# Patient Record
Sex: MALE | Race: WHITE | NOT HISPANIC OR LATINO | URBAN - METROPOLITAN AREA
[De-identification: names, ages, dates, MRNs, and addresses within clinical notes are randomized per-mention and may not be internally consistent; named-entity substitution may affect disease eponyms.]

---

## 2017-05-31 ENCOUNTER — EMERGENCY (EMERGENCY)
Facility: HOSPITAL | Age: 37
LOS: 1 days | Discharge: HOME | End: 2017-05-31
Attending: EMERGENCY MEDICINE | Admitting: EMERGENCY MEDICINE
Payer: MEDICAID

## 2017-05-31 VITALS
RESPIRATION RATE: 18 BRPM | TEMPERATURE: 98 F | SYSTOLIC BLOOD PRESSURE: 156 MMHG | DIASTOLIC BLOOD PRESSURE: 82 MMHG | HEART RATE: 87 BPM | OXYGEN SATURATION: 99 %

## 2017-05-31 PROCEDURE — 99284 EMERGENCY DEPT VISIT MOD MDM: CPT

## 2017-05-31 RX ORDER — KETOROLAC TROMETHAMINE 30 MG/ML
30 SYRINGE (ML) INJECTION ONCE
Qty: 0 | Refills: 0 | Status: DISCONTINUED | OUTPATIENT
Start: 2017-05-31 | End: 2017-05-31

## 2017-05-31 RX ORDER — CYCLOBENZAPRINE HYDROCHLORIDE 10 MG/1
10 TABLET, FILM COATED ORAL ONCE
Qty: 0 | Refills: 0 | Status: COMPLETED | OUTPATIENT
Start: 2017-05-31 | End: 2017-05-31

## 2017-05-31 RX ORDER — CYCLOBENZAPRINE HYDROCHLORIDE 10 MG/1
1 TABLET, FILM COATED ORAL
Qty: 6 | Refills: 0 | OUTPATIENT
Start: 2017-05-31

## 2017-05-31 RX ORDER — KETOROLAC TROMETHAMINE 30 MG/ML
60 SYRINGE (ML) INJECTION ONCE
Qty: 0 | Refills: 0 | Status: DISCONTINUED | OUTPATIENT
Start: 2017-05-31 | End: 2017-05-31

## 2017-05-31 RX ADMIN — Medication 60 MILLIGRAM(S): at 15:03

## 2017-05-31 RX ADMIN — CYCLOBENZAPRINE HYDROCHLORIDE 10 MILLIGRAM(S): 10 TABLET, FILM COATED ORAL at 15:00

## 2017-05-31 NOTE — ED PROVIDER NOTE - NS ED MD SCRIBE ATTENDING SCRIBE SECTIONS
VITAL SIGNS( Pullset)/PHYSICAL EXAM/PAST MEDICAL/SURGICAL/SOCIAL HISTORY/HIV/REVIEW OF SYSTEMS/DISPOSITION/HISTORY OF PRESENT ILLNESS

## 2017-05-31 NOTE — ED PROVIDER NOTE - PLAN OF CARE
Rest, drink plenty of fluids.  Advance activity as tolerated.  Continue all previously prescribed medications as directed.  Follow up with your primary care physician in 48-72 hours- bring copies of your results.  Take Naproxen every 12 hours  as needed. Take medrol dose pack as directed. Take Flexeril three times a day as needed - Do not drink/drive/operate machinery while on this medication, it can make you drowsy.  Return to the Emergency Department for worsening or persistent symptoms OR ANY NEW OR CONCERNING SYMPTOMS.

## 2017-05-31 NOTE — ED PROVIDER NOTE - CARE PLAN
Principal Discharge DX:	Buttock pain  Instructions for follow-up, activity and diet:	Rest, drink plenty of fluids.  Advance activity as tolerated.  Continue all previously prescribed medications as directed.  Follow up with your primary care physician in 48-72 hours- bring copies of your results.  Take Naproxen every 12 hours  as needed. Take medrol dose pack as directed. Take Flexeril three times a day as needed - Do not drink/drive/operate machinery while on this medication, it can make you drowsy.  Return to the Emergency Department for worsening or persistent symptoms OR ANY NEW OR CONCERNING SYMPTOMS.

## 2017-05-31 NOTE — ED PROVIDER NOTE - OBJECTIVE STATEMENT
36y M with no significant PMHx presents to the ED with left buttock pain x 2.5 days. Pt states he was sleeping on a hard surface when he developed pain. Reports throbbing pain radiating down leg. Pt states he had pain at right buttock in the past after trauma but notes this pain is worse. Took Ibuprofen 2 hours ago. Ot states he is taking Methadone. Denies fever, numbness, tingling, weakness, bladder/bowel incontinence, or any other complaints. NKDA.

## 2017-05-31 NOTE — ED PROVIDER NOTE - PROGRESS NOTE DETAILS
JOSE ALBERTO Greco: discussed with eliezer barajasquesting naproxen, flexeril and medrol dose pack sent to [facility]. pt ambulating without difficulty. discussed return precautions. not to drive on flexeril. pt agrees and u understands plan. No signs of withdrawal. I performed the initial face to face bedside interview with this patient regarding history of present illness, review of symptoms and past medical, social and family history.  I completed an independent physical examination.  I was the initial provider who evaluated this patient.  The history, review of symptoms and examination was documented by the scribe in my presence and I attest to the accuracy of the documentation.  I have signed out the follow up of any pending tests (i.e. labs, radiological studies) to the PA.  I have discussed the patient’s plan of care and disposition with the PA.

## 2017-06-09 ENCOUNTER — INPATIENT (INPATIENT)
Facility: HOSPITAL | Age: 37
LOS: 13 days | Discharge: NURSING, REHAB OR LONG-TERM CARE | End: 2017-06-23
Attending: INTERNAL MEDICINE | Admitting: INTERNAL MEDICINE
Payer: MEDICAID

## 2017-06-09 VITALS
SYSTOLIC BLOOD PRESSURE: 129 MMHG | RESPIRATION RATE: 18 BRPM | DIASTOLIC BLOOD PRESSURE: 71 MMHG | HEART RATE: 103 BPM | TEMPERATURE: 98 F

## 2017-06-09 DIAGNOSIS — R74.0 NONSPECIFIC ELEVATION OF LEVELS OF TRANSAMINASE AND LACTIC ACID DEHYDROGENASE [LDH]: ICD-10-CM

## 2017-06-09 DIAGNOSIS — K92.2 GASTROINTESTINAL HEMORRHAGE, UNSPECIFIED: ICD-10-CM

## 2017-06-09 DIAGNOSIS — R22.2 LOCALIZED SWELLING, MASS AND LUMP, TRUNK: ICD-10-CM

## 2017-06-09 DIAGNOSIS — M25.552 PAIN IN LEFT HIP: ICD-10-CM

## 2017-06-09 DIAGNOSIS — K92.1 MELENA: ICD-10-CM

## 2017-06-09 DIAGNOSIS — F11.20 OPIOID DEPENDENCE, UNCOMPLICATED: ICD-10-CM

## 2017-06-09 DIAGNOSIS — Z29.9 ENCOUNTER FOR PROPHYLACTIC MEASURES, UNSPECIFIED: ICD-10-CM

## 2017-06-09 DIAGNOSIS — M54.9 DORSALGIA, UNSPECIFIED: ICD-10-CM

## 2017-06-09 DIAGNOSIS — D50.0 IRON DEFICIENCY ANEMIA SECONDARY TO BLOOD LOSS (CHRONIC): ICD-10-CM

## 2017-06-09 DIAGNOSIS — R65.10 SYSTEMIC INFLAMMATORY RESPONSE SYNDROME (SIRS) OF NON-INFECTIOUS ORIGIN WITHOUT ACUTE ORGAN DYSFUNCTION: ICD-10-CM

## 2017-06-09 LAB
ALBUMIN SERPL ELPH-MCNC: 3.3 G/DL — SIGNIFICANT CHANGE UP (ref 3.3–5)
ALP SERPL-CCNC: 100 U/L — SIGNIFICANT CHANGE UP (ref 40–120)
ALT FLD-CCNC: 116 U/L — HIGH (ref 4–41)
AMPHET UR-MCNC: NEGATIVE — SIGNIFICANT CHANGE UP
ANISOCYTOSIS BLD QL: SLIGHT — SIGNIFICANT CHANGE UP
APAP SERPL-MCNC: < 15 UG/ML — LOW (ref 15–25)
APPEARANCE UR: CLEAR — SIGNIFICANT CHANGE UP
APTT BLD: 33.1 SEC — SIGNIFICANT CHANGE UP (ref 27.5–37.4)
AST SERPL-CCNC: 88 U/L — HIGH (ref 4–40)
BARBITURATES MEASUREMENT: NEGATIVE — SIGNIFICANT CHANGE UP
BARBITURATES UR SCN-MCNC: NEGATIVE — SIGNIFICANT CHANGE UP
BASE EXCESS BLDV CALC-SCNC: 1.8 MMOL/L — SIGNIFICANT CHANGE UP
BASOPHILS # BLD AUTO: 0.05 K/UL — SIGNIFICANT CHANGE UP (ref 0–0.2)
BASOPHILS NFR BLD AUTO: 0.2 % — SIGNIFICANT CHANGE UP (ref 0–2)
BASOPHILS NFR SPEC: 0 % — SIGNIFICANT CHANGE UP (ref 0–2)
BENZODIAZ SERPL-MCNC: NEGATIVE — SIGNIFICANT CHANGE UP
BENZODIAZ UR-MCNC: NEGATIVE — SIGNIFICANT CHANGE UP
BILIRUB SERPL-MCNC: 0.2 MG/DL — SIGNIFICANT CHANGE UP (ref 0.2–1.2)
BILIRUB UR-MCNC: NEGATIVE — SIGNIFICANT CHANGE UP
BLASTS # FLD: 0 % — SIGNIFICANT CHANGE UP (ref 0–0)
BLD GP AB SCN SERPL QL: NEGATIVE — SIGNIFICANT CHANGE UP
BLOOD GAS VENOUS - CREATININE: 0.83 MG/DL — SIGNIFICANT CHANGE UP (ref 0.5–1.3)
BLOOD UR QL VISUAL: NEGATIVE — SIGNIFICANT CHANGE UP
BUN SERPL-MCNC: 19 MG/DL — SIGNIFICANT CHANGE UP (ref 7–23)
CALCIUM SERPL-MCNC: 8.9 MG/DL — SIGNIFICANT CHANGE UP (ref 8.4–10.5)
CANNABINOIDS UR-MCNC: POSITIVE — SIGNIFICANT CHANGE UP
CHLORIDE BLDV-SCNC: 96 MMOL/L — SIGNIFICANT CHANGE UP (ref 96–108)
CHLORIDE SERPL-SCNC: 92 MMOL/L — LOW (ref 98–107)
CK MB BLD-MCNC: 1.33 NG/ML — SIGNIFICANT CHANGE UP (ref 1–6.6)
CK MB BLD-MCNC: SIGNIFICANT CHANGE UP (ref 0–2.5)
CK SERPL-CCNC: 97 U/L — SIGNIFICANT CHANGE UP (ref 30–200)
CO2 SERPL-SCNC: 25 MMOL/L — SIGNIFICANT CHANGE UP (ref 22–31)
COCAINE METAB.OTHER UR-MCNC: NEGATIVE — SIGNIFICANT CHANGE UP
COLOR SPEC: YELLOW — SIGNIFICANT CHANGE UP
CREAT SERPL-MCNC: 0.84 MG/DL — SIGNIFICANT CHANGE UP (ref 0.5–1.3)
CRP SERPL-MCNC: 261.7 MG/L — HIGH (ref 0.3–5)
EOSINOPHIL # BLD AUTO: 0.03 K/UL — SIGNIFICANT CHANGE UP (ref 0–0.5)
EOSINOPHIL NFR BLD AUTO: 0.1 % — SIGNIFICANT CHANGE UP (ref 0–6)
EOSINOPHIL NFR FLD: 0 % — SIGNIFICANT CHANGE UP (ref 0–6)
ERYTHROCYTE [SEDIMENTATION RATE] IN BLOOD: > 140 MM/HR — HIGH (ref 1–15)
ETHANOL BLD-MCNC: < 10 MG/DL — SIGNIFICANT CHANGE UP
FERRITIN SERPL-MCNC: 1201 NG/ML — HIGH (ref 30–400)
GAS PNL BLDV: 129 MMOL/L — LOW (ref 136–146)
GIANT PLATELETS BLD QL SMEAR: PRESENT — SIGNIFICANT CHANGE UP
GLUCOSE BLDV-MCNC: 106 — HIGH (ref 70–99)
GLUCOSE SERPL-MCNC: 104 MG/DL — HIGH (ref 70–99)
GLUCOSE UR-MCNC: NEGATIVE — SIGNIFICANT CHANGE UP
HCO3 BLDV-SCNC: 25 MMOL/L — SIGNIFICANT CHANGE UP (ref 20–27)
HCT VFR BLD CALC: 17.5 % — CRITICAL LOW (ref 39–50)
HCT VFR BLD CALC: 23.2 % — LOW (ref 39–50)
HCT VFR BLDV CALC: 23.9 % — LOW (ref 39–51)
HGB BLD-MCNC: 5.6 G/DL — CRITICAL LOW (ref 13–17)
HGB BLD-MCNC: 7.5 G/DL — LOW (ref 13–17)
HGB BLDV-MCNC: 7.7 G/DL — LOW (ref 13–17)
HYPOCHROMIA BLD QL: SIGNIFICANT CHANGE UP
IMM GRANULOCYTES NFR BLD AUTO: 3.8 % — HIGH (ref 0–1.5)
INR BLD: 1.33 — HIGH (ref 0.88–1.17)
IRON SATN MFR SERPL: 15 UG/DL — LOW (ref 45–165)
IRON SATN MFR SERPL: 232 UG/DL — SIGNIFICANT CHANGE UP (ref 155–535)
KETONES UR-MCNC: NEGATIVE — SIGNIFICANT CHANGE UP
LACTATE BLDV-MCNC: 1.3 MMOL/L — SIGNIFICANT CHANGE UP (ref 0.5–2)
LEUKOCYTE ESTERASE UR-ACNC: NEGATIVE — SIGNIFICANT CHANGE UP
LYMPHOCYTES # BLD AUTO: 2.67 K/UL — SIGNIFICANT CHANGE UP (ref 1–3.3)
LYMPHOCYTES # BLD AUTO: 8.9 % — LOW (ref 13–44)
LYMPHOCYTES NFR SPEC AUTO: 7.9 % — LOW (ref 13–44)
MACROCYTES BLD QL: SLIGHT — SIGNIFICANT CHANGE UP
MCHC RBC-ENTMCNC: 28.9 PG — SIGNIFICANT CHANGE UP (ref 27–34)
MCHC RBC-ENTMCNC: 29.5 PG — SIGNIFICANT CHANGE UP (ref 27–34)
MCHC RBC-ENTMCNC: 32 % — SIGNIFICANT CHANGE UP (ref 32–36)
MCHC RBC-ENTMCNC: 32.3 % — SIGNIFICANT CHANGE UP (ref 32–36)
MCV RBC AUTO: 90.2 FL — SIGNIFICANT CHANGE UP (ref 80–100)
MCV RBC AUTO: 91.3 FL — SIGNIFICANT CHANGE UP (ref 80–100)
METAMYELOCYTES # FLD: 0.9 % — SIGNIFICANT CHANGE UP (ref 0–1)
METHADONE UR-MCNC: POSITIVE — SIGNIFICANT CHANGE UP
MONOCYTES # BLD AUTO: 1.93 K/UL — HIGH (ref 0–0.9)
MONOCYTES NFR BLD AUTO: 6.4 % — SIGNIFICANT CHANGE UP (ref 2–14)
MONOCYTES NFR BLD: 2.6 % — SIGNIFICANT CHANGE UP (ref 2–9)
MYELOCYTES NFR BLD: 1.8 % — HIGH (ref 0–0)
NEUTROPHIL AB SER-ACNC: 83.3 % — HIGH (ref 43–77)
NEUTROPHILS # BLD AUTO: 24.1 K/UL — HIGH (ref 1.8–7.4)
NEUTROPHILS NFR BLD AUTO: 80.6 % — HIGH (ref 43–77)
NEUTS BAND # BLD: 3.5 % — SIGNIFICANT CHANGE UP (ref 0–6)
NITRITE UR-MCNC: NEGATIVE — SIGNIFICANT CHANGE UP
NON-SQ EPI CELLS # UR AUTO: <1 — SIGNIFICANT CHANGE UP
NT-PROBNP SERPL-SCNC: 10.31 PG/ML — SIGNIFICANT CHANGE UP
OB PNL STL: POSITIVE — SIGNIFICANT CHANGE UP
OPIATES UR-MCNC: NEGATIVE — SIGNIFICANT CHANGE UP
OTHER - HEMATOLOGY %: 0 — SIGNIFICANT CHANGE UP
OXYCODONE UR-MCNC: NEGATIVE — SIGNIFICANT CHANGE UP
PCO2 BLDV: 50 MMHG — SIGNIFICANT CHANGE UP (ref 41–51)
PCP UR-MCNC: NEGATIVE — SIGNIFICANT CHANGE UP
PH BLDV: 7.35 PH — SIGNIFICANT CHANGE UP (ref 7.32–7.43)
PH UR: 6 — SIGNIFICANT CHANGE UP (ref 4.6–8)
PLATELET # BLD AUTO: 430 K/UL — HIGH (ref 150–400)
PLATELET # BLD AUTO: 541 K/UL — HIGH (ref 150–400)
PLATELET COUNT - ESTIMATE: SIGNIFICANT CHANGE UP
PMV BLD: 9 FL — SIGNIFICANT CHANGE UP (ref 7–13)
PMV BLD: 9.3 FL — SIGNIFICANT CHANGE UP (ref 7–13)
PO2 BLDV: < 24 MMHG — LOW (ref 35–40)
POTASSIUM BLDV-SCNC: 3.7 MMOL/L — SIGNIFICANT CHANGE UP (ref 3.4–4.5)
POTASSIUM SERPL-MCNC: 4 MMOL/L — SIGNIFICANT CHANGE UP (ref 3.5–5.3)
POTASSIUM SERPL-SCNC: 4 MMOL/L — SIGNIFICANT CHANGE UP (ref 3.5–5.3)
PROMYELOCYTES # FLD: 0 % — SIGNIFICANT CHANGE UP (ref 0–0)
PROT SERPL-MCNC: 7.9 G/DL — SIGNIFICANT CHANGE UP (ref 6–8.3)
PROT UR-MCNC: 30 — SIGNIFICANT CHANGE UP
PROTHROM AB SERPL-ACNC: 15 SEC — HIGH (ref 9.8–13.1)
RBC # BLD: 1.94 M/UL — LOW (ref 4.2–5.8)
RBC # BLD: 2.54 M/UL — LOW (ref 4.2–5.8)
RBC # FLD: 13.4 % — SIGNIFICANT CHANGE UP (ref 10.3–14.5)
RBC # FLD: 13.5 % — SIGNIFICANT CHANGE UP (ref 10.3–14.5)
RBC CASTS # UR COMP ASSIST: SIGNIFICANT CHANGE UP (ref 0–?)
RETICS #: 58.3 10X3/UL — SIGNIFICANT CHANGE UP (ref 17–73)
RETICS/RBC NFR: 3.1 % — HIGH (ref 0.5–2.5)
RH IG SCN BLD-IMP: POSITIVE — SIGNIFICANT CHANGE UP
RH IG SCN BLD-IMP: POSITIVE — SIGNIFICANT CHANGE UP
SALICYLATES SERPL-MCNC: < 5 MG/DL — LOW (ref 15–30)
SAO2 % BLDV: 16.3 % — LOW (ref 60–85)
SODIUM SERPL-SCNC: 133 MMOL/L — LOW (ref 135–145)
SP GR SPEC: > 1.04 — HIGH (ref 1–1.03)
SQUAMOUS # UR AUTO: SIGNIFICANT CHANGE UP
TROPONIN T SERPL-MCNC: < 0.06 NG/ML — SIGNIFICANT CHANGE UP (ref 0–0.06)
UIBC SERPL-MCNC: 217 UG/DL — SIGNIFICANT CHANGE UP (ref 110–370)
UROBILINOGEN FLD QL: NORMAL E.U. — SIGNIFICANT CHANGE UP (ref 0.1–0.2)
VARIANT LYMPHS # BLD: 0 % — SIGNIFICANT CHANGE UP
WBC # BLD: 22.9 K/UL — HIGH (ref 3.8–10.5)
WBC # BLD: 29.93 K/UL — HIGH (ref 3.8–10.5)
WBC # FLD AUTO: 22.9 K/UL — HIGH (ref 3.8–10.5)
WBC # FLD AUTO: 29.93 K/UL — HIGH (ref 3.8–10.5)
WBC UR QL: HIGH (ref 0–?)

## 2017-06-09 PROCEDURE — 71260 CT THORAX DX C+: CPT | Mod: 26

## 2017-06-09 PROCEDURE — 71020: CPT | Mod: 26

## 2017-06-09 RX ORDER — CEFEPIME 1 G/1
INJECTION, POWDER, FOR SOLUTION INTRAMUSCULAR; INTRAVENOUS
Qty: 0 | Refills: 0 | Status: DISCONTINUED | OUTPATIENT
Start: 2017-06-09 | End: 2017-06-10

## 2017-06-09 RX ORDER — TRAMADOL HYDROCHLORIDE 50 MG/1
25 TABLET ORAL ONCE
Qty: 0 | Refills: 0 | Status: DISCONTINUED | OUTPATIENT
Start: 2017-06-09 | End: 2017-06-09

## 2017-06-09 RX ORDER — ACETAMINOPHEN 500 MG
650 TABLET ORAL ONCE
Qty: 0 | Refills: 0 | Status: COMPLETED | OUTPATIENT
Start: 2017-06-09 | End: 2017-06-09

## 2017-06-09 RX ORDER — ACETAMINOPHEN 500 MG
650 TABLET ORAL EVERY 6 HOURS
Qty: 0 | Refills: 0 | Status: DISCONTINUED | OUTPATIENT
Start: 2017-06-09 | End: 2017-06-23

## 2017-06-09 RX ORDER — HYDROMORPHONE HYDROCHLORIDE 2 MG/ML
2 INJECTION INTRAMUSCULAR; INTRAVENOUS; SUBCUTANEOUS ONCE
Qty: 0 | Refills: 0 | Status: DISCONTINUED | OUTPATIENT
Start: 2017-06-09 | End: 2017-06-09

## 2017-06-09 RX ORDER — HYDROMORPHONE HYDROCHLORIDE 2 MG/ML
1 INJECTION INTRAMUSCULAR; INTRAVENOUS; SUBCUTANEOUS EVERY 4 HOURS
Qty: 0 | Refills: 0 | Status: DISCONTINUED | OUTPATIENT
Start: 2017-06-09 | End: 2017-06-10

## 2017-06-09 RX ORDER — PANTOPRAZOLE SODIUM 20 MG/1
8 TABLET, DELAYED RELEASE ORAL
Qty: 80 | Refills: 0 | Status: DISCONTINUED | OUTPATIENT
Start: 2017-06-09 | End: 2017-06-12

## 2017-06-09 RX ORDER — CEFEPIME 1 G/1
2000 INJECTION, POWDER, FOR SOLUTION INTRAMUSCULAR; INTRAVENOUS ONCE
Qty: 0 | Refills: 0 | Status: COMPLETED | OUTPATIENT
Start: 2017-06-09 | End: 2017-06-09

## 2017-06-09 RX ORDER — KETOROLAC TROMETHAMINE 30 MG/ML
15 SYRINGE (ML) INJECTION ONCE
Qty: 0 | Refills: 0 | Status: DISCONTINUED | OUTPATIENT
Start: 2017-06-09 | End: 2017-06-09

## 2017-06-09 RX ORDER — PIPERACILLIN AND TAZOBACTAM 4; .5 G/20ML; G/20ML
3.38 INJECTION, POWDER, LYOPHILIZED, FOR SOLUTION INTRAVENOUS EVERY 8 HOURS
Qty: 0 | Refills: 0 | Status: DISCONTINUED | OUTPATIENT
Start: 2017-06-09 | End: 2017-06-09

## 2017-06-09 RX ORDER — PANTOPRAZOLE SODIUM 20 MG/1
40 TABLET, DELAYED RELEASE ORAL
Qty: 0 | Refills: 0 | Status: DISCONTINUED | OUTPATIENT
Start: 2017-06-09 | End: 2017-06-09

## 2017-06-09 RX ORDER — PIPERACILLIN AND TAZOBACTAM 4; .5 G/20ML; G/20ML
3.38 INJECTION, POWDER, LYOPHILIZED, FOR SOLUTION INTRAVENOUS ONCE
Qty: 0 | Refills: 0 | Status: COMPLETED | OUTPATIENT
Start: 2017-06-09 | End: 2017-06-09

## 2017-06-09 RX ORDER — SODIUM CHLORIDE 9 MG/ML
1000 INJECTION INTRAMUSCULAR; INTRAVENOUS; SUBCUTANEOUS ONCE
Qty: 0 | Refills: 0 | Status: COMPLETED | OUTPATIENT
Start: 2017-06-09 | End: 2017-06-09

## 2017-06-09 RX ORDER — VANCOMYCIN HCL 1 G
1250 VIAL (EA) INTRAVENOUS EVERY 12 HOURS
Qty: 0 | Refills: 0 | Status: DISCONTINUED | OUTPATIENT
Start: 2017-06-10 | End: 2017-06-11

## 2017-06-09 RX ORDER — SODIUM CHLORIDE 9 MG/ML
1000 INJECTION INTRAMUSCULAR; INTRAVENOUS; SUBCUTANEOUS
Qty: 0 | Refills: 0 | Status: DISCONTINUED | OUTPATIENT
Start: 2017-06-09 | End: 2017-06-23

## 2017-06-09 RX ORDER — MIRTAZAPINE 45 MG/1
45 TABLET, ORALLY DISINTEGRATING ORAL ONCE
Qty: 0 | Refills: 0 | Status: COMPLETED | OUTPATIENT
Start: 2017-06-09 | End: 2017-06-10

## 2017-06-09 RX ORDER — HYDROMORPHONE HYDROCHLORIDE 2 MG/ML
1 INJECTION INTRAMUSCULAR; INTRAVENOUS; SUBCUTANEOUS ONCE
Qty: 0 | Refills: 0 | Status: DISCONTINUED | OUTPATIENT
Start: 2017-06-09 | End: 2017-06-09

## 2017-06-09 RX ORDER — CEFEPIME 1 G/1
2000 INJECTION, POWDER, FOR SOLUTION INTRAMUSCULAR; INTRAVENOUS EVERY 8 HOURS
Qty: 0 | Refills: 0 | Status: DISCONTINUED | OUTPATIENT
Start: 2017-06-10 | End: 2017-06-10

## 2017-06-09 RX ORDER — VANCOMYCIN HCL 1 G
1000 VIAL (EA) INTRAVENOUS ONCE
Qty: 0 | Refills: 0 | Status: COMPLETED | OUTPATIENT
Start: 2017-06-09 | End: 2017-06-09

## 2017-06-09 RX ADMIN — SODIUM CHLORIDE 100 MILLILITER(S): 9 INJECTION INTRAMUSCULAR; INTRAVENOUS; SUBCUTANEOUS at 18:50

## 2017-06-09 RX ADMIN — PIPERACILLIN AND TAZOBACTAM 200 GRAM(S): 4; .5 INJECTION, POWDER, LYOPHILIZED, FOR SOLUTION INTRAVENOUS at 17:12

## 2017-06-09 RX ADMIN — HYDROMORPHONE HYDROCHLORIDE 1 MILLIGRAM(S): 2 INJECTION INTRAMUSCULAR; INTRAVENOUS; SUBCUTANEOUS at 14:55

## 2017-06-09 RX ADMIN — Medication 250 MILLIGRAM(S): at 15:06

## 2017-06-09 RX ADMIN — CEFEPIME 100 MILLIGRAM(S): 1 INJECTION, POWDER, FOR SOLUTION INTRAMUSCULAR; INTRAVENOUS at 18:50

## 2017-06-09 RX ADMIN — Medication 650 MILLIGRAM(S): at 15:40

## 2017-06-09 RX ADMIN — Medication 15 MILLIGRAM(S): at 14:55

## 2017-06-09 RX ADMIN — HYDROMORPHONE HYDROCHLORIDE 2 MILLIGRAM(S): 2 INJECTION INTRAMUSCULAR; INTRAVENOUS; SUBCUTANEOUS at 19:40

## 2017-06-09 RX ADMIN — TRAMADOL HYDROCHLORIDE 25 MILLIGRAM(S): 50 TABLET ORAL at 22:51

## 2017-06-09 RX ADMIN — SODIUM CHLORIDE 1000 MILLILITER(S): 9 INJECTION INTRAMUSCULAR; INTRAVENOUS; SUBCUTANEOUS at 12:52

## 2017-06-09 RX ADMIN — HYDROMORPHONE HYDROCHLORIDE 2 MILLIGRAM(S): 2 INJECTION INTRAMUSCULAR; INTRAVENOUS; SUBCUTANEOUS at 20:05

## 2017-06-09 RX ADMIN — PANTOPRAZOLE SODIUM 10 MG/HR: 20 TABLET, DELAYED RELEASE ORAL at 21:00

## 2017-06-09 RX ADMIN — TRAMADOL HYDROCHLORIDE 25 MILLIGRAM(S): 50 TABLET ORAL at 22:13

## 2017-06-09 RX ADMIN — HYDROMORPHONE HYDROCHLORIDE 1 MILLIGRAM(S): 2 INJECTION INTRAMUSCULAR; INTRAVENOUS; SUBCUTANEOUS at 14:40

## 2017-06-09 RX ADMIN — PANTOPRAZOLE SODIUM 40 MILLIGRAM(S): 20 TABLET, DELAYED RELEASE ORAL at 17:13

## 2017-06-09 RX ADMIN — HYDROMORPHONE HYDROCHLORIDE 1 MILLIGRAM(S): 2 INJECTION INTRAMUSCULAR; INTRAVENOUS; SUBCUTANEOUS at 23:52

## 2017-06-09 RX ADMIN — SODIUM CHLORIDE 2000 MILLILITER(S): 9 INJECTION INTRAMUSCULAR; INTRAVENOUS; SUBCUTANEOUS at 17:12

## 2017-06-09 RX ADMIN — HYDROMORPHONE HYDROCHLORIDE 1 MILLIGRAM(S): 2 INJECTION INTRAMUSCULAR; INTRAVENOUS; SUBCUTANEOUS at 23:24

## 2017-06-09 RX ADMIN — Medication 15 MILLIGRAM(S): at 14:40

## 2017-06-09 NOTE — ED ADULT TRIAGE NOTE - CHIEF COMPLAINT QUOTE
Pt seen here may 31--for sciatic pain--which pt states is getting worse. Pt states its in his hip area and when he moves its very painfull- went to pmd and was told he has mass on the outside of his chest that needs to be checked. Pt has raised area on upper lt chest

## 2017-06-09 NOTE — H&P ADULT - NSHPLABSRESULTS_GEN_ALL_CORE
7.5    29.93 )-----------( 541      ( 09 Jun 2017 12:20 )             23.2     06-09    133<L>  |  92<L>  |  19  ----------------------------<  104<H>  4.0   |  25  |  0.84    Ca    8.9      09 Jun 2017 12:20    TPro  7.9  /  Alb  3.3  /  TBili  0.2  /  DBili  x   /  AST  88<H>  /  ALT  116<H>  /  AlkPhos  100  06-09      PT/INR - ( 09 Jun 2017 12:20 )   PT: 15.0 SEC;   INR: 1.33          PTT - ( 09 Jun 2017 12:20 )  PTT:33.1 SEC

## 2017-06-09 NOTE — ED ADULT NURSE NOTE - OBJECTIVE STATEMENT
patient alert ox3 came in c/o low back pain and mass to anterior chest since few days. denies any injury. skin warm and dry, breathing even and unlabored. labs done as ordered. awaiting results and re eval .mass noted to top of the sternum palpable with pain.

## 2017-06-09 NOTE — CONSULT NOTE ADULT - ASSESSMENT
35 yo man with L hip pain, high fever and leukocytosis concerning for infecious process - possibly septic joint vs osteomyelitis - patient also with melena x1 in the setting of high dose NSAID use concerning for PUD bleeding.

## 2017-06-09 NOTE — ED PROVIDER NOTE - OBJECTIVE STATEMENT
36yoM psych hx, taking methadone, diverticulitis in the past, seen 1.5 wks ago for left sided back pain pw with worsening left sided back pain, starting from left buttocks radiating to left groin associated with numbness and tingling in bilateral feet as well as bilateral hands per pt. pain is so bad, pt cannot get out of bed at times and has trouble using the bathroom, described as "would rather on himself than use the bathroom". pt also endorsing subjective fevers, chills and sweats mostly at night for past 5-6 days, worsening yesterday. PT also has bump that has developed on his left anterior chest wall that is painful to touch with no redness. slowly increasing in size per pt. ALSO pt has episode of large, black, thick, stool yesterday x1 after not moving his bowel for approx 1 week. endorsing some fatigue and sob with exertion. prior to back pain, pt was able to walk all day, now he can only walk about  feet before coming out of breath. denies exertional chest pain, abd pain, nausea, vomiting, diarrhea, bowel incontinence. + history of IV drug use (aprox 20 years ago), + family history of colon ca. pt endorses good compliance with his methadone has not missed doses.

## 2017-06-09 NOTE — CONSULT NOTE ADULT - PROBLEM SELECTOR RECOMMENDATION 2
- suspect UGIB likely 2/2 PUD from NSAID use  - serial CBC (Q8 hours) with transfusion to hgb goal > 7  - PPI gtt  - NPO after midnight for possible EGD - suspect UGIB likely 2/2 PUD from NSAID use  - serial CBC (Q8 hours) with transfusion to hgb goal > 7  - PPI gtt  - NPO after midnight for possible EGD  - discussed with GI attending regarding imaging of the abd (CT abd) if obtaining hip imaging to ensure no perforated ulcer - suspect UGIB likely 2/2 PUD from NSAID use  - serial CBC (Q8 hours) with transfusion to hgb goal > 7  - PPI gtt  - NPO after midnight for possible EGD depending on response to transfusions  - discussed with GI attending regarding imaging of the abd (CT abd) if obtaining hip imaging to ensure no perforated ulcer

## 2017-06-09 NOTE — H&P ADULT - PROBLEM SELECTOR PLAN 4
Acute blood loss anemia given Occult blood + and normal Hg in 12/2016  Endorses hx of diverticulitis-No abdominal pain reported  May have a slow GIB vs Occult GI malignancy given fam Hx  Will call GI. Protonix IV 40 bid  Type and scree. Goal Hg>7

## 2017-06-09 NOTE — ED PROVIDER NOTE - ATTENDING CONTRIBUTION TO CARE
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 Pt was seen in the ED and discharged home and now he has fever, chills and worsening pain with a new chest mass. Pt also complains of black tarry stool, feels very tired and weak    pt is alert, unkept ill appearing male, s1s2 normal reg, b/l clear breathe sounds, 3 cm x 2 cm mass in central chest with tenderness to palpation, fluctuance  but no redness, mass is parasternal on left side. Mottling of b/l lower legs noted and diffuse poor skin turgor, full rom at joints, pain rom at left hip but no rash seen.    ddx: infectious or bleeding etiology leading to vaso occlusion, concerns for chest mass

## 2017-06-09 NOTE — H&P ADULT - PROBLEM SELECTOR PLAN 2
TTP in left Posteriro superior illiac spine and gluteal region. Concern for Osteomyelitis  Severely elevated ESR and CRP. Hx of IV drug abuse  Will check MRI pelvis and hip to r/o Osteo  Pain control with Dilaudid

## 2017-06-09 NOTE — H&P ADULT - PROBLEM SELECTOR PLAN 7
SCD for DVT ppx  Protonix for GI ppx   Clear liquid diet for now Soft tissues mass in midline chest not visualized on CT   Will obtain Ultrasound

## 2017-06-09 NOTE — CONSULT NOTE ADULT - SUBJECTIVE AND OBJECTIVE BOX
Chief Complaint:  Patient is a 36y old  Male who presents with a chief complaint of Left sided hip/leg pain with fevers (2017 16:06)      HPI: 37 yo man with depression/anxiety, remote history of prescription drug abuse currently on methadone presents to [facility] for severe left hip pain x1-2 weeks with concurrent fevers. He denies any recent IVDU or IV needle exposures. He denies any open skin wounds. He has been taking 1200 mg ibuprofen twice daily and tylenol for his hip pain. Yesterday the patient had a single melenic BM in the AM that was tarry and thick. No abd pain. No nausea or vomiting. No dyspepsia. No hematochezia or hematemesis.     Allergies:  No Known Allergies      Home Medications:    Hospital Medications:  acetaminophen   Tablet 650milliGRAM(s) Oral every 6 hours PRN  HYDROmorphone  Injectable 1milliGRAM(s) IV Push every 4 hours PRN  cefepime  IVPB  IV Intermittent   sodium chloride 0.9%. 1000milliLiter(s) IV Continuous <Continuous>  pantoprazole Infusion 8mG/Hr IV Continuous <Continuous>      PMHX/PSHX:  Depression  No significant past surgical history      Family history:  Family history of stomach cancer (Mother)      Social History:     ROS:     General:  (+) fevers, chills, night sweats, fatigue   Eyes:  Good vision, no reported pain  ENT:  No sore throat, pain, runny nose  CV:  No chest pain, SOB, palpitations, orthopnea  Resp:  No cough, SOB, wheezing  GI:  See HPI  :  No pain, bleeding, incontinence, nocturia  Muscle:  severe left hip pain and sternal chest wall pain with lump  Neuro:  No weakness, tingling, memory problems  Psych:  No fatigue, insomnia, mood problems  Endocrine:  No cold/heat intolerance  Heme:  No petechiae, ecchymosis, easy bruising  Skin:  No rash, edema      PHYSICAL EXAM:     GENERAL: appears uncomfortable in bed  HEENT: sclera anicteric  CHEST: CTAB  HEART:  RRR, no edema  ABDOMEN:  Soft, non-tender, non-distended, no masses, no hepatosplenomegaly  RECTAL: no stool in rectal vault  EXTREMITIES:  no cyanosis, or edema  SKIN:  No rash  NEURO:  Alert, orientedx3     Vital Signs:  Vital Signs Last 24 Hrs  T(C): 36.7, Max: 39.8 ( @ 15:15)  T(F): 98, Max: 103.6 ( @ 15:15)  HR: 100 (100 - 110)  BP: 144/84 (126/68 - 149/76)  BP(mean): --  RR: 16 (16 - 19)  SpO2: 98% (97% - 100%)  Daily Height in cm: 170.18 (2017 19:48)    Daily Weight in k.2 (2017 19:48)    LABS:                        5.6    22.90 )-----------( 430      ( 2017 18:34 )             17.5         133<L>  |  92<L>  |  19  ----------------------------<  104<H>  4.0   |  25  |  0.84    Ca    8.9      2017 12:20    TPro  7.9  /  Alb  3.3  /  TBili  0.2  /  DBili  x   /  AST  88<H>  /  ALT  116<H>  /  AlkPhos  100      LIVER FUNCTIONS - ( 2017 12:20 )  Alb: 3.3 g/dL / Pro: 7.9 g/dL / ALK PHOS: 100 u/L / ALT: 116 u/L / AST: 88 u/L / GGT: x           PT/INR - ( 2017 12:20 )   PT: 15.0 SEC;   INR: 1.33          PTT - ( 2017 12:20 )  PTT:33.1 SEC  Urinalysis Basic - ( 2017 15:53 )    Color: YELLOW / Appearance: CLEAR / SG: > 1.040 / pH: 6.0  Gluc: NEGATIVE / Ketone: NEGATIVE  / Bili: NEGATIVE / Urobili: NORMAL E.U.   Blood: NEGATIVE / Protein: 30 / Nitrite: NEGATIVE   Leuk Esterase: NEGATIVE / RBC: 2-5 / WBC 5-10   Sq Epi: OCC / Non Sq Epi: x / Bacteria: x          Imaging:

## 2017-06-09 NOTE — H&P ADULT - PROBLEM SELECTOR PLAN 1
WBC>12 HR>90 and Temp>38  TTP in left PSIS and gluteal region. Concern for Osteo   Severely elevated ESR and CRP. Hx of IV drug abuse  Will check MRI pelvis and hip to r/o Osteo  Pain control with Dilaudid  UA shows spec gravity 1.04-will give more IVF  Cultures sent. CXR unremarkable WBC>12 HR>90 and Temp>38  TTP in left PSIS and gluteal region. Concern for Osteo   Severely elevated ESR and CRP. Hx of IV drug abuse  Will check MRI pelvis and hip to r/o Osteo  Pain control with Dilaudid  UA shows spec gravity 1.04-will give more IVF  Cultures sent. CXR unremarkable  Broad spectrum antibiotics for now  Will check urine toxicology.

## 2017-06-09 NOTE — H&P ADULT - NSHPPHYSICALEXAM_GEN_ALL_CORE
PHYSICAL EXAM:  GENERAL: NAD, well-developed  HEAD:  Atraumatic, Normocephalic  EYES: EOMI, PERRLA, conjunctiva and sclera clear  NECK: Supple, No JVD  CHEST/LUNG: Clear to auscultation bilaterally; No wheeze  HEART: Regular rate and rhythm; No murmurs, rubs, or gallops  ABDOMEN: Soft, Nontender, Nondistended; Bowel sounds present  EXTREMITIES:  2+ Peripheral Pulses, No clubbing, cyanosis, or edema  PSYCH: AAOx3  NEUROLOGY: non-focal  SKIN: No rashes or lesions PHYSICAL EXAM:  GENERAL: Moderate distress from left hip pain. Lying on right side   HEAD:  Atraumatic, Normocephalic  EYES: EOMI, PERRLA, conjunctiva and sclera clear. No icterus or pallor  NECK: Supple, No JVD  CHEST/LUNG: Clear to auscultation bilaterally; No wheeze  HEART: Tachycardic. Regular rhythm; Systolic murmur heard best at RUSB. No rubs or gallops  ABDOMEN: Soft, Nontender, Nondistended; Bowel sounds present. No organomegaly   EXTREMITIES:  2+ Peripheral Pulses, No clubbing, cyanosis, or edema. TTP on left posterior illiac spine and left buttocks. No fluctuance noted. No overlying rash. Spine non tender. Full ROM in spine. Limited in left lower extremity due to pain.   PSYCH: AAOx3  NEUROLOGY: non-focal. Reflexes 2+  SKIN: Anterior mid chest mass-TTP-No drainage or overlying point of entry. No fluctuance noted on left buttocks or left PSIS

## 2017-06-09 NOTE — ED PROVIDER NOTE - MEDICAL DECISION MAKING DETAILS
36yoM hx of iv drug use, psych hx, diverticulitis pw chills, sub fevers, chest mass, back pain, black tarry stools. concern for infectious etiology. labs, cxr, ekg, fluids, will reass for further workup.

## 2017-06-09 NOTE — CHART NOTE - NSCHARTNOTEFT_GEN_A_CORE
Night R1 Chart Note:    Called by GI for consideration of ortho consult to r/o septic joint. evaluated pt. He complained of continued L hip pain, not able to walk on it. Tachycardic but currently afebrile (103F in ED). Pt has limited range of motion with flexion/extension of L hip. No swelling or erythema but TTP superior gluteal/inferior L sacrum. Will consult ortho, obtain Xrays L hip and AP pelvis. iSTOP negative, will prescribe Tramadol, as pt states Dilaudid not working (Toradol worked better, he states but cannot prescribe in setting of melena). Night R1 Chart Note:    Called by GI for consideration of ortho consult to r/o septic joint. evaluated pt. He complained of continued L hip pain, not able to walk on it. Tachycardic but currently afebrile (103F in ED). Pt has limited range of motion with flexion/extension of L hip. No swelling or erythema but TTP superior gluteal/inferior L sacrum. Will consult ortho, obtain Xrays L hip and AP pelvis. iSTOP negative, will prescribe Tramadol, as pt states Dilaudid not working (Toradol worked better, he states but cannot prescribe in setting of melena).      Addendum:  Pt has had no bloody BMs since admission. 4U PRBCs ordered earlier in day. Will stop at 2U PRBCs, recheck Hb (along with all AM labs). Also noted by GI to be NPO after MN and start PPI gtt. Night R1 Chart Note:    Called by GI for consideration of ortho consult to r/o septic joint. evaluated pt. He complained of continued L hip pain, not able to walk on it. Tachycardic but currently afebrile (103F in ED). Pt has limited range of motion with flexion/extension of L hip. No swelling or erythema but TTP superior gluteal/inferior L sacrum. Will consult ortho, obtain Xrays L hip and AP pelvis. iSTOP negative, will prescribe Tramadol, as pt states Dilaudid not working (Toradol worked better, he states but cannot prescribe in setting of melena).      Addendum:  Pt has had no bloody BMs since admission. 4U PRBCs ordered earlier in day but will stop at 2U PRBCs, since noted to be transfusing to Hb > 7. Will recheck Hb (along with all AM labs) after 2U. Notified of low grade temps by RN, that started prior to blood transfusions, will continue to monitor but no need to stop transfusions early. Also noted by GI to be NPO after MN and start PPI gtt. Per ortho, unlikely septic joint; will f/u Xrays and also MRI - primary team may consider IR guided tap if signs of large effusion on MRI. Notified

## 2017-06-09 NOTE — H&P ADULT - PROBLEM SELECTOR PLAN 6
Soft tissues mass in midline chest not visualized on CT   Will obtain Ultrasound AST/ALT ~100. Differential includes 2/2 hypovolemia vs CASTREJON vs drug side effect  Will monitor

## 2017-06-09 NOTE — H&P ADULT - HISTORY OF PRESENT ILLNESS
Patient is a 36 years old male with PMH of IV drug abuse (last used 20 years ago) on methadone and depression who presents with acute onset of left sided hip and buttock pain of 7 days in duration. Patient presented to [facility] ED a week ago and was discharged with pain medications. He returned to the hospital today because of persistent and worsening pain. He is also endorsing fever, chills, and night sweats which have been present for the past week as well along with a lackluster appetite. No recent IV drug use. He [place] self [place] but denies any bug bites of rashes. Not sexually active and no recent trauma. He also endorses nausea but denies vomiting.    Patient also stated that he has a large black bowel movement yesterday but never had BRBPR. He does have intermittent constipation and has been told in the past that he has "diverticulitis."     Patient found to be febrile in .6 with . NML BP. Initial labs show WBC ~30, ESR >140, , HG 7.5 (previously normal), transaminitis with elevated INR 1.33. CT chest negative for soft tissue abnormality. Admitted to medicine for further management.

## 2017-06-09 NOTE — H&P ADULT - NSHPREVIEWOFSYSTEMS_GEN_ALL_CORE
Review of Systems:  Review of Systems: GEN: + FEVER, CHILLS, Night Sweats. no weight loss , no swollen lymph nodes    EYES: No blurry vision , no changes in vision  ENT: no sores, no runny nose, no sore throat   PULM: no cough, no shortness of breath   CARD: no palpitations. Tender mass midline chest     GI : no abdominal pain , + nausea, no vomiting  : no burning urination, no change in color of urine, no flank pain, no blood in urine   MSK: TTP left buttocks and hip as per HPI   SKIN: no bruising or bleeding, no sores in mouth  , no yellowing of the skin. No rashes  Neuro: no lightheadedness, no headaches, no weakness, no fainting, no confusion , no seizures

## 2017-06-10 DIAGNOSIS — M00.052 STAPHYLOCOCCAL ARTHRITIS, LEFT HIP: ICD-10-CM

## 2017-06-10 DIAGNOSIS — R78.81 BACTEREMIA: ICD-10-CM

## 2017-06-10 DIAGNOSIS — A49.8 OTHER BACTERIAL INFECTIONS OF UNSPECIFIED SITE: ICD-10-CM

## 2017-06-10 LAB
ALBUMIN SERPL ELPH-MCNC: 2.8 G/DL — LOW (ref 3.3–5)
ALP SERPL-CCNC: 96 U/L — SIGNIFICANT CHANGE UP (ref 40–120)
ALT FLD-CCNC: 87 U/L — HIGH (ref 4–41)
APTT BLD: 34.9 SEC — SIGNIFICANT CHANGE UP (ref 27.5–37.4)
AST SERPL-CCNC: 57 U/L — HIGH (ref 4–40)
BILIRUB SERPL-MCNC: 0.4 MG/DL — SIGNIFICANT CHANGE UP (ref 0.2–1.2)
BUN SERPL-MCNC: 10 MG/DL — SIGNIFICANT CHANGE UP (ref 7–23)
CALCIUM SERPL-MCNC: 8.6 MG/DL — SIGNIFICANT CHANGE UP (ref 8.4–10.5)
CHLORIDE SERPL-SCNC: 91 MMOL/L — LOW (ref 98–107)
CO2 SERPL-SCNC: 23 MMOL/L — SIGNIFICANT CHANGE UP (ref 22–31)
CREAT SERPL-MCNC: 0.58 MG/DL — SIGNIFICANT CHANGE UP (ref 0.5–1.3)
ERYTHROCYTE [SEDIMENTATION RATE] IN BLOOD: 111 MM/HR — HIGH (ref 1–15)
GLUCOSE SERPL-MCNC: 61 MG/DL — LOW (ref 70–99)
HCT VFR BLD CALC: 22 % — LOW (ref 39–50)
HCT VFR BLD CALC: 24 % — LOW (ref 39–50)
HCT VFR BLD CALC: 29.1 % — LOW (ref 39–50)
HGB BLD-MCNC: 7.3 G/DL — LOW (ref 13–17)
HGB BLD-MCNC: 8 G/DL — LOW (ref 13–17)
HGB BLD-MCNC: 9.6 G/DL — LOW (ref 13–17)
HIV1 AG SER QL: SIGNIFICANT CHANGE UP
HIV1+2 AB SPEC QL: SIGNIFICANT CHANGE UP
MAGNESIUM SERPL-MCNC: 2 MG/DL — SIGNIFICANT CHANGE UP (ref 1.6–2.6)
MANUAL SMEAR VERIFICATION: SIGNIFICANT CHANGE UP
MCHC RBC-ENTMCNC: 29.6 PG — SIGNIFICANT CHANGE UP (ref 27–34)
MCHC RBC-ENTMCNC: 29.7 PG — SIGNIFICANT CHANGE UP (ref 27–34)
MCHC RBC-ENTMCNC: 30.1 PG — SIGNIFICANT CHANGE UP (ref 27–34)
MCHC RBC-ENTMCNC: 33 % — SIGNIFICANT CHANGE UP (ref 32–36)
MCHC RBC-ENTMCNC: 33.2 % — SIGNIFICANT CHANGE UP (ref 32–36)
MCHC RBC-ENTMCNC: 33.3 % — SIGNIFICANT CHANGE UP (ref 32–36)
MCV RBC AUTO: 89.1 FL — SIGNIFICANT CHANGE UP (ref 80–100)
MCV RBC AUTO: 90.1 FL — SIGNIFICANT CHANGE UP (ref 80–100)
MCV RBC AUTO: 90.2 FL — SIGNIFICANT CHANGE UP (ref 80–100)
PHOSPHATE SERPL-MCNC: 3.5 MG/DL — SIGNIFICANT CHANGE UP (ref 2.5–4.5)
PLATELET # BLD AUTO: 485 K/UL — HIGH (ref 150–400)
PLATELET # BLD AUTO: 522 K/UL — HIGH (ref 150–400)
PLATELET # BLD AUTO: 522 K/UL — HIGH (ref 150–400)
PMV BLD: 8.6 FL — SIGNIFICANT CHANGE UP (ref 7–13)
PMV BLD: 9 FL — SIGNIFICANT CHANGE UP (ref 7–13)
PMV BLD: 9.3 FL — SIGNIFICANT CHANGE UP (ref 7–13)
POTASSIUM SERPL-MCNC: 3.5 MMOL/L — SIGNIFICANT CHANGE UP (ref 3.5–5.3)
POTASSIUM SERPL-SCNC: 3.5 MMOL/L — SIGNIFICANT CHANGE UP (ref 3.5–5.3)
PROT SERPL-MCNC: 7.2 G/DL — SIGNIFICANT CHANGE UP (ref 6–8.3)
RBC # BLD: 2.47 M/UL — LOW (ref 4.2–5.8)
RBC # BLD: 2.66 M/UL — LOW (ref 4.2–5.8)
RBC # BLD: 3.23 M/UL — LOW (ref 4.2–5.8)
RBC # FLD: 13.7 % — SIGNIFICANT CHANGE UP (ref 10.3–14.5)
RBC # FLD: 13.8 % — SIGNIFICANT CHANGE UP (ref 10.3–14.5)
RBC # FLD: 14 % — SIGNIFICANT CHANGE UP (ref 10.3–14.5)
SODIUM SERPL-SCNC: 133 MMOL/L — LOW (ref 135–145)
SPECIMEN SOURCE: SIGNIFICANT CHANGE UP
SPECIMEN SOURCE: SIGNIFICANT CHANGE UP
WBC # BLD: 23.55 K/UL — HIGH (ref 3.8–10.5)
WBC # BLD: 24.2 K/UL — HIGH (ref 3.8–10.5)
WBC # BLD: 24.24 K/UL — HIGH (ref 3.8–10.5)
WBC # FLD AUTO: 23.55 K/UL — HIGH (ref 3.8–10.5)
WBC # FLD AUTO: 24.2 K/UL — HIGH (ref 3.8–10.5)
WBC # FLD AUTO: 24.24 K/UL — HIGH (ref 3.8–10.5)

## 2017-06-10 PROCEDURE — 99222 1ST HOSP IP/OBS MODERATE 55: CPT | Mod: GC

## 2017-06-10 PROCEDURE — 73502 X-RAY EXAM HIP UNI 2-3 VIEWS: CPT | Mod: 26,LT

## 2017-06-10 PROCEDURE — 99223 1ST HOSP IP/OBS HIGH 75: CPT | Mod: GC

## 2017-06-10 RX ORDER — CEFAZOLIN SODIUM 1 G
VIAL (EA) INJECTION
Qty: 0 | Refills: 0 | Status: DISCONTINUED | OUTPATIENT
Start: 2017-06-10 | End: 2017-06-23

## 2017-06-10 RX ORDER — MIRTAZAPINE 45 MG/1
45 TABLET, ORALLY DISINTEGRATING ORAL AT BEDTIME
Qty: 0 | Refills: 0 | Status: DISCONTINUED | OUTPATIENT
Start: 2017-06-10 | End: 2017-06-23

## 2017-06-10 RX ORDER — CEFAZOLIN SODIUM 1 G
2000 VIAL (EA) INJECTION ONCE
Qty: 0 | Refills: 0 | Status: COMPLETED | OUTPATIENT
Start: 2017-06-10 | End: 2017-06-10

## 2017-06-10 RX ORDER — KETOROLAC TROMETHAMINE 30 MG/ML
15 SYRINGE (ML) INJECTION ONCE
Qty: 0 | Refills: 0 | Status: DISCONTINUED | OUTPATIENT
Start: 2017-06-10 | End: 2017-06-10

## 2017-06-10 RX ORDER — HYDROMORPHONE HYDROCHLORIDE 2 MG/ML
1 INJECTION INTRAMUSCULAR; INTRAVENOUS; SUBCUTANEOUS
Qty: 0 | Refills: 0 | Status: DISCONTINUED | OUTPATIENT
Start: 2017-06-10 | End: 2017-06-11

## 2017-06-10 RX ORDER — TRAMADOL HYDROCHLORIDE 50 MG/1
50 TABLET ORAL ONCE
Qty: 0 | Refills: 0 | Status: DISCONTINUED | OUTPATIENT
Start: 2017-06-10 | End: 2017-06-23

## 2017-06-10 RX ORDER — HYDROMORPHONE HYDROCHLORIDE 2 MG/ML
2 INJECTION INTRAMUSCULAR; INTRAVENOUS; SUBCUTANEOUS EVERY 4 HOURS
Qty: 0 | Refills: 0 | Status: DISCONTINUED | OUTPATIENT
Start: 2017-06-10 | End: 2017-06-12

## 2017-06-10 RX ORDER — QUETIAPINE FUMARATE 200 MG/1
200 TABLET, FILM COATED ORAL AT BEDTIME
Qty: 0 | Refills: 0 | Status: DISCONTINUED | OUTPATIENT
Start: 2017-06-10 | End: 2017-06-23

## 2017-06-10 RX ORDER — PIPERACILLIN AND TAZOBACTAM 4; .5 G/20ML; G/20ML
3.38 INJECTION, POWDER, LYOPHILIZED, FOR SOLUTION INTRAVENOUS EVERY 8 HOURS
Qty: 0 | Refills: 0 | Status: DISCONTINUED | OUTPATIENT
Start: 2017-06-10 | End: 2017-06-10

## 2017-06-10 RX ORDER — CEFAZOLIN SODIUM 1 G
2000 VIAL (EA) INJECTION EVERY 8 HOURS
Qty: 0 | Refills: 0 | Status: DISCONTINUED | OUTPATIENT
Start: 2017-06-10 | End: 2017-06-23

## 2017-06-10 RX ADMIN — Medication 100 MILLIGRAM(S): at 14:24

## 2017-06-10 RX ADMIN — Medication 15 MILLIGRAM(S): at 09:40

## 2017-06-10 RX ADMIN — HYDROMORPHONE HYDROCHLORIDE 1 MILLIGRAM(S): 2 INJECTION INTRAMUSCULAR; INTRAVENOUS; SUBCUTANEOUS at 04:28

## 2017-06-10 RX ADMIN — Medication 650 MILLIGRAM(S): at 02:17

## 2017-06-10 RX ADMIN — MIRTAZAPINE 45 MILLIGRAM(S): 45 TABLET, ORALLY DISINTEGRATING ORAL at 22:59

## 2017-06-10 RX ADMIN — Medication 15 MILLIGRAM(S): at 17:53

## 2017-06-10 RX ADMIN — Medication 650 MILLIGRAM(S): at 22:38

## 2017-06-10 RX ADMIN — HYDROMORPHONE HYDROCHLORIDE 2 MILLIGRAM(S): 2 INJECTION INTRAMUSCULAR; INTRAVENOUS; SUBCUTANEOUS at 22:59

## 2017-06-10 RX ADMIN — HYDROMORPHONE HYDROCHLORIDE 1 MILLIGRAM(S): 2 INJECTION INTRAMUSCULAR; INTRAVENOUS; SUBCUTANEOUS at 04:12

## 2017-06-10 RX ADMIN — QUETIAPINE FUMARATE 200 MILLIGRAM(S): 200 TABLET, FILM COATED ORAL at 22:58

## 2017-06-10 RX ADMIN — Medication 166.67 MILLIGRAM(S): at 05:51

## 2017-06-10 RX ADMIN — Medication 100 MILLIGRAM(S): at 22:38

## 2017-06-10 RX ADMIN — HYDROMORPHONE HYDROCHLORIDE 2 MILLIGRAM(S): 2 INJECTION INTRAMUSCULAR; INTRAVENOUS; SUBCUTANEOUS at 23:13

## 2017-06-10 RX ADMIN — MIRTAZAPINE 45 MILLIGRAM(S): 45 TABLET, ORALLY DISINTEGRATING ORAL at 01:25

## 2017-06-10 RX ADMIN — Medication 15 MILLIGRAM(S): at 17:17

## 2017-06-10 RX ADMIN — Medication 166.67 MILLIGRAM(S): at 17:19

## 2017-06-10 RX ADMIN — PIPERACILLIN AND TAZOBACTAM 25 GRAM(S): 4; .5 INJECTION, POWDER, LYOPHILIZED, FOR SOLUTION INTRAVENOUS at 13:04

## 2017-06-10 RX ADMIN — CEFEPIME 100 MILLIGRAM(S): 1 INJECTION, POWDER, FOR SOLUTION INTRAMUSCULAR; INTRAVENOUS at 05:50

## 2017-06-10 RX ADMIN — Medication 15 MILLIGRAM(S): at 10:21

## 2017-06-10 NOTE — PROGRESS NOTE ADULT - SUBJECTIVE AND OBJECTIVE BOX
Patient is a 36y old  Male who presents with a chief complaint of Left sided hip/leg pain with fevers (09 Jun 2017 16:06)      SUBJECTIVE / OVERNIGHT EVENTS:  Continues to have 10/10 left hip and leg pain. Pain mostly with extension of left leg. unable to lie on left side. No BM overnight-received two units. febrile to 100.5 orally.      MEDICATIONS  (STANDING):  cefepime  IVPB  IV Intermittent   vancomycin  IVPB 1250milliGRAM(s) IV Intermittent every 12 hours  cefepime  IVPB 2000milliGRAM(s) IV Intermittent every 8 hours  sodium chloride 0.9%. 1000milliLiter(s) IV Continuous <Continuous>  pantoprazole Infusion 8mG/Hr IV Continuous <Continuous>    MEDICATIONS  (PRN):  acetaminophen   Tablet 650milliGRAM(s) Oral every 6 hours PRN For Temp greater than 38 C (100.4 F)  HYDROmorphone  Injectable 1milliGRAM(s) IV Push every 4 hours PRN Severe Pain (7 - 10)  traMADol 50milliGRAM(s) Oral once PRN Breakthrough pain no controlled on Dilaudid      Vital Signs Last 24 Hrs  T(C): 38, Max: 39.8 (06-09 @ 15:15)  HR: 97 (97 - 110)  BP: 139/83 (126/68 - 149/76)  RR: 16 (16 - 19)  SpO2: 98% (97% - 100%)  Wt(kg): --  CAPILLARY BLOOD GLUCOSE    I&O's Summary    Physical Exam:  Physical Exam: PHYSICAL EXAM: GENERAL: Moderate distress from left hip pain. Lying on right side  HEAD:  Atraumatic, Normocephalic EYES: EOMI, PERRLA, conjunctiva and sclera clear. No icterus or pallor NECK: Supple, No JVD CHEST/LUNG: Clear to auscultation bilaterally; No wheeze HEART: Tachycardic. Regular rhythm; Systolic murmur heard best at RUSB. No rubs or gallops ABDOMEN: Soft, Nontender, Nondistended; Bowel sounds present. No organomegaly  EXTREMITIES:  2+ Peripheral Pulses, No clubbing, cyanosis, or edema. TTP on left posterior illiac spine and left buttocks. Pain mostly with extension of LLE. No fluctuance noted. No overlying rash. Spine non tender. Full ROM in spine. Limited in left lower extremity due to pain.  PSYCH: AAOx3 NEUROLOGY: non-focal. Reflexes 2+ SKIN: Anterior mid chest mass-TTP-No drainage or overlying point of entry. No fluctuance noted on left buttocks or left PSIS	      LABS:                        5.6    22.90 )-----------( 430      ( 09 Jun 2017 18:34 )             17.5     06-09    133<L>  |  92<L>  |  19  ----------------------------<  104<H>  4.0   |  25  |  0.84    Ca    8.9      09 Jun 2017 12:20    TPro  7.9  /  Alb  3.3  /  TBili  0.2  /  DBili  x   /  AST  88<H>  /  ALT  116<H>  /  AlkPhos  100  06-09    PT/INR - ( 09 Jun 2017 12:20 )   PT: 15.0 SEC;   INR: 1.33          PTT - ( 09 Jun 2017 12:20 )  PTT:33.1 SEC  CARDIAC MARKERS ( 09 Jun 2017 12:20 )  x     / < 0.06 ng/mL / 97 u/L / 1.33 ng/mL / x          Urinalysis Basic - ( 09 Jun 2017 15:53 )    Color: YELLOW / Appearance: CLEAR / SG: > 1.040 / pH: 6.0  Gluc: NEGATIVE / Ketone: NEGATIVE  / Bili: NEGATIVE / Urobili: NORMAL E.U.   Blood: NEGATIVE / Protein: 30 / Nitrite: NEGATIVE   Leuk Esterase: NEGATIVE / RBC: 2-5 / WBC 5-10   Sq Epi: OCC / Non Sq Epi: x / Bacteria: x        RADIOLOGY & ADDITIONAL TESTS:    Imaging Personally Reviewed:    Consultant(s) Notes Reviewed:      Care Discussed with Consultants/Other Providers: Patient is a 36y old  Male who presents with a chief complaint of Left sided hip/leg pain with fevers (09 Jun 2017 16:06)      SUBJECTIVE / OVERNIGHT EVENTS:  Continues to have 10/10 left hip and leg pain. Pain mostly with extension of left leg. unable to lie on left side. No BM overnight-received two units. febrile to 100.5 orally.     MEDICATIONS  (STANDING):  cefepime  IVPB  IV Intermittent   vancomycin  IVPB 1250milliGRAM(s) IV Intermittent every 12 hours  cefepime  IVPB 2000milliGRAM(s) IV Intermittent every 8 hours  sodium chloride 0.9%. 1000milliLiter(s) IV Continuous <Continuous>  pantoprazole Infusion 8mG/Hr IV Continuous <Continuous>    MEDICATIONS  (PRN):  acetaminophen   Tablet 650milliGRAM(s) Oral every 6 hours PRN For Temp greater than 38 C (100.4 F)  HYDROmorphone  Injectable 1milliGRAM(s) IV Push every 4 hours PRN Severe Pain (7 - 10)  traMADol 50milliGRAM(s) Oral once PRN Breakthrough pain no controlled on Dilaudid      Vital Signs Last 24 Hrs  T(C): 38, Max: 39.8 (06-09 @ 15:15)  HR: 97 (97 - 110)  BP: 139/83 (126/68 - 149/76)  RR: 16 (16 - 19)  SpO2: 98% (97% - 100%)  Wt(kg): --  CAPILLARY BLOOD GLUCOSE    I&O's Summary    Physical Exam:  Physical Exam: PHYSICAL EXAM: GENERAL: Moderate distress from left hip pain. Lying on right side  HEAD:  Atraumatic, Normocephalic EYES: EOMI, PERRLA, conjunctiva and sclera clear. No icterus or pallor NECK: Supple, No JVD CHEST/LUNG: Clear to auscultation bilaterally; No wheeze HEART: Tachycardic. Regular rhythm; Systolic murmur heard best at RUSB. No rubs or gallops ABDOMEN: Soft, Nontender, Nondistended; Bowel sounds present. No organomegaly  EXTREMITIES:  2+ Peripheral Pulses, No clubbing, cyanosis, or edema. TTP on left posterior illiac spine and left buttocks. Pain mostly with extension of LLE. No fluctuance noted. No overlying rash. Spine non tender. Full ROM in spine. Limited in left lower extremity due to pain.  PSYCH: AAOx3 NEUROLOGY: non-focal. Reflexes 2+ SKIN: Anterior mid chest mass-TTP-No drainage or overlying point of entry. No fluctuance noted on left buttocks or left PSIS	      LABS:                        5.6    22.90 )-----------( 430      ( 09 Jun 2017 18:34 )             17.5     06-09    133<L>  |  92<L>  |  19  ----------------------------<  104<H>  4.0   |  25  |  0.84    Ca    8.9      09 Jun 2017 12:20    TPro  7.9  /  Alb  3.3  /  TBili  0.2  /  DBili  x   /  AST  88<H>  /  ALT  116<H>  /  AlkPhos  100  06-09    PT/INR - ( 09 Jun 2017 12:20 )   PT: 15.0 SEC;   INR: 1.33          PTT - ( 09 Jun 2017 12:20 )  PTT:33.1 SEC  CARDIAC MARKERS ( 09 Jun 2017 12:20 )  x     / < 0.06 ng/mL / 97 u/L / 1.33 ng/mL / x          Urinalysis Basic - ( 09 Jun 2017 15:53 )    Color: YELLOW / Appearance: CLEAR / SG: > 1.040 / pH: 6.0  Gluc: NEGATIVE / Ketone: NEGATIVE  / Bili: NEGATIVE / Urobili: NORMAL E.U.   Blood: NEGATIVE / Protein: 30 / Nitrite: NEGATIVE   Leuk Esterase: NEGATIVE / RBC: 2-5 / WBC 5-10   Sq Epi: OCC / Non Sq Epi: x / Bacteria: x        RADIOLOGY & ADDITIONAL TESTS:    Imaging Personally Reviewed:    Consultant(s) Notes Reviewed:      Care Discussed with Consultants/Other Providers:

## 2017-06-10 NOTE — PROGRESS NOTE ADULT - SUBJECTIVE AND OBJECTIVE BOX
Patient is a 36y old  Male who presents with a chief complaint of Left sided hip/leg pain with fevers (09 Jun 2017 16:06)  complaining of pain   had fever over night  cbc dropped   s/p 2 prbc  pending cbc today       INTERVAL HPI/OVERNIGHT EVENTS:  T(C): 38, Max: 39.8 (06-09 @ 15:15)  HR: 97 (97 - 110)  BP: 139/83 (126/68 - 149/76)  RR: 16 (16 - 19)  SpO2: 98% (97% - 100%)  Wt(kg): --  I&O's Summary      LABS:                        5.6    22.90 )-----------( 430      ( 09 Jun 2017 18:34 )             17.5     06-09    133<L>  |  92<L>  |  19  ----------------------------<  104<H>  4.0   |  25  |  0.84    Ca    8.9      09 Jun 2017 12:20    TPro  7.9  /  Alb  3.3  /  TBili  0.2  /  DBili  x   /  AST  88<H>  /  ALT  116<H>  /  AlkPhos  100  06-09    PT/INR - ( 09 Jun 2017 12:20 )   PT: 15.0 SEC;   INR: 1.33          PTT - ( 10 Saul 2017 06:00 )  PTT:34.9 SEC  Urinalysis Basic - ( 09 Jun 2017 15:53 )    Color: YELLOW / Appearance: CLEAR / SG: > 1.040 / pH: 6.0  Gluc: NEGATIVE / Ketone: NEGATIVE  / Bili: NEGATIVE / Urobili: NORMAL E.U.   Blood: NEGATIVE / Protein: 30 / Nitrite: NEGATIVE   Leuk Esterase: NEGATIVE / RBC: 2-5 / WBC 5-10   Sq Epi: OCC / Non Sq Epi: x / Bacteria: x      CAPILLARY BLOOD GLUCOSE        Urinalysis Basic - ( 09 Jun 2017 15:53 )    Color: YELLOW / Appearance: CLEAR / SG: > 1.040 / pH: 6.0  Gluc: NEGATIVE / Ketone: NEGATIVE  / Bili: NEGATIVE / Urobili: NORMAL E.U.   Blood: NEGATIVE / Protein: 30 / Nitrite: NEGATIVE   Leuk Esterase: NEGATIVE / RBC: 2-5 / WBC 5-10   Sq Epi: OCC / Non Sq Epi: x / Bacteria: x        MEDICATIONS  (STANDING):  vancomycin  IVPB 1250milliGRAM(s) IV Intermittent every 12 hours  sodium chloride 0.9%. 1000milliLiter(s) IV Continuous <Continuous>  pantoprazole Infusion 8mG/Hr IV Continuous <Continuous>  piperacillin/tazobactam IVPB. 3.375Gram(s) IV Intermittent every 8 hours  ketorolac   Injectable 15milliGRAM(s) IV Push once  QUEtiapine 200milliGRAM(s) Oral at bedtime  mirtazapine 45milliGRAM(s) Oral at bedtime    MEDICATIONS  (PRN):  acetaminophen   Tablet 650milliGRAM(s) Oral every 6 hours PRN For Temp greater than 38 C (100.4 F)  HYDROmorphone  Injectable 1milliGRAM(s) IV Push every 4 hours PRN Severe Pain (7 - 10)  traMADol 50milliGRAM(s) Oral once PRN Breakthrough pain no controlled on Dilaudid      REVIEW OF SYSTEMS:  CONSTITUTIONAL: No fever, weight loss, or fatigue  EYES: No eye pain, visual disturbances, or discharge  ENMT:  No difficulty hearing, tinnitus, vertigo; No sinus or throat pain  NECK: No pain or stiffness  RESPIRATORY: No cough, wheezing, chills or hemoptysis; No shortness of breath  CARDIOVASCULAR: No chest pain, palpitations, dizziness, or leg swelling  GASTROINTESTINAL: No abdominal or epigastric pain. No nausea, vomiting, or hematemesis; No diarrhea or constipation. No melena or hematochezia.  GENITOURINARY: No dysuria, frequency, hematuria, or incontinence  NEUROLOGICAL: No headaches, memory loss, loss of strength, numbness, or tremors  SKIN: No itching, burning, rashes, or lesions   LYMPH NODES: No enlarged glands  ENDOCRINE: No heat or cold intolerance; No hair loss  MUSCULOSKELETAL: No joint pain or swelling; No muscle, back, or extremity pain  PSYCHIATRIC: No depression, anxiety, mood swings, or difficulty sleeping  HEME/LYMPH: No easy bruising, or bleeding gums  ALLERY AND IMMUNOLOGIC: No hives or eczema    RADIOLOGY & ADDITIONAL TESTS:    Imaging Personally Reviewed:  [ ] YES  [ ] NO    Consultant(s) Notes Reviewed:  [ ] YES  [ ] NO    PHYSICAL EXAM:  GENERAL: NAD, well-groomed, well-developed  HEAD:  Atraumatic, Normocephalic  EYES: EOMI, PERRLA, conjunctiva and sclera clear  ENMT: No tonsillar erythema, exudates, or enlargement; Moist mucous membranes, Good dentition, No lesions  NECK: Supple, No JVD, Normal thyroid  NERVOUS SYSTEM:  Alert & Oriented X3, Good concentration; Motor Strength 5/5 B/L upper and lower extremities; DTRs 2+ intact and symmetric  CHEST/LUNG: Clear to percussion bilaterally; No rales, rhonchi, wheezing, or rubs  HEART: Regular rate and rhythm; No murmurs, rubs, or gallops  ABDOMEN: Soft, Nontender, Nondistended; Bowel sounds present  EXTREMITIES:  2+ Peripheral Pulses, No clubbing, cyanosis, or edema  LYMPH: No lymphadenopathy noted  SKIN: No rashes or lesions    Care Discussed with Consultants/Other Providers [ ] YES  [ ] NO

## 2017-06-10 NOTE — PROGRESS NOTE ADULT - PROBLEM SELECTOR PLAN 3
Acute blood loss anemia given Occult blood + and normal Hg in 12/2016  Endorses hx of diverticulitis-No abdominal pain reported  Also has been taking NSAIDs-may be UGIB from PUD  hold nsaids  gi consult  transfuse   check cbc  gi consult  ppi  GI may perform EGD today. Protonix drip. NPO  Type and scree. Goal Hg>7  received 2 units o/n-awaiting repeat cbc  ferritin extremity high on setting of sepsis

## 2017-06-10 NOTE — CONSULT NOTE ADULT - ASSESSMENT
A/P: 36M with L lower back and lateral proximal thigh pain  --minimal pain in L hip joint on exam. given clinical exam, unlikely septic hip joint  -FU MRI. if large effusion and no other obvious cause for L sided pain, would consider IR aspiration of hip for culture  -no urgent ortho intervention at this time  -WBAT

## 2017-06-10 NOTE — CONSULT NOTE ADULT - ATTENDING COMMENTS
I have seen/examined with the resident/fellow.   I agree with the assessment/plan as outlined above. I have seen/examined with the resident/fellow.   I agree with the assessment/plan as outlined above.    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    I have discussed plan of care with consulting team

## 2017-06-10 NOTE — PROGRESS NOTE ADULT - PROBLEM SELECTOR PLAN 1
WBC>12 HR>90 and Temp>38  TTP in left PSIS and gluteal region.  Concern for Osteo   Severely elevated ESR and CRP.   Hx of IV drug abuse  pending mri or xr rays   resident discussing with radiology   this is worrisome for septic joint  continue antibiotics   follow up with id

## 2017-06-10 NOTE — PROGRESS NOTE ADULT - PROBLEM SELECTOR PLAN 4
Acute blood loss anemia given Occult blood + and normal Hg in 12/2016  Endorses hx of diverticulitis-No abdominal pain reported  Been taking NSAIDs  May have a slow UGIB vs Occult GI malignancy given fam Hx  Protonix gtt and NPO for possible scope  Type and scree. Goal Hg>7

## 2017-06-10 NOTE — PROGRESS NOTE ADULT - PROBLEM SELECTOR PLAN 2
TTP in left Posterior superior illiac spine and gluteal region. Concern for Osteomyelitis  Severely elevated ESR and CRP. Hx of IV drug abuse  pending  MRI pelvis and hip to r/o Osteo  Pain control with Dilaudid

## 2017-06-10 NOTE — PROGRESS NOTE ADULT - ASSESSMENT
Patient is a 36 years old male with PMH of IV drug abuse (last used 20 years ago) on methadone and depression who presents with acute onset of left sided hip and buttock pain of 7 days in duration.

## 2017-06-10 NOTE — PROGRESS NOTE ADULT - PROBLEM SELECTOR PLAN 2
TTP in left Posteriro superior illiac spine and gluteal region. Concern for Osteomyelitis  Severely elevated ESR and CRP. Hx of IV drug abuse  Will check MRI pelvis and hip to r/o Osteo  Pain control with Dilaudid  Ortho not convinced this is septic joint TTP in left Posterior superior illiac spine and gluteal region. Concern for Osteomyelitis  Severely elevated ESR and CRP. Hx of IV drug abuse  Will check MRI pelvis and hip to r/o Osteo  Pain control with Dilaudid  Ortho not convinced this is septic joint

## 2017-06-10 NOTE — CONSULT NOTE ADULT - SUBJECTIVE AND OBJECTIVE BOX
36M with history of IV drug abuse with a week and a half of pain in his left side. Pt reports worsening pain in left lower back and buttocks region as well as groin. Is not able to ambulate secondary to pain. was seen in ER once last week and sent out on pain medication, but did not resolve. Had a large black bowel movement two days ago. Endorses fevers.    Vital Signs Last 24 Hrs  T(C): 37.8, Max: 39.8 (06-09 @ 15:15)  T(F): 100, Max: 103.6 (06-09 @ 15:15)  HR: 99 (99 - 110)  BP: 146/72 (126/68 - 149/76)  BP(mean): --  RR: 18 (16 - 19)  SpO2: 99% (97% - 100%)    NAD, AOx3  LLE: holding leg in slight flexion for comfort. pain with full extension. able to straight leg raise. minimal pain with hip flexion to 90 and in internal/external rotation of hip. ttp at left lower back paraspinal area as well as L buttocks. no erythema or fluctuance noted. negative log roll                          5.6    22.90 )-----------( 430      ( 09 Jun 2017 18:34 )             17.5   06-09    133<L>  |  92<L>  |  19  ----------------------------<  104<H>  4.0   |  25  |  0.84    Ca    8.9      09 Jun 2017 12:20    TPro  7.9  /  Alb  3.3  /  TBili  0.2  /  DBili  x   /  AST  88<H>  /  ALT  116<H>  /  AlkPhos  100  06-09    ,     XR pending  MRI pending

## 2017-06-10 NOTE — CONSULT NOTE ADULT - SUBJECTIVE AND OBJECTIVE BOX
36 years old male with PMH of IV drug abuse (last used 20 years ago) on methadone and depression who presents with acute onset of left sided hip and buttock pain of 7 days in duration. Patient presented to [facility] ED a week ago and was discharged with pain medications. He returned to the hospital today because of persistent and worsening pain. He is also endorsing fever, chills, and night sweats which have been present for the past week as well along with a lackluster appetite. No recent IV drug use. [place] but denies any bug bites of rashes. Not sexually active and no recent trauma. He also endorses nausea but denies vomiting.     Patient found to be febrile in .6 with , hemodynamically stable. Initial labs show WBC ~30, ESR >140, , HG 7.5 (previously normal), transaminitis with elevated INR 1.33. CT chest negative for soft tissue abnormality. Admitted to medicine for further management.        PAST MEDICAL & SURGICAL HISTORY:  Diverticulitis  No significant past surgical history      Allergies  No Known Allergies        ANTIMICROBIALS:  cefepime  IVPB    vancomycin  IVPB 1250 every 12 hours s/p one dose   cefepime  IVPB 2000 every 8 hours, s/p one dose   Zosyn- s/p one dose yesterdat       OTHER MEDS:  acetaminophen   Tablet 650milliGRAM(s) Oral every 6 hours PRN  HYDROmorphone  Injectable 1milliGRAM(s) IV Push every 4 hours PRN  sodium chloride 0.9%. 1000milliLiter(s) IV Continuous <Continuous>  pantoprazole Infusion 8mG/Hr IV Continuous <Continuous>  traMADol 50milliGRAM(s) Oral once PRN      SOCIAL HISTORY: [ ] etoh [ ] tobacco [ ] former smoker [ ] IVDU    FAMILY HISTORY:  Family history of stomach cancer (Mother)      REVIEW OF SYSTEMS  [  ] ROS unobtainable because:    [  ] All other systems negative except as noted below:	    Constitutional: [ ] fever [ ]chills [ ] sweats [ ] fatigue [ ] weight loss [ ]  Skin:  [ ] rash [ ] phlebitis	  Eyes: [ ] icterus [ ] inflammation	  ENMT: [ ] discharge [ ] thrush [ ] ulcers [ ] exudates  Respiratory: [ ] dyspnea [ ] hemoptysis [ ] cough [ ] sputum	  Cardiovascular:  [ ] chest pain [ ] palpitations [ ] edema	  Gastrointestinal:  [ ] nausea [ ] vomiting [ ] diarrhea [ ] constipation [ ] pain	  Genitourinary:  [ ] dysuria [ ] frequency [ ] hematuria [ ] discharge [ ] flank pain  Musculoskeletal:  [ ] myalgias [ ] arthralgias [ ] arthritis	  Neurological:  [ ] headache [ ] seizures	  Psychiatric:  [ ] anxiety [ ] depression	  Hematology/Lymphatics:  [ ] lymphadenopathy  Endocrine:  [ ] adrenal [ ] thyroid  Allergic/Immunologic:	 [ ] transplant [ ] seasonal    Vital Signs Last 24 Hrs  T(F): 100.4, Max: 103.6 (06-09 @ 15:15)  Wt(kg): --    Vital Signs Last 24 Hrs  HR: 97 (97 - 110)  BP: 139/83 (126/68 - 149/76)  RR: 16  SpO2: 98% (97% - 100%)  Wt(kg): --    PHYSICAL EXAM:  General: [ ] non-toxic  HEAD/EYES: [ ] PERRL [ ] white sclera [ ] icterus  ENT:  [ ] normal [ ] supple [ ] thrush [ ] pharyngeal exudate  Cardiovascular:   [ ] murmur [ ] normal [ ] PPM/AICD  Respiratory:  [ ] clear to ausculation bilaterally  GI:  [ ] soft, non-tender, normal bowel sounds  :  [ ] santos [ ] no CVA tenderness   Musculoskeletal:  [ ] no synovitis  Neurologic:  [ ] non-focal exam   Skin:  [ ] no rash  Lymph: [ ] no lymphadenopathy  Psychiatric:  [ ] appropriate affect [ ] alert & oriented  Lines:  [ ] no phlebitis [ ] central line                                5.6    22.90 )-----------( 430      ( 09 Jun 2017 18:34 )             17.5       06-09    133<L>  |  92<L>  |  19  ----------------------------<  104<H>  4.0   |  25  |  0.84    Ca    8.9      09 Jun 2017 12:20    TPro  7.9  /  Alb  3.3  /  TBili  0.2  /  DBili  x   /  AST  88<H>  /  ALT  116<H>  /  AlkPhos  100  06-09  , CRP- 250      Urinalysis Basic - Negative       MICROBIOLOGY:  Blood cultures sent from the er, - sent before antibiotics   UCx sent from the er,             RADIOLOGY: xray chest and ct chest normal                        Pending MRI of the l hip joint 36 years old male with PMH of IV drug abuse (last used 20 years ago) on methadone and depression who presents with acute onset of left sided hip and buttock pain of 7 days in duration. Patient presented to [facility] ED a week ago and was discharged with pain medications. He returned to the hospital today because of persistent and worsening pain. He is also endorsing fever, chills, and night sweats which have been present for the past week as well along with a lackluster appetite. No recent IV drug use. He lives [place] self [place] but denies any bug bites of rashes. Not sexually active and no recent trauma. He also endorses nausea but denies vomiting.     Patient found to be febrile in .6 with , hemodynamically stable. Initial labs show WBC ~30, ESR >140, , HG 7.5 (previously normal), transaminitis with elevated INR 1.33. CT chest negative for soft tissue abnormality. Admitted to medicine for further management.        PAST MEDICAL & SURGICAL HISTORY:  Diverticulitis  No significant past surgical history      Allergies  No Known Allergies        ANTIMICROBIALS:  cefepime  IVPB    vancomycin  IVPB 1250 every 12 hours s/p one dose   cefepime  IVPB 2000 every 8 hours, s/p one dose   Zosyn- s/p one dose yesterdat       OTHER MEDS:  acetaminophen   Tablet 650milliGRAM(s) Oral every 6 hours PRN  HYDROmorphone  Injectable 1milliGRAM(s) IV Push every 4 hours PRN  sodium chloride 0.9%. 1000milliLiter(s) IV Continuous <Continuous>  pantoprazole Infusion 8mG/Hr IV Continuous <Continuous>  traMADol 50milliGRAM(s) Oral once PRN      SOCIAL HISTORY: [ x] etoh [ x] tobacco [ ] former smoker [x ] IVDU    FAMILY HISTORY:  Family history of stomach cancer (Mother)      REVIEW OF SYSTEMS  [  ] ROS unobtainable because:    [  x] All other systems negative except as noted below:	    Constitutional: [x ] fever [x ]chills [ ] sweats [ x] fatigue [ ] weight loss [ ]  Skin:  [ ] rash [ ] phlebitis	  Eyes: [ ] icterus [ ] inflammation	  ENMT: [ ] discharge [ ] thrush [ ] ulcers [ ] exudates  Respiratory: [ ] dyspnea [ ] hemoptysis [ ] cough [ ] sputum	  Cardiovascular:  [x ] chest pain [ ] palpitations [ ] edema	  Gastrointestinal:  [ ] nausea [ ] vomiting [ ] diarrhea [ ] constipation [ ] pain	x dark stools   Genitourinary:  [ ] dysuria [ ] frequency [ ] hematuria [ ] discharge [ ] flank pain  Musculoskeletal:  [ ] myalgias [ ] arthralgias [ x] arthritis	x pain in hip joint   Neurological:  [ ] headache [ ] seizures	  Psychiatric:  [ ] anxiety [ ] depression	  Hematology/Lymphatics:  [ ] lymphadenopathy  Endocrine:  [ ] adrenal [ ] thyroid  Allergic/Immunologic:	 [ ] transplant [ ] seasonal    Vital Signs Last 24 Hrs  T(F): 100.4, Max: 103.6 (06-09 @ 15:15)  Wt(kg): --    Vital Signs Last 24 Hrs  HR: 97 (97 - 110)  BP: 139/83 (126/68 - 149/76)  RR: 16  SpO2: 98% (97% - 100%)  Wt(kg): --    PHYSICAL EXAM:  General: [x ] non-toxic  HEAD/EYES: [ x] PERRL [ ] white sclera [ ] icterus  ENT:  [x ] normal [x ] supple [ ] thrush [ ] pharyngeal exudate  Cardiovascular:   [ ] murmur [ ] normal , pain on palpation of the anterior chest wall   Respiratory:  [ x] clear to ausculation bilaterally  GI:  [ x] soft, non-tender, normal bowel sounds  :  [ ] santos [x ] no CVA tenderness   Musculoskeletal:  pain in palpation on left hip, tenderness, inability to bear weight,   Neurologic:  [ x] non-focal exam   Skin:  [x ] no rash  Lymph: [x ] no lymphadenopathy  Psychiatric:  [x ] appropriate affect [ ] alert & oriented  Lines:  [x ] no phlebitis [ ] central line                                5.6    22.90 )-----------( 430      ( 09 Jun 2017 18:34 )             17.5       06-09    133<L>  |  92<L>  |  19  ----------------------------<  104<H>  4.0   |  25  |  0.84    Ca    8.9      09 Jun 2017 12:20    TPro  7.9  /  Alb  3.3  /  TBili  0.2  /  DBili  x   /  AST  88<H>  /  ALT  116<H>  /  AlkPhos  100  06-09  , CRP- 250      Urinalysis Basic - Negative       MICROBIOLOGY:  Blood cultures sent from the er, - sent before antibiotics   UCx sent from the er,             RADIOLOGY: xray chest and ct chest normal                        Pending MRI of the l hip joint 36M hx IVDU but adamently denies current use.   years old male with PMH of IV drug abuse (last used 20 years ago) on methadone and depression who presents with acute onset of left sided hip and buttock pain of 7 days in duration. Patient presented to [facility] ED a week ago and was discharged with pain medications. He returned to the hospital today because of persistent and worsening pain. He is also endorsing fever, chills, and night sweats which have been present for the past week as well along with a lackluster appetite. No recent IV drug use. He lives [place] self [place] but denies any bug bites of rashes. Not sexually active and no recent trauma. He also endorses nausea but denies vomiting.     Patient found to be febrile in .6 with , hemodynamically stable. Initial labs show WBC ~30, ESR >140, , HG 7.5 (previously normal), transaminitis with elevated INR 1.33. CT chest negative for soft tissue abnormality. Admitted to medicine for further management.        PAST MEDICAL & SURGICAL HISTORY:  Diverticulitis  No significant past surgical history      Allergies  No Known Allergies        ANTIMICROBIALS:  cefepime  IVPB    vancomycin  IVPB 1250 every 12 hours s/p one dose   cefepime  IVPB 2000 every 8 hours, s/p one dose   Zosyn- s/p one dose yesterdat       OTHER MEDS:  acetaminophen   Tablet 650milliGRAM(s) Oral every 6 hours PRN  HYDROmorphone  Injectable 1milliGRAM(s) IV Push every 4 hours PRN  sodium chloride 0.9%. 1000milliLiter(s) IV Continuous <Continuous>  pantoprazole Infusion 8mG/Hr IV Continuous <Continuous>  traMADol 50milliGRAM(s) Oral once PRN      SOCIAL HISTORY: [ x] etoh [ x] tobacco [ ] former smoker [x ] IVDU    FAMILY HISTORY:  Family history of stomach cancer (Mother)      REVIEW OF SYSTEMS  [  ] ROS unobtainable because:    [  x] All other systems negative except as noted below:	    Constitutional: [x ] fever [x ]chills [ ] sweats [ x] fatigue [ ] weight loss [ ]  Skin:  [ ] rash [ ] phlebitis	  Eyes: [ ] icterus [ ] inflammation	  ENMT: [ ] discharge [ ] thrush [ ] ulcers [ ] exudates  Respiratory: [ ] dyspnea [ ] hemoptysis [ ] cough [ ] sputum	  Cardiovascular:  [x ] chest pain [ ] palpitations [ ] edema	  Gastrointestinal:  [ ] nausea [ ] vomiting [ ] diarrhea [ ] constipation [ ] pain	x dark stools   Genitourinary:  [ ] dysuria [ ] frequency [ ] hematuria [ ] discharge [ ] flank pain  Musculoskeletal:  [ ] myalgias [ ] arthralgias [ x] arthritis	x pain in hip joint   Neurological:  [ ] headache [ ] seizures	  Psychiatric:  [ ] anxiety [ ] depression	  Hematology/Lymphatics:  [ ] lymphadenopathy  Endocrine:  [ ] adrenal [ ] thyroid  Allergic/Immunologic:	 [ ] transplant [ ] seasonal    Vital Signs Last 24 Hrs  T(F): 100.4, Max: 103.6 (06-09 @ 15:15)  Wt(kg): --    Vital Signs Last 24 Hrs  HR: 97 (97 - 110)  BP: 139/83 (126/68 - 149/76)  RR: 16  SpO2: 98% (97% - 100%)  Wt(kg): --    PHYSICAL EXAM:  General: [x ] non-toxic  HEAD/EYES: [ x] PERRL [ ] white sclera [ ] icterus  ENT:  [x ] normal [x ] supple [ ] thrush [ ] pharyngeal exudate  Cardiovascular:   [ ] murmur [ ] normal , pain on palpation of the anterior chest wall   Respiratory:  [ x] clear to ausculation bilaterally  GI:  [ x] soft, non-tender, normal bowel sounds  :  [ ] santos [x ] no CVA tenderness   Musculoskeletal:  pain in palpation on left hip, tenderness, inability to bear weight,   Neurologic:  [ x] non-focal exam   Skin:  [x ] no rash  Lymph: [x ] no lymphadenopathy  Psychiatric:  [x ] appropriate affect [ ] alert & oriented  Lines:  [x ] no phlebitis [ ] central line                                5.6    22.90 )-----------( 430      ( 09 Jun 2017 18:34 )             17.5       06-09    133<L>  |  92<L>  |  19  ----------------------------<  104<H>  4.0   |  25  |  0.84    Ca    8.9      09 Jun 2017 12:20    TPro  7.9  /  Alb  3.3  /  TBili  0.2  /  DBili  x   /  AST  88<H>  /  ALT  116<H>  /  AlkPhos  100  06-09  , CRP- 250      Urinalysis Basic - Negative       MICROBIOLOGY:  Blood cultures sent from the er, - sent before antibiotics   UCx sent from the er,             RADIOLOGY: xray chest and ct chest normal                        Pending MRI of the l hip joint 36M with hx of IVDU (last used 20 years ago) on methadone and depression who presents with acute onset of left sided hip and buttock pain of 7 days in duration. Patient presented to [facility] ED 5/31/17 with left buttock pain and was discharged with pain medications. He returned to the hospital yesterday because of persistent and worsening pain. He is also endorsing fever, chills, and night sweats which have been present for the past week as well along with a decreased appetite. No recent IV drug use. He lives [place] self [place] but denies any bug bites of rashes. Not sexually active and no recent trauma. He also endorses nausea but denies vomiting.  Also noted left chest "bump" that has been increasing in size and pain over the past 4 days.    IN ER, T103.6 with , hemodynamically stable. Initial labs show WBC ~30, ESR >140, , HG 7.5 (previously normal), transaminitis with elevated INR 1.33. CT chest negative for soft tissue abnormality. Admitted to medicine for further management.      PAST MEDICAL & SURGICAL HISTORY:  Diverticulitis    No Known Allergies    ANTIMICROBIALS:  cefepime  IVPB    vancomycin  IVPB 1250 every 12 hours s/p one dose   cefepime  IVPB 2000 every 8 hours, s/p one dose   Zosyn- s/p one dose yesterdat     OTHER MEDS:    pantoprazole Infusion 8mG/Hr IV Continuous <Continuous>    SOCIAL HISTORY: [ x] etoh [ x] tobacco [ ] former smoker [x ] IVDU in past, no pets, no travel, on disablity    Family history of stomach cancer (Mother)    REVIEW OF SYSTEMS  [  ] ROS unobtainable because:    [  x] All other systems negative except as noted below:	    Constitutional: [x ] fever [x ]chills [ ] sweats [ x] fatigue [ ] weight loss [ ]  Skin:  [ ] rash [ ] phlebitis	  Eyes: [ ] icterus [ ] inflammation	  ENMT: [ ] discharge [ ] thrush [ ] ulcers [ ] exudates  Respiratory: [ ] dyspnea [ ] hemoptysis [ ] cough [ ] sputum	  Cardiovascular:  [x ] chest pain [ ] palpitations [ ] edema	  Gastrointestinal:  [ ] nausea [ ] vomiting [ ] diarrhea [ ] constipation [ ] pain	x dark stools   Genitourinary:  [ ] dysuria [ ] frequency [ ] hematuria [ ] discharge [ ] flank pain  Musculoskeletal:  [ ] myalgias [ ] arthralgias [ x] arthritis	x pain in hip joint   Neurological:  [ ] headache [ ] seizures	  Psychiatric:  [ ] anxiety [ ] depression	  Hematology/Lymphatics:  [ ] lymphadenopathy  Endocrine:  [ ] adrenal [ ] thyroid  Allergic/Immunologic:	 [ ] transplant [ ] seasonal    Vital Signs Last 24 Hrs  T(F): 100.4, Max: 103.6 (06-09 @ 15:15)  HR: 97 (97 - 110)  BP: 139/83 (126/68 - 149/76)  RR: 16  SpO2: 98% (97% - 100%)    PHYSICAL EXAM:  General: [x ] non-toxic  HEAD/EYES: [ x] PERRL [ ] white sclera [ ] icterus  ENT:  [x ] normal [x ] supple [ ] thrush [ ] pharyngeal exudate  Cardiovascular:   [x] murmur [ ] normal , pain on palpation of the anterior chest wall left upper sternal area  Respiratory:  [ x] clear to ausculation bilaterally  GI:  [ x] soft, non-tender, normal bowel sounds  :  [x ] no CVA tenderness   Musculoskeletal:  pain in palpation on left hip, tenderness, inability to bear weight, decreased ROM. keeps hip and knee flexed  Neurologic:  [ x] non-focal exam   Skin:  [x ] no rash  Lymph: [x ] no lymphadenopathy  Psychiatric:  [x ] appropriate affect [ ] alert & oriented  Lines:  [x ] no phlebitis [ ] central line                        5.6    22.90 )-----------( 430      ( 09 Jun 2017 18:34 )             17.5     133<L>  |  92<L>  |  19  ----------------------------<  104<H>  4.0   |  25  |  0.84    Ca    8.9      09 Jun 2017 12:20    TPro  7.9  /  Alb  3.3  /  TBili  0.2  /  DBili  x   /  AST  88<H>  /  ALT  116<H>  /  AlkPhos  100  06-09  , CRP- 250    WBC Count: 24.20 (06-10 @ 10:55)  WBC Count: 24.24 (06-10 @ 04:00)  WBC Count: 22.90 (06-09 @ 18:34)  WBC Count: 29.93 (06-09 @ 12:20)    HIV-1/2 Ab: NON-REACTIVE If patient is suspected to be at risk and/or in the  diagnostic window period, then consider subsequent HIV  retesting with new sample. (06.10.17 @ 06:00)    MICROBIOLOGY:  Gram Stain Blood:   ***** CRITICAL RESULT *****  GPCCL^Gram Pos Cocci In Clusters  AFTER: 23 HOURS INCUBATION  BOTTLE: AEROBIC BOTTLE (06.09.17 @ 13:21)    Gram Stain Blood:   ***** CRITICAL RESULT *****  GPCCL^Gram Pos Cocci In Clusters  AFTER: 20 HOURS INCUBATION  BOTTLE: AEROBIC BOTTLE (06.09.17 @ 13:21)    EXAM:  CT CHEST IC    PROCEDURE DATE:  Jun 9 2017   No evidence of acute pathology in the chest.    Ultrasound may be considered forfurther evaluation of the anterior chest   soft tissues if clinically indicated.      L hip xray pending

## 2017-06-10 NOTE — CONSULT NOTE ADULT - ASSESSMENT
Fever most likely secondary to Hip Pathology   Likely Involvement of the hip joint, could this be septic arthritis  Very liekly given the absence of any other physical finding, and elevated wbc, esr and crp   Would recommend imaging, atleast a xray to begin with, but mri would be the best   will follow up blood cultures,   Cont vancomycin and could switch to cefepime to zosyn   Will d/w attending, will follow up with ortho Fever most likely secondary to Hip Pathology   Likely Involvement of the hip joint, could this be septic arthritis  Very liekly given the absence of any other physical finding, and elevated wbc, esr and crp   Would recommend imaging, atleast a xray to begin with, but mri would be the best   will follow up blood cultures, would get tte to begin with   Cont vancomycin and could switch to cefepime to zosyn   Will d/w attending, will follow up with ortho

## 2017-06-10 NOTE — PROGRESS NOTE ADULT - PROBLEM SELECTOR PLAN 3
Acute blood loss anemia given Occult blood + and normal Hg in 12/2016  Endorses hx of diverticulitis-No abdominal pain reported  Also has been taking NSAIDs-may be UGIB from PUD  May have a slow GIB vs Occult GI malignancy given fam Hx  GI may perform EGD today. Protonix drip. NPO  Type and scree. Goal Hg>7  received 2 units o/n-awaiting repeat cbc  ferritin extremity high on setting of sepsis

## 2017-06-10 NOTE — PROGRESS NOTE ADULT - PROBLEM SELECTOR PLAN 1
WBC>12 HR>90 and Temp>38  TTP in left PSIS and gluteal region. Concern for Osteo   Severely elevated ESR and CRP. Hx of IV drug abuse  Will check MRI pelvis and hip to r/o Osteo  Pain control with Dilaudid  UA shows spec gravity 1.04-will give more IVF  Cultures sent. CXR unremarkable  Broad spectrum antibiotics for now  Will check urine toxicology.  Ortho on board-not convinced this is septic joint

## 2017-06-11 DIAGNOSIS — K68.12 PSOAS MUSCLE ABSCESS: ICD-10-CM

## 2017-06-11 DIAGNOSIS — M46.46 DISCITIS, UNSPECIFIED, LUMBAR REGION: ICD-10-CM

## 2017-06-11 DIAGNOSIS — M86.9 OSTEOMYELITIS, UNSPECIFIED: ICD-10-CM

## 2017-06-11 LAB
BACTERIA UR CULT: SIGNIFICANT CHANGE UP
BASOPHILS # BLD AUTO: 0.03 K/UL — SIGNIFICANT CHANGE UP (ref 0–0.2)
BASOPHILS NFR BLD AUTO: 0.1 % — SIGNIFICANT CHANGE UP (ref 0–2)
BLD GP AB SCN SERPL QL: NEGATIVE — SIGNIFICANT CHANGE UP
BUN SERPL-MCNC: 12 MG/DL — SIGNIFICANT CHANGE UP (ref 7–23)
CALCIUM SERPL-MCNC: 8.3 MG/DL — LOW (ref 8.4–10.5)
CHLORIDE SERPL-SCNC: 91 MMOL/L — LOW (ref 98–107)
CO2 SERPL-SCNC: 25 MMOL/L — SIGNIFICANT CHANGE UP (ref 22–31)
CREAT SERPL-MCNC: 0.62 MG/DL — SIGNIFICANT CHANGE UP (ref 0.5–1.3)
EOSINOPHIL # BLD AUTO: 0.05 K/UL — SIGNIFICANT CHANGE UP (ref 0–0.5)
EOSINOPHIL NFR BLD AUTO: 0.2 % — SIGNIFICANT CHANGE UP (ref 0–6)
GLUCOSE SERPL-MCNC: 104 MG/DL — HIGH (ref 70–99)
HCT VFR BLD CALC: 21.7 % — LOW (ref 39–50)
HCT VFR BLD CALC: 22.9 % — LOW (ref 39–50)
HGB BLD-MCNC: 7.1 G/DL — LOW (ref 13–17)
HGB BLD-MCNC: 7.5 G/DL — LOW (ref 13–17)
IMM GRANULOCYTES NFR BLD AUTO: 1.5 % — SIGNIFICANT CHANGE UP (ref 0–1.5)
LDH SERPL L TO P-CCNC: 388 U/L — HIGH (ref 135–225)
LYMPHOCYTES # BLD AUTO: 2.08 K/UL — SIGNIFICANT CHANGE UP (ref 1–3.3)
LYMPHOCYTES # BLD AUTO: 9.4 % — LOW (ref 13–44)
MAGNESIUM SERPL-MCNC: 2.1 MG/DL — SIGNIFICANT CHANGE UP (ref 1.6–2.6)
MANUAL SMEAR VERIFICATION: SIGNIFICANT CHANGE UP
MCHC RBC-ENTMCNC: 29.8 PG — SIGNIFICANT CHANGE UP (ref 27–34)
MCHC RBC-ENTMCNC: 29.9 PG — SIGNIFICANT CHANGE UP (ref 27–34)
MCHC RBC-ENTMCNC: 32.7 % — SIGNIFICANT CHANGE UP (ref 32–36)
MCHC RBC-ENTMCNC: 32.8 % — SIGNIFICANT CHANGE UP (ref 32–36)
MCV RBC AUTO: 91.2 FL — SIGNIFICANT CHANGE UP (ref 80–100)
MCV RBC AUTO: 91.2 FL — SIGNIFICANT CHANGE UP (ref 80–100)
MONOCYTES # BLD AUTO: 1.23 K/UL — HIGH (ref 0–0.9)
MONOCYTES NFR BLD AUTO: 5.6 % — SIGNIFICANT CHANGE UP (ref 2–14)
NEUTROPHILS # BLD AUTO: 18.4 K/UL — HIGH (ref 1.8–7.4)
NEUTROPHILS NFR BLD AUTO: 83.2 % — HIGH (ref 43–77)
PHOSPHATE SERPL-MCNC: 3.8 MG/DL — SIGNIFICANT CHANGE UP (ref 2.5–4.5)
PLATELET # BLD AUTO: 583 K/UL — HIGH (ref 150–400)
PLATELET # BLD AUTO: 595 K/UL — HIGH (ref 150–400)
PMV BLD: 8.8 FL — SIGNIFICANT CHANGE UP (ref 7–13)
PMV BLD: 8.9 FL — SIGNIFICANT CHANGE UP (ref 7–13)
POTASSIUM SERPL-MCNC: 3.4 MMOL/L — LOW (ref 3.5–5.3)
POTASSIUM SERPL-SCNC: 3.4 MMOL/L — LOW (ref 3.5–5.3)
RBC # BLD: 2.38 M/UL — LOW (ref 4.2–5.8)
RBC # BLD: 2.51 M/UL — LOW (ref 4.2–5.8)
RBC # FLD: 13.7 % — SIGNIFICANT CHANGE UP (ref 10.3–14.5)
RBC # FLD: 13.7 % — SIGNIFICANT CHANGE UP (ref 10.3–14.5)
RH IG SCN BLD-IMP: POSITIVE — SIGNIFICANT CHANGE UP
SODIUM SERPL-SCNC: 132 MMOL/L — LOW (ref 135–145)
SPECIMEN SOURCE: SIGNIFICANT CHANGE UP
VANCOMYCIN TROUGH SERPL-MCNC: 2.5 UG/ML — LOW (ref 10–20)
WBC # BLD: 21.02 K/UL — HIGH (ref 3.8–10.5)
WBC # BLD: 22.12 K/UL — HIGH (ref 3.8–10.5)
WBC # FLD AUTO: 21.02 K/UL — HIGH (ref 3.8–10.5)
WBC # FLD AUTO: 22.12 K/UL — HIGH (ref 3.8–10.5)

## 2017-06-11 PROCEDURE — 72197 MRI PELVIS W/O & W/DYE: CPT | Mod: 26

## 2017-06-11 PROCEDURE — 72149 MRI LUMBAR SPINE W/DYE: CPT | Mod: 26

## 2017-06-11 PROCEDURE — 99232 SBSQ HOSP IP/OBS MODERATE 35: CPT | Mod: GC

## 2017-06-11 PROCEDURE — 73723 MRI JOINT LWR EXTR W/O&W/DYE: CPT | Mod: 26,LT

## 2017-06-11 RX ORDER — VANCOMYCIN HCL 1 G
1500 VIAL (EA) INTRAVENOUS EVERY 12 HOURS
Qty: 0 | Refills: 0 | Status: DISCONTINUED | OUTPATIENT
Start: 2017-06-11 | End: 2017-06-12

## 2017-06-11 RX ORDER — OXYCODONE HYDROCHLORIDE 5 MG/1
5 TABLET ORAL EVERY 6 HOURS
Qty: 0 | Refills: 0 | Status: DISCONTINUED | OUTPATIENT
Start: 2017-06-11 | End: 2017-06-11

## 2017-06-11 RX ORDER — OXYCODONE HYDROCHLORIDE 5 MG/1
5 TABLET ORAL EVERY 4 HOURS
Qty: 0 | Refills: 0 | Status: DISCONTINUED | OUTPATIENT
Start: 2017-06-11 | End: 2017-06-12

## 2017-06-11 RX ORDER — PANTOPRAZOLE SODIUM 20 MG/1
20 TABLET, DELAYED RELEASE ORAL ONCE
Qty: 0 | Refills: 0 | Status: COMPLETED | OUTPATIENT
Start: 2017-06-11 | End: 2017-06-11

## 2017-06-11 RX ORDER — ACETAMINOPHEN 500 MG
1000 TABLET ORAL ONCE
Qty: 0 | Refills: 0 | Status: COMPLETED | OUTPATIENT
Start: 2017-06-11 | End: 2017-06-11

## 2017-06-11 RX ORDER — PANTOPRAZOLE SODIUM 20 MG/1
40 TABLET, DELAYED RELEASE ORAL ONCE
Qty: 0 | Refills: 0 | Status: DISCONTINUED | OUTPATIENT
Start: 2017-06-11 | End: 2017-06-11

## 2017-06-11 RX ADMIN — Medication 100 MILLIGRAM(S): at 16:00

## 2017-06-11 RX ADMIN — Medication 100 MILLIGRAM(S): at 22:13

## 2017-06-11 RX ADMIN — OXYCODONE HYDROCHLORIDE 5 MILLIGRAM(S): 5 TABLET ORAL at 18:10

## 2017-06-11 RX ADMIN — PANTOPRAZOLE SODIUM 20 MILLIGRAM(S): 20 TABLET, DELAYED RELEASE ORAL at 09:07

## 2017-06-11 RX ADMIN — QUETIAPINE FUMARATE 200 MILLIGRAM(S): 200 TABLET, FILM COATED ORAL at 22:13

## 2017-06-11 RX ADMIN — Medication 100 MILLIGRAM(S): at 05:36

## 2017-06-11 RX ADMIN — OXYCODONE HYDROCHLORIDE 5 MILLIGRAM(S): 5 TABLET ORAL at 23:51

## 2017-06-11 RX ADMIN — Medication 1000 MILLIGRAM(S): at 09:37

## 2017-06-11 RX ADMIN — OXYCODONE HYDROCHLORIDE 5 MILLIGRAM(S): 5 TABLET ORAL at 19:10

## 2017-06-11 RX ADMIN — Medication 300 MILLIGRAM(S): at 23:04

## 2017-06-11 RX ADMIN — MIRTAZAPINE 45 MILLIGRAM(S): 45 TABLET, ORALLY DISINTEGRATING ORAL at 22:14

## 2017-06-11 RX ADMIN — OXYCODONE HYDROCHLORIDE 5 MILLIGRAM(S): 5 TABLET ORAL at 13:07

## 2017-06-11 RX ADMIN — Medication 300 MILLIGRAM(S): at 12:52

## 2017-06-11 RX ADMIN — Medication 400 MILLIGRAM(S): at 09:07

## 2017-06-11 RX ADMIN — OXYCODONE HYDROCHLORIDE 5 MILLIGRAM(S): 5 TABLET ORAL at 14:05

## 2017-06-11 RX ADMIN — Medication 650 MILLIGRAM(S): at 18:10

## 2017-06-11 NOTE — PROGRESS NOTE ADULT - PROBLEM SELECTOR PLAN 1
WBC>12 HR>90 and Temp>38  TTP in left PSIS and gluteal region.  Concern for Osteo w/ elevated ESR/ CRP   - ortho/ ID following   - blood culture initial gram + in clusters   Severely elevated ESR and CRP.   Hx of IV drug abuse  pending mri or xr rays   resident discussing with radiology   this is worrisome for septic joint  continue antibiotics   follow up with id

## 2017-06-11 NOTE — PROGRESS NOTE ADULT - PROBLEM SELECTOR PLAN 2
TTP in left Posterior superior illiac spine and gluteal region. Concern for Osteomyelitis  Severely elevated ESR and CRP. Hx of IV drug abuse  pending  MRI pelvis and hip to r/o Osteo  Pain control with Dilaudid TTP in left Posterior superior illiac spine and gluteal region. Concern for Osteomyelitis  Severely elevated ESR and CRP. Hx of IV drug abuse  pending  MRI pelvis and hip to r/o Osteo, Echo for r/o Endocarditis   Pain control with Dilaudid

## 2017-06-11 NOTE — CONSULT NOTE ADULT - SUBJECTIVE AND OBJECTIVE BOX
DAVID MARIN 36y ,Male  HPI:  Patient is a 36 years old male with PMH of IV drug abuse (last used 20 years ago) on methadone and depression who presents with acute onset of left sided hip and buttock pain of 7 days in duration. Patient presented to [facility] ED a week ago and was discharged with pain medications. He returned to the hospital today because of persistent and worsening pain. He is also endorsing fever, chills, and night sweats which have been present for the past week as well along with a lackluster appetite. No recent IV drug use. He lives [place] self [place] a park but denies any bug bites of rashes. Not sexually active and no recent trauma. He also endorses nausea but denies vomiting. Patient also stated that he has a large black bowel movement yesterday but never had BRBPR. He does have intermittent constipation and has been told in the past that he has "diverticulitis." Patient found to be febrile in .6 with . NML BP. Initial labs show WBC ~30, ESR >140, , HG 7.5 (previously normal), transaminitis with elevated INR 1.33. CT chest negative for soft tissue abnormality. Admitted to medicine for further management.MRI of the Lumbar spine today showed findings suspicious for early discitis osteomyelitis at L3-4. Left psoas abscess extends into the pelvis. Inflammatory changes right psoas muscle.  Upon PE patient reports Left buttokcs and hip pain, and pain with AROM, no weakness, no paraesthesias.  Patient on vancomycin and cefazolin for + BCX: MRSA.  Denies bowel or bladder incontinenece.    PAST MEDICAL & SURGICAL HISTORY:  Diverticulitis  No significant past surgical history    Allergies  No Known Allergies    MEDICATIONS  (STANDING):  sodium chloride 0.9%. 1000milliLiter(s) IV Continuous <Continuous>  pantoprazole Infusion 8mG/Hr IV Continuous <Continuous>  QUEtiapine 200milliGRAM(s) Oral at bedtime  mirtazapine 45milliGRAM(s) Oral at bedtime  ceFAZolin   IVPB 2000milliGRAM(s) IV Intermittent every 8 hours  ceFAZolin   IVPB  IV Intermittent   vancomycin  IVPB 1500milliGRAM(s) IV Intermittent every 12 hours    MEDICATIONS  (PRN):  acetaminophen   Tablet 650milliGRAM(s) Oral every 6 hours PRN For Temp greater than 38 C (100.4 F)  traMADol 50milliGRAM(s) Oral once PRN Breakthrough pain no controlled on Dilaudid  HYDROmorphone  Injectable 2milliGRAM(s) IV Push every 4 hours PRN Severe Pain (7 - 10)  oxyCODONE IR 5milliGRAM(s) Oral every 6 hours PRN Moderate Pain (4 - 6)    Vital Signs Last 24 Hrs  T(C): 37.6, Max: 39 (06-10 @ 22:20)  T(F): 0.9, Max: 102.2 (06-10 @ 22:20)  HR: 107 (95 - 107)  BP: 130/67 (122/66 - 146/71)  BP(mean): --  RR: 18 (18 - 18)  SpO2: 99% (97% - 99%)    PHYSICAL EXAM:  AA&0 x3, CN 2-12 grossly intact , speach clear, follows commands  Motor: BUE, RLE 5/5, LLE : proximal 4/5 limited by pain, distal 5/5  Sensory intact to light touch, no clonus      LABS:                        7.1    22.12 )-----------( 595      ( 11 Jun 2017 14:37 )             21.7     06-11    132<L>  |  91<L>  |  12  ----------------------------<  104<H>  3.4<L>   |  25  |  0.62    Ca    8.3<L>      11 Jun 2017 06:30  Phos  3.8     06-11  Mg     2.1     06-11    TPro  7.2  /  Alb  2.8<L>  /  TBili  0.4  /  DBili  x   /  AST  57<H>  /  ALT  87<H>  /  AlkPhos  96  06-10    PTT - ( 10 Saul 2017 06:00 )  PTT:34.9 SEC

## 2017-06-11 NOTE — PROGRESS NOTE ADULT - SUBJECTIVE AND OBJECTIVE BOX
HPI:  Patient is a 36 years old male with PMH of IV drug abuse (last used 20 years ago) on methadone and depression who presents with acute onset of left sided hip and buttock pain of 7 days in duration.           SUBJECTIVE / OVERNIGHT EVENTS:  febrile overnight, complaining of left hip pain worse with movement. Denies CP/SOB    MEDICATIONS  (STANDING):  vancomycin  IVPB 1250milliGRAM(s) IV Intermittent every 12 hours  sodium chloride 0.9%. 1000milliLiter(s) IV Continuous <Continuous>  pantoprazole Infusion 8mG/Hr IV Continuous <Continuous>  QUEtiapine 200milliGRAM(s) Oral at bedtime  mirtazapine 45milliGRAM(s) Oral at bedtime  ceFAZolin   IVPB 2000milliGRAM(s) IV Intermittent every 8 hours  ceFAZolin   IVPB  IV Intermittent     MEDICATIONS  (PRN):  acetaminophen   Tablet 650milliGRAM(s) Oral every 6 hours PRN For Temp greater than 38 C (100.4 F)  traMADol 50milliGRAM(s) Oral once PRN Breakthrough pain no controlled on Dilaudid  HYDROmorphone  Injectable 2milliGRAM(s) IV Push every 4 hours PRN Severe Pain (7 - 10)  HYDROmorphone  Injectable 1milliGRAM(s) IV Push every 2 hours PRN Moderate Pain (4 - 6)      Vital Signs Last 24 Hrs  T(C): 37.6, Max: 39 (06-10 @ 22:20)  HR: 107 (95 - 107)  BP: 122/66 (122/66 - 146/71)  RR: 18 (18 - 18)  SpO2: 99% (97% - 99%)  Wt(kg): --  CAPILLARY BLOOD GLUCOSE    I&O's Summary      PHYSICAL EXAM:  GENERAL: NAD, well-developed  HEAD:  Atraumatic, Normocephalic  EYES: EOMI, PERRLA, conjunctiva and sclera clear  NECK: Supple, No JVD  CHEST/LUNG: Clear to auscultation bilaterally; No wheeze  HEART: tachycardic ; No murmurs, rubs, or gallops  ABDOMEN: Soft, Nontender, Nondistended; Bowel sounds present  EXTREMITIES:  2+ Peripheral Pulses, No clubbing, cyanosis, or edema. left hip pain, decreased rom   PSYCH: AAOx3  NEUROLOGY: non-focal      LABS:  (06-11 @ 06:30)                        7.5  21.02 )-----------( 583                 22.9    Neutrophils = -- (--%)  Lymphocytes = -- (--%)  Eosinophils = -- (--%)  Basophils = -- (--%)  Monocytes = -- (--%)  Bands = --%    WBC Trend: 21.02<--, 23.55<--, 24.20<--  Hb Trend: 7.5<--, 7.3<--, 8.0<--, 9.6<--, 5.6<--  Plt Trend: 583<--, 522<--, 485<--, 522<--, 430<--  06-10    133<L>  |  91<L>  |  10  ----------------------------<  61<L>  3.5   |  23  |  0.58    Ca    8.6      10 Saul 2017 04:00  Phos  3.5     06-10  Mg     2.0     06-10    TPro  7.2  /  Alb  2.8<L>  /  TBili  0.4  /  DBili  x   /  AST  57<H>  /  ALT  87<H>  /  AlkPhos  96  06-10    Creatinine Trend: 0.58<--, 0.84<--  ( 09 Jun 2017 12:20 )   PT: 15.0 SEC;   INR: 1.33 ;       PTT:34.9 SEC  CARDIAC MARKERS ( 09 Jun 2017 12:20 )  Trop < 0.06 ng/mL / CK 97 u/L / CKMB 1.33 ng/mL       Urinalysis Basic - ( 09 Jun 2017 15:53 )    Color: YELLOW / Appearance: CLEAR / SG: > 1.040 / pH: 6.0  Gluc: NEGATIVE / Ketone: NEGATIVE  / Bili: NEGATIVE / Urobili: NORMAL E.U.   Blood: NEGATIVE / Protein: 30 / Nitrite: NEGATIVE   Leuk Esterase: NEGATIVE / RBC: 2-5 / WBC 5-10   Sq Epi: OCC / Non Sq Epi: x / Bacteria: x        Microbiology:  Urine Cx:  Blood Cx: Gram + clusters     RADIOLOGY & ADDITIONAL TESTS:  X- Ray:Jun 9 2017         INTERPRETATION:  CLINICAL INFORMATION: Fever and night sweats with chills   for one week. Firm tender mass chest on anterior chest left of the   sternum. No overlying skin changes.    PROCEDURE:   CT of the Chest was performed with intravenous contrast.   Intravenous contrast: 90 ml Omnipaque 350. 10 ml discarded.  Oral contrast: positive contrast was administered.  Sagittal and coronal reformats were performed.    COMPARISON: Chestx-ray 6/9/2017. Chest x-ray 9/4/2003.    FINDINGS:    LUNGS AND LARGE AIRWAYS: Patent central airways. No pulmonary nodules.  PLEURA: No pleural effusion.  VESSELS: Within normal limits.  HEART: Heart size is normal. No pericardial effusion.  MEDIASTINUM AND JACQUES: No lymphadenopathy.  CHEST WALL AND LOWER NECK: Within normal limits.  UPPER ABDOMEN: Small hiatus hernia.  BONES: Within normal limits.     IMPRESSION:    No evidence of acute pathology in the chest.    Ultrasound may be considered forfurther evaluation of the anterior chest   soft tissues if clinically indicated.  CT:  Ultrasound:  [ ] imaging personally reviewed and interpreted by me    Consultant(s) Notes Reviewed:  ID, Ortho     Care Discussed with Consultants/Other Providers:

## 2017-06-11 NOTE — PROGRESS NOTE ADULT - SUBJECTIVE AND OBJECTIVE BOX
Follow Up:      Inverval History:    ROS:    Unobtainable becasue:  All other systems negative    fever [ ]     chills [ ]      headache  [ ]       chest pain [ ]           SOB  [ ]          abd pain  [ ]            nausea [ ]            vomiting [ ]          diarrhea [ ]  dysurea [ ]         rash  [ ]         joint swelling [ ]           edema  [ ]    Allergies  No Known Allergies        ANTIMICROBIALS:  ceFAZolin   IVPB 2000 every 8 hours  ceFAZolin   IVPB    vancomycin  IVPB 1500 every 12 hours      OTHER MEDS:  acetaminophen   Tablet 650milliGRAM(s) Oral every 6 hours PRN  sodium chloride 0.9%. 1000milliLiter(s) IV Continuous <Continuous>  pantoprazole Infusion 8mG/Hr IV Continuous <Continuous>  traMADol 50milliGRAM(s) Oral once PRN  QUEtiapine 200milliGRAM(s) Oral at bedtime  mirtazapine 45milliGRAM(s) Oral at bedtime  HYDROmorphone  Injectable 2milliGRAM(s) IV Push every 4 hours PRN  oxyCODONE IR 5milliGRAM(s) Oral every 6 hours PRN      Vital Signs Last 24 Hrs  T(C): 37.6, Max: 39 (06-10 @ 22:20)  T(F): 0.9, Max: 102.2 (06-10 @ 22:20)  HR: 107 (95 - 107)  BP: 130/67 (122/66 - 146/71)  BP(mean): --  RR: 18 (18 - 18)  SpO2: 99% (97% - 99%)    Physical Exam:  General:    NAD,  non toxic, A&O x 3  HEENT:    no oropharyngeal lesions,   no LAD,   neck supple  Cardiovascular:     regular S1, S2,  no murmur  Respiratory:    clear b/l,    no wheezing  abd:     soft,   BS +,   no tenderness,    no organomegaly  :   no CVAT,  no suprapubic tenderness,   no  santos  Musculoskletal:    no joint swelling,   no edema  Skin:    no rash  Neurologic:     no focal deficit                          7.5    21.02 )-----------( 583      ( 11 Jun 2017 06:30 )             22.9       06-11    132<L>  |  91<L>  |  12  ----------------------------<  104<H>  3.4<L>   |  25  |  0.62    Ca    8.3<L>      11 Jun 2017 06:30  Phos  3.8     06-11  Mg     2.1     06-11    TPro  7.2  /  Alb  2.8<L>  /  TBili  0.4  /  DBili  x   /  AST  57<H>  /  ALT  87<H>  /  AlkPhos  96  06-10      Urinalysis Basic - ( 09 Jun 2017 15:53 )    Color: YELLOW / Appearance: CLEAR / SG: > 1.040 / pH: 6.0  Gluc: NEGATIVE / Ketone: NEGATIVE  / Bili: NEGATIVE / Urobili: NORMAL E.U.   Blood: NEGATIVE / Protein: 30 / Nitrite: NEGATIVE   Leuk Esterase: NEGATIVE / RBC: 2-5 / WBC 5-10   Sq Epi: OCC / Non Sq Epi: x / Bacteria: x        MICROBIOLOGY:  Vancomycin Level, Trough: 2.5 ug/mL (06-11)    Culture - Blood (06.09.17 @ 13:21)    Culture - Blood:   STAU^Staphylococcus aureus- MRSA                         RADIOLOGY: Follow Up:      Inverval History:    ROS:    Unobtainable becasue:  All other systems negative    fever [ ]     chills [ ]      headache  [ ]       chest pain [ ]           SOB  [ ]          abd pain  [ ]            nausea [ ]            vomiting [ ]          diarrhea [ ]  dysurea [ ]         rash  [ ]         joint swelling [ ]           edema  [ ]    Allergies  No Known Allergies        ANTIMICROBIALS:  ceFAZolin   IVPB 2000 every 8 hours  ceFAZolin   IVPB    vancomycin  IVPB 1500 every 12 hours      OTHER MEDS:  acetaminophen   Tablet 650milliGRAM(s) Oral every 6 hours PRN  sodium chloride 0.9%. 1000milliLiter(s) IV Continuous <Continuous>  pantoprazole Infusion 8mG/Hr IV Continuous <Continuous>  traMADol 50milliGRAM(s) Oral once PRN  QUEtiapine 200milliGRAM(s) Oral at bedtime  mirtazapine 45milliGRAM(s) Oral at bedtime  HYDROmorphone  Injectable 2milliGRAM(s) IV Push every 4 hours PRN  oxyCODONE IR 5milliGRAM(s) Oral every 6 hours PRN      Vital Signs Last 24 Hrs  T(C): 37.6, Max: 39 (06-10 @ 22:20)  T(F): 0.9, Max: 102.2 (06-10 @ 22:20)  HR: 107 (95 - 107)  BP: 130/67 (122/66 - 146/71)  BP(mean): --  RR: 18 (18 - 18)  SpO2: 99% (97% - 99%)    Physical Exam:  General:    NAD,  non toxic, A&O x 3  HEENT:    no oropharyngeal lesions,   no LAD,   neck supple  Cardiovascular:     regular S1, S2,  no murmur  Respiratory:    clear b/l,    no wheezing  abd:     soft,   BS +,   no tenderness,    no organomegaly  :   no CVAT,  no suprapubic tenderness,   no  santos  Musculoskletal:    pt has severe tenderness of the left hip joint   Skin:    no rash  Neurologic:     no focal deficit                          7.5    21.02 )-----------( 583    WBC Count: 21.02 (06-11 @ 06:30)  WBC Count: 23.55 (06-10 @ 23:20)  WBC Count: 24.20 (06-10 @ 10:55)  WBC Count: 24.24 (06-10 @ 04:00)  WBC Count: 22.90 (06-09 @ 18:34)  WBC Count: 29.93 (06-09 @ 12:20)               22.9       06-11    132<L>  |  91<L>  |  12  ----------------------------<  104<H>  3.4<L>   |  25  |  0.62    Ca    8.3<L>      11 Jun 2017 06:30  Phos  3.8     06-11  Mg     2.1     06-11    TPro  7.2  /  Alb  2.8<L>  /  TBili  0.4  /  DBili  x   /  AST  57<H>  /  ALT  87<H>  /  AlkPhos  96  06-10      Urinalysis Basic - ( 09 Jun 2017 15:53 )    Color: YELLOW / Appearance: CLEAR / SG: > 1.040 / pH: 6.0  Gluc: NEGATIVE / Ketone: NEGATIVE  / Bili: NEGATIVE / Urobili: NORMAL E.U.   Blood: NEGATIVE / Protein: 30 / Nitrite: NEGATIVE   Leuk Esterase: NEGATIVE / RBC: 2-5 / WBC 5-10   Sq Epi: OCC / Non Sq Epi: x / Bacteria: x        MICROBIOLOGY:  Vancomycin Level, Trough: 2.5 ug/mL (06-11)    Culture - Blood (06.09.17 @ 13:21)   STAU^Staphylococcus aureus- MRSA    To send repeat blood cultures           RADIOLOGY:  EXAM:  MRI PELVIS W & W O CONTRAST    EXAM:  MRI HIP W-W O C LT      EXAM:  MRI LUMBAR SPINE W C      There is mild edema involving the superior endplate of L4 at the L3-4   level. After contrast administration there is slight enhancement. This is   suspicious for early discitis/osteomyelitis.    The left psoas muscle is enlarged and enhances heterogeneously consistent   with a psoas abscess. This extends downward into the pelvis to involve   the left iliacus muscle. This is better evaluated on the MRI of the   pelvis. The right psoas muscle is also slightly enlarged and enhances   slightly heterogeneously suggesting mild inflammatory change without   drainable collection. No abnormal enhancement within the spinal canal is   identified.    The conus medullaris normally terminates at the L1 level.EXAM:  MRI LUMBAR SPINE W C      There is mild edema involving the superior endplate of L4 at the L3-4   level. After contrast administration there is slight enhancement. This is   suspicious for early discitis/osteomyelitis.    The left psoas muscle is enlarged and enhances heterogeneously consistent   with a psoas abscess. This extends downward into the pelvis to involve   the left iliacus muscle. This is better evaluated on the MRI of the   pelvis. The right psoas muscle is also slightly enlarged and enhances   slightly heterogeneously suggesting mild inflammatory change without   drainable collection. No abnormal enhancement within the spinal canal is   identified.    The conus medullaris normally terminates at the L1 level.  IMPRESSION:   1.  Findings suspicious for infectious left iliopsoas bursitis tracking   into the pelvis. Rim-enhancing fluid collection tracking along the left   iliopsoas tendon. Fully extent of fluid collection is partially included   on this study.  2.  Small rim-enhancing fluid collections within the left piriformis and   left gluteus gabriel musculature suspicious for abscess.  3.  Myositis of the cellulitis, gluteus gabriel and left piriformis of   infectious or inflammatory etiology.  4.  No osteomyelitis.  6.  Diffuse marrow signal loss on T1-weighted imaging within the lumbar   spine and pelvis may reflect chronic anemia or other infiltrative process. Follow Up:      Inverval History: the pt has been stable overnight, though he still has fever and he is complaining of pain. The pt mentions some pain control due to medications. No nausea, vomitting.   No acute events overnight.     ROS:    Unobtainable becasue:  All other systems negative    fever [x ]     chills [x ]      headache  [ ]       chest pain [ ]           SOB  [ ]          abd pain  [ ]            nausea [ ]            vomiting [ ]          diarrhea [ ]  dysurea [ ]         rash  [ ]         joint swelling [ ]           edema  [ ] xpain in the hip     Allergies  No Known Allergies        ANTIMICROBIALS:  ceFAZolin   IVPB 2000 every 8 hours  ceFAZolin   IVPB    vancomycin  IVPB 1500 every 12 hours      OTHER MEDS:  acetaminophen   Tablet 650milliGRAM(s) Oral every 6 hours PRN  sodium chloride 0.9%. 1000milliLiter(s) IV Continuous <Continuous>  pantoprazole Infusion 8mG/Hr IV Continuous <Continuous>  traMADol 50milliGRAM(s) Oral once PRN  QUEtiapine 200milliGRAM(s) Oral at bedtime  mirtazapine 45milliGRAM(s) Oral at bedtime  HYDROmorphone  Injectable 2milliGRAM(s) IV Push every 4 hours PRN  oxyCODONE IR 5milliGRAM(s) Oral every 6 hours PRN      Vital Signs Last 24 Hrs  T(C): 37.6, Max: 39 (06-10 @ 22:20)  T(F): 0.9, Max: 102.2 (06-10 @ 22:20)  HR: 107 (95 - 107)  BP: 130/67 (122/66 - 146/71)  BP(mean): --  RR: 18 (18 - 18)  SpO2: 99% (97% - 99%)    Physical Exam:  General:    NAD,  non toxic, A&O x 3  HEENT:    no oropharyngeal lesions,   no LAD,   neck supple  Cardiovascular:     regular S1, S2,  no murmur  Respiratory:    clear b/l,    no wheezing  abd:     soft,   BS +,   no tenderness,    no organomegaly  :   no CVAT,  no suprapubic tenderness,   no  santos  Musculoskletal:    pt has severe tenderness of the left hip joint   Skin:    no rash  Neurologic:     no focal deficit                          7.5    21.02 )-----------( 583    WBC Count: 21.02 (06-11 @ 06:30)  WBC Count: 23.55 (06-10 @ 23:20)  WBC Count: 24.20 (06-10 @ 10:55)  WBC Count: 24.24 (06-10 @ 04:00)  WBC Count: 22.90 (06-09 @ 18:34)  WBC Count: 29.93 (06-09 @ 12:20)               22.9       06-11    132<L>  |  91<L>  |  12  ----------------------------<  104<H>  3.4<L>   |  25  |  0.62    Ca    8.3<L>      11 Jun 2017 06:30  Phos  3.8     06-11  Mg     2.1     06-11    TPro  7.2  /  Alb  2.8<L>  /  TBili  0.4  /  DBili  x   /  AST  57<H>  /  ALT  87<H>  /  AlkPhos  96  06-10      Urinalysis Basic - ( 09 Jun 2017 15:53 )    Color: YELLOW / Appearance: CLEAR / SG: > 1.040 / pH: 6.0  Gluc: NEGATIVE / Ketone: NEGATIVE  / Bili: NEGATIVE / Urobili: NORMAL E.U.   Blood: NEGATIVE / Protein: 30 / Nitrite: NEGATIVE   Leuk Esterase: NEGATIVE / RBC: 2-5 / WBC 5-10   Sq Epi: OCC / Non Sq Epi: x / Bacteria: x        MICROBIOLOGY:  Vancomycin Level, Trough: 2.5 ug/mL (06-11)    Culture - Blood (06.09.17 @ 13:21)   STAU^Staphylococcus aureus- MRSA    To send repeat blood cultures           RADIOLOGY:  EXAM:  MRI PELVIS W & W O CONTRAST    EXAM:  MRI HIP W-W O C LT      EXAM:  MRI LUMBAR SPINE W C      There is mild edema involving the superior endplate of L4 at the L3-4   level. After contrast administration there is slight enhancement. This is   suspicious for early discitis/osteomyelitis.    The left psoas muscle is enlarged and enhances heterogeneously consistent   with a psoas abscess. This extends downward into the pelvis to involve   the left iliacus muscle. This is better evaluated on the MRI of the   pelvis. The right psoas muscle is also slightly enlarged and enhances   slightly heterogeneously suggesting mild inflammatory change without   drainable collection. No abnormal enhancement within the spinal canal is   identified.    The conus medullaris normally terminates at the L1 level.EXAM:  MRI LUMBAR SPINE W C      There is mild edema involving the superior endplate of L4 at the L3-4   level. After contrast administration there is slight enhancement. This is   suspicious for early discitis/osteomyelitis.    The left psoas muscle is enlarged and enhances heterogeneously consistent   with a psoas abscess. This extends downward into the pelvis to involve   the left iliacus muscle. This is better evaluated on the MRI of the   pelvis. The right psoas muscle is also slightly enlarged and enhances   slightly heterogeneously suggesting mild inflammatory change without   drainable collection. No abnormal enhancement within the spinal canal is   identified.    The conus medullaris normally terminates at the L1 level.  IMPRESSION:   1.  Findings suspicious for infectious left iliopsoas bursitis tracking   into the pelvis. Rim-enhancing fluid collection tracking along the left   iliopsoas tendon. Fully extent of fluid collection is partially included   on this study.  2.  Small rim-enhancing fluid collections within the left piriformis and   left gluteus gabriel musculature suspicious for abscess.  3.  Myositis of the cellulitis, gluteus gabriel and left piriformis of   infectious or inflammatory etiology.  4.  No osteomyelitis.  6.  Diffuse marrow signal loss on T1-weighted imaging within the lumbar   spine and pelvis may reflect chronic anemia or other infiltrative process. Follow Up:      Inverval History: the pt has been stable overnight, though he still has fever and he is complaining of pain. The pt mentions some pain control due to medications. No nausea, vomitting.   No acute events overnight.     ROS:    Unobtainable becasue:  All other systems negative: x     fever [x ]     chills [x ]      headache  [ ]       chest pain [ ]           SOB  [ ]          abd pain  [ ]            nausea [ ]            vomiting [ ]          diarrhea [ ]  dysurea [ ]         rash  [ ]         joint swelling [ ]           edema  [ ] xpain in the hip     Allergies  No Known Allergies        ANTIMICROBIALS:  ceFAZolin   IVPB 2000 every 8 hours  ceFAZolin   IVPB    vancomycin  IVPB 1500 every 12 hours      OTHER MEDS:  acetaminophen   Tablet 650milliGRAM(s) Oral every 6 hours PRN  sodium chloride 0.9%. 1000milliLiter(s) IV Continuous <Continuous>  pantoprazole Infusion 8mG/Hr IV Continuous <Continuous>  traMADol 50milliGRAM(s) Oral once PRN  QUEtiapine 200milliGRAM(s) Oral at bedtime  mirtazapine 45milliGRAM(s) Oral at bedtime  HYDROmorphone  Injectable 2milliGRAM(s) IV Push every 4 hours PRN  oxyCODONE IR 5milliGRAM(s) Oral every 6 hours PRN      Vital Signs Last 24 Hrs  T(C): 37.6, Max: 39 (06-10 @ 22:20)  T(F): 0.9, Max: 102.2 (06-10 @ 22:20)  HR: 107 (95 - 107)  BP: 130/67 (122/66 - 146/71)  BP(mean): --  RR: 18 (18 - 18)  SpO2: 99% (97% - 99%)    Physical Exam:  General:    NAD,  non toxic, A&O x 3  HEENT:    no oropharyngeal lesions,   no LAD,   neck supple  Cardiovascular:     regular S1, S2,  no murmur  Respiratory:    clear b/l,    no wheezing  abd:     soft,   BS +,   no tenderness,    no organomegaly  :   no CVAT,  no suprapubic tenderness,   no  santos  Musculoskletal:    pt has severe tenderness of the left hip joint   Skin:    no rash  Neurologic:     no focal deficit                          7.5    21.02 )-----------( 583    WBC Count: 21.02 (06-11 @ 06:30)  WBC Count: 23.55 (06-10 @ 23:20)  WBC Count: 24.20 (06-10 @ 10:55)  WBC Count: 24.24 (06-10 @ 04:00)  WBC Count: 22.90 (06-09 @ 18:34)  WBC Count: 29.93 (06-09 @ 12:20)               22.9       06-11    132<L>  |  91<L>  |  12  ----------------------------<  104<H>  3.4<L>   |  25  |  0.62    Ca    8.3<L>      11 Jun 2017 06:30  Phos  3.8     06-11  Mg     2.1     06-11    TPro  7.2  /  Alb  2.8<L>  /  TBili  0.4  /  DBili  x   /  AST  57<H>  /  ALT  87<H>  /  AlkPhos  96  06-10      Urinalysis Basic - ( 09 Jun 2017 15:53 )    Color: YELLOW / Appearance: CLEAR / SG: > 1.040 / pH: 6.0  Gluc: NEGATIVE / Ketone: NEGATIVE  / Bili: NEGATIVE / Urobili: NORMAL E.U.   Blood: NEGATIVE / Protein: 30 / Nitrite: NEGATIVE   Leuk Esterase: NEGATIVE / RBC: 2-5 / WBC 5-10   Sq Epi: OCC / Non Sq Epi: x / Bacteria: x        MICROBIOLOGY:  Vancomycin Level, Trough: 2.5 ug/mL (06-11)    Culture - Blood (06.09.17 @ 13:21)   STAU^Staphylococcus aureus- MRSA    To send repeat blood cultures           RADIOLOGY:  EXAM:  MRI PELVIS W & W O CONTRAST    EXAM:  MRI HIP W-W O C LT      EXAM:  MRI LUMBAR SPINE W C      There is mild edema involving the superior endplate of L4 at the L3-4   level. After contrast administration there is slight enhancement. This is   suspicious for early discitis/osteomyelitis.    The left psoas muscle is enlarged and enhances heterogeneously consistent   with a psoas abscess. This extends downward into the pelvis to involve   the left iliacus muscle. This is better evaluated on the MRI of the   pelvis. The right psoas muscle is also slightly enlarged and enhances   slightly heterogeneously suggesting mild inflammatory change without   drainable collection. No abnormal enhancement within the spinal canal is   identified.    The conus medullaris normally terminates at the L1 level.EXAM:  MRI LUMBAR SPINE W C      There is mild edema involving the superior endplate of L4 at the L3-4   level. After contrast administration there is slight enhancement. This is   suspicious for early discitis/osteomyelitis.    The left psoas muscle is enlarged and enhances heterogeneously consistent   with a psoas abscess. This extends downward into the pelvis to involve   the left iliacus muscle. This is better evaluated on the MRI of the   pelvis. The right psoas muscle is also slightly enlarged and enhances   slightly heterogeneously suggesting mild inflammatory change without   drainable collection. No abnormal enhancement within the spinal canal is   identified.    The conus medullaris normally terminates at the L1 level.  IMPRESSION:   1.  Findings suspicious for infectious left iliopsoas bursitis tracking   into the pelvis. Rim-enhancing fluid collection tracking along the left   iliopsoas tendon. Fully extent of fluid collection is partially included   on this study.  2.  Small rim-enhancing fluid collections within the left piriformis and   left gluteus gabriel musculature suspicious for abscess.  3.  Myositis of the cellulitis, gluteus gabriel and left piriformis of   infectious or inflammatory etiology.  4.  No osteomyelitis.  6.  Diffuse marrow signal loss on T1-weighted imaging within the lumbar   spine and pelvis may reflect chronic anemia or other infiltrative process. Follow Up:  bacteremia, r/o septic hip    Interval History: c/o continued left hip pain, remains febrile.  Rest of ROS otherwise negative.    Allergies  No Known Allergies    ANTIMICROBIALS:  ceFAZolin   IVPB 2000 every 8 hours  ceFAZolin   IVPB    vancomycin  IVPB 1500 every 12 hours    OTHER MEDS:  acetaminophen   Tablet 650milliGRAM(s) Oral every 6 hours PRN  sodium chloride 0.9%. 1000milliLiter(s) IV Continuous <Continuous>  pantoprazole Infusion 8mG/Hr IV Continuous <Continuous>  traMADol 50milliGRAM(s) Oral once PRN  QUEtiapine 200milliGRAM(s) Oral at bedtime  mirtazapine 45milliGRAM(s) Oral at bedtime  HYDROmorphone  Injectable 2milliGRAM(s) IV Push every 4 hours PRN  oxyCODONE IR 5milliGRAM(s) Oral every 6 hours PRN      Vital Signs Last 24 Hrs  T(C): 37.6, Max: 39 (06-10 @ 22:20)  T(F): 0.9, Max: 102.2 (06-10 @ 22:20)  HR: 107 (95 - 107)  BP: 130/67 (122/66 - 146/71)  BP(mean): --  RR: 18 (18 - 18)  SpO2: 99% (97% - 99%)    Physical Exam:  General:    NAD,  non toxic, A&O x 3  HEENT:    no oropharyngeal lesions,   no LAD,   neck supple  Cardiovascular:     regular S1, S2,  no murmur  Respiratory:    clear b/l,    no wheezing  abd:     soft,   BS +,   no tenderness,    no organomegaly  :   no CVAT,  no suprapubic tenderness,   no  santos  Musculoskletal:    pt has severe tenderness of the left hip joint   Skin:    no rash  Neurologic:     no focal deficit                        7.5    21.02 )-----------( 583      WBC Count: 21.02 (06-11 @ 06:30)  WBC Count: 23.55 (06-10 @ 23:20)  WBC Count: 24.20 (06-10 @ 10:55)  WBC Count: 24.24 (06-10 @ 04:00)  WBC Count: 22.90 (06-09 @ 18:34)  WBC Count: 29.93 (06-09 @ 12:20)    132<L>  |  91<L>  |  12  ----------------------------<  104<H>  3.4<L>   |  25  |  0.62    Ca    8.3<L>      11 Jun 2017 06:30  Phos  3.8     06-11  Mg     2.1     06-11    TPro  7.2  /  Alb  2.8<L>  /  TBili  0.4  /  DBili  x   /  AST  57<H>  /  ALT  87<H>  /  AlkPhos  96  06-10    Urinalysis Basic - ( 09 Jun 2017 15:53 )    Color: YELLOW / Appearance: CLEAR / SG: > 1.040 / pH: 6.0  Gluc: NEGATIVE / Ketone: NEGATIVE  / Bili: NEGATIVE / Urobili: NORMAL E.U.   Blood: NEGATIVE / Protein: 30 / Nitrite: NEGATIVE   Leuk Esterase: NEGATIVE / RBC: 2-5 / WBC 5-10   Sq Epi: OCC / Non Sq Epi: x / Bacteria: x    MICROBIOLOGY:  Vancomycin Level, Trough: 2.5 ug/mL (06-11)    Culture - Blood (06.09.17 @ 13:21)   STAU^Staphylococcus aureus- MRSA    To send repeat blood cultures        RADIOLOGY:  MRI PELVIS W & W O CONTRAST    MRI HIP W-W O C LT    MRI LUMBAR SPINE W C      1.  Findings suspicious for infectious left iliopsoas bursitis tracking into the pelvis. Rim-enhancing fluid collection tracking along the left   iliopsoas tendon. Fully extent of fluid collection is partially included on this study.  2.  Small rim-enhancing fluid collections within the left piriformis and left gluteus gabriel musculature suspicious for abscess.  3.  Myositis of the cellulitis, gluteus gabriel and left piriformis of infectious or inflammatory etiology.  4.  No osteomyelitis.  5.  Moderate distention of the urinary bladder. Correlate clinically.  6.  Diffuse marrow signal loss on T1-weighted imaging within the lumbar spine and pelvis may reflect chronic anemia or other infiltrative process.    Findings suspicious for early discitis osteomyelitis at L3-4. Left psoas abscess extends into the pelvis. Inflammatory changes right psoas muscle.

## 2017-06-11 NOTE — PROGRESS NOTE ADULT - ASSESSMENT
Iliopsoas abscess/ Lumbar discitis/ Staph Aureus Bacteremia   Pt has pathologies as described in the imaging that includes Iliopsoas absscess, and other smaller absecces, and Staph Aureus bacteremia.   Still febrile and has a whie count   Would recommend Cont vancomycin and cefazolin, would need tte  repeat cultures  Will need drainage of the abscess and send for culture   Will also need analysis of the effusion, small, around the left hip joint to assess the involvement  Will d/w attending,

## 2017-06-11 NOTE — PROGRESS NOTE ADULT - PROBLEM SELECTOR PLAN 3
Acute blood loss anemia given Occult blood + and normal Hg in 12/2016  Endorses hx of diverticulitis-No abdominal pain reported  Also has been taking NSAIDs-may be UGIB from PUD  hold nsaids  gi consult  transfuse   check cbc q 24   gi consult pending   ppi  GI may perform EGD today. Protonix drip. NPO  Type and scree. Goal Hg>7  received 2 units o/n-awaiting repeat cbc  ferritin extremity high on setting of sepsis likely Acute blood loss anemia given Occult blood + and normal Hg in 12/2016 , less likely 2/2 hemolysis will check hapto/ LDH as possibility of endocarditis   Endorses hx of diverticulitis-No abdominal pain reported  Also has been taking NSAIDs-may be UGIB from PUD  hold nsaids  gi consult  transfuse if h/h < 7.0  check cbc q 24 , will check LDH/ Hapto . ECHO pending   gi consulted  ppi  GI may perform EGD today. Protonix drip. NPO  Type and scree. Goal Hg>7  received 2 units o/n-awaiting repeat cbc  ferritin extremity high on setting of sepsis

## 2017-06-11 NOTE — CONSULT NOTE ADULT - PROBLEM SELECTOR RECOMMENDATION 9
- agree with IV abx  - would consult Orthopedics to ask about possible hip joint aspiration to assess for septic joint  - any further imaging recommendations per Ortho re: hip imaging
1. Continue IV ABx  2. PT eval  3. ORtho f/u for left hip pain  4. Case to be d/w attending
continue vancomycin  change zosyn to ancef  repeat blood culture  echo

## 2017-06-12 LAB
-  CEFAZOLIN: SIGNIFICANT CHANGE UP
-  CIPROFLOXACIN: SIGNIFICANT CHANGE UP
-  CLINDAMYCIN: SIGNIFICANT CHANGE UP
-  ERYTHROMYCIN: SIGNIFICANT CHANGE UP
-  GENTAMICIN: SIGNIFICANT CHANGE UP
-  MOXIFLOXACIN(AEROBIC): SIGNIFICANT CHANGE UP
-  OXACILLIN: SIGNIFICANT CHANGE UP
-  PENICILLIN: SIGNIFICANT CHANGE UP
-  RIFAMPIN.: SIGNIFICANT CHANGE UP
-  TETRACYCLINE: SIGNIFICANT CHANGE UP
-  TRIMETHOPRIM/SULFAMETHOXAZOLE: SIGNIFICANT CHANGE UP
-  VANCOMYCIN: SIGNIFICANT CHANGE UP
BACTERIA BLD CULT: SIGNIFICANT CHANGE UP
BUN SERPL-MCNC: 10 MG/DL — SIGNIFICANT CHANGE UP (ref 7–23)
CALCIUM SERPL-MCNC: 8.4 MG/DL — SIGNIFICANT CHANGE UP (ref 8.4–10.5)
CHLORIDE SERPL-SCNC: 91 MMOL/L — LOW (ref 98–107)
CO2 SERPL-SCNC: 25 MMOL/L — SIGNIFICANT CHANGE UP (ref 22–31)
CREAT SERPL-MCNC: 0.63 MG/DL — SIGNIFICANT CHANGE UP (ref 0.5–1.3)
GLUCOSE SERPL-MCNC: 104 MG/DL — HIGH (ref 70–99)
HCT VFR BLD CALC: 25 % — LOW (ref 39–50)
HGB BLD-MCNC: 8.1 G/DL — LOW (ref 13–17)
INR BLD: 1.52 — HIGH (ref 0.88–1.17)
MAGNESIUM SERPL-MCNC: 2 MG/DL — SIGNIFICANT CHANGE UP (ref 1.6–2.6)
MCHC RBC-ENTMCNC: 29.2 PG — SIGNIFICANT CHANGE UP (ref 27–34)
MCHC RBC-ENTMCNC: 32.4 % — SIGNIFICANT CHANGE UP (ref 32–36)
MCV RBC AUTO: 90.3 FL — SIGNIFICANT CHANGE UP (ref 80–100)
METHOD TYPE: SIGNIFICANT CHANGE UP
ORGANISM # SPEC MICROSCOPIC CNT: SIGNIFICANT CHANGE UP
ORGANISM # SPEC MICROSCOPIC CNT: SIGNIFICANT CHANGE UP
PHOSPHATE SERPL-MCNC: 3.5 MG/DL — SIGNIFICANT CHANGE UP (ref 2.5–4.5)
PLATELET # BLD AUTO: 689 K/UL — HIGH (ref 150–400)
PMV BLD: 8.9 FL — SIGNIFICANT CHANGE UP (ref 7–13)
POTASSIUM SERPL-MCNC: 3.9 MMOL/L — SIGNIFICANT CHANGE UP (ref 3.5–5.3)
POTASSIUM SERPL-SCNC: 3.9 MMOL/L — SIGNIFICANT CHANGE UP (ref 3.5–5.3)
PROTHROM AB SERPL-ACNC: 17.2 SEC — HIGH (ref 9.8–13.1)
RBC # BLD: 2.77 M/UL — LOW (ref 4.2–5.8)
RBC # FLD: 14.1 % — SIGNIFICANT CHANGE UP (ref 10.3–14.5)
SODIUM SERPL-SCNC: 133 MMOL/L — LOW (ref 135–145)
SPECIMEN SOURCE: SIGNIFICANT CHANGE UP
SPECIMEN SOURCE: SIGNIFICANT CHANGE UP
WBC # BLD: 16.39 K/UL — HIGH (ref 3.8–10.5)
WBC # FLD AUTO: 16.39 K/UL — HIGH (ref 3.8–10.5)

## 2017-06-12 PROCEDURE — 99232 SBSQ HOSP IP/OBS MODERATE 35: CPT

## 2017-06-12 PROCEDURE — 43235 EGD DIAGNOSTIC BRUSH WASH: CPT | Mod: GC

## 2017-06-12 PROCEDURE — 76604 US EXAM CHEST: CPT | Mod: 26

## 2017-06-12 RX ORDER — METHADONE HYDROCHLORIDE 40 MG/1
105 TABLET ORAL DAILY
Qty: 0 | Refills: 0 | Status: DISCONTINUED | OUTPATIENT
Start: 2017-06-12 | End: 2017-06-12

## 2017-06-12 RX ORDER — METHADONE HYDROCHLORIDE 40 MG/1
105 TABLET ORAL EVERY 24 HOURS
Qty: 0 | Refills: 0 | Status: DISCONTINUED | OUTPATIENT
Start: 2017-06-12 | End: 2017-06-19

## 2017-06-12 RX ORDER — METHADONE HYDROCHLORIDE 40 MG/1
105 TABLET ORAL EVERY 24 HOURS
Qty: 0 | Refills: 0 | Status: DISCONTINUED | OUTPATIENT
Start: 2017-06-12 | End: 2017-06-12

## 2017-06-12 RX ORDER — PANTOPRAZOLE SODIUM 20 MG/1
40 TABLET, DELAYED RELEASE ORAL
Qty: 0 | Refills: 0 | Status: DISCONTINUED | OUTPATIENT
Start: 2017-06-12 | End: 2017-06-23

## 2017-06-12 RX ORDER — VANCOMYCIN HCL 1 G
1000 VIAL (EA) INTRAVENOUS ONCE
Qty: 0 | Refills: 0 | Status: COMPLETED | OUTPATIENT
Start: 2017-06-12 | End: 2017-06-12

## 2017-06-12 RX ORDER — OXYCODONE HYDROCHLORIDE 5 MG/1
5 TABLET ORAL EVERY 4 HOURS
Qty: 0 | Refills: 0 | Status: DISCONTINUED | OUTPATIENT
Start: 2017-06-12 | End: 2017-06-19

## 2017-06-12 RX ADMIN — Medication 100 MILLIGRAM(S): at 15:12

## 2017-06-12 RX ADMIN — Medication 250 MILLIGRAM(S): at 19:33

## 2017-06-12 RX ADMIN — METHADONE HYDROCHLORIDE 105 MILLIGRAM(S): 40 TABLET ORAL at 14:41

## 2017-06-12 RX ADMIN — Medication 300 MILLIGRAM(S): at 15:33

## 2017-06-12 RX ADMIN — Medication 100 MILLIGRAM(S): at 05:15

## 2017-06-12 RX ADMIN — OXYCODONE HYDROCHLORIDE 5 MILLIGRAM(S): 5 TABLET ORAL at 08:04

## 2017-06-12 RX ADMIN — MIRTAZAPINE 45 MILLIGRAM(S): 45 TABLET, ORALLY DISINTEGRATING ORAL at 22:06

## 2017-06-12 RX ADMIN — QUETIAPINE FUMARATE 200 MILLIGRAM(S): 200 TABLET, FILM COATED ORAL at 22:06

## 2017-06-12 RX ADMIN — OXYCODONE HYDROCHLORIDE 5 MILLIGRAM(S): 5 TABLET ORAL at 19:33

## 2017-06-12 RX ADMIN — Medication 650 MILLIGRAM(S): at 16:04

## 2017-06-12 RX ADMIN — OXYCODONE HYDROCHLORIDE 5 MILLIGRAM(S): 5 TABLET ORAL at 00:30

## 2017-06-12 RX ADMIN — OXYCODONE HYDROCHLORIDE 5 MILLIGRAM(S): 5 TABLET ORAL at 08:55

## 2017-06-12 RX ADMIN — OXYCODONE HYDROCHLORIDE 5 MILLIGRAM(S): 5 TABLET ORAL at 20:03

## 2017-06-12 RX ADMIN — Medication 100 MILLIGRAM(S): at 22:06

## 2017-06-12 NOTE — PHYSICAL THERAPY INITIAL EVALUATION ADULT - MANUAL MUSCLE TESTING RESULTS, REHAB EVAL
Except B Shoulder Flexion and L Hip Flexion less than or equal to 3/5/no strength deficits were identified

## 2017-06-12 NOTE — PROGRESS NOTE ADULT - SUBJECTIVE AND OBJECTIVE BOX
Follow Up:  bacteremia, discitis L3/4, left iliopsoas abscess    Interval History: c/o continued left hip/buttock pain but better.  s/p EGD with gastric non-bleeding erosions.  chest sono unremarkable.  Rest of ROS otherwise negative.    Allergies  No Known Allergies    ANTIMICROBIALS:  ceFAZolin   IVPB 2000 every 8 hours  ceFAZolin   IVPB    vancomycin  IVPB 1500 every 12 hours    OTHER MEDS:  acetaminophen   Tablet 650milliGRAM(s) Oral every 6 hours PRN  sodium chloride 0.9%. 1000milliLiter(s) IV Continuous <Continuous>  pantoprazole Infusion 8mG/Hr IV Continuous <Continuous>  traMADol 50milliGRAM(s) Oral once PRN  QUEtiapine 200milliGRAM(s) Oral at bedtime  mirtazapine 45milliGRAM(s) Oral at bedtime  HYDROmorphone  Injectable 2milliGRAM(s) IV Push every 4 hours PRN  oxyCODONE IR 5milliGRAM(s) Oral every 6 hours PRN    Vital Signs Last 24 Hrs  T(F): 99.2, Max: 102.7 (06-11 @ 17:51)  HR: 93  BP: 140/69  RR: 18  SpO2: 98% (97% - 98%)  Wt(kg): --    Physical Exam:  General:    NAD,  non toxic, sitting up in bed  HEENT:   no icterus, neck supple  Cardiovascular:  S1, S2  Respiratory: rhonchi b/l  abd: soft, no tenderness  : no santos  Musculoskeletal:  no findings left buttock (ie cellulitis, fluctuance), can flex left hip  Skin:    no rash  Neurologic:  no focal deficit                        8.1    16.39 )-----------( 689      ( 12 Jun 2017 06:00 )             25.0 06-12    133  |  91  |  10  ----------------------------<  104  3.9   |  25  |  0.63  Ca    8.4      12 Jun 2017 06:00Phos  3.5     06-12Mg     2.0     06-12    Culture - Blood:   NO ORGANISMS ISOLATED  NO ORGANISMS ISOLATED AT 24 HOURS (06.11.17 @ 08:44)    Culture - Blood:   NO ORGANISMS ISOLATED  NO ORGANISMS ISOLATED AT 24 HOURS (06.11.17 @ 08:44)    Culture - Blood:   STAU^Staphylococcus aureus (06.09.17 @ 13:21)  -  Oxacillin: S <=0.25 HILLARY (06.09.17 @ 13:21)    RADIOLOGY:  MRI PELVIS W & W O CONTRAST    MRI HIP W-W O C LT    MRI LUMBAR SPINE W C      1.  Findings suspicious for infectious left iliopsoas bursitis tracking into the pelvis. Rim-enhancing fluid collection tracking along the left   iliopsoas tendon. Fully extent of fluid collection is partially included on this study.  2.  Small rim-enhancing fluid collections within the left piriformis and left gluteus gabriel musculature suspicious for abscess.  3.  Myositis of the cellulitis, gluteus gabriel and left piriformis of infectious or inflammatory etiology.  4.  No osteomyelitis.  5.  Moderate distention of the urinary bladder. Correlate clinically.  6.  Diffuse marrow signal loss on T1-weighted imaging within the lumbar spine and pelvis may reflect chronic anemia or other infiltrative process.    Findings suspicious for early discitis osteomyelitis at L3-4. Left psoas abscess extends into the pelvis. Inflammatory changes right psoas muscle.

## 2017-06-12 NOTE — PROGRESS NOTE ADULT - SUBJECTIVE AND OBJECTIVE BOX
patient was evaluated for rule out septic hip. pain in lower back and left leg unchanged. patient said he is going to get an abscess drained by IR today.    Vitals: ICU Vital Signs Last 24 Hrs  T(C): 37.2, Max: 39.3 (06-11 @ 17:51)  T(F): 99, Max: 102.7 (06-11 @ 17:51)  HR: 98 (89 - 98)  BP: 146/80 (104/70 - 146/80)  BP(mean): --  ABP: --  ABP(mean): --  RR: 16 (16 - 18)  SpO2: 98% (97% - 99%)      Exam:  Gen: NAD  LLE  Motor: 5/5 EHL/FHL/TA/Gastrocnemius  Sensory: SILT DP/SP/S/S/T nerve distributions  Vascular: foot warm well perfused  ROM: pain with range of motion, AROM 0-70 PROM 0-90 ER/IR: 15/20    MRI: L iliopsoas bursitis/abscess, L3/4 discitis fluid collection piriformis/glut max    A/P: 36 year old M s/p iliopsoas abscess  - rec IR aspiration of abscess  - abx per primary team  - no ortho intervention at this time, please call back for questions

## 2017-06-12 NOTE — PROGRESS NOTE ADULT - PROBLEM SELECTOR PLAN 2
Abscess/bursitis seen on MRI which will need IR guided drainage  Contacted IR today. Will f/u  c/w IV abx for now  Pain control with oxycodone and Dilaudid

## 2017-06-12 NOTE — PROGRESS NOTE ADULT - PROBLEM SELECTOR PLAN 1
Discitis/osteo of L3-L4 noted on MRI  c/w IV Vancomycin and cefazolin for now  ID and neuro sx following. Will f/u additional reccs   Appreciate input

## 2017-06-12 NOTE — PROGRESS NOTE ADULT - PROBLEM SELECTOR PLAN 3
Acute blood loss anemia given Occult blood + and normal Hg in 12/2016  Endorses hx of diverticulitis-No abdominal pain reported  Also has been taking NSAIDs-may be UGIB from PUD  hold nsaids  gi consult  transfuse   check cbc  gi consult  ppi  GI may perform EGD today. Protonix drip. NPO  Type and scree. Goal Hg>7  received 2 units o/n-awaiting repeat cbc  ferritin extremity high on setting of sepsis Acute blood loss anemia given Occult blood + and normal Hg in 12/2016  Endorses hx of diverticulitis-No abdominal pain reported  Also has been taking NSAIDs-may be UGIB from PUD  hold nsaids  gi consult  transfuse   check cbc  gi consult  ppi  GI may perform EGD today. Protonix drip. NPO  Type and scree. Goal Hg>7  received 2 units o/n-awaiting repeat cbc  ferritin extremity high on setting of sepsis  Transfused one unit pRBC last night

## 2017-06-12 NOTE — PROGRESS NOTE ADULT - SUBJECTIVE AND OBJECTIVE BOX
Patient is a 36y old  Male with hx of IV drug abuse found to have L3-4 discitis/osteo and left iliopsoas bursitis/myositis with left gluteus abscess       INTERVAL HPI/OVERNIGHT EVENTS:  Still has left sided hip pain. Febrile yesterday. MRI showed osteo/abscess/bursitis       T(C): 38, Max: 39.8 (06-09 @ 15:15)  HR: 97 (97 - 110)  BP: 139/83 (126/68 - 149/76)  RR: 16 (16 - 19)  SpO2: 98% (97% - 100%)  Wt(kg): --  I&O's Summary    MEDICATIONS  (STANDING):  cefepime  IVPB  IV Intermittent   vancomycin  IVPB 1250milliGRAM(s) IV Intermittent every 12 hours  cefepime  IVPB 2000milliGRAM(s) IV Intermittent every 8 hours  sodium chloride 0.9%. 1000milliLiter(s) IV Continuous <Continuous>  pantoprazole Infusion 8mG/Hr IV Continuous <Continuous>    MEDICATIONS  (PRN):  acetaminophen   Tablet 650milliGRAM(s) Oral every 6 hours PRN For Temp greater than 38 C (100.4 F)  HYDROmorphone  Injectable 1milliGRAM(s) IV Push every 4 hours PRN Severe Pain (7 - 10)  traMADol 50milliGRAM(s) Oral once PRN Breakthrough pain no controlled on Dilaudid      Physical Exam: PHYSICAL EXAM: GENERAL: Moderate distress from left hip pain. Lying on right side  HEAD:  Atraumatic, Normocephalic EYES: EOMI, PERRLA, conjunctiva and sclera clear. No icterus or pallor NECK: Supple, No JVD CHEST/LUNG: Clear to auscultation bilaterally; No wheeze HEART: Tachycardic. Regular rhythm; Systolic murmur heard best at RUSB. No rubs or gallops ABDOMEN: Soft, Nontender, Nondistended; Bowel sounds present. No organomegaly  EXTREMITIES:  2+ Peripheral Pulses, No clubbing, cyanosis, or edema. TTP on left posterior illiac spine and left buttocks. Pain mostly with extension of LLE. No fluctuance noted. No overlying rash. Spine non tender. Full ROM in spine. Limited in left lower extremity due to pain.  PSYCH: AAOx3 NEUROLOGY: non-focal. Reflexes 2+ SKIN: Anterior mid chest mass-TTP-No drainage or overlying point of entry. No fluctuance noted on left buttocks or left PSIS	      LABS:                        5.6    22.90 )-----------( 430      ( 09 Jun 2017 18:34 )             17.5     06-09    133<L>  |  92<L>  |  19  ----------------------------<  104<H>  4.0   |  25  |  0.84    Ca    8.9      09 Jun 2017 12:20    TPro  7.9  /  Alb  3.3  /  TBili  0.2  /  DBili  x   /  AST  88<H>  /  ALT  116<H>  /  AlkPhos  100  06-09    PT/INR - ( 09 Jun 2017 12:20 )   PT: 15.0 SEC;   INR: 1.33          PTT - ( 10 Saul 2017 06:00 )  PTT:34.9 SEC  Urinalysis Basic - ( 09 Jun 2017 15:53 )    Color: YELLOW / Appearance: CLEAR / SG: > 1.040 / pH: 6.0  Gluc: NEGATIVE / Ketone: NEGATIVE  / Bili: NEGATIVE / Urobili: NORMAL E.U.   Blood: NEGATIVE / Protein: 30 / Nitrite: NEGATIVE   Leuk Esterase: NEGATIVE / RBC: 2-5 / WBC 5-10   Sq Epi: OCC / Non Sq Epi: x / Bacteria: x      CAPILLARY BLOOD GLUCOSE        Urinalysis Basic - ( 09 Jun 2017 15:53 )    Color: YELLOW / Appearance: CLEAR / SG: > 1.040 / pH: 6.0  Gluc: NEGATIVE / Ketone: NEGATIVE  / Bili: NEGATIVE / Urobili: NORMAL E.U.   Blood: NEGATIVE / Protein: 30 / Nitrite: NEGATIVE   Leuk Esterase: NEGATIVE / RBC: 2-5 / WBC 5-10   Sq Epi: OCC / Non Sq Epi: x / Bacteria: x    RADIOLOGY & ADDITIONAL TESTS:    Imaging Personally Reviewed:  [ ] YES  [ ] NO    Consultant(s) Notes Reviewed:  [ ] YES  [ ] NO    PHYSICAL EXAM:  GENERAL: NAD, well-groomed, well-developed  HEAD:  Atraumatic, Normocephalic  EYES: EOMI, PERRLA, conjunctiva and sclera clear  ENMT: No tonsillar erythema, exudates, or enlargement; Moist mucous membranes, Good dentition, No lesions  NECK: Supple, No JVD, Normal thyroid  NERVOUS SYSTEM:  Alert & Oriented X3, Good concentration; Motor Strength 5/5 B/L upper and lower extremities; DTRs 2+ intact and symmetric  CHEST/LUNG: Clear to percussion bilaterally; No rales, rhonchi, wheezing, or rubs  HEART: Regular rate and rhythm; No murmurs, rubs, or gallops  ABDOMEN: Soft, Nontender, Nondistended; Bowel sounds present  EXTREMITIES:  2+ Peripheral Pulses, No clubbing, cyanosis, or edema  LYMPH: No lymphadenopathy noted  SKIN: No rashes or lesions    Care Discussed with Consultants/Other Providers [ ] YES  [ ] NO Patient is a 36y old  Male with hx of IV drug abuse found to have L3-4 discitis/osteo and left iliopsoas bursitis/myositis with left gluteus abscess       INTERVAL HPI/OVERNIGHT EVENTS:  Still has left sided hip pain. Febrile yesterday. MRI showed osteo/abscess/bursitis. Received One unit pRBC o/n.       T(C): 38, Max: 39.8 (06-09 @ 15:15)  HR: 97 (97 - 110)  BP: 139/83 (126/68 - 149/76)  RR: 16 (16 - 19)  SpO2: 98% (97% - 100%)  Wt(kg): --  I&O's Summary    MEDICATIONS  (STANDING):  cefepime  IVPB  IV Intermittent   vancomycin  IVPB 1250milliGRAM(s) IV Intermittent every 12 hours  cefepime  IVPB 2000milliGRAM(s) IV Intermittent every 8 hours  sodium chloride 0.9%. 1000milliLiter(s) IV Continuous <Continuous>  pantoprazole Infusion 8mG/Hr IV Continuous <Continuous>    MEDICATIONS  (PRN):  acetaminophen   Tablet 650milliGRAM(s) Oral every 6 hours PRN For Temp greater than 38 C (100.4 F)  HYDROmorphone  Injectable 1milliGRAM(s) IV Push every 4 hours PRN Severe Pain (7 - 10)  traMADol 50milliGRAM(s) Oral once PRN Breakthrough pain no controlled on Dilaudid      Physical Exam: PHYSICAL EXAM: GENERAL: Moderate distress from left hip pain. Lying on right side  HEAD:  Atraumatic, Normocephalic EYES: EOMI, PERRLA, conjunctiva and sclera clear. No icterus or pallor NECK: Supple, No JVD CHEST/LUNG: Clear to auscultation bilaterally; No wheeze HEART: Tachycardic. Regular rhythm; Systolic murmur heard best at RUSB. No rubs or gallops ABDOMEN: Soft, Nontender, Nondistended; Bowel sounds present. No organomegaly  EXTREMITIES:  2+ Peripheral Pulses, No clubbing, cyanosis, or edema. TTP on left posterior illiac spine and left buttocks. Pain mostly with extension of LLE. No fluctuance noted. No overlying rash. Spine non tender. Full ROM in spine. Limited in left lower extremity due to pain.  PSYCH: AAOx3 NEUROLOGY: non-focal. Reflexes 2+ SKIN: Anterior mid chest mass-TTP-No drainage or overlying point of entry. No fluctuance noted on left buttocks or left PSIS	      LABS:                        5.6    22.90 )-----------( 430      ( 09 Jun 2017 18:34 )             17.5     06-09    133<L>  |  92<L>  |  19  ----------------------------<  104<H>  4.0   |  25  |  0.84    Ca    8.9      09 Jun 2017 12:20    TPro  7.9  /  Alb  3.3  /  TBili  0.2  /  DBili  x   /  AST  88<H>  /  ALT  116<H>  /  AlkPhos  100  06-09    PT/INR - ( 09 Jun 2017 12:20 )   PT: 15.0 SEC;   INR: 1.33          PTT - ( 10 Saul 2017 06:00 )  PTT:34.9 SEC  Urinalysis Basic - ( 09 Jun 2017 15:53 )    Color: YELLOW / Appearance: CLEAR / SG: > 1.040 / pH: 6.0  Gluc: NEGATIVE / Ketone: NEGATIVE  / Bili: NEGATIVE / Urobili: NORMAL E.U.   Blood: NEGATIVE / Protein: 30 / Nitrite: NEGATIVE   Leuk Esterase: NEGATIVE / RBC: 2-5 / WBC 5-10   Sq Epi: OCC / Non Sq Epi: x / Bacteria: x      CAPILLARY BLOOD GLUCOSE        Urinalysis Basic - ( 09 Jun 2017 15:53 )    Color: YELLOW / Appearance: CLEAR / SG: > 1.040 / pH: 6.0  Gluc: NEGATIVE / Ketone: NEGATIVE  / Bili: NEGATIVE / Urobili: NORMAL E.U.   Blood: NEGATIVE / Protein: 30 / Nitrite: NEGATIVE   Leuk Esterase: NEGATIVE / RBC: 2-5 / WBC 5-10   Sq Epi: OCC / Non Sq Epi: x / Bacteria: x    RADIOLOGY & ADDITIONAL TESTS:    Imaging Personally Reviewed:  [ ] YES  [ ] NO    Consultant(s) Notes Reviewed:  [ ] YES  [ ] NO    PHYSICAL EXAM:  GENERAL: NAD, well-groomed, well-developed  HEAD:  Atraumatic, Normocephalic  EYES: EOMI, PERRLA, conjunctiva and sclera clear  ENMT: No tonsillar erythema, exudates, or enlargement; Moist mucous membranes, Good dentition, No lesions  NECK: Supple, No JVD, Normal thyroid  NERVOUS SYSTEM:  Alert & Oriented X3, Good concentration; Motor Strength 5/5 B/L upper and lower extremities; DTRs 2+ intact and symmetric  CHEST/LUNG: Clear to percussion bilaterally; No rales, rhonchi, wheezing, or rubs  HEART: Regular rate and rhythm; No murmurs, rubs, or gallops  ABDOMEN: Soft, Nontender, Nondistended; Bowel sounds present  EXTREMITIES:  2+ Peripheral Pulses, No clubbing, cyanosis, or edema  LYMPH: No lymphadenopathy noted  SKIN: No rashes or lesions    Care Discussed with Consultants/Other Providers [ ] YES  [ ] NO

## 2017-06-12 NOTE — PROGRESS NOTE ADULT - ASSESSMENT
MSSA bacteremia in former IVDU with involvement of Left iliopsoas, gluteal muscles and discitis/OM L3/4.  Not clear if joint space is also involved. Repeat blood cultures negative to date.  IR unable to drain psoas.  Ortho feels there is no need for orthopedic surgical intervention.  [place]. PICC once blood cultures stay negative.

## 2017-06-13 LAB
BUN SERPL-MCNC: 10 MG/DL — SIGNIFICANT CHANGE UP (ref 7–23)
CALCIUM SERPL-MCNC: 8.7 MG/DL — SIGNIFICANT CHANGE UP (ref 8.4–10.5)
CHLORIDE SERPL-SCNC: 94 MMOL/L — LOW (ref 98–107)
CO2 SERPL-SCNC: 28 MMOL/L — SIGNIFICANT CHANGE UP (ref 22–31)
CREAT SERPL-MCNC: 0.62 MG/DL — SIGNIFICANT CHANGE UP (ref 0.5–1.3)
GLUCOSE SERPL-MCNC: 97 MG/DL — SIGNIFICANT CHANGE UP (ref 70–99)
HCT VFR BLD CALC: 25.6 % — LOW (ref 39–50)
HGB BLD-MCNC: 8.2 G/DL — LOW (ref 13–17)
MAGNESIUM SERPL-MCNC: 2.1 MG/DL — SIGNIFICANT CHANGE UP (ref 1.6–2.6)
MCHC RBC-ENTMCNC: 29.1 PG — SIGNIFICANT CHANGE UP (ref 27–34)
MCHC RBC-ENTMCNC: 32 % — SIGNIFICANT CHANGE UP (ref 32–36)
MCV RBC AUTO: 90.8 FL — SIGNIFICANT CHANGE UP (ref 80–100)
PHOSPHATE SERPL-MCNC: 3.8 MG/DL — SIGNIFICANT CHANGE UP (ref 2.5–4.5)
PLATELET # BLD AUTO: 779 K/UL — HIGH (ref 150–400)
PMV BLD: 8.6 FL — SIGNIFICANT CHANGE UP (ref 7–13)
POTASSIUM SERPL-MCNC: 4 MMOL/L — SIGNIFICANT CHANGE UP (ref 3.5–5.3)
POTASSIUM SERPL-SCNC: 4 MMOL/L — SIGNIFICANT CHANGE UP (ref 3.5–5.3)
RBC # BLD: 2.82 M/UL — LOW (ref 4.2–5.8)
RBC # FLD: 14.1 % — SIGNIFICANT CHANGE UP (ref 10.3–14.5)
SODIUM SERPL-SCNC: 137 MMOL/L — SIGNIFICANT CHANGE UP (ref 135–145)
WBC # BLD: 12.54 K/UL — HIGH (ref 3.8–10.5)
WBC # FLD AUTO: 12.54 K/UL — HIGH (ref 3.8–10.5)

## 2017-06-13 PROCEDURE — 99232 SBSQ HOSP IP/OBS MODERATE 35: CPT

## 2017-06-13 RX ORDER — POLYETHYLENE GLYCOL 3350 17 G/17G
17 POWDER, FOR SOLUTION ORAL DAILY
Qty: 0 | Refills: 0 | Status: DISCONTINUED | OUTPATIENT
Start: 2017-06-13 | End: 2017-06-17

## 2017-06-13 RX ORDER — DOCUSATE SODIUM 100 MG
100 CAPSULE ORAL THREE TIMES A DAY
Qty: 0 | Refills: 0 | Status: DISCONTINUED | OUTPATIENT
Start: 2017-06-13 | End: 2017-06-17

## 2017-06-13 RX ORDER — SENNA PLUS 8.6 MG/1
2 TABLET ORAL AT BEDTIME
Qty: 0 | Refills: 0 | Status: DISCONTINUED | OUTPATIENT
Start: 2017-06-13 | End: 2017-06-17

## 2017-06-13 RX ADMIN — POLYETHYLENE GLYCOL 3350 17 GRAM(S): 17 POWDER, FOR SOLUTION ORAL at 18:04

## 2017-06-13 RX ADMIN — METHADONE HYDROCHLORIDE 105 MILLIGRAM(S): 40 TABLET ORAL at 14:03

## 2017-06-13 RX ADMIN — MIRTAZAPINE 45 MILLIGRAM(S): 45 TABLET, ORALLY DISINTEGRATING ORAL at 23:48

## 2017-06-13 RX ADMIN — OXYCODONE HYDROCHLORIDE 5 MILLIGRAM(S): 5 TABLET ORAL at 18:45

## 2017-06-13 RX ADMIN — Medication 100 MILLIGRAM(S): at 06:22

## 2017-06-13 RX ADMIN — Medication 100 MILLIGRAM(S): at 23:48

## 2017-06-13 RX ADMIN — SENNA PLUS 2 TABLET(S): 8.6 TABLET ORAL at 23:48

## 2017-06-13 RX ADMIN — OXYCODONE HYDROCHLORIDE 5 MILLIGRAM(S): 5 TABLET ORAL at 07:35

## 2017-06-13 RX ADMIN — OXYCODONE HYDROCHLORIDE 5 MILLIGRAM(S): 5 TABLET ORAL at 18:02

## 2017-06-13 RX ADMIN — PANTOPRAZOLE SODIUM 40 MILLIGRAM(S): 20 TABLET, DELAYED RELEASE ORAL at 06:21

## 2017-06-13 RX ADMIN — Medication 100 MILLIGRAM(S): at 14:07

## 2017-06-13 RX ADMIN — QUETIAPINE FUMARATE 200 MILLIGRAM(S): 200 TABLET, FILM COATED ORAL at 23:48

## 2017-06-13 RX ADMIN — OXYCODONE HYDROCHLORIDE 5 MILLIGRAM(S): 5 TABLET ORAL at 08:10

## 2017-06-13 NOTE — PROGRESS NOTE ADULT - SUBJECTIVE AND OBJECTIVE BOX
Follow Up:  bacteremia, discitis L3/4, left iliopsoas abscess    Interval History: left hip/buttock pain but better.  can weight bear a little.  left SC joint area better.  Rest of ROS otherwise negative.    Allergies  No Known Allergies    ANTIMICROBIALS:    ceFAZolin   IVPB 2000 every 8 hours    OTHER MEDS:  acetaminophen   Tablet 650milliGRAM(s) Oral every 6 hours PRN  sodium chloride 0.9%. 1000milliLiter(s) IV Continuous <Continuous>  pantoprazole Infusion 8mG/Hr IV Continuous <Continuous>  traMADol 50milliGRAM(s) Oral once PRN  QUEtiapine 200milliGRAM(s) Oral at bedtime  mirtazapine 45milliGRAM(s) Oral at bedtime  HYDROmorphone  Injectable 2milliGRAM(s) IV Push every 4 hours PRN  oxyCODONE IR 5milliGRAM(s) Oral every 6 hours PRN    Vital Signs Last 24 Hrs  T(F): 98.3, Max: 101.7 (06-12 @ 15:46)  HR: 86  BP: 115/67  RR: 20  SpO2: 98% (94% - 100%)  Wt(kg): --    Physical Exam:  General:    NAD,  non toxic, sitting up in bed  HEENT:   no icterus, neck supple  Cardiovascular:  S1, S2  Respiratory: rhonchi b/l  abd: soft, no tenderness  : no santos  Musculoskeletal:  no findings left buttock (ie cellulitis, fluctuance), can flex and extend  left hip  Skin:    no rash  Neurologic:  no focal deficit                                   8.2    12.54 )-----------( 779      ( 13 Jun 2017 05:35 )             25.6 06-13    137  |  94  |  10  ----------------------------<  97  4.0   |  28  |  0.62  Ca    8.7      13 Jun 2017 05:35Phos  3.8     06-13Mg     2.1     06-13    MICROBIOLOGY:  Culture - Blood:   Culture - Blood:   STAU^Staphylococcus aureus (06.11.17 @ 08:44)    Culture - Blood:   STAU^Staphylococcus aureus (06.09.17 @ 13:21)  -  Oxacillin: S <=0.25 HILLARY (06.09.17 @ 13:21)    RADIOLOGY:  MRI PELVIS W & W O CONTRAST    MRI HIP W-W O C LT    MRI LUMBAR SPINE W C      1.  Findings suspicious for infectious left iliopsoas bursitis tracking into the pelvis. Rim-enhancing fluid collection tracking along the left   iliopsoas tendon. Fully extent of fluid collection is partially included on this study.  2.  Small rim-enhancing fluid collections within the left piriformis and left gluteus gabriel musculature suspicious for abscess.  3.  Myositis of the cellulitis, gluteus gabriel and left piriformis of infectious or inflammatory etiology.  4.  No osteomyelitis.  5.  Moderate distention of the urinary bladder. Correlate clinically.  6.  Diffuse marrow signal loss on T1-weighted imaging within the lumbar spine and pelvis may reflect chronic anemia or other infiltrative process.    Findings suspicious for early discitis osteomyelitis at L3-4. Left psoas abscess extends into the pelvis. Inflammatory changes right psoas muscle.

## 2017-06-13 NOTE — PROGRESS NOTE ADULT - PROBLEM SELECTOR PLAN 4
Acute blood loss anemia given Occult blood + and normal Hg in 12/2016  Endorses hx of diverticulitis-No abdominal pain reported  Been taking NSAIDs though EGD normal   Type and scree. Goal Hg>7  Will f/u with GI regarding need for colonoscopy/capsule study

## 2017-06-13 NOTE — PROVIDER CONTACT NOTE (CRITICAL VALUE NOTIFICATION) - TEST AND RESULT REPORTED:
H/H 5.6/17.5
gram + cocci in clusters from 06/11 1 out of 2
2nd set blood cultures- aerobically growing gram pos cocci in clusters
Blood cultures are growing gram positive cocci in clusters from an aerobic bottle after 20hrs of incubation from 1 out of 2 bottles.
BCx from 6/11 2nd set anaerobic bottle - gram positive cocci in clusters

## 2017-06-13 NOTE — PROVIDER CONTACT NOTE (CRITICAL VALUE NOTIFICATION) - ASSESSMENT
Pt is asymptomatic
Pt. febrile at times
asymptomatic
BCx from 6/11 2nd set anaerobic bottle - gram positive cocci in clusters
Pt resting in bed

## 2017-06-13 NOTE — PROVIDER CONTACT NOTE (CRITICAL VALUE NOTIFICATION) - SITUATION
2nd set venous blood cultures- 2/2 bottles aerobically growing gram pos cocci in clusters after 23 hours of incubation
Blood cultures from 6/9 are growing gram positive cocci in clusters from an aerobic bottle after 20hrs of incubation from 1 out of 2 bottles.
BCx from 6/11 2nd set anaerobic bottle - gram positive cocci in clusters
+ blood cx.
low H/H

## 2017-06-13 NOTE — PROVIDER CONTACT NOTE (CRITICAL VALUE NOTIFICATION) - RECOMMENDATIONS
administer antibiotics as ordered
transfuse as ordered
Assess pt at bedside
MD notified for further interventions.

## 2017-06-13 NOTE — PROVIDER CONTACT NOTE (CRITICAL VALUE NOTIFICATION) - BACKGROUND
Pt is dx with Dorsalgia
Pt. admitted with dorsalgia
Pt. admitted with pain
Patient admitted for dorsalgia, r/o osteomyelitis.
Pt admitted for rule out osteo

## 2017-06-13 NOTE — PROGRESS NOTE ADULT - SUBJECTIVE AND OBJECTIVE BOX
Patient is a 36y old  Male with hx of IV drug abuse found to have L3-4 discitis/osteo and left iliopsoas bursitis/myositis with left gluteus abscess     CC: left hip pain, fevers    INTERVAL HPI/OVERNIGHT EVENTS:  Left sided hip/leg pain much improved. Febrile yesterday afternoon.       T(C): 38, Max: 39.8 (06-09 @ 15:15)  HR: 97 (97 - 110)  BP: 139/83 (126/68 - 149/76)  RR: 16 (16 - 19)  SpO2: 98% (97% - 100%)  Wt(kg): --  I&O's Summary    MEDICATIONS  (STANDING):  cefepime  IVPB  IV Intermittent   vancomycin  IVPB 1250milliGRAM(s) IV Intermittent every 12 hours  cefepime  IVPB 2000milliGRAM(s) IV Intermittent every 8 hours  sodium chloride 0.9%. 1000milliLiter(s) IV Continuous <Continuous>  pantoprazole Infusion 8mG/Hr IV Continuous <Continuous>    MEDICATIONS  (PRN):  acetaminophen   Tablet 650milliGRAM(s) Oral every 6 hours PRN For Temp greater than 38 C (100.4 F)  HYDROmorphone  Injectable 1milliGRAM(s) IV Push every 4 hours PRN Severe Pain (7 - 10)  traMADol 50milliGRAM(s) Oral once PRN Breakthrough pain no controlled on Dilaudid      Physical Exam: PHYSICAL EXAM: GENERAL: Moderate distress from left hip pain. Lying on right side  HEAD:  Atraumatic, Normocephalic EYES: EOMI, PERRLA, conjunctiva and sclera clear. No icterus or pallor NECK: Supple, No JVD CHEST/LUNG: Clear to auscultation bilaterally; No wheeze HEART: Tachycardic. Regular rhythm; Systolic murmur heard best at RUSB. No rubs or gallops ABDOMEN: Soft, Nontender, Nondistended; Bowel sounds present. No organomegaly  EXTREMITIES:  2+ Peripheral Pulses, No clubbing, cyanosis, or edema. TTP on left posterior illiac spine and left buttocks. Pain mostly with extension of LLE. No fluctuance noted. No overlying rash. Spine non tender. Full ROM in spine. Limited in left lower extremity due to pain.  PSYCH: AAOx3 NEUROLOGY: non-focal. Reflexes 2+ SKIN: Anterior mid chest mass-TTP-No drainage or overlying point of entry. No fluctuance noted on left buttocks or left PSIS	      LABS:                        5.6    22.90 )-----------( 430      ( 09 Jun 2017 18:34 )             17.5     06-09    133<L>  |  92<L>  |  19  ----------------------------<  104<H>  4.0   |  25  |  0.84    Ca    8.9      09 Jun 2017 12:20    TPro  7.9  /  Alb  3.3  /  TBili  0.2  /  DBili  x   /  AST  88<H>  /  ALT  116<H>  /  AlkPhos  100  06-09    PT/INR - ( 09 Jun 2017 12:20 )   PT: 15.0 SEC;   INR: 1.33          PTT - ( 10 Saul 2017 06:00 )  PTT:34.9 SEC  Urinalysis Basic - ( 09 Jun 2017 15:53 )    Color: YELLOW / Appearance: CLEAR / SG: > 1.040 / pH: 6.0  Gluc: NEGATIVE / Ketone: NEGATIVE  / Bili: NEGATIVE / Urobili: NORMAL E.U.   Blood: NEGATIVE / Protein: 30 / Nitrite: NEGATIVE   Leuk Esterase: NEGATIVE / RBC: 2-5 / WBC 5-10   Sq Epi: OCC / Non Sq Epi: x / Bacteria: x      CAPILLARY BLOOD GLUCOSE        Urinalysis Basic - ( 09 Jun 2017 15:53 )    Color: YELLOW / Appearance: CLEAR / SG: > 1.040 / pH: 6.0  Gluc: NEGATIVE / Ketone: NEGATIVE  / Bili: NEGATIVE / Urobili: NORMAL E.U.   Blood: NEGATIVE / Protein: 30 / Nitrite: NEGATIVE   Leuk Esterase: NEGATIVE / RBC: 2-5 / WBC 5-10   Sq Epi: OCC / Non Sq Epi: x / Bacteria: x    RADIOLOGY & ADDITIONAL TESTS:    Imaging Personally Reviewed:  [ ] YES  [ ] NO    Consultant(s) Notes Reviewed:  [ ] YES  [ ] NO    PHYSICAL EXAM:  GENERAL: NAD, well-groomed, well-developed  HEAD:  Atraumatic, Normocephalic  EYES: EOMI, PERRLA, conjunctiva and sclera clear  ENMT: No tonsillar erythema, exudates, or enlargement; Moist mucous membranes, Good dentition, No lesions  NECK: Supple, No JVD, Normal thyroid  NERVOUS SYSTEM:  Alert & Oriented X3, Good concentration; Motor Strength 5/5 B/L upper and lower extremities; DTRs 2+ intact and symmetric  CHEST/LUNG: Clear to percussion bilaterally; No rales, rhonchi, wheezing, or rubs  HEART: Regular rate and rhythm; No murmurs, rubs, or gallops  ABDOMEN: Soft, Nontender, Nondistended; Bowel sounds present  EXTREMITIES:  2+ Peripheral Pulses, No clubbing, cyanosis, or edema  LYMPH: No lymphadenopathy noted  SKIN: No rashes or lesions    Care Discussed with Consultants/Other Providers [ ] YES  [ ] NO

## 2017-06-13 NOTE — PROGRESS NOTE ADULT - PROBLEM SELECTOR PLAN 2
Abscess/bursitis seen on MRI-collection is about a cm and is usually overly enhanced in MRI as per IR-will not be accessible for drainage  c/w IV abx for now  Pain control with oxycodone

## 2017-06-13 NOTE — PROGRESS NOTE ADULT - PROBLEM SELECTOR PLAN 3
Acute blood loss anemia given Occult blood + and normal Hg in 12/2016  Endorses hx of diverticulitis-No abdominal pain reported  UGIB ruled out with normal EGD  hold nsaids  c/w protonix po  Gi following  Type and screen. Goal Hg>7

## 2017-06-14 DIAGNOSIS — R78.81 BACTEREMIA: ICD-10-CM

## 2017-06-14 LAB
BACTERIA BLD CULT: SIGNIFICANT CHANGE UP
BUN SERPL-MCNC: 9 MG/DL — SIGNIFICANT CHANGE UP (ref 7–23)
CALCIUM SERPL-MCNC: 9.2 MG/DL — SIGNIFICANT CHANGE UP (ref 8.4–10.5)
CHLORIDE SERPL-SCNC: 93 MMOL/L — LOW (ref 98–107)
CO2 SERPL-SCNC: 28 MMOL/L — SIGNIFICANT CHANGE UP (ref 22–31)
CREAT SERPL-MCNC: 0.76 MG/DL — SIGNIFICANT CHANGE UP (ref 0.5–1.3)
GLUCOSE SERPL-MCNC: 97 MG/DL — SIGNIFICANT CHANGE UP (ref 70–99)
HCT VFR BLD CALC: 26.9 % — LOW (ref 39–50)
HGB BLD-MCNC: 8.5 G/DL — LOW (ref 13–17)
MCHC RBC-ENTMCNC: 29 PG — SIGNIFICANT CHANGE UP (ref 27–34)
MCHC RBC-ENTMCNC: 31.6 % — LOW (ref 32–36)
MCV RBC AUTO: 91.8 FL — SIGNIFICANT CHANGE UP (ref 80–100)
PLATELET # BLD AUTO: 841 K/UL — HIGH (ref 150–400)
PMV BLD: 8.6 FL — SIGNIFICANT CHANGE UP (ref 7–13)
POTASSIUM SERPL-MCNC: 4.3 MMOL/L — SIGNIFICANT CHANGE UP (ref 3.5–5.3)
POTASSIUM SERPL-SCNC: 4.3 MMOL/L — SIGNIFICANT CHANGE UP (ref 3.5–5.3)
RBC # BLD: 2.93 M/UL — LOW (ref 4.2–5.8)
RBC # FLD: 13.8 % — SIGNIFICANT CHANGE UP (ref 10.3–14.5)
SODIUM SERPL-SCNC: 135 MMOL/L — SIGNIFICANT CHANGE UP (ref 135–145)
WBC # BLD: 11.08 K/UL — HIGH (ref 3.8–10.5)
WBC # FLD AUTO: 11.08 K/UL — HIGH (ref 3.8–10.5)

## 2017-06-14 PROCEDURE — 93306 TTE W/DOPPLER COMPLETE: CPT | Mod: 26

## 2017-06-14 RX ADMIN — PANTOPRAZOLE SODIUM 40 MILLIGRAM(S): 20 TABLET, DELAYED RELEASE ORAL at 06:02

## 2017-06-14 RX ADMIN — OXYCODONE HYDROCHLORIDE 5 MILLIGRAM(S): 5 TABLET ORAL at 03:44

## 2017-06-14 RX ADMIN — POLYETHYLENE GLYCOL 3350 17 GRAM(S): 17 POWDER, FOR SOLUTION ORAL at 13:55

## 2017-06-14 RX ADMIN — OXYCODONE HYDROCHLORIDE 5 MILLIGRAM(S): 5 TABLET ORAL at 09:30

## 2017-06-14 RX ADMIN — OXYCODONE HYDROCHLORIDE 5 MILLIGRAM(S): 5 TABLET ORAL at 10:30

## 2017-06-14 RX ADMIN — Medication 100 MILLIGRAM(S): at 21:31

## 2017-06-14 RX ADMIN — QUETIAPINE FUMARATE 200 MILLIGRAM(S): 200 TABLET, FILM COATED ORAL at 21:31

## 2017-06-14 RX ADMIN — SENNA PLUS 2 TABLET(S): 8.6 TABLET ORAL at 21:31

## 2017-06-14 RX ADMIN — Medication 100 MILLIGRAM(S): at 13:55

## 2017-06-14 RX ADMIN — OXYCODONE HYDROCHLORIDE 5 MILLIGRAM(S): 5 TABLET ORAL at 21:16

## 2017-06-14 RX ADMIN — Medication 100 MILLIGRAM(S): at 05:53

## 2017-06-14 RX ADMIN — METHADONE HYDROCHLORIDE 105 MILLIGRAM(S): 40 TABLET ORAL at 13:56

## 2017-06-14 RX ADMIN — MIRTAZAPINE 45 MILLIGRAM(S): 45 TABLET, ORALLY DISINTEGRATING ORAL at 21:31

## 2017-06-14 RX ADMIN — OXYCODONE HYDROCHLORIDE 5 MILLIGRAM(S): 5 TABLET ORAL at 20:16

## 2017-06-14 RX ADMIN — Medication 100 MILLIGRAM(S): at 05:54

## 2017-06-14 RX ADMIN — Medication 100 MILLIGRAM(S): at 13:59

## 2017-06-14 RX ADMIN — OXYCODONE HYDROCHLORIDE 5 MILLIGRAM(S): 5 TABLET ORAL at 04:44

## 2017-06-14 NOTE — PROGRESS NOTE ADULT - SUBJECTIVE AND OBJECTIVE BOX
Patient is a 36y old  Male with hx of IV drug abuse found to have L3-4 discitis/osteo and left iliopsoas bursitis/myositis with left gluteus abscess     CC: left hip pain, fevers    INTERVAL HPI/OVERNIGHT EVENTS:  Left sided hip/leg pain much improved. low grade fever yesterday       T(C): 38, Max: 39.8 (06-09 @ 15:15)  HR: 97 (97 - 110)  BP: 139/83 (126/68 - 149/76)  RR: 16 (16 - 19)  SpO2: 98% (97% - 100%)  Wt(kg): --  I&O's Summary    MEDICATIONS  (STANDING):  cefepime  IVPB  IV Intermittent   vancomycin  IVPB 1250milliGRAM(s) IV Intermittent every 12 hours  cefepime  IVPB 2000milliGRAM(s) IV Intermittent every 8 hours  sodium chloride 0.9%. 1000milliLiter(s) IV Continuous <Continuous>  pantoprazole Infusion 8mG/Hr IV Continuous <Continuous>    MEDICATIONS  (PRN):  acetaminophen   Tablet 650milliGRAM(s) Oral every 6 hours PRN For Temp greater than 38 C (100.4 F)  HYDROmorphone  Injectable 1milliGRAM(s) IV Push every 4 hours PRN Severe Pain (7 - 10)  traMADol 50milliGRAM(s) Oral once PRN Breakthrough pain no controlled on Dilaudid      Physical Exam: PHYSICAL EXAM: GENERAL: Moderate distress from left hip pain. Lying on right side  HEAD:  Atraumatic, Normocephalic EYES: EOMI, PERRLA, conjunctiva and sclera clear. No icterus or pallor NECK: Supple, No JVD CHEST/LUNG: Clear to auscultation bilaterally; No wheeze HEART: Tachycardic. Regular rhythm; Systolic murmur heard best at RUSB. No rubs or gallops ABDOMEN: Soft, Nontender, Nondistended; Bowel sounds present. No organomegaly  EXTREMITIES:  2+ Peripheral Pulses, No clubbing, cyanosis, or edema. TTP on left posterior illiac spine and left buttocks. Pain mostly with extension of LLE. No fluctuance noted. No overlying rash. Spine non tender. Full ROM in spine. Limited in left lower extremity due to pain.  PSYCH: AAOx3 NEUROLOGY: non-focal. Reflexes 2+ SKIN: Anterior mid chest mass-TTP-No drainage or overlying point of entry. No fluctuance noted on left buttocks or left PSIS	      LABS:                        5.6    22.90 )-----------( 430      ( 09 Jun 2017 18:34 )             17.5     06-09    133<L>  |  92<L>  |  19  ----------------------------<  104<H>  4.0   |  25  |  0.84    Ca    8.9      09 Jun 2017 12:20    TPro  7.9  /  Alb  3.3  /  TBili  0.2  /  DBili  x   /  AST  88<H>  /  ALT  116<H>  /  AlkPhos  100  06-09    PT/INR - ( 09 Jun 2017 12:20 )   PT: 15.0 SEC;   INR: 1.33          PTT - ( 10 Saul 2017 06:00 )  PTT:34.9 SEC  Urinalysis Basic - ( 09 Jun 2017 15:53 )    Color: YELLOW / Appearance: CLEAR / SG: > 1.040 / pH: 6.0  Gluc: NEGATIVE / Ketone: NEGATIVE  / Bili: NEGATIVE / Urobili: NORMAL E.U.   Blood: NEGATIVE / Protein: 30 / Nitrite: NEGATIVE   Leuk Esterase: NEGATIVE / RBC: 2-5 / WBC 5-10   Sq Epi: OCC / Non Sq Epi: x / Bacteria: x      CAPILLARY BLOOD GLUCOSE        Urinalysis Basic - ( 09 Jun 2017 15:53 )    Color: YELLOW / Appearance: CLEAR / SG: > 1.040 / pH: 6.0  Gluc: NEGATIVE / Ketone: NEGATIVE  / Bili: NEGATIVE / Urobili: NORMAL E.U.   Blood: NEGATIVE / Protein: 30 / Nitrite: NEGATIVE   Leuk Esterase: NEGATIVE / RBC: 2-5 / WBC 5-10   Sq Epi: OCC / Non Sq Epi: x / Bacteria: x    RADIOLOGY & ADDITIONAL TESTS:    Imaging Personally Reviewed:  [ ] YES  [ ] NO    Consultant(s) Notes Reviewed:  [ ] YES  [ ] NO    PHYSICAL EXAM:  GENERAL: NAD, well-groomed, well-developed  HEAD:  Atraumatic, Normocephalic  EYES: EOMI, PERRLA, conjunctiva and sclera clear  ENMT: No tonsillar erythema, exudates, or enlargement; Moist mucous membranes, Good dentition, No lesions  NECK: Supple, No JVD, Normal thyroid  NERVOUS SYSTEM:  Alert & Oriented X3, Good concentration; Motor Strength 5/5 B/L upper and lower extremities; DTRs 2+ intact and symmetric  CHEST/LUNG: Clear to percussion bilaterally; No rales, rhonchi, wheezing, or rubs  HEART: Regular rate and rhythm; No murmurs, rubs, or gallops  ABDOMEN: Soft, Nontender, Nondistended; Bowel sounds present  EXTREMITIES:  2+ Peripheral Pulses, No clubbing, cyanosis, or edema  LYMPH: No lymphadenopathy noted  SKIN: No rashes or lesions    Care Discussed with Consultants/Other Providers [ ] YES  [ ] NO Patient is a 36y old  Male with hx of IV drug abuse found to have L3-4 discitis/osteo and left iliopsoas bursitis/myositis with left gluteus abscess     CC: left hip pain, fevers    INTERVAL HPI/OVERNIGHT EVENTS:  Left sided hip/leg pain worse today-7/10. Lying on right side. low grade fever yesterday       T(C): 38, Max: 39.8 (06-09 @ 15:15)  HR: 97 (97 - 110)  BP: 139/83 (126/68 - 149/76)  RR: 16 (16 - 19)  SpO2: 98% (97% - 100%)  Wt(kg): --  I&O's Summary    MEDICATIONS  (STANDING):  cefepime  IVPB  IV Intermittent   vancomycin  IVPB 1250milliGRAM(s) IV Intermittent every 12 hours  cefepime  IVPB 2000milliGRAM(s) IV Intermittent every 8 hours  sodium chloride 0.9%. 1000milliLiter(s) IV Continuous <Continuous>  pantoprazole Infusion 8mG/Hr IV Continuous <Continuous>    MEDICATIONS  (PRN):  acetaminophen   Tablet 650milliGRAM(s) Oral every 6 hours PRN For Temp greater than 38 C (100.4 F)  HYDROmorphone  Injectable 1milliGRAM(s) IV Push every 4 hours PRN Severe Pain (7 - 10)  traMADol 50milliGRAM(s) Oral once PRN Breakthrough pain no controlled on Dilaudid      Physical Exam: PHYSICAL EXAM: GENERAL: Moderate distress from left hip pain. Lying on right side  HEAD:  Atraumatic, Normocephalic EYES: EOMI, PERRLA, conjunctiva and sclera clear. No icterus or pallor NECK: Supple, No JVD CHEST/LUNG: Clear to auscultation bilaterally; No wheeze HEART: Tachycardic. Regular rhythm; Systolic murmur heard best at RUSB. No rubs or gallops ABDOMEN: Soft, Nontender, Nondistended; Bowel sounds present. No organomegaly  EXTREMITIES:  2+ Peripheral Pulses, No clubbing, cyanosis, or edema. TTP on left posterior illiac spine and left buttocks. Pain mostly with extension of LLE. No fluctuance noted. No overlying rash. Spine non tender. Full ROM in spine. Limited in left lower extremity due to pain.  PSYCH: AAOx3 NEUROLOGY: non-focal. Reflexes 2+ SKIN: Anterior mid chest mass-TTP-No drainage or overlying point of entry. No fluctuance noted on left buttocks or left PSIS	      LABS:                        5.6    22.90 )-----------( 430      ( 09 Jun 2017 18:34 )             17.5     06-09    133<L>  |  92<L>  |  19  ----------------------------<  104<H>  4.0   |  25  |  0.84    Ca    8.9      09 Jun 2017 12:20    TPro  7.9  /  Alb  3.3  /  TBili  0.2  /  DBili  x   /  AST  88<H>  /  ALT  116<H>  /  AlkPhos  100  06-09    PT/INR - ( 09 Jun 2017 12:20 )   PT: 15.0 SEC;   INR: 1.33          PTT - ( 10 Saul 2017 06:00 )  PTT:34.9 SEC  Urinalysis Basic - ( 09 Jun 2017 15:53 )    Color: YELLOW / Appearance: CLEAR / SG: > 1.040 / pH: 6.0  Gluc: NEGATIVE / Ketone: NEGATIVE  / Bili: NEGATIVE / Urobili: NORMAL E.U.   Blood: NEGATIVE / Protein: 30 / Nitrite: NEGATIVE   Leuk Esterase: NEGATIVE / RBC: 2-5 / WBC 5-10   Sq Epi: OCC / Non Sq Epi: x / Bacteria: x      CAPILLARY BLOOD GLUCOSE        Urinalysis Basic - ( 09 Jun 2017 15:53 )    Color: YELLOW / Appearance: CLEAR / SG: > 1.040 / pH: 6.0  Gluc: NEGATIVE / Ketone: NEGATIVE  / Bili: NEGATIVE / Urobili: NORMAL E.U.   Blood: NEGATIVE / Protein: 30 / Nitrite: NEGATIVE   Leuk Esterase: NEGATIVE / RBC: 2-5 / WBC 5-10   Sq Epi: OCC / Non Sq Epi: x / Bacteria: x    RADIOLOGY & ADDITIONAL TESTS:    Imaging Personally Reviewed:  [ ] YES  [ ] NO    Consultant(s) Notes Reviewed:  [ ] YES  [ ] NO    PHYSICAL EXAM:  GENERAL: NAD, well-groomed, well-developed  HEAD:  Atraumatic, Normocephalic  EYES: EOMI, PERRLA, conjunctiva and sclera clear  ENMT: No tonsillar erythema, exudates, or enlargement; Moist mucous membranes, Good dentition, No lesions  NECK: Supple, No JVD, Normal thyroid  NERVOUS SYSTEM:  Alert & Oriented X3, Good concentration; Motor Strength 5/5 B/L upper and lower extremities; DTRs 2+ intact and symmetric  CHEST/LUNG: Clear to percussion bilaterally; No rales, rhonchi, wheezing, or rubs  HEART: Regular rate and rhythm; No murmurs, rubs, or gallops  ABDOMEN: Soft, Nontender, Nondistended; Bowel sounds present  EXTREMITIES:  2+ Peripheral Pulses, No clubbing, cyanosis, or edema  LYMPH: No lymphadenopathy noted  SKIN: No rashes or lesions    Care Discussed with Consultants/Other Providers [ ] YES  [ ] NO

## 2017-06-14 NOTE — PROGRESS NOTE ADULT - PROBLEM SELECTOR PLAN 4
Acute blood loss anemia given Occult blood + and normal Hg in 12/2016  Endorses hx of diverticulitis-No abdominal pain reported  Been taking NSAIDs though EGD normal   Type and scree. Goal Hg>7  Will f/u with GI regarding need for colonoscopy/capsule study Acute blood loss anemia given Occult blood + and normal Hg in 12/2016  Endorses hx of diverticulitis-No abdominal pain reported  UGIB ruled out with normal EGD  hold nsaids  c/w protonix po  Gi following  Type and screen. Goal Hg>7

## 2017-06-14 NOTE — PROGRESS NOTE ADULT - PROBLEM SELECTOR PLAN 3
Acute blood loss anemia given Occult blood + and normal Hg in 12/2016  Endorses hx of diverticulitis-No abdominal pain reported  UGIB ruled out with normal EGD  hold nsaids  c/w protonix po  Gi following  Type and screen. Goal Hg>7 GPC bacteremia-grew twice in 2 sets of cultures two days apart despite being on Cefazolin-MSSA-will repeat blood cx today

## 2017-06-14 NOTE — PROGRESS NOTE ADULT - PROBLEM SELECTOR PLAN 5
confirmed with NP at [facility]  continue inpatient Acute blood loss anemia given Occult blood + and normal Hg in 12/2016  Endorses hx of diverticulitis-No abdominal pain reported  Been taking NSAIDs though EGD normal   Type and scree. Goal Hg>7  Will f/u with GI regarding need for colonoscopy/capsule study

## 2017-06-14 NOTE — PROGRESS NOTE ADULT - PROBLEM SELECTOR PLAN 7
SCD for DVT ppx  Protonix GI ppx AST/ALT ~100. Differential includes 2/2 hypovolemia vs CASTREJON vs drug side effect  Will monitor

## 2017-06-14 NOTE — PROGRESS NOTE ADULT - PROBLEM SELECTOR PLAN 6
AST/ALT ~100. Differential includes 2/2 hypovolemia vs CASTREJON vs drug side effect  Will monitor confirmed with NP at [facility]  continue inpatient

## 2017-06-15 LAB
BACTERIA BLD CULT: SIGNIFICANT CHANGE UP
SPECIMEN SOURCE: SIGNIFICANT CHANGE UP
SPECIMEN SOURCE: SIGNIFICANT CHANGE UP

## 2017-06-15 PROCEDURE — 71010: CPT | Mod: 26

## 2017-06-15 PROCEDURE — 99232 SBSQ HOSP IP/OBS MODERATE 35: CPT | Mod: GC

## 2017-06-15 RX ADMIN — Medication 100 MILLIGRAM(S): at 21:59

## 2017-06-15 RX ADMIN — QUETIAPINE FUMARATE 200 MILLIGRAM(S): 200 TABLET, FILM COATED ORAL at 22:01

## 2017-06-15 RX ADMIN — OXYCODONE HYDROCHLORIDE 5 MILLIGRAM(S): 5 TABLET ORAL at 10:29

## 2017-06-15 RX ADMIN — OXYCODONE HYDROCHLORIDE 5 MILLIGRAM(S): 5 TABLET ORAL at 02:56

## 2017-06-15 RX ADMIN — OXYCODONE HYDROCHLORIDE 5 MILLIGRAM(S): 5 TABLET ORAL at 18:30

## 2017-06-15 RX ADMIN — OXYCODONE HYDROCHLORIDE 5 MILLIGRAM(S): 5 TABLET ORAL at 19:00

## 2017-06-15 RX ADMIN — Medication 100 MILLIGRAM(S): at 14:53

## 2017-06-15 RX ADMIN — OXYCODONE HYDROCHLORIDE 5 MILLIGRAM(S): 5 TABLET ORAL at 09:59

## 2017-06-15 RX ADMIN — Medication 100 MILLIGRAM(S): at 05:20

## 2017-06-15 RX ADMIN — MIRTAZAPINE 45 MILLIGRAM(S): 45 TABLET, ORALLY DISINTEGRATING ORAL at 22:01

## 2017-06-15 RX ADMIN — METHADONE HYDROCHLORIDE 105 MILLIGRAM(S): 40 TABLET ORAL at 10:03

## 2017-06-15 RX ADMIN — POLYETHYLENE GLYCOL 3350 17 GRAM(S): 17 POWDER, FOR SOLUTION ORAL at 14:53

## 2017-06-15 RX ADMIN — SENNA PLUS 2 TABLET(S): 8.6 TABLET ORAL at 22:01

## 2017-06-15 RX ADMIN — PANTOPRAZOLE SODIUM 40 MILLIGRAM(S): 20 TABLET, DELAYED RELEASE ORAL at 06:02

## 2017-06-15 RX ADMIN — Medication 100 MILLIGRAM(S): at 15:00

## 2017-06-15 NOTE — PROGRESS NOTE ADULT - SUBJECTIVE AND OBJECTIVE BOX
Follow Up:      Interval History/ROS:    Allergies  No Known Allergies        ANTIMICROBIALS:  ceFAZolin   IVPB 2000 every 8 hours  ceFAZolin   IVPB        OTHER MEDS:  MEDICATIONS  (STANDING):  acetaminophen   Tablet 650 every 6 hours PRN  traMADol 50 once PRN  QUEtiapine 200 at bedtime  mirtazapine 45 at bedtime  oxyCODONE IR 5 every 4 hours PRN  pantoprazole    Tablet 40 before breakfast  methadone 105 every 24 hours  senna 2 at bedtime  polyethylene glycol 3350 17 daily  docusate sodium 100 three times a day      Vital Signs Last 24 Hrs  T(C): 37.2, Max: 37.8 (06-14 @ 21:13)  T(F): 98.9, Max: 100.1 (06-14 @ 21:13)  HR: 76 (76 - 79)  BP: 105/70 (98/53 - 117/71)  BP(mean): --  RR: 18 (18 - 18)  SpO2: 97% (95% - 100%)    PHYSICAL EXAM:  General: non-toxic  HEAD/EYES: anicteric, PERRL  ENT:  supple  Cardiovascular:   S1, S2  Respiratory:  clear bilaterally  GI:  soft, non-tender, normal bowel sounds  :  no CVA tenderness   Musculoskeletal:  no synovitis  Neurologic:  grossly non-focal  Skin:  no rash  Lymph: no lymphadenopathy  Psychiatric:  appropriate affect  Vascular:  no phlebitis                                8.5    11.08 )-----------( 841      ( 14 Jun 2017 07:30 )             26.9       06-14    135  |  93<L>  |  9   ----------------------------<  97  4.3   |  28  |  0.76    Ca    9.2      14 Jun 2017 07:30            MICROBIOLOGY:  v  BLOOD PERIPHERAL  06-14-17 --  --  --      BLOOD VENOUS  06-11-17 --  --  --      URINE MIDSTREAM  06-09-17 --  --  --      BLOOD PERIPHERAL  06-09-17 --  --  Staphylococcus aureus                RADIOLOGY: Follow Up:  Staphylococcus aureus bacteremia with Left Iliopsoas abscess with Discitis of lumbar region and Staphylococcal arthritis of left hip.     Interval History/ROS:  Patient has pain in his left hip and lower back. He also has chest pain that is tender on palpation. Lower back is tender as well. He feels that he has to cough up sputum but it is too painful to cough. He has fevers and chills. he is constipated as well.     Allergies  No Known Allergies      ANTIMICROBIALS:    ceFAZolin   IVPB 2000 every 8 hours- started 6/10/17        OTHER MEDS:  MEDICATIONS  (STANDING):  acetaminophen   Tablet 650 every 6 hours PRN  traMADol 50 once PRN  QUEtiapine 200 at bedtime  mirtazapine 45 at bedtime  oxyCODONE IR 5 every 4 hours PRN  pantoprazole    Tablet 40 before breakfast  methadone 105 every 24 hours  senna 2 at bedtime  polyethylene glycol 3350 17 daily  docusate sodium 100 three times a day      Vital Signs Last 24 Hrs  T(C): 37.2, Max: 37.8 (06-14 @ 21:13)  T(F): 98.9, Max: 100.1 (06-14 @ 21:13)  HR: 76 (76 - 79)  BP: 105/70 (98/53 - 117/71)  BP(mean): --  RR: 18 (18 - 18)  SpO2: 97% (95% - 100%)    PHYSICAL EXAM:  General: non-toxic  HEAD/EYES: anicteric, PERRL  ENT:  supple  Cardiovascular:   S1, S2  Respiratory:  rhonchi bilateral lower lobes plus cough that is productive of white sputum  GI:  soft, non-tender, normal bowel sounds  :  no CVA tenderness   Musculoskeletal:  lower back on left side is tender  has left hip pain and limited range of motion  Neurologic:  grossly non-focal  Skin:  no rash  Lymph: no lymphadenopathy  Psychiatric:  appropriate affect  Vascular:  no phlebitis                            8.5    11.08 )-----------( 841      ( 14 Jun 2017 07:30 )             26.9       06-14    135  |  93<L>  |  9   ----------------------------<  97  4.3   |  28  |  0.76    Ca    9.2      14 Jun 2017 07:30          Culture - Blood (06.11.17 @ 08:44)    Culture - Blood:   NO ORGANISMS ISOLATED  NO ORGANISMS ISOLATED AT 48 HRS.    Culture - Blood:   STAU^Staphylococcus aureus    Specimen Source: BLOOD VENOUS    Gram Stain Blood:   ***** CRITICAL RESULT *****  PERSON CALLED / READ-BACK: RATNA BRADLEY/RN/Y  DATE / TIME CALLED: 06/13/17 0847  CALLED BY: SUE RICHARDS  GPCCL^Gram Pos Cocci In Clusters  AFTER: 43 HOURS INCUBATION  BOTTLE: ANAEROBIC BOTTLE      MICROBIOLOGY:    Culture - Blood in AM (06.14.17 @ 07:54)    Culture - Blood:   NO ORGANISMS ISOLATED  NO ORGANISMS ISOLATED AT 24 HOURS    Specimen Source: BLOOD VENOUS    Culture - Blood in AM (06.14.17 @ 07:54)    Culture - Blood:   NO ORGANISMS ISOLATED  NO ORGANISMS ISOLATED AT 24 HOURS    Specimen Source: BLOOD PERIPHERAL    Culture - Blood (06.11.17 @ 08:44)    Culture - Blood:   REFER TO [id number] FOR [facility] COLLECTED ON 6/9/17 AT 1235  STAU^Staphylococcus aureus    Culture - Blood:   STAU^Staphylococcus aureus    Specimen Source: BLOOD PERIPHERAL    Gram Stain Blood:   ***** CRITICAL RESULT *****  PERSON CALLED / READ-BACK: ANA GUTIÉRREZ RN / Y  DATE / TIME CALLED: 06/12/17 1841  CALLED BY: LEIGHA TURNER  GPCCL^Gram Pos Cocci In Clusters  AFTER: 33 HOURS INCUBATION  BOTTLE: AEROBIC BOTTLE    HIV-1/2 Ag/Ab Combo (06.10.17 @ 06:00)    HIV-1 Ag: NON-REACTIVE If patient is suspected to be at risk and/or in the  diagnostic window period, then consider subsequent HIV  retesting with new sample.    HIV-1/2 Ab: NON-REACTIVE If patient is suspected to be at risk and/or in the  diagnostic window period, then consider subsequent HIV  retesting with new sample.            Echocardiogram:    Patient name: DAVID MARIN  YOB: 1980   Age: 36 (M)   MR#: [id number]  Study Date: 6/14/2017  Ejection Fraction (Teicholtz): 67 %  CONCLUSIONS:  1. Normal mitral valve. Minimal mitral regurgitation.  2. Normal left ventricular internal dimensions and wall  thicknesses.  3. Normal left ventricular systolic function. No segmental  wallmotion abnormalities.  4. The right ventricle is not well visualized; grossly  normal right ventricular systolic function. Follow Up:  Staphylococcus aureus bacteremia with Left Iliopsoas abscess with Discitis of lumbar region and Staphylococcal arthritis of left hip.     Interval History/ROS:  Patient has pain in his left hip and lower back. he can barely walk with a walker. He also has chest pain that is tender on palpation. Lower back is tender as well. He feels that he has to cough up sputum but it is too painful to cough. He has fevers and chills. he is constipated as well.     Allergies  No Known Allergies      ANTIMICROBIALS:    ceFAZolin   IVPB 2000 every 8 hours- started 6/10/17        OTHER MEDS:  MEDICATIONS  (STANDING):  acetaminophen   Tablet 650 every 6 hours PRN  traMADol 50 once PRN  QUEtiapine 200 at bedtime  mirtazapine 45 at bedtime  oxyCODONE IR 5 every 4 hours PRN  pantoprazole    Tablet 40 before breakfast  methadone 105 every 24 hours  senna 2 at bedtime  polyethylene glycol 3350 17 daily  docusate sodium 100 three times a day      Vital Signs Last 24 Hrs  T(C): 37.2, Max: 37.8 (06-14 @ 21:13)  T(F): 98.9, Max: 100.1 (06-14 @ 21:13)  HR: 76 (76 - 79)  BP: 105/70 (98/53 - 117/71)  BP(mean): --  RR: 18 (18 - 18)  SpO2: 97% (95% - 100%)    PHYSICAL EXAM:  General: non-toxic  HEAD/EYES: anicteric, PERRL  ENT:  supple  Cardiovascular:   S1, S2  Respiratory:  rhonchi bilateral lower lobes plus cough that is productive of white sputum  GI:  soft, non-tender, normal bowel sounds  :  no CVA tenderness   Musculoskeletal:  lower back on left side is tender  has left hip pain and limited range of motion  Neurologic:  grossly non-focal  Skin:  no rash  Lymph: no lymphadenopathy  Psychiatric:  appropriate affect  Vascular:  no phlebitis                            8.5    11.08 )-----------( 841      ( 14 Jun 2017 07:30 )             26.9       06-14    135  |  93<L>  |  9   ----------------------------<  97  4.3   |  28  |  0.76    Ca    9.2      14 Jun 2017 07:30          Culture - Blood (06.11.17 @ 08:44)    Culture - Blood:   NO ORGANISMS ISOLATED  NO ORGANISMS ISOLATED AT 48 HRS.    Culture - Blood:   STAU^Staphylococcus aureus    Specimen Source: BLOOD VENOUS    Gram Stain Blood:   ***** CRITICAL RESULT *****  PERSON CALLED / READ-BACK: RATNA BRADLEY/RN/Y  DATE / TIME CALLED: 06/13/17 0847  CALLED BY: SUE RICHARDS  GPCCL^Gram Pos Cocci In Clusters  AFTER: 43 HOURS INCUBATION  BOTTLE: ANAEROBIC BOTTLE      MICROBIOLOGY:    Culture - Blood in AM (06.14.17 @ 07:54)    Culture - Blood:   NO ORGANISMS ISOLATED  NO ORGANISMS ISOLATED AT 24 HOURS    Specimen Source: BLOOD VENOUS    Culture - Blood in AM (06.14.17 @ 07:54)    Culture - Blood:   NO ORGANISMS ISOLATED  NO ORGANISMS ISOLATED AT 24 HOURS    Specimen Source: BLOOD PERIPHERAL    Culture - Blood (06.11.17 @ 08:44)    Culture - Blood:   REFER TO [id number] FOR [facility] COLLECTED ON 6/9/17 AT 1235  STAU^Staphylococcus aureus    Culture - Blood:   STAU^Staphylococcus aureus    Specimen Source: BLOOD PERIPHERAL    Gram Stain Blood:   ***** CRITICAL RESULT *****  PERSON CALLED / READ-BACK: ANA GUTIÉRREZ RN / Y  DATE / TIME CALLED: 06/12/17 1841  CALLED BY: LEIGHA TURNER  GPCCL^Gram Pos Cocci In Clusters  AFTER: 33 HOURS INCUBATION  BOTTLE: AEROBIC BOTTLE    HIV-1/2 Ag/Ab Combo (06.10.17 @ 06:00)    HIV-1 Ag: NON-REACTIVE If patient is suspected to be at risk and/or in the  diagnostic window period, then consider subsequent HIV  retesting with new sample.    HIV-1/2 Ab: NON-REACTIVE If patient is suspected to be at risk and/or in the  diagnostic window period, then consider subsequent HIV  retesting with new sample.            Echocardiogram:    Patient name: DAVID MARIN  YOB: 1980   Age: 36 (M)   MR#: [id number]  Study Date: 6/14/2017  Ejection Fraction (Teicholtz): 67 %  CONCLUSIONS:  1. Normal mitral valve. Minimal mitral regurgitation.  2. Normal left ventricular internal dimensions and wall  thicknesses.  3. Normal left ventricular systolic function. No segmental  wallmotion abnormalities.  4. The right ventricle is not well visualized; grossly  normal right ventricular systolic function. Follow Up:  Staphylococcus aureus bacteremia with Left Iliopsoas abscess with Discitis of lumbar region and probable septic bursitis left hip.     Interval History/ROS:  Patient has pain in his left hip and lower back. he can barely walk with a walker cut can extend hip which he was not able to do earlier. He also has chest pain that is tender on palpation. Lower back is tender as well. He feels that he has to cough up sputum but it is too painful to cough. He has fevers and chills. he is constipated as well.     Allergies  No Known Allergies    ANTIMICROBIALS:    ceFAZolin   IVPB 2000 every 8 hours- started 6/10/17    OTHER MEDS:  MEDICATIONS  (STANDING):  acetaminophen   Tablet 650 every 6 hours PRN  traMADol 50 once PRN  QUEtiapine 200 at bedtime  mirtazapine 45 at bedtime  oxyCODONE IR 5 every 4 hours PRN  pantoprazole    Tablet 40 before breakfast  methadone 105 every 24 hours  senna 2 at bedtime  polyethylene glycol 3350 17 daily  docusate sodium 100 three times a day    Vital Signs Last 24 Hrs  T(C): 37.2, Max: 37.8 (06-14 @ 21:13)  T(F): 98.9, Max: 100.1 (06-14 @ 21:13)  HR: 76 (76 - 79)  BP: 105/70 (98/53 - 117/71)  BP(mean): --  RR: 18 (18 - 18)  SpO2: 97% (95% - 100%)    PHYSICAL EXAM:  General: non-toxic  HEAD/EYES: anicteric, PERRL  ENT:  supple  Cardiovascular:   S1, S2  Respiratory:  rhonchi bilateral lower lobes plus cough that is productive of white sputum  GI:  soft, non-tender, normal bowel sounds  :  no CVA tenderness   Musculoskeletal:  lower back on left side is tender  has left hip pain and limited range of motion  Neurologic:  grossly non-focal  Skin:  no rash  Lymph: no lymphadenopathy  Psychiatric:  appropriate affect  Vascular:  no phlebitis                        8.5    11.08 )-----------( 841      ( 14 Jun 2017 07:30 )             26.9     135  |  93<L>  |  9   ----------------------------<  97  4.3   |  28  |  0.76    Ca    9.2      14 Jun 2017 07:30    Culture - Blood:   NO ORGANISMS ISOLATED  NO ORGANISMS ISOLATED AT 24 HOURS (06.14.17 @ 07:54)    Culture - Blood:   STAU^Staphylococcus aureus (06.11.17 @ 08:44)    Culture - Blood:   STAU^Staphylococcus aureus (06.09.17 @ 13:21)    HIV-1/2 Ag/Ab Combo (06.10.17 @ 06:00)    HIV-1 Ag: NON-REACTIVE      TTE:  Study Date: 6/14/2017  Ejection Fraction (Teicholtz): 67 %  CONCLUSIONS:  1. Normal mitral valve. Minimal mitral regurgitation.  2. Normal left ventricular internal dimensions and wall  thicknesses.  3. Normal left ventricular systolic function. No segmental  wall motion abnormalities.  4. The right ventricle is not well visualized; grossly  normal right ventricular systolic function.    EXAM:  MRI PELVIS W & W O CONTRASTPROCEDURE DATE:  Jun 11 2017 IMPRESSION:   1.  Findings suspicious for infectious left iliopsoas bursitis tracking into the pelvis. Rim-enhancing fluid collection tracking along the left iliopsoas tendon. Fully extent of fluid collection is partially included on this study.  2.  Small rim-enhancing fluid collections within the left piriformis and left gluteus gabriel musculature suspicious for abscess.  3.  Myositis of the cellulitis, gluteus gabriel and left piriformis of infectious or inflammatory etiology.  4.  No osteomyelitis.  5.  Moderate distention of the urinary bladder. Correlate clinically.  6.  Diffuse marrow signal loss on T1-weighted imaging within the lumbar spine and pelvis may reflect chronic anemia or other infiltrative process.    EXAM:  MRI LUMBAR SPINE W C  PROCEDURE DATE:  Jun 11 2017   Impression: Findings suspicious for early discitis osteomyelitis at L3-4.   Left psoas abscess extends into the pelvis. Inflammatory changes right psoas muscle.

## 2017-06-15 NOTE — PROGRESS NOTE ADULT - SUBJECTIVE AND OBJECTIVE BOX
Patient is a 36y old  Male who presents with a chief complaint of Left sided hip/leg pain with fevers (09 Jun 2017 16:06)    CC: Back pain, cant ambulate    SUBJECTIVE / OVERNIGHT EVENTS: No acute events overnight. Would like his methadone in the AM like he gets at home.  Pain in his left leg is still very bad when he walks. No CP, SOB. he does complain of a productive cough. and pain when he coughs     MEDICATIONS  (STANDING):  sodium chloride 0.9%. 1000milliLiter(s) IV Continuous <Continuous>  QUEtiapine 200milliGRAM(s) Oral at bedtime  mirtazapine 45milliGRAM(s) Oral at bedtime  ceFAZolin   IVPB 2000milliGRAM(s) IV Intermittent every 8 hours  ceFAZolin   IVPB  IV Intermittent   pantoprazole    Tablet 40milliGRAM(s) Oral before breakfast  methadone 105milliGRAM(s) Oral every 24 hours  senna 2Tablet(s) Oral at bedtime  polyethylene glycol 3350 17Gram(s) Oral daily  docusate sodium 100milliGRAM(s) Oral three times a day    MEDICATIONS  (PRN):  acetaminophen   Tablet 650milliGRAM(s) Oral every 6 hours PRN For Temp greater than 38 C (100.4 F)  traMADol 50milliGRAM(s) Oral once PRN Breakthrough pain no controlled on Dilaudid  oxyCODONE IR 5milliGRAM(s) Oral every 4 hours PRN Severe Pain (7 - 10)      Vital Signs Last 24 Hrs  T(C): 37.2, Max: 37.8 (06-14 @ 21:13)  HR: 76 (76 - 79)  BP: 105/70 (98/53 - 117/71)  RR: 18 (18 - 18)  SpO2: 97% (95% - 100%)  Wt(kg): --  CAPILLARY BLOOD GLUCOSE    I&O's Summary      PHYSICAL EXAM:  GENERAL: NAD, thin male  HEAD:  Atraumatic, Normocephalic  EYES: EOMI, PERRLA, conjunctiva and sclera clear  NECK: Supple, No JVD  CHEST/LUNG: Rhonchi bilaterally   HEART: Regular rate and rhythm; No murmurs, rubs, or gallops  ABDOMEN: Soft, Nontender, Nondistended; Bowel sounds present  EXTREMITIES:  2+ Peripheral Pulses, No clubbing, cyanosis, or edema  PSYCH: AAOx3  NEUROLOGY: pain when he moves left leg,.   SKIN: No rashes or lesions    LABS:                        8.5    11.08 )-----------( 841      ( 14 Jun 2017 07:30 )             26.9     06-14    135  |  93<L>  |  9   ----------------------------<  97  4.3   |  28  |  0.76    Ca    9.2      14 Jun 2017 07:30                RADIOLOGY & ADDITIONAL TESTS:    Imaging Personally Reviewed:    Consultant(s) Notes Reviewed:      Care Discussed with Consultants/Other Providers:

## 2017-06-15 NOTE — PROGRESS NOTE ADULT - PROBLEM SELECTOR PLAN 1
Ancef  please send more blood cultures  TTE ok, check LONDON  PICC if repeat blood cultures again negative

## 2017-06-15 NOTE — PROGRESS NOTE ADULT - PROBLEM SELECTOR PLAN 6
confirmed with NP at [facility]  continue inpatient  - Will give methadone in AM

## 2017-06-15 NOTE — PROGRESS NOTE ADULT - ASSESSMENT
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  Not clear if joint space is also involved. Repeat blood cultures negative to date.  IR unable to drain psoas.  Ortho feels there is no need for orthopedic surgical intervention. PICC once blood cultures stay negative    1- Staphylococcus aureus bacteremia with Left Iliopsoas abscess with Discitis of lumbar region and Staphylococcal arthritis of left hip.   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  Not clear if joint space is also involved. Repeat blood cultures negative to date.  IR unable to drain psoas.  Ortho feels there is no need for orthopedic surgical intervention. PICC once blood cultures stay negative    1- Staphylococcus aureus bacteremia with Left Iliopsoas abscess with Discitis of lumbar region and Staphylococcal arthritis of left hip.   - repeat blood cultures are so far negative from 6/14/17  - echo shows no vegetations  - on cefazolin day 6  - consider CXR for cough with sputum and rhonchi  - as per notes: Abscess/bursitis seen on MRI-collection is about a cm and is usually overly enhanced in MRI as per IR-will not be accessible for drainage 36M ex-IVDU with MSSA bacteremia with involvement of left iliopsoas, gluteal muscles and discitis/OM L3/4.  Not clear if joint space is also involved. Repeat blood cultures negative to date.  IR unable to drain psoas.  Ortho feels there is no need for orthopedic surgical intervention. PICC once blood cultures stay negative    1- Staphylococcus aureus bacteremia with Left Iliopsoas abscess with Discitis of lumbar region and Staphylococcal arthritis of left hip.   - repeat blood cultures are so far negative from 6/14/17  - echo shows no vegetations  - on cefazolin day 6  - consider CXR for cough with sputum and rhonchi  - as per notes: Abscess/bursitis seen on MRI-collection is about a cm and is usually overly enhanced in MRI as per IR-will not be accessible for drainage

## 2017-06-15 NOTE — PROGRESS NOTE ADULT - PROBLEM SELECTOR PLAN 3
GPC bacteremia-grew twice in 2 sets of cultures two days apart despite being on Cefazolin-MSSA-will repeat blood cx today

## 2017-06-16 LAB
BUN SERPL-MCNC: 8 MG/DL — SIGNIFICANT CHANGE UP (ref 7–23)
CALCIUM SERPL-MCNC: 9.5 MG/DL — SIGNIFICANT CHANGE UP (ref 8.4–10.5)
CHLORIDE SERPL-SCNC: 95 MMOL/L — LOW (ref 98–107)
CO2 SERPL-SCNC: 27 MMOL/L — SIGNIFICANT CHANGE UP (ref 22–31)
CREAT SERPL-MCNC: 0.72 MG/DL — SIGNIFICANT CHANGE UP (ref 0.5–1.3)
GLUCOSE SERPL-MCNC: 105 MG/DL — HIGH (ref 70–99)
HCT VFR BLD CALC: 26.2 % — LOW (ref 39–50)
HGB BLD-MCNC: 8 G/DL — LOW (ref 13–17)
MAGNESIUM SERPL-MCNC: 2 MG/DL — SIGNIFICANT CHANGE UP (ref 1.6–2.6)
MCHC RBC-ENTMCNC: 28.3 PG — SIGNIFICANT CHANGE UP (ref 27–34)
MCHC RBC-ENTMCNC: 30.5 % — LOW (ref 32–36)
MCV RBC AUTO: 92.6 FL — SIGNIFICANT CHANGE UP (ref 80–100)
PHOSPHATE SERPL-MCNC: 5.3 MG/DL — HIGH (ref 2.5–4.5)
PLATELET # BLD AUTO: 931 K/UL — HIGH (ref 150–400)
PMV BLD: 8.4 FL — SIGNIFICANT CHANGE UP (ref 7–13)
POTASSIUM SERPL-MCNC: 4.4 MMOL/L — SIGNIFICANT CHANGE UP (ref 3.5–5.3)
POTASSIUM SERPL-SCNC: 4.4 MMOL/L — SIGNIFICANT CHANGE UP (ref 3.5–5.3)
RBC # BLD: 2.83 M/UL — LOW (ref 4.2–5.8)
RBC # FLD: 13.7 % — SIGNIFICANT CHANGE UP (ref 10.3–14.5)
SODIUM SERPL-SCNC: 137 MMOL/L — SIGNIFICANT CHANGE UP (ref 135–145)
WBC # BLD: 11.12 K/UL — HIGH (ref 3.8–10.5)
WBC # FLD AUTO: 11.12 K/UL — HIGH (ref 3.8–10.5)

## 2017-06-16 PROCEDURE — 99232 SBSQ HOSP IP/OBS MODERATE 35: CPT

## 2017-06-16 RX ADMIN — MIRTAZAPINE 45 MILLIGRAM(S): 45 TABLET, ORALLY DISINTEGRATING ORAL at 21:38

## 2017-06-16 RX ADMIN — Medication 100 MILLIGRAM(S): at 21:38

## 2017-06-16 RX ADMIN — OXYCODONE HYDROCHLORIDE 5 MILLIGRAM(S): 5 TABLET ORAL at 20:40

## 2017-06-16 RX ADMIN — OXYCODONE HYDROCHLORIDE 5 MILLIGRAM(S): 5 TABLET ORAL at 01:01

## 2017-06-16 RX ADMIN — METHADONE HYDROCHLORIDE 105 MILLIGRAM(S): 40 TABLET ORAL at 09:02

## 2017-06-16 RX ADMIN — PANTOPRAZOLE SODIUM 40 MILLIGRAM(S): 20 TABLET, DELAYED RELEASE ORAL at 06:11

## 2017-06-16 RX ADMIN — OXYCODONE HYDROCHLORIDE 5 MILLIGRAM(S): 5 TABLET ORAL at 19:43

## 2017-06-16 RX ADMIN — OXYCODONE HYDROCHLORIDE 5 MILLIGRAM(S): 5 TABLET ORAL at 12:29

## 2017-06-16 RX ADMIN — OXYCODONE HYDROCHLORIDE 5 MILLIGRAM(S): 5 TABLET ORAL at 01:58

## 2017-06-16 RX ADMIN — OXYCODONE HYDROCHLORIDE 5 MILLIGRAM(S): 5 TABLET ORAL at 06:25

## 2017-06-16 RX ADMIN — Medication 100 MILLIGRAM(S): at 05:26

## 2017-06-16 RX ADMIN — OXYCODONE HYDROCHLORIDE 5 MILLIGRAM(S): 5 TABLET ORAL at 05:26

## 2017-06-16 RX ADMIN — OXYCODONE HYDROCHLORIDE 5 MILLIGRAM(S): 5 TABLET ORAL at 13:04

## 2017-06-16 RX ADMIN — QUETIAPINE FUMARATE 200 MILLIGRAM(S): 200 TABLET, FILM COATED ORAL at 21:38

## 2017-06-16 RX ADMIN — Medication 100 MILLIGRAM(S): at 14:51

## 2017-06-16 RX ADMIN — SENNA PLUS 2 TABLET(S): 8.6 TABLET ORAL at 21:38

## 2017-06-16 NOTE — PROGRESS NOTE ADULT - PROBLEM SELECTOR PLAN 1
Ancef  f/u 6/14 and 6/16 blood cultures  LONDON please  PICC monday if above blood cultures negative

## 2017-06-16 NOTE — PROGRESS NOTE ADULT - PROBLEM SELECTOR PLAN 4
Acute blood loss anemia given Occult blood + and normal Hg in 12/2016  Endorses hx of diverticulitis-No abdominal pain reported  UGIB ruled out with normal EGD  hold nsaids  c/w protonix po  Gi following  Type and screen. Goal Hg>7  Stable

## 2017-06-16 NOTE — PROGRESS NOTE ADULT - SUBJECTIVE AND OBJECTIVE BOX
Follow Up:  bacteremia, discitis L3/4, left iliopsoas abscess    Interval History: left hip/buttock pain but better.  can weight bear a little.  left SC joint area better.  Rest of ROS otherwise negative.    Allergies  No Known Allergies    ANTIMICROBIALS:    ceFAZolin   IVPB 2000 every 8 hours    MEDICATIONS  (STANDING):  acetaminophen   Tablet 650 every 6 hours PRN  traMADol 50 once PRN  QUEtiapine 200 at bedtime  mirtazapine 45 at bedtime  oxyCODONE IR 5 every 4 hours PRN  pantoprazole    Tablet 40 before breakfast  methadone 105 every 24 hours  senna 2 at bedtime  polyethylene glycol 3350 17 daily  docusate sodium 100 three times a day    Vital Signs Last 24 Hrs  T(F): 98.5, Max: 98.8 (06-15 @ 22:07)  HR: 88  BP: 109/63  RR: 18  SpO2: 100% (96% - 100%)  Physical Exam:  General:    NAD,  non toxic, sitting up in bed  HEENT:   no icterus, neck supple  Cardiovascular:  S1, S2  Respiratory: rhonchi b/l  abd: soft, no tenderness  : no santos  Musculoskeletal:  markedly improved ROM  Skin:    no rash  Neurologic:  no focal deficit                                8.0    11.12 )-----------( 931      ( 16 Jun 2017 07:00 )             26.2 06-16    137  |  95  |  8   ----------------------------<  105  4.4   |  27  |  0.72  Ca    9.5      16 Jun 2017 07:00Phos  5.3     06-16Mg     2.0     06-16      MICROBIOLOGY:  Culture - Blood:   NO ORGANISMS ISOLATED  NO ORGANISMS ISOLATED AT 48 HRS. (06.14.17 @ 07:54)    Culture - Blood:   STAU^Staphylococcus aureus (06.11.17 @ 08:44)    Culture - Blood:   STAU^Staphylococcus aureus (06.09.17 @ 13:21)  -  Oxacillin: S <=0.25 HILLARY (06.09.17 @ 13:21)    RADIOLOEXAM:  RAD CHEST PORTABLE URGENT    PROCEDURE DATE:  Saul 15 2017   IMPRESSION:  Clear lungs.GY:      MRI PELVIS W & W O CONTRAST    MRI HIP W-W O C LT    MRI LUMBAR SPINE W C      1.  Findings suspicious for infectious left iliopsoas bursitis tracking into the pelvis. Rim-enhancing fluid collection tracking along the left   iliopsoas tendon. Fully extent of fluid collection is partially included on this study.  2.  Small rim-enhancing fluid collections within the left piriformis and left gluteus gabriel musculature suspicious for abscess.  3.  Myositis of the cellulitis, gluteus gabriel and left piriformis of infectious or inflammatory etiology.  4.  No osteomyelitis.  5.  Moderate distention of the urinary bladder. Correlate clinically.  6.  Diffuse marrow signal loss on T1-weighted imaging within the lumbar spine and pelvis may reflect chronic anemia or other infiltrative process.    Findings suspicious for early discitis osteomyelitis at L3-4. Left psoas abscess extends into the pelvis. Inflammatory changes right psoas muscle.

## 2017-06-16 NOTE — PROGRESS NOTE ADULT - PROBLEM SELECTOR PLAN 3
GPC bacteremia-grew twice in 2 sets of cultures two days apart despite being on Cefazolin-MSSA-repeat blood cx from 6/14 NGTD   Plan for PICC line eventually for 6-8 weeks of abx from the day of negative cultures

## 2017-06-16 NOTE — PROGRESS NOTE ADULT - SUBJECTIVE AND OBJECTIVE BOX
Patient is a 36y old  Male who presents with a chief complaint of Left sided hip/leg pain with fevers (09 Jun 2017 16:06)    CC: Back pain, cant ambulate    SUBJECTIVE / OVERNIGHT EVENTS: No acute events overnight. Afebrile last 24 hours.     MEDICATIONS  (STANDING):  sodium chloride 0.9%. 1000milliLiter(s) IV Continuous <Continuous>  QUEtiapine 200milliGRAM(s) Oral at bedtime  mirtazapine 45milliGRAM(s) Oral at bedtime  ceFAZolin   IVPB 2000milliGRAM(s) IV Intermittent every 8 hours  ceFAZolin   IVPB  IV Intermittent   pantoprazole    Tablet 40milliGRAM(s) Oral before breakfast  methadone 105milliGRAM(s) Oral every 24 hours  senna 2Tablet(s) Oral at bedtime  polyethylene glycol 3350 17Gram(s) Oral daily  docusate sodium 100milliGRAM(s) Oral three times a day    MEDICATIONS  (PRN):  acetaminophen   Tablet 650milliGRAM(s) Oral every 6 hours PRN For Temp greater than 38 C (100.4 F)  traMADol 50milliGRAM(s) Oral once PRN Breakthrough pain no controlled on Dilaudid  oxyCODONE IR 5milliGRAM(s) Oral every 4 hours PRN Severe Pain (7 - 10)      Vital Signs Last 24 Hrs  T(C): 37.2, Max: 37.8 (06-14 @ 21:13)  HR: 76 (76 - 79)  BP: 105/70 (98/53 - 117/71)  RR: 18 (18 - 18)  SpO2: 97% (95% - 100%)  Wt(kg): --  CAPILLARY BLOOD GLUCOSE    I&O's Summary      PHYSICAL EXAM:  GENERAL: NAD, thin male  HEAD:  Atraumatic, Normocephalic  EYES: EOMI, PERRLA, conjunctiva and sclera clear  NECK: Supple, No JVD  CHEST/LUNG: Rhonchi bilaterally   HEART: Regular rate and rhythm; No murmurs, rubs, or gallops  ABDOMEN: Soft, Nontender, Nondistended; Bowel sounds present  EXTREMITIES:  2+ Peripheral Pulses, No clubbing, cyanosis, or edema  PSYCH: AAOx3  NEUROLOGY: pain when he moves left leg,.   SKIN: No rashes or lesions    LABS:                        8.5    11.08 )-----------( 841      ( 14 Jun 2017 07:30 )             26.9     06-14    135  |  93<L>  |  9   ----------------------------<  97  4.3   |  28  |  0.76    Ca    9.2      14 Jun 2017 07:30                RADIOLOGY & ADDITIONAL TESTS:    Imaging Personally Reviewed:    Consultant(s) Notes Reviewed:      Care Discussed with Consultants/Other Providers: Patient is a 36y old  Male who presents with a chief complaint of Left sided hip/leg pain with fevers (09 Jun 2017 16:06)    CC: Back pain, cant ambulate    SUBJECTIVE / OVERNIGHT EVENTS: No acute events overnight. Afebrile last 24 hours. Still has left groin pain. Coughing intermittently.       MEDICATIONS  (STANDING):  sodium chloride 0.9%. 1000milliLiter(s) IV Continuous <Continuous>  QUEtiapine 200milliGRAM(s) Oral at bedtime  mirtazapine 45milliGRAM(s) Oral at bedtime  ceFAZolin   IVPB 2000milliGRAM(s) IV Intermittent every 8 hours  ceFAZolin   IVPB  IV Intermittent   pantoprazole    Tablet 40milliGRAM(s) Oral before breakfast  methadone 105milliGRAM(s) Oral every 24 hours  senna 2Tablet(s) Oral at bedtime  polyethylene glycol 3350 17Gram(s) Oral daily  docusate sodium 100milliGRAM(s) Oral three times a day    MEDICATIONS  (PRN):  acetaminophen   Tablet 650milliGRAM(s) Oral every 6 hours PRN For Temp greater than 38 C (100.4 F)  traMADol 50milliGRAM(s) Oral once PRN Breakthrough pain no controlled on Dilaudid  oxyCODONE IR 5milliGRAM(s) Oral every 4 hours PRN Severe Pain (7 - 10)      Vital Signs Last 24 Hrs  T(C): 37.2, Max: 37.8 (06-14 @ 21:13)  HR: 76 (76 - 79)  BP: 105/70 (98/53 - 117/71)  RR: 18 (18 - 18)  SpO2: 97% (95% - 100%)  Wt(kg): --  CAPILLARY BLOOD GLUCOSE    I&O's Summary      PHYSICAL EXAM:  GENERAL: NAD, thin male  HEAD:  Atraumatic, Normocephalic  EYES: EOMI, PERRLA, conjunctiva and sclera clear  NECK: Supple, No JVD  CHEST/LUNG: CTA in all fields     HEART: Regular rate and rhythm; No murmurs, rubs, or gallops  ABDOMEN: Soft, Nontender, Nondistended; Bowel sounds present  EXTREMITIES:  2+ Peripheral Pulses, No clubbing, cyanosis, or edema. Pain with extension, internal rotation and adduction of left hip   PSYCH: AAOx3  NEUROLOGY: Non focal.   SKIN: No rashes or lesions    LABS:                        8.5    11.08 )-----------( 841      ( 14 Jun 2017 07:30 )             26.9     06-14    135  |  93<L>  |  9   ----------------------------<  97  4.3   |  28  |  0.76    Ca    9.2      14 Jun 2017 07:30                RADIOLOGY & ADDITIONAL TESTS:    Imaging Personally Reviewed:    Consultant(s) Notes Reviewed:      Care Discussed with Consultants/Other Providers:

## 2017-06-16 NOTE — PROGRESS NOTE ADULT - PROBLEM SELECTOR PLAN 1
Discitis/osteo of L3-L4 noted on MRI  c/w IV cefazolin  ID following-will likely need 6-8 weeks of antibiotics total Discitis/osteo of L3-L4 noted on MRI  c/w IV cefazolin  ID following-will likely need 6-8 weeks of antibiotics total  Will like to get PICC placed today

## 2017-06-16 NOTE — PROGRESS NOTE ADULT - PROBLEM SELECTOR PLAN 6
confirmed with NP at [facility]  continue inpatient  - Will give methadone in AM

## 2017-06-17 LAB
HCT VFR BLD CALC: 22.5 % — LOW (ref 39–50)
HGB BLD-MCNC: 7 G/DL — CRITICAL LOW (ref 13–17)
MCHC RBC-ENTMCNC: 28.8 PG — SIGNIFICANT CHANGE UP (ref 27–34)
MCHC RBC-ENTMCNC: 31.1 % — LOW (ref 32–36)
MCV RBC AUTO: 92.6 FL — SIGNIFICANT CHANGE UP (ref 80–100)
PLATELET # BLD AUTO: 944 K/UL — HIGH (ref 150–400)
PMV BLD: 8.1 FL — SIGNIFICANT CHANGE UP (ref 7–13)
RBC # BLD: 2.43 M/UL — LOW (ref 4.2–5.8)
RBC # FLD: 13.5 % — SIGNIFICANT CHANGE UP (ref 10.3–14.5)
SPECIMEN SOURCE: SIGNIFICANT CHANGE UP
WBC # BLD: 10.97 K/UL — HIGH (ref 3.8–10.5)
WBC # FLD AUTO: 10.97 K/UL — HIGH (ref 3.8–10.5)

## 2017-06-17 RX ADMIN — METHADONE HYDROCHLORIDE 105 MILLIGRAM(S): 40 TABLET ORAL at 08:57

## 2017-06-17 RX ADMIN — MIRTAZAPINE 45 MILLIGRAM(S): 45 TABLET, ORALLY DISINTEGRATING ORAL at 21:22

## 2017-06-17 RX ADMIN — QUETIAPINE FUMARATE 200 MILLIGRAM(S): 200 TABLET, FILM COATED ORAL at 21:22

## 2017-06-17 RX ADMIN — Medication 100 MILLIGRAM(S): at 06:03

## 2017-06-17 RX ADMIN — PANTOPRAZOLE SODIUM 40 MILLIGRAM(S): 20 TABLET, DELAYED RELEASE ORAL at 06:03

## 2017-06-17 RX ADMIN — Medication 100 MILLIGRAM(S): at 13:13

## 2017-06-17 RX ADMIN — POLYETHYLENE GLYCOL 3350 17 GRAM(S): 17 POWDER, FOR SOLUTION ORAL at 11:37

## 2017-06-17 RX ADMIN — OXYCODONE HYDROCHLORIDE 5 MILLIGRAM(S): 5 TABLET ORAL at 14:57

## 2017-06-17 RX ADMIN — Medication 100 MILLIGRAM(S): at 21:23

## 2017-06-17 RX ADMIN — OXYCODONE HYDROCHLORIDE 5 MILLIGRAM(S): 5 TABLET ORAL at 22:19

## 2017-06-17 RX ADMIN — OXYCODONE HYDROCHLORIDE 5 MILLIGRAM(S): 5 TABLET ORAL at 04:00

## 2017-06-17 RX ADMIN — OXYCODONE HYDROCHLORIDE 5 MILLIGRAM(S): 5 TABLET ORAL at 21:22

## 2017-06-17 RX ADMIN — OXYCODONE HYDROCHLORIDE 5 MILLIGRAM(S): 5 TABLET ORAL at 03:02

## 2017-06-17 RX ADMIN — OXYCODONE HYDROCHLORIDE 5 MILLIGRAM(S): 5 TABLET ORAL at 15:30

## 2017-06-17 NOTE — PROGRESS NOTE ADULT - PROBLEM SELECTOR PLAN 5
Acute blood loss anemia given Occult blood + and normal Hg in 12/2016  Endorses hx of diverticulitis-No abdominal pain reported  Been taking NSAIDs though EGD normal   Type and scree. Goal Hg>7  Will f/u with GI regarding need for colonoscopy/capsule study Acute blood loss anemia given Occult blood + and normal Hg in 12/2016  Endorses hx of diverticulitis-No abdominal pain reported  Been taking NSAIDs though EGD normal   Type and scree. Goal Hg>7  No intervention from GI as inpatient given stable HG

## 2017-06-17 NOTE — PROGRESS NOTE ADULT - PROBLEM SELECTOR PLAN 6
confirmed with NP at [facility]  continue inpatient  - Will give methadone in AM

## 2017-06-17 NOTE — PROGRESS NOTE ADULT - PROBLEM SELECTOR PLAN 1
Discitis/osteo of L3-L4 noted on MRI  c/w IV cefazolin  ID following-will likely need 6-8 weeks of antibiotics total  PICC after blood cx from 6/14 and 6/16 stay negative Discitis/osteo of L3-L4 noted on MRI  c/w IV cefazolin-DAY 9  ID following-will likely need 6-8 weeks of antibiotics total  PICC after blood cx from 6/14 and 6/16 stay negative  L[place]ON Monday

## 2017-06-17 NOTE — PROGRESS NOTE ADULT - SUBJECTIVE AND OBJECTIVE BOX
Patient is a 36y old  Male who presents with a chief complaint of Left sided hip/leg pain with fevers (09 Jun 2017 16:06)    CC: Back pain, cant ambulate    SUBJECTIVE / OVERNIGHT EVENTS: No acute events overnight. Afebrile last 24 hours. Still has left groin pain. Coughing intermittently.       MEDICATIONS  (STANDING):  sodium chloride 0.9%. 1000milliLiter(s) IV Continuous <Continuous>  QUEtiapine 200milliGRAM(s) Oral at bedtime  mirtazapine 45milliGRAM(s) Oral at bedtime  ceFAZolin   IVPB 2000milliGRAM(s) IV Intermittent every 8 hours  ceFAZolin   IVPB  IV Intermittent   pantoprazole    Tablet 40milliGRAM(s) Oral before breakfast  methadone 105milliGRAM(s) Oral every 24 hours  senna 2Tablet(s) Oral at bedtime  polyethylene glycol 3350 17Gram(s) Oral daily  docusate sodium 100milliGRAM(s) Oral three times a day    MEDICATIONS  (PRN):  acetaminophen   Tablet 650milliGRAM(s) Oral every 6 hours PRN For Temp greater than 38 C (100.4 F)  traMADol 50milliGRAM(s) Oral once PRN Breakthrough pain no controlled on Dilaudid  oxyCODONE IR 5milliGRAM(s) Oral every 4 hours PRN Severe Pain (7 - 10)      Vital Signs Last 24 Hrs  T(C): 37.2, Max: 37.8 (06-14 @ 21:13)  HR: 76 (76 - 79)  BP: 105/70 (98/53 - 117/71)  RR: 18 (18 - 18)  SpO2: 97% (95% - 100%)  Wt(kg): --  CAPILLARY BLOOD GLUCOSE    I&O's Summary      PHYSICAL EXAM:  GENERAL: NAD, thin male  HEAD:  Atraumatic, Normocephalic  EYES: EOMI, PERRLA, conjunctiva and sclera clear  NECK: Supple, No JVD  CHEST/LUNG: CTA in all fields     HEART: Regular rate and rhythm; No murmurs, rubs, or gallops  ABDOMEN: Soft, Nontender, Nondistended; Bowel sounds present  EXTREMITIES:  2+ Peripheral Pulses, No clubbing, cyanosis, or edema. Pain with extension, internal rotation and adduction of left hip   PSYCH: AAOx3  NEUROLOGY: Non focal.   SKIN: No rashes or lesions    LABS:                        8.5    11.08 )-----------( 841      ( 14 Jun 2017 07:30 )             26.9     06-14    135  |  93<L>  |  9   ----------------------------<  97  4.3   |  28  |  0.76    Ca    9.2      14 Jun 2017 07:30                RADIOLOGY & ADDITIONAL TESTS:    Imaging Personally Reviewed:    Consultant(s) Notes Reviewed:      Care Discussed with Consultants/Other Providers: Patient is a 36y old  Male who presents with a chief complaint of Left sided hip/leg pain with fevers (09 Jun 2017 16:06)    CC: Back pain, cant ambulate    SUBJECTIVE / OVERNIGHT EVENTS: No acute events overnight. Afebrile last 24 hours. Ambulating with crutches. Coughing intermittently but improved.       MEDICATIONS  (STANDING):  sodium chloride 0.9%. 1000milliLiter(s) IV Continuous <Continuous>  QUEtiapine 200milliGRAM(s) Oral at bedtime  mirtazapine 45milliGRAM(s) Oral at bedtime  ceFAZolin   IVPB 2000milliGRAM(s) IV Intermittent every 8 hours  ceFAZolin   IVPB  IV Intermittent   pantoprazole    Tablet 40milliGRAM(s) Oral before breakfast  methadone 105milliGRAM(s) Oral every 24 hours  senna 2Tablet(s) Oral at bedtime  polyethylene glycol 3350 17Gram(s) Oral daily  docusate sodium 100milliGRAM(s) Oral three times a day    MEDICATIONS  (PRN):  acetaminophen   Tablet 650milliGRAM(s) Oral every 6 hours PRN For Temp greater than 38 C (100.4 F)  traMADol 50milliGRAM(s) Oral once PRN Breakthrough pain no controlled on Dilaudid  oxyCODONE IR 5milliGRAM(s) Oral every 4 hours PRN Severe Pain (7 - 10)      Vital Signs Last 24 Hrs  T(C): 37.2, Max: 37.8 (06-14 @ 21:13)  HR: 76 (76 - 79)  BP: 105/70 (98/53 - 117/71)  RR: 18 (18 - 18)  SpO2: 97% (95% - 100%)  Wt(kg): --  CAPILLARY BLOOD GLUCOSE    I&O's Summary      PHYSICAL EXAM:  GENERAL: NAD, thin male  HEAD:  Atraumatic, Normocephalic  EYES: EOMI, PERRLA, conjunctiva and sclera clear  NECK: Supple, No JVD  CHEST/LUNG: CTA in all fields     HEART: Regular rate and rhythm; No murmurs, rubs, or gallops  ABDOMEN: Soft, Nontender, Nondistended; Bowel sounds present  EXTREMITIES:  2+ Peripheral Pulses, No clubbing, cyanosis, or edema. Pain with extension, internal rotation and adduction of left hip   PSYCH: AAOx3  NEUROLOGY: Non focal.   SKIN: No rashes or lesions    LABS:                        8.5    11.08 )-----------( 841      ( 14 Jun 2017 07:30 )             26.9     06-14    135  |  93<L>  |  9   ----------------------------<  97  4.3   |  28  |  0.76    Ca    9.2      14 Jun 2017 07:30                RADIOLOGY & ADDITIONAL TESTS:    Imaging Personally Reviewed:    Consultant(s) Notes Reviewed:      Care Discussed with Consultants/Other Providers: Patient is a 36y old  Male who presents with a chief complaint of Left sided hip/leg pain with fevers (09 Jun 2017 16:06)    CC: Back pain, cant ambulate    SUBJECTIVE / OVERNIGHT EVENTS: No acute events overnight. Afebrile last 24 hours. Ambulating with crutches. Coughing intermittently but improved.       MEDICATIONS  (STANDING):  sodium chloride 0.9%. 1000milliLiter(s) IV Continuous <Continuous>  QUEtiapine 200milliGRAM(s) Oral at bedtime  mirtazapine 45milliGRAM(s) Oral at bedtime  ceFAZolin   IVPB 2000milliGRAM(s) IV Intermittent every 8 hours  ceFAZolin   IVPB  IV Intermittent   pantoprazole    Tablet 40milliGRAM(s) Oral before breakfast  methadone 105milliGRAM(s) Oral every 24 hours  senna 2Tablet(s) Oral at bedtime  polyethylene glycol 3350 17Gram(s) Oral daily  docusate sodium 100milliGRAM(s) Oral three times a day    MEDICATIONS  (PRN):  acetaminophen   Tablet 650milliGRAM(s) Oral every 6 hours PRN For Temp greater than 38 C (100.4 F)  traMADol 50milliGRAM(s) Oral once PRN Breakthrough pain no controlled on Dilaudid  oxyCODONE IR 5milliGRAM(s) Oral every 4 hours PRN Severe Pain (7 - 10)    Vital Signs Last 24 Hrs  T(C): 36.6, Max: 37.3 (06-16 @ 20:35)  T(F): 97.9, Max: 99.1 (06-16 @ 20:35)  HR: 74 (71 - 80)  BP: 111/69 (108/62 - 113/61)  BP(mean): --  RR: 18 (16 - 18)  SpO2: 100% (98% - 100%)      PHYSICAL EXAM:  GENERAL: NAD, thin male  HEAD:  Atraumatic, Normocephalic  EYES: EOMI, PERRLA, conjunctiva and sclera clear  NECK: Supple, No JVD  CHEST/LUNG: CTA in all fields     HEART: Regular rate and rhythm; No murmurs, rubs, or gallops  ABDOMEN: Soft, Nontender, Nondistended; Bowel sounds present  EXTREMITIES:  2+ Peripheral Pulses, No clubbing, cyanosis, or edema. Pain with extension, internal rotation and adduction of left hip   PSYCH: AAOx3  NEUROLOGY: Non focal.   SKIN: No rashes or lesions    LABS:                        8.5    11.08 )-----------( 841      ( 14 Jun 2017 07:30 )             26.9     06-14    135  |  93<L>  |  9   ----------------------------<  97  4.3   |  28  |  0.76    Ca    9.2      14 Jun 2017 07:30                RADIOLOGY & ADDITIONAL TESTS:    Imaging Personally Reviewed:    Consultant(s) Notes Reviewed:      Care Discussed with Consultants/Other Providers:

## 2017-06-17 NOTE — PROGRESS NOTE ADULT - PROBLEM SELECTOR PLAN 4
Acute blood loss anemia given Occult blood + and normal Hg in 12/2016  Endorses hx of diverticulitis-No abdominal pain reported  UGIB ruled out with normal EGD  hold nsaids  c/w protonix po  Gi following  Type and screen. Goal Hg>7  Stable Acute blood loss anemia given Occult blood + and normal Hg in 12/2016  Endorses hx of diverticulitis-No abdominal pain reported  UGIB ruled out with normal EGD  hold nsaids  c/w protonix po  Gi following-HG stable   Type and screen. Goal Hg>7  Stable Acute blood loss anemia given Occult blood + and normal Hg in 12/2016  Endorses hx of diverticulitis-No abdominal pain reported  UGIB ruled out with normal EGD  hold nsaids  c/w protonix po  HG drop this am-will contact GI regarding rescoping   Type and screen. Goal Hg>7  Stable

## 2017-06-17 NOTE — PROGRESS NOTE ADULT - PROBLEM SELECTOR PLAN 3
GPC bacteremia-grew twice in 2 sets of cultures two days apart despite being on Cefazolin-MSSA-repeat blood cx from 6/14 NGTD   Plan for PICC line eventually for 6-8 weeks of abx from the day of negative cultures  L[place]ON Monday

## 2017-06-18 LAB
BASOPHILS # BLD AUTO: 0.02 K/UL — SIGNIFICANT CHANGE UP (ref 0–0.2)
BASOPHILS NFR BLD AUTO: 0.3 % — SIGNIFICANT CHANGE UP (ref 0–2)
BUN SERPL-MCNC: 12 MG/DL — SIGNIFICANT CHANGE UP (ref 7–23)
CALCIUM SERPL-MCNC: 9.2 MG/DL — SIGNIFICANT CHANGE UP (ref 8.4–10.5)
CHLORIDE SERPL-SCNC: 95 MMOL/L — LOW (ref 98–107)
CO2 SERPL-SCNC: 30 MMOL/L — SIGNIFICANT CHANGE UP (ref 22–31)
CREAT SERPL-MCNC: 0.75 MG/DL — SIGNIFICANT CHANGE UP (ref 0.5–1.3)
EOSINOPHIL # BLD AUTO: 0.29 K/UL — SIGNIFICANT CHANGE UP (ref 0–0.5)
EOSINOPHIL NFR BLD AUTO: 3.7 % — SIGNIFICANT CHANGE UP (ref 0–6)
GLUCOSE SERPL-MCNC: 99 MG/DL — SIGNIFICANT CHANGE UP (ref 70–99)
HCT VFR BLD CALC: 27.4 % — LOW (ref 39–50)
HGB BLD-MCNC: 8.4 G/DL — LOW (ref 13–17)
IMM GRANULOCYTES NFR BLD AUTO: 0.8 % — SIGNIFICANT CHANGE UP (ref 0–1.5)
LYMPHOCYTES # BLD AUTO: 1.94 K/UL — SIGNIFICANT CHANGE UP (ref 1–3.3)
LYMPHOCYTES # BLD AUTO: 24.7 % — SIGNIFICANT CHANGE UP (ref 13–44)
MAGNESIUM SERPL-MCNC: 2 MG/DL — SIGNIFICANT CHANGE UP (ref 1.6–2.6)
MCHC RBC-ENTMCNC: 28.3 PG — SIGNIFICANT CHANGE UP (ref 27–34)
MCHC RBC-ENTMCNC: 30.7 % — LOW (ref 32–36)
MCV RBC AUTO: 92.3 FL — SIGNIFICANT CHANGE UP (ref 80–100)
MONOCYTES # BLD AUTO: 0.73 K/UL — SIGNIFICANT CHANGE UP (ref 0–0.9)
MONOCYTES NFR BLD AUTO: 9.3 % — SIGNIFICANT CHANGE UP (ref 2–14)
NEUTROPHILS # BLD AUTO: 4.82 K/UL — SIGNIFICANT CHANGE UP (ref 1.8–7.4)
NEUTROPHILS NFR BLD AUTO: 61.2 % — SIGNIFICANT CHANGE UP (ref 43–77)
PHOSPHATE SERPL-MCNC: 4.4 MG/DL — SIGNIFICANT CHANGE UP (ref 2.5–4.5)
PLATELET # BLD AUTO: 883 K/UL — HIGH (ref 150–400)
PMV BLD: 7.9 FL — SIGNIFICANT CHANGE UP (ref 7–13)
POTASSIUM SERPL-MCNC: 4.6 MMOL/L — SIGNIFICANT CHANGE UP (ref 3.5–5.3)
POTASSIUM SERPL-SCNC: 4.6 MMOL/L — SIGNIFICANT CHANGE UP (ref 3.5–5.3)
RBC # BLD: 2.97 M/UL — LOW (ref 4.2–5.8)
RBC # FLD: 13.4 % — SIGNIFICANT CHANGE UP (ref 10.3–14.5)
SODIUM SERPL-SCNC: 138 MMOL/L — SIGNIFICANT CHANGE UP (ref 135–145)
WBC # BLD: 7.86 K/UL — SIGNIFICANT CHANGE UP (ref 3.8–10.5)
WBC # FLD AUTO: 7.86 K/UL — SIGNIFICANT CHANGE UP (ref 3.8–10.5)

## 2017-06-18 PROCEDURE — 99232 SBSQ HOSP IP/OBS MODERATE 35: CPT | Mod: GC

## 2017-06-18 RX ADMIN — MIRTAZAPINE 45 MILLIGRAM(S): 45 TABLET, ORALLY DISINTEGRATING ORAL at 21:33

## 2017-06-18 RX ADMIN — OXYCODONE HYDROCHLORIDE 5 MILLIGRAM(S): 5 TABLET ORAL at 11:23

## 2017-06-18 RX ADMIN — Medication 100 MILLIGRAM(S): at 21:33

## 2017-06-18 RX ADMIN — OXYCODONE HYDROCHLORIDE 5 MILLIGRAM(S): 5 TABLET ORAL at 07:00

## 2017-06-18 RX ADMIN — Medication 100 MILLIGRAM(S): at 06:00

## 2017-06-18 RX ADMIN — QUETIAPINE FUMARATE 200 MILLIGRAM(S): 200 TABLET, FILM COATED ORAL at 21:33

## 2017-06-18 RX ADMIN — PANTOPRAZOLE SODIUM 40 MILLIGRAM(S): 20 TABLET, DELAYED RELEASE ORAL at 06:00

## 2017-06-18 RX ADMIN — OXYCODONE HYDROCHLORIDE 5 MILLIGRAM(S): 5 TABLET ORAL at 06:00

## 2017-06-18 RX ADMIN — OXYCODONE HYDROCHLORIDE 5 MILLIGRAM(S): 5 TABLET ORAL at 12:00

## 2017-06-18 RX ADMIN — OXYCODONE HYDROCHLORIDE 5 MILLIGRAM(S): 5 TABLET ORAL at 17:21

## 2017-06-18 RX ADMIN — METHADONE HYDROCHLORIDE 105 MILLIGRAM(S): 40 TABLET ORAL at 09:03

## 2017-06-18 RX ADMIN — OXYCODONE HYDROCHLORIDE 5 MILLIGRAM(S): 5 TABLET ORAL at 18:00

## 2017-06-18 RX ADMIN — Medication 100 MILLIGRAM(S): at 13:46

## 2017-06-18 RX ADMIN — OXYCODONE HYDROCHLORIDE 5 MILLIGRAM(S): 5 TABLET ORAL at 23:25

## 2017-06-18 NOTE — PROGRESS NOTE ADULT - SUBJECTIVE AND OBJECTIVE BOX
Patient is a 36y old  Male who presents with a chief complaint of Left sided hip/leg pain with fevers (09 Jun 2017 16:06)    CC: Back pain, cant ambulate    SUBJECTIVE / OVERNIGHT EVENTS: No acute events overnight. Afebrile. Ambulating with crutches. Coughing intermittently but improved.       MEDICATIONS  (STANDING):  sodium chloride 0.9%. 1000milliLiter(s) IV Continuous <Continuous>  QUEtiapine 200milliGRAM(s) Oral at bedtime  mirtazapine 45milliGRAM(s) Oral at bedtime  ceFAZolin   IVPB 2000milliGRAM(s) IV Intermittent every 8 hours  ceFAZolin   IVPB  IV Intermittent   pantoprazole    Tablet 40milliGRAM(s) Oral before breakfast  methadone 105milliGRAM(s) Oral every 24 hours    MEDICATIONS  (PRN):  acetaminophen   Tablet 650milliGRAM(s) Oral every 6 hours PRN For Temp greater than 38 C (100.4 F)  traMADol 50milliGRAM(s) Oral once PRN Breakthrough pain no controlled on Dilaudid  oxyCODONE IR 5milliGRAM(s) Oral every 4 hours PRN Severe Pain (7 - 10)    Vital Signs Last 24 Hrs  T(C): 36.7, Max: 37.5 (06-17 @ 21:00)  T(F): 98.1, Max: 99.5 (06-17 @ 21:00)  HR: 72 (72 - 74)  BP: 113/73 (102/57 - 113/73)  BP(mean): --  RR: 18 (18 - 18)  SpO2: 98% (98% - 100%)    PHYSICAL EXAM:  GENERAL: NAD, thin male  HEAD:  Atraumatic, Normocephalic  EYES: EOMI, PERRLA, conjunctiva and sclera clear  NECK: Supple, No JVD  CHEST/LUNG: CTA in all fields     HEART: Regular rate and rhythm; No murmurs, rubs, or gallops  ABDOMEN: Soft, Nontender, Nondistended; Bowel sounds present  EXTREMITIES:  2+ Peripheral Pulses, No clubbing, cyanosis, or edema. Pain with extension, internal rotation and adduction of left hip   PSYCH: AAOx3  NEUROLOGY: Non focal.   SKIN: No rashes or lesions    LABS:                          8.4    7.86  )-----------( 883      ( 18 Jun 2017 06:05 )             27.4     06-18    138  |  95<L>  |  12  ----------------------------<  99  4.6   |  30  |  0.75    Ca    9.2      18 Jun 2017 06:05  Phos  4.4     06-18  Mg     2.0     06-18                  RADIOLOGY & ADDITIONAL TESTS:    Imaging Personally Reviewed:    Consultant(s) Notes Reviewed:      Care Discussed with Consultants/Other Providers:

## 2017-06-18 NOTE — PROGRESS NOTE ADULT - SUBJECTIVE AND OBJECTIVE BOX
Chief Complaint:  Patient is a 36y old  Male who presents with a chief complaint of Left sided hip/leg pain with fevers (09 Jun 2017 16:06)      Interval Events: NO acute overnight events. Called to re-evaluate patient for GIB after hgb drop from 8 to 7 yesterday. No overt GI bleeding. Stools are dark brown. No CP or SOB. Currently on antibiotics for Psoas abscess/MSSA bacteremia.     Allergies:  No Known Allergies        Hospital Medications:  acetaminophen   Tablet 650milliGRAM(s) Oral every 6 hours PRN  sodium chloride 0.9%. 1000milliLiter(s) IV Continuous <Continuous>  traMADol 50milliGRAM(s) Oral once PRN  QUEtiapine 200milliGRAM(s) Oral at bedtime  mirtazapine 45milliGRAM(s) Oral at bedtime  ceFAZolin   IVPB 2000milliGRAM(s) IV Intermittent every 8 hours  ceFAZolin   IVPB  IV Intermittent   oxyCODONE IR 5milliGRAM(s) Oral every 4 hours PRN  pantoprazole    Tablet 40milliGRAM(s) Oral before breakfast  methadone 105milliGRAM(s) Oral every 24 hours      PMHX/PSHX:  Diverticulitis  No significant past surgical history      Family history:  Family history of stomach cancer (Mother)      ROS:     General:  No wt loss, fevers, chills, night sweats, fatigue,   Eyes:  Good vision, no reported pain  ENT:  No sore throat, pain, runny nose, dysphagia  CV:  No pain, palpitations, hypo/hypertension  Resp:  No dyspnea, cough, tachypnea, wheezing  GI:  No pain, No nausea, No vomiting, No diarrhea, No constipation, No weight loss, No fever, No pruritis, No rectal bleeding, No tarry stools, No dysphagia,  :  No pain, bleeding, incontinence, nocturia  Muscle:  No pain, weakness  Neuro:  No weakness, tingling, memory problems  Psych:  No fatigue, insomnia, mood problems, depression  Endocrine:  No polyuria, polydipsia, cold/heat intolerance  Heme:  No petechiae, ecchymosis, easy bruisability  Skin:  No rash, tattoos, scars, edema      PHYSICAL EXAM:   Vital Signs:  Vital Signs Last 24 Hrs  T(C): 36.7, Max: 37.5 (06-17 @ 21:00)  T(F): 98.1, Max: 99.5 (06-17 @ 21:00)  HR: 72 (72 - 74)  BP: 113/73 (102/57 - 113/73)  BP(mean): --  RR: 18 (18 - 18)  SpO2: 98% (98% - 100%)  Daily     Daily     GENERAL:  Appears stated age, well-groomed, well-nourished, no distress  HEENT:  NC/AT,  conjunctivae clear and pink, sclera -anicteric  CHEST:  Full & symmetric excursion, no increased effort, breath sounds clear  HEART:  Regular rhythm, S1, S2, no murmur/rub/S3/S4, no abdominal bruit, no edema  ABDOMEN:  Soft, non-tender, non-distended, normoactive bowel sounds,  no masses ,no hepato-splenomegaly, no signs of chronic liver disease  EXTEREMITIES:  no cyanosis,clubbing or edema  SKIN:  No rash/erythema/ecchymoses/petechiae/wounds/abscess/warm/dry  NEURO:  Alert, oriented, no asterixis, no tremor, no encephalopathy    LABS:                        7.0    10.97 )-----------( 944      ( 17 Jun 2017 06:33 )             22.5   Hemoglobin: 7.0 g/dL (06-17 @ 06:33)  Hemoglobin: 8.0 g/dL (06-16 @ 07:00)  Hemoglobin: 8.5 g/dL (06-14 @ 07:30)  Baseline Hgb 12    Mean Cell Volume: 92.6 fL (06-17 @ 06:33)    06-16    137  |  95<L>  |  8   ----------------------------<  105<H>  4.4   |  27  |  0.72    Ca    9.5      16 Jun 2017 07:00  Phos  5.3     06-16  Mg     2.0     06-16          Imaging:  [facility]  _______________________________________________________________________________  Patient Name: Reynaldo Gandara               Procedure Date: 6/12/2017 11:04 AM  MRN: [id number]                     Account Number: [id number]  YOB: 1980             Admit Type: Inpatient  Room: [place] [address]                         Gender: Male  Attending MD: TIEN JOHNSTON MD   _______________________________________________________________________________     Procedure:           Upper GI endoscopy  Indications:         Melena  Providers:           TIEN JOHNSTON MD, KEY KELSEY MD (Fellow)  Medicines:           Monitored Anesthesia Care  Complications:       No immediate complications.  Procedure:    Pre-Anesthesia Assessment:                       - Prior to the procedure, a History and Physical was                        performed, and patient medications and allergies were                        reviewed. The risks and benefits of the procedure and                        the sedation options and risks were discussed with the                        patient. All questions were answered and informed                        consent was obtained. Patient identification and       proposed procedure were verified by the physician, the                        nurse and the anesthesiologist in the pre-procedure area                        in the procedure room. ASA Grade Assessment: III - A                        patient withsevere systemic disease. After reviewing                        the risks and benefits, the patient was deemed in                        satisfactory condition to undergo the procedure. The                        anesthesia plan was to use monitored anesthesia care                        (MAC). Immediately prior to administration of                        medications, the patient was re-assessed for adequacy to                        receive sedatives. The heart rate, respiratory rate,               oxygen saturations, blood pressure, adequacy of                        pulmonary ventilation, and response to care were                        monitored throughout the procedure. The physical status                        of the patient was re-assessed after the procedure.                       After obtaining informed consent, the endoscope was                        passed under direct vision. Throughout the procedure,                        the patient's blood pressure, pulse, and oxygen                        saturations were monitored continuously.The upper GI                        endoscopy was accomplished without difficulty. The                        patient tolerated the procedure well. The was introduced           through the mouth, and advanced to the second part of                        duodenum.                                                                                   Findings:       The examined esophagus was normal except for a 3cm hiatushernia.       The Z-line was regular and was found 32 cm from the incisors. The GEJ, Z        line, and top of the gastric folds were noted at 32cm. The diaphragmatic        hiatus was noted at 35cm.       A 3 cm hiatus hernia was present.       Multiple dispersed, non-bleeding erosions were found in the prepyloric        region of the stomach. There were no stigmata of recent bleeding.       The exam of the stomach was otherwise normal.       The duodenal bulb and 2nd part of the duodenum were normal.                                                                                   Impression:          - 3 cm hiatus hernia.                       - Non-bleeding erosive gastropathy.                       - Normal duodenal bulb and 2nd part of the duodenum.                       - No clear source of melena found.  Recommendation:      - Return patient to hospital [facility] for ongoing care.                       - Advance diet as tolerated.                       - Trend CBC. Transfuse as needed.                       - PPI daily.                       - Monitor for further episodes of GI bleeding.                                                                                   Attending Participation:       I was present and participated during the entire procedure, including        non-key portions.                                                                                     ______________________  TIEN JOHNSTON MD  6/12/2017 11:50:35 AM  This report has been signed electronically.  Number of Addenda: 0    Note Initiated On: 6/12/2017 11:04 AM

## 2017-06-18 NOTE — PROGRESS NOTE ADULT - PROBLEM SELECTOR PLAN 5
Acute blood loss anemia given Occult blood + and normal Hg in 12/2016  Endorses hx of diverticulitis-No abdominal pain reported  Been taking NSAIDs though EGD normal   Type and scree. Goal Hg>7  No intervention from GI as inpatient given stable HG

## 2017-06-18 NOTE — PROGRESS NOTE ADULT - PROBLEM SELECTOR PLAN 1
Discitis/osteo of L3-L4 noted on MRI  c/w IV cefazolin-DAY 10  ID following-will likely need 6-8 weeks of antibiotics total  PICC after blood cx from 6/14 and 6/16 stay negative  LONDON Monday

## 2017-06-18 NOTE — PROGRESS NOTE ADULT - PROBLEM SELECTOR PLAN 4
Acute blood loss anemia given Occult blood + and normal Hg in 12/2016  Endorses hx of diverticulitis-No abdominal pain reported  UGIB ruled out with normal EGD  hold nsaids  c/w protonix po  HG drop this am-will contact GI regarding rescoping   Type and screen. Goal Hg>7  Stable

## 2017-06-18 NOTE — PROGRESS NOTE ADULT - PROBLEM SELECTOR PLAN 8
SCD for DVT ppx  Protonix gtt GI ppx   NPO
SCD for DVT ppx  Protonix GI ppx
SCD for DVT ppx  Protonix gtt GI ppx   NPO
SCD for DVT ppx  Protonix GI ppx

## 2017-06-18 NOTE — PROGRESS NOTE ADULT - ASSESSMENT
Impression:  1)Acute blood loss anemia- drop in Hgb 6/17 w/o any signs of overt GIB. Suspect drop in Hgb yesterday may have been due to lab error, no etiology for drop in Hgb from baselined 12/2016 yet found. Negative EGD 6/12/17. Deferring endoscopic evaluation for now given bacteremia.   2)Psoas Abscess, MSSA Bacteremia- on IV antibiotics. LONDON pending    Plan:  -f/u AM Hgb, would monitor Hgb daily and monitor bowel movements  -c/w PPI PO daily  -c/w IV antibiotics as per ID, f/u LONDON  -If Hgb continues to drop may consider repeat EGD.   Will follow [id number]

## 2017-06-18 NOTE — PROGRESS NOTE ADULT - PROBLEM SELECTOR PLAN 6
confirmed with NP at [facility]  continue inpatient  - Will give methadone in AM

## 2017-06-19 LAB
BACTERIA BLD CULT: SIGNIFICANT CHANGE UP
BACTERIA BLD CULT: SIGNIFICANT CHANGE UP
BASOPHILS # BLD AUTO: 0.03 K/UL — SIGNIFICANT CHANGE UP (ref 0–0.2)
BASOPHILS NFR BLD AUTO: 0.4 % — SIGNIFICANT CHANGE UP (ref 0–2)
EOSINOPHIL # BLD AUTO: 0.28 K/UL — SIGNIFICANT CHANGE UP (ref 0–0.5)
EOSINOPHIL NFR BLD AUTO: 3.3 % — SIGNIFICANT CHANGE UP (ref 0–6)
HCT VFR BLD CALC: 26.9 % — LOW (ref 39–50)
HGB BLD-MCNC: 8.1 G/DL — LOW (ref 13–17)
IMM GRANULOCYTES NFR BLD AUTO: 0.6 % — SIGNIFICANT CHANGE UP (ref 0–1.5)
LYMPHOCYTES # BLD AUTO: 2.01 K/UL — SIGNIFICANT CHANGE UP (ref 1–3.3)
LYMPHOCYTES # BLD AUTO: 23.5 % — SIGNIFICANT CHANGE UP (ref 13–44)
MCHC RBC-ENTMCNC: 27.6 PG — SIGNIFICANT CHANGE UP (ref 27–34)
MCHC RBC-ENTMCNC: 30.1 % — LOW (ref 32–36)
MCV RBC AUTO: 91.8 FL — SIGNIFICANT CHANGE UP (ref 80–100)
MONOCYTES # BLD AUTO: 0.76 K/UL — SIGNIFICANT CHANGE UP (ref 0–0.9)
MONOCYTES NFR BLD AUTO: 8.9 % — SIGNIFICANT CHANGE UP (ref 2–14)
NEUTROPHILS # BLD AUTO: 5.41 K/UL — SIGNIFICANT CHANGE UP (ref 1.8–7.4)
NEUTROPHILS NFR BLD AUTO: 63.3 % — SIGNIFICANT CHANGE UP (ref 43–77)
PLATELET # BLD AUTO: 787 K/UL — HIGH (ref 150–400)
PMV BLD: 7.8 FL — SIGNIFICANT CHANGE UP (ref 7–13)
RBC # BLD: 2.93 M/UL — LOW (ref 4.2–5.8)
RBC # FLD: 13.5 % — SIGNIFICANT CHANGE UP (ref 10.3–14.5)
WBC # BLD: 8.54 K/UL — SIGNIFICANT CHANGE UP (ref 3.8–10.5)
WBC # FLD AUTO: 8.54 K/UL — SIGNIFICANT CHANGE UP (ref 3.8–10.5)

## 2017-06-19 PROCEDURE — 93312 ECHO TRANSESOPHAGEAL: CPT | Mod: 26

## 2017-06-19 PROCEDURE — 99232 SBSQ HOSP IP/OBS MODERATE 35: CPT

## 2017-06-19 PROCEDURE — 93320 DOPPLER ECHO COMPLETE: CPT | Mod: 26,GC

## 2017-06-19 PROCEDURE — 76376 3D RENDER W/INTRP POSTPROCES: CPT | Mod: 26

## 2017-06-19 PROCEDURE — 93325 DOPPLER ECHO COLOR FLOW MAPG: CPT | Mod: 26,GC

## 2017-06-19 RX ADMIN — METHADONE HYDROCHLORIDE 105 MILLIGRAM(S): 40 TABLET ORAL at 11:23

## 2017-06-19 RX ADMIN — QUETIAPINE FUMARATE 200 MILLIGRAM(S): 200 TABLET, FILM COATED ORAL at 21:48

## 2017-06-19 RX ADMIN — OXYCODONE HYDROCHLORIDE 5 MILLIGRAM(S): 5 TABLET ORAL at 22:45

## 2017-06-19 RX ADMIN — OXYCODONE HYDROCHLORIDE 5 MILLIGRAM(S): 5 TABLET ORAL at 15:57

## 2017-06-19 RX ADMIN — OXYCODONE HYDROCHLORIDE 5 MILLIGRAM(S): 5 TABLET ORAL at 15:27

## 2017-06-19 RX ADMIN — PANTOPRAZOLE SODIUM 40 MILLIGRAM(S): 20 TABLET, DELAYED RELEASE ORAL at 06:19

## 2017-06-19 RX ADMIN — OXYCODONE HYDROCHLORIDE 5 MILLIGRAM(S): 5 TABLET ORAL at 08:29

## 2017-06-19 RX ADMIN — MIRTAZAPINE 45 MILLIGRAM(S): 45 TABLET, ORALLY DISINTEGRATING ORAL at 21:48

## 2017-06-19 RX ADMIN — OXYCODONE HYDROCHLORIDE 5 MILLIGRAM(S): 5 TABLET ORAL at 21:48

## 2017-06-19 RX ADMIN — Medication 100 MILLIGRAM(S): at 05:19

## 2017-06-19 RX ADMIN — Medication 100 MILLIGRAM(S): at 13:19

## 2017-06-19 RX ADMIN — Medication 100 MILLIGRAM(S): at 21:49

## 2017-06-19 RX ADMIN — OXYCODONE HYDROCHLORIDE 5 MILLIGRAM(S): 5 TABLET ORAL at 00:25

## 2017-06-19 NOTE — PROGRESS NOTE ADULT - SUBJECTIVE AND OBJECTIVE BOX
Patient is a 36y old  Male who presents with a chief complaint of Left sided hip/leg pain with fevers (09 Jun 2017 16:06)    CC: Back pain, cant ambulate    SUBJECTIVE / OVERNIGHT EVENTS: No acute events overnight. Afebrile. Ambulating with crutches. Coughing improved.       MEDICATIONS  (STANDING):  sodium chloride 0.9%. 1000milliLiter(s) IV Continuous <Continuous>  QUEtiapine 200milliGRAM(s) Oral at bedtime  mirtazapine 45milliGRAM(s) Oral at bedtime  ceFAZolin   IVPB 2000milliGRAM(s) IV Intermittent every 8 hours  ceFAZolin   IVPB  IV Intermittent   pantoprazole    Tablet 40milliGRAM(s) Oral before breakfast  methadone 105milliGRAM(s) Oral every 24 hours    MEDICATIONS  (PRN):  acetaminophen   Tablet 650milliGRAM(s) Oral every 6 hours PRN For Temp greater than 38 C (100.4 F)  traMADol 50milliGRAM(s) Oral once PRN Breakthrough pain no controlled on Dilaudid  oxyCODONE IR 5milliGRAM(s) Oral every 4 hours PRN Severe Pain (7 - 10)    Vital Signs Last 24 Hrs  T(C): 37.1, Max: 37.7 (06-18 @ 21:04)  T(F): 98.7, Max: 99.8 (06-18 @ 21:04)  HR: 66 (66 - 89)  BP: 109/61 (103/56 - 124/71)  BP(mean): --  RR: 18 (18 - 18)  SpO2: 97% (97% - 100%)    PHYSICAL EXAM:  GENERAL: NAD, thin male  HEAD:  Atraumatic, Normocephalic  EYES: EOMI, PERRLA, conjunctiva and sclera clear  NECK: Supple, No JVD  CHEST/LUNG: CTA in all fields     HEART: Regular rate and rhythm; No murmurs, rubs, or gallops  ABDOMEN: Soft, Nontender, Nondistended; Bowel sounds present  EXTREMITIES:  2+ Peripheral Pulses, No clubbing, cyanosis, or edema. Pain with extension, internal rotation and adduction of left hip   PSYCH: AAOx3  NEUROLOGY: Non focal.   SKIN: No rashes or lesions    LABS:                          8.1    8.54  )-----------( 787      ( 19 Jun 2017 06:00 )             26.9     06-18    138  |  95<L>  |  12  ----------------------------<  99  4.6   |  30  |  0.75    Ca    9.2      18 Jun 2017 06:05  Phos  4.4     06-18  Mg     2.0     06-18                      RADIOLOGY & ADDITIONAL TESTS:    Imaging Personally Reviewed:    Consultant(s) Notes Reviewed:      Care Discussed with Consultants/Other Providers:

## 2017-06-19 NOTE — PROGRESS NOTE ADULT - PROBLEM SELECTOR PLAN 3
GPC bacteremia-grew twice in 2 sets of cultures two days apart despite being on Cefazolin-MSSA-repeat blood cx from 6/14 NGTD   Plan for PICC line eventually for 6-8 weeks of abx from the day of negative cultures  LONDON today

## 2017-06-19 NOTE — PROGRESS NOTE ADULT - PROBLEM SELECTOR PLAN 1
Discitis/osteo of L3-L4 noted on MRI  c/w IV cefazolin-DAY 10  ID following-will likely need 6-8 weeks of antibiotics total  PICC after blood cx from 6/14 and 6/16 stay negative  LONDON today

## 2017-06-19 NOTE — PROGRESS NOTE ADULT - SUBJECTIVE AND OBJECTIVE BOX
Follow Up:  bacteremia, discitis L3/4, left iliopsoas abscess    Interval History: left hip/buttock pain but better.  can weight bear a little.  left SC joint area better.  Rest of ROS otherwise negative.    Allergies  No Known Allergies    ANTIMICROBIALS:    ceFAZolin   IVPB 2000 every 8 hours    MEDICATIONS  (STANDING):  acetaminophen   Tablet 650 every 6 hours PRN  traMADol 50 once PRN  QUEtiapine 200 at bedtime  mirtazapine 45 at bedtime  oxyCODONE IR 5 every 4 hours PRN  pantoprazole    Tablet 40 before breakfast  methadone 105 every 24 hours  senna 2 at bedtime  polyethylene glycol 3350 17 daily  docusate sodium 100 three times a day    Vital Signs Last 24 Hrs  T(F): 98.7, Max: 99.8 (06-18 @ 21:04)  HR: 66  BP: 109/61  RR: 18  SpO2: 97% (97% - 100%)    Physical Exam:  General:    NAD,  non toxic, sitting up in bed  HEENT:   no icterus, neck supple  Cardiovascular:  S1, S2  Respiratory: rhonchi b/l  abd: soft, no tenderness  : no santos  Musculoskeletal:  markedly improved ROM  Skin:    no rash  Neurologic:  no focal deficit                                       8.1    8.54  )-----------( 787      ( 19 Jun 2017 06:00 )             26.9 06-18    138  |  95  |  12  ----------------------------<  99  4.6   |  30  |  0.75  Ca    9.2      18 Jun 2017 06:05Phos  4.4     06-18Mg     2.0     06-18    MICROBIOLOGY:  Culture - Blood:   NO ORGANISMS ISOLATED  NO ORGANISMS ISOLATED AT 48 HRS. (06.14.17 @ 07:54)    Culture - Blood:   STAU^Staphylococcus aureus (06.11.17 @ 08:44)    Culture - Blood:   STAU^Staphylococcus aureus (06.09.17 @ 13:21)  -  Oxacillin: S <=0.25 HILLARY (06.09.17 @ 13:21)    EXAM:  RAD CHEST PORTABLE URGENT    PROCEDURE DATE:  Saul 15 2017   IMPRESSION:  Clear lungs.GY:    LONDON negative    MRI PELVIS W & W O CONTRAST    MRI HIP W-W O C LT    MRI LUMBAR SPINE W C      1.  Findings suspicious for infectious left iliopsoas bursitis tracking into the pelvis. Rim-enhancing fluid collection tracking along the left   iliopsoas tendon. Fully extent of fluid collection is partially included on this study.  2.  Small rim-enhancing fluid collections within the left piriformis and left gluteus gabriel musculature suspicious for abscess.  3.  Myositis of the cellulitis, gluteus gabriel and left piriformis of infectious or inflammatory etiology.  4.  No osteomyelitis.  5.  Moderate distention of the urinary bladder. Correlate clinically.  6.  Diffuse marrow signal loss on T1-weighted imaging within the lumbar spine and pelvis may reflect chronic anemia or other infiltrative process.    Findings suspicious for early discitis osteomyelitis at L3-4. Left psoas abscess extends into the pelvis. Inflammatory changes right psoas muscle.

## 2017-06-20 LAB
BUN SERPL-MCNC: 10 MG/DL — SIGNIFICANT CHANGE UP (ref 7–23)
CALCIUM SERPL-MCNC: 9.6 MG/DL — SIGNIFICANT CHANGE UP (ref 8.4–10.5)
CHLORIDE SERPL-SCNC: 94 MMOL/L — LOW (ref 98–107)
CO2 SERPL-SCNC: 26 MMOL/L — SIGNIFICANT CHANGE UP (ref 22–31)
CREAT SERPL-MCNC: 0.87 MG/DL — SIGNIFICANT CHANGE UP (ref 0.5–1.3)
GLUCOSE SERPL-MCNC: 104 MG/DL — HIGH (ref 70–99)
HCT VFR BLD CALC: 28.5 % — LOW (ref 39–50)
HGB BLD-MCNC: 8.8 G/DL — LOW (ref 13–17)
INR BLD: 1.19 — HIGH (ref 0.88–1.17)
MCHC RBC-ENTMCNC: 28.4 PG — SIGNIFICANT CHANGE UP (ref 27–34)
MCHC RBC-ENTMCNC: 30.9 % — LOW (ref 32–36)
MCV RBC AUTO: 91.9 FL — SIGNIFICANT CHANGE UP (ref 80–100)
PLATELET # BLD AUTO: 848 K/UL — HIGH (ref 150–400)
PMV BLD: 8 FL — SIGNIFICANT CHANGE UP (ref 7–13)
POTASSIUM SERPL-MCNC: 4.3 MMOL/L — SIGNIFICANT CHANGE UP (ref 3.5–5.3)
POTASSIUM SERPL-SCNC: 4.3 MMOL/L — SIGNIFICANT CHANGE UP (ref 3.5–5.3)
PROTHROM AB SERPL-ACNC: 13.4 SEC — HIGH (ref 9.8–13.1)
RBC # BLD: 3.1 M/UL — LOW (ref 4.2–5.8)
RBC # FLD: 13.6 % — SIGNIFICANT CHANGE UP (ref 10.3–14.5)
SODIUM SERPL-SCNC: 137 MMOL/L — SIGNIFICANT CHANGE UP (ref 135–145)
WBC # BLD: 7.19 K/UL — SIGNIFICANT CHANGE UP (ref 3.8–10.5)
WBC # FLD AUTO: 7.19 K/UL — SIGNIFICANT CHANGE UP (ref 3.8–10.5)

## 2017-06-20 PROCEDURE — 36569 INSJ PICC 5 YR+ W/O IMAGING: CPT

## 2017-06-20 PROCEDURE — 99232 SBSQ HOSP IP/OBS MODERATE 35: CPT

## 2017-06-20 PROCEDURE — 76937 US GUIDE VASCULAR ACCESS: CPT | Mod: 26

## 2017-06-20 PROCEDURE — 77001 FLUOROGUIDE FOR VEIN DEVICE: CPT | Mod: 26,GC

## 2017-06-20 RX ORDER — OXYCODONE HYDROCHLORIDE 5 MG/1
5 TABLET ORAL EVERY 6 HOURS
Qty: 0 | Refills: 0 | Status: DISCONTINUED | OUTPATIENT
Start: 2017-06-20 | End: 2017-06-23

## 2017-06-20 RX ORDER — METHADONE HYDROCHLORIDE 40 MG/1
105 TABLET ORAL EVERY 24 HOURS
Qty: 0 | Refills: 0 | Status: DISCONTINUED | OUTPATIENT
Start: 2017-06-20 | End: 2017-06-21

## 2017-06-20 RX ADMIN — METHADONE HYDROCHLORIDE 105 MILLIGRAM(S): 40 TABLET ORAL at 09:18

## 2017-06-20 RX ADMIN — Medication 100 MILLIGRAM(S): at 15:03

## 2017-06-20 RX ADMIN — OXYCODONE HYDROCHLORIDE 5 MILLIGRAM(S): 5 TABLET ORAL at 23:33

## 2017-06-20 RX ADMIN — PANTOPRAZOLE SODIUM 40 MILLIGRAM(S): 20 TABLET, DELAYED RELEASE ORAL at 06:13

## 2017-06-20 RX ADMIN — Medication 100 MILLIGRAM(S): at 21:22

## 2017-06-20 RX ADMIN — OXYCODONE HYDROCHLORIDE 5 MILLIGRAM(S): 5 TABLET ORAL at 12:50

## 2017-06-20 RX ADMIN — OXYCODONE HYDROCHLORIDE 5 MILLIGRAM(S): 5 TABLET ORAL at 12:20

## 2017-06-20 RX ADMIN — QUETIAPINE FUMARATE 200 MILLIGRAM(S): 200 TABLET, FILM COATED ORAL at 21:22

## 2017-06-20 RX ADMIN — MIRTAZAPINE 45 MILLIGRAM(S): 45 TABLET, ORALLY DISINTEGRATING ORAL at 21:22

## 2017-06-20 RX ADMIN — Medication 100 MILLIGRAM(S): at 06:13

## 2017-06-20 NOTE — DISCHARGE NOTE ADULT - PLAN OF CARE
To treat with IV antibiotics Please take your medications as prescribed and report any changes in your symptoms to your PCP. Treat with IV antibiotics Determine etiology Continue Methadone Your Methadone was confirmed by the NP at [facility]. Please take your medications as prescribed and report any changes in your symptoms to your PCP. You have MSSA baceteremia and are on IV antibiotics. You need to continue taking the antibiotics until 07/25/2017. Please take your medications as prescribed and report any changes in your symptoms to your PCP. You were treated with IV antibiotics for Osteomyelitis. Please take your medications as prescribed and report any changes in your symptoms to your PCP. You were treated with IV antibiotics and the abscess was deemed too small for IR guided drainage. Please take your medications as prescribed and report any changes in your symptoms to your PCP. You had an EGD which was negative for acute bleeding. You received transfusions and yoru Hemoglobin was stable. Please take your medications as prescribed and report any changes in your symptoms to your PCP.

## 2017-06-20 NOTE — PROGRESS NOTE ADULT - SUBJECTIVE AND OBJECTIVE BOX
Patient is a 36y old  Male who presents with a chief complaint of Left sided hip/leg pain with fevers (09 Jun 2017 16:06)    CC: Back pain, cant ambulate    SUBJECTIVE / OVERNIGHT EVENTS: No acute events overnight. Afebrile. Ambulation improving       MEDICATIONS  (STANDING):  sodium chloride 0.9%. 1000milliLiter(s) IV Continuous <Continuous>  QUEtiapine 200milliGRAM(s) Oral at bedtime  mirtazapine 45milliGRAM(s) Oral at bedtime  ceFAZolin   IVPB 2000milliGRAM(s) IV Intermittent every 8 hours  ceFAZolin   IVPB  IV Intermittent   pantoprazole    Tablet 40milliGRAM(s) Oral before breakfast  methadone    Solution 105milliGRAM(s) Oral every 24 hours    MEDICATIONS  (PRN):  acetaminophen   Tablet 650milliGRAM(s) Oral every 6 hours PRN For Temp greater than 38 C (100.4 F)  traMADol 50milliGRAM(s) Oral once PRN Breakthrough pain no controlled on Dilaudid  oxyCODONE IR 5milliGRAM(s) Oral every 6 hours PRN Severe Pain (7 - 10)      Vital Signs Last 24 Hrs  T(C): 37.2, Max: 37.3 (06-19 @ 21:22)  T(F): 98.9, Max: 99.2 (06-19 @ 21:22)  HR: 67 (67 - 88)  BP: 117/65 (101/54 - 126/77)  BP(mean): --  RR: 18 (18 - 18)  SpO2: 97% (97% - 97%)    PHYSICAL EXAM:  GENERAL: NAD, thin male  HEAD:  Atraumatic, Normocephalic  EYES: EOMI, PERRLA, conjunctiva and sclera clear  NECK: Supple, No JVD  CHEST/LUNG: CTA in all fields     HEART: Regular rate and rhythm; No murmurs, rubs, or gallops  ABDOMEN: Soft, Nontender, Nondistended; Bowel sounds present  EXTREMITIES:  2+ Peripheral Pulses, No clubbing, cyanosis, or edema. Pain with extension, internal rotation and adduction of left hip   PSYCH: AAOx3  NEUROLOGY: Non focal.   SKIN: No rashes or lesions                          8.1    8.54  )-----------( 787      ( 19 Jun 2017 06:00 )             26.9                           RADIOLOGY & ADDITIONAL TESTS:    Imaging Personally Reviewed:    Consultant(s) Notes Reviewed:      Care Discussed with Consultants/Other Providers:

## 2017-06-20 NOTE — DISCHARGE NOTE ADULT - CARE PROVIDERS DIRECT ADDRESSES
[email or web address]

## 2017-06-20 NOTE — PROGRESS NOTE ADULT - ASSESSMENT
MSSA bacteremia in former IVDU with involvement of Left iliopsoas, gluteal muscles and discitis/OM L3/4.  Repeat blood cultures negative to date.  IR unable to drain psoas.  Ortho feels there is no need for orthopedic surgical intervention.  LONDON negative. Stable for discharge to rehab

## 2017-06-20 NOTE — DISCHARGE NOTE ADULT - HOSPITAL COURSE
Patient is a 36 years old male with PMH of IV drug abuse (last used 20 years ago) on methadone and depression who presents with acute onset of left sided hip and buttock pain of 7 days in duration. Patient presented to [facility] ED a week ago and was discharged with pain medications. He returned to the hospital today because of persistent and worsening pain. He is also endorsing fever, chills, and night sweats which have been present for the past week as well along with a lackluster appetite. No recent IV drug use. He [place] self [place] but denies any bug bites of rashes. Not sexually active and no recent trauma. He also endorses nausea but denies vomiting.    Patient also stated that he has a large black bowel movement yesterday but never had BRBPR. He does have intermittent constipation and has been told in the past that he has "diverticulitis."     Patient found to be febrile in .6 with . NML BP. Initial labs show WBC ~30, ESR >140, , HG 7.5 (previously normal), transaminitis with elevated INR 1.33. CT chest negative for soft tissue abnormality. Admitted to medicine for further management. Patient is a 36 years old male with PMH of IV drug abuse (last used 20 years ago) on methadone and depression who presents with acute onset of left sided hip and buttock pain of 7 days in duration. Patient presented to [facility] ED a week ago and was discharged with pain medications. He returned to the hospital today because of persistent and worsening pain. He is also endorsing fever, chills, and night sweats which have been present for the past week as well along with a lackluster appetite. No recent IV drug use. He lives [place] self [place] a park but denies any bug bites of rashes. Not sexually active and no recent trauma. He also endorses nausea but denies vomiting.    Patient also stated that he has a large black bowel movement yesterday but never had BRBPR. He does have intermittent constipation and has been told in the past that he has "diverticulitis."     Patient found to be febrile in .6 with . NML BP. Initial labs show WBC ~30, ESR >140, , HG 7.5 (previously normal), transaminitis with elevated INR 1.33. CT chest negative for soft tissue abnormality. Admitted to medicine for further management.      6/9; MRI spine/pelvis ordered to rule out Osteo. Vanco and zosyn. CXR neg. Gave 2 liters fluids. Stat cbc sent-if Hg<7, transfuse. ID and GI called   6/10; No NSAIDs as per GI. Will not scope if no acute bleeding or hemodynamic compromise. Pain still excessive-wants toradol-GI recommending against it. called to have MRI protocoled    Sat afternoon coverage: G+ cocci in clusters. Still on Vanc. ID saw and wanted Zosyn changed to Cefazolin. ID thinks septic joint. Called Ortho, they will follow-up with MRI and if effusion will tap.  6/11: MRI today will f/u read w/ ortho, added hemolysis labs though GI bleed more likely . vnaco increased to 1500mg BID,   MRI showing osteo l3-l4, neuro suregy consulted, may need IR drain for psos on monday. Ortho aware of MRI awaiting final MRI hip read. NPO for Gi scope tomorrow   6/12: MRI shows 1 cm abscess left illopsoas, bursitis/seen with left gluteus inv-IR says 1 cm collection is not amenable to drainage. ID made aware. Methadone confirmed. Nothing to drain as per psych Blc cx from 6/11 still positive for GPC in clusters 1/2.  Patient afternoon dose was cancelled before bl cx came back. ordered 1g Vanc.  6/13: Wll speak with ID regarding vanc given staph aureus bacteremia-febrile yesterday evening-pain much improved-PT recc rehab. TTE to be done today   6/15: Changed methadone to AM. KEMAL ordered per ID. having cough, getting CXR. Will need PICC if cutrues clear. order another set tmr AM  6/17; Blood cx neg fron 6.14 and 16-plan for KEMAL monday and PICC afterwards, Pain much improved. Afebrile   6/18: afebrile. pain under control. Plan for kemal tomorrow   6/19; went for KEMAL today   6/20; KEMAL neg. awaiting placement   6/23: pending placement Patient is a 36 years old male with PMH of IV drug abuse (last used 20 years ago) on methadone and depression who presents with acute onset of left sided hip and buttock pain of 7 days in duration. Patient presented to [facility] ED a week ago and was discharged with pain medications. He returned to the hospital today because of persistent and worsening pain. He is also endorsing fever, chills, and night sweats which have been present for the past week as well along with a lackluster appetite. No recent IV drug use. He lives in self made [place]ent [place] a park but denies any bug bites of rashes. Not sexually active and no recent trauma. He also endorses nausea but denies vomiting.    Patient also stated that he has a large black bowel movement yesterday but never had BRBPR. He does have intermittent constipation and has been told in the past that he has "diverticulitis."     Patient found to be febrile in .6 with . NML BP. Initial labs show WBC ~30, ESR >140, , HG 7.5 (previously normal), transaminitis with elevated INR 1.33. CT chest negative for soft tissue abnormality. Admitted to medicine for further management.      Patient had MSSA bacteremia and received a PICC line for IV antibiotics. MRI showed osteo L3-L4, neurosuregy amd IR were called who offered no intervention. Patient was started on IV antibiotics for bacteremia and abscess as per ID. His MRI also showed left illopsoas abscess, bursitis/seen with left gluteus involvement-IR stated that 1 cm collection was not amenable to drainage. His Methadone was confirmed and resumed inpatient. LONDON was done which was negative for endocarditis. Over the course of his hospitalization the pain was under better control and he was able to ambulate with assistance. Patient also received multiple transfusions for Hg<7 upon presentation and had an EGD which was negative for acute sign of bleeding. Patient's H/H remained stable throughout the last week of hospital stay. Discharged to rehab in stable condition with PICC line and plan for antibiotics till 07/25/2017.

## 2017-06-20 NOTE — DISCHARGE NOTE ADULT - MEDICATION SUMMARY - MEDICATIONS TO STOP TAKING
I will STOP taking the medications listed below when I get home from the hospital:    Paxil 40 mg oral tablet  -- 1 tab(s) by mouth once a day

## 2017-06-20 NOTE — DISCHARGE NOTE ADULT - PATIENT PORTAL LINK FT
“You can access the [facility] Patient Portal, offered by [facility], by registering with the following website: [email or web address]”

## 2017-06-20 NOTE — PROGRESS NOTE ADULT - PROBLEM SELECTOR PLAN 5
Acute blood loss anemia given Occult blood + and normal Hg in 12/2016  Endorses hx of diverticulitis-No abdominal pain reported  Been taking NSAIDs though EGD normal   Type and screen. Goal Hg>7  No intervention from GI as inpatient given stable HG

## 2017-06-20 NOTE — PROGRESS NOTE ADULT - PROBLEM SELECTOR PLAN 1
Discitis/osteo of L3-L4 noted on MRI  c/w IV cefazolin for MSSA bacteremia   ID following-will likely need IV antibiotics till 07/25/2017  PICC line today  LONDON negative for endocarditis

## 2017-06-20 NOTE — PROGRESS NOTE ADULT - SUBJECTIVE AND OBJECTIVE BOX
Follow Up:  bacteremia, discitis L3/4, left iliopsoas abscess    Interval History: left hip/buttock pain but better.  can weight bear a little. ambulating with crutches.  Rest of ROS otherwise negative.    Allergies  No Known Allergies    ANTIMICROBIALS:    ceFAZolin   IVPB 2000 every 8 hours    MEDICATIONS  (STANDING):  QUEtiapine 200 at bedtime  mirtazapine 45 at bedtime  pantoprazole    Tablet 40 before breakfast  methadone 105 every 24 hours  senna 2 at bedtime  polyethylene glycol 3350 17 daily  docusate sodium 100 three times a day    Vital Signs Last 24 Hrs  T(F): 98.9, Max: 99.2 (06-19 @ 21:22)  HR: 67  BP: 117/65  RR: 18  SpO2: 97% (97% - 97%)    Physical Exam:  General:    NAD,  non toxic, sitting up in bed  HEENT:   no icterus, neck supple  Cardiovascular:  S1, S2  Respiratory: rhonchi b/l  abd: soft, no tenderness  : no santos  Musculoskeletal:  markedly improved ROM  Skin:    no rash  Neurologic:  no focal deficit  Vascular:  Left PICC inserted c/d/i                                              8.8    7.19  )-----------( 848      ( 20 Jun 2017 06:40 )             28.5 06-20    137  |  94  |  10  ----------------------------<  104  4.3   |  26  |  0.87  Ca    9.6      20 Jun 2017 06:40    MICROBIOLOGY:  Culture - Blood:   NO ORGANISMS ISOLATED  NO ORGANISMS ISOLATED AT 48 HRS. (06.14.17 @ 07:54)    Culture - Blood:   STAU^Staphylococcus aureus (06.11.17 @ 08:44)    Culture - Blood:   STAU^Staphylococcus aureus (06.09.17 @ 13:21)  -  Oxacillin: S <=0.25 HILLARY (06.09.17 @ 13:21)    EXAM:  RAD CHEST PORTABLE URGENT    PROCEDURE DATE:  Saul 15 2017   IMPRESSION:  Clear lungs.GY:    LONDON negative    MRI PELVIS W & W O CONTRAST    MRI HIP W-W O C LT    MRI LUMBAR SPINE W C      1.  Findings suspicious for infectious left iliopsoas bursitis tracking into the pelvis. Rim-enhancing fluid collection tracking along the left   iliopsoas tendon. Fully extent of fluid collection is partially included on this study.  2.  Small rim-enhancing fluid collections within the left piriformis and left gluteus gabriel musculature suspicious for abscess.  3.  Myositis of the cellulitis, gluteus gabriel and left piriformis of infectious or inflammatory etiology.  4.  No osteomyelitis.  5.  Moderate distention of the urinary bladder. Correlate clinically.  6.  Diffuse marrow signal loss on T1-weighted imaging within the lumbar spine and pelvis may reflect chronic anemia or other infiltrative process.    Findings suspicious for early discitis osteomyelitis at L3-4. Left psoas abscess extends into the pelvis. Inflammatory changes right psoas muscle.

## 2017-06-20 NOTE — DISCHARGE NOTE ADULT - MEDICATION SUMMARY - MEDICATIONS TO TAKE
I will START or STAY ON the medications listed below when I get home from the hospital:    methadone 5 mg oral tablet  -- 21 tab(s) by mouth every 24 hours  -- Indication: For Opiate dependence    oxyCODONE 5 mg oral tablet  -- 1 tab(s) by mouth every 6 hours, As needed, Severe Pain (7 - 10)  -- Indication: For Pain    traMADol 50 mg oral tablet  -- 1 tab(s) by mouth once, As needed, Breakthrough pain no controlled on Dilaudid  -- Indication: For Pain    mirtazapine 45 mg oral tablet  -- 1 tab(s) by mouth once a day (at bedtime)  -- Indication: For Depression    QUEtiapine 200 mg oral tablet  -- 1 tab(s) by mouth once a day (at bedtime)  -- Indication: For Depression    ceFAZolin  --     -- Indication: For Bacteremia    ceFAZolin 2 g/20 mL injectable solution  -- 2 milliliter(s) injectable every 8 hours. Last day is 07/25/2017  -- Indication: For Bacteremia    pantoprazole 40 mg oral delayed release tablet  -- 1 tab(s) by mouth once a day (before a meal)  -- Indication: For GERD

## 2017-06-20 NOTE — PROGRESS NOTE ADULT - PROBLEM SELECTOR PLAN 3
MSSA bacteremia-repeat blood cx from 6/14 NGTD   Plan for PICC line today for IV abx till 07/25/2017  LONDON negative for endocarditis

## 2017-06-20 NOTE — DISCHARGE NOTE ADULT - COMMUNITY RESOURCES
[facility] [address] [place] [address] [phone]

## 2017-06-20 NOTE — PROGRESS NOTE ADULT - PROBLEM SELECTOR PLAN 2
Abscess/bursitis seen on MRI-collection is about a cm and is usually overly enhanced in MRI as per IR-will not be accessible for drainage  c/w IV abx   Pain control with oxycodone

## 2017-06-20 NOTE — DISCHARGE NOTE ADULT - CARE PLAN
Principal Discharge DX:	Staphylococcus aureus bacteremia  Goal:	To treat with IV antibiotics  Instructions for follow-up, activity and diet:	Please take your medications as prescribed and report any changes in your symptoms to your PCP.  Secondary Diagnosis:	Osteomyelitis  Instructions for follow-up, activity and diet:	Please take your medications as prescribed and report any changes in your symptoms to your PCP.  Secondary Diagnosis:	Iliopsoas abscess on left  Instructions for follow-up, activity and diet:	Please take your medications as prescribed and report any changes in your symptoms to your PCP.  Secondary Diagnosis:	GIB (gastrointestinal bleeding)  Instructions for follow-up, activity and diet:	Please take your medications as prescribed and report any changes in your symptoms to your PCP.  Secondary Diagnosis:	Methadone use Principal Discharge DX:	Staphylococcus aureus bacteremia  Goal:	To treat with IV antibiotics  Instructions for follow-up, activity and diet:	You have MSSA baceteremia and are on IV antibiotics. You need to continue taking the antibiotics until 07/25/2017. Please take your medications as prescribed and report any changes in your symptoms to your PCP.  Secondary Diagnosis:	Osteomyelitis  Goal:	Treat with IV antibiotics  Instructions for follow-up, activity and diet:	You were treated with IV antibiotics for Osteomyelitis. Please take your medications as prescribed and report any changes in your symptoms to your PCP.  Secondary Diagnosis:	Iliopsoas abscess on left  Goal:	Treat with IV antibiotics  Instructions for follow-up, activity and diet:	You were treated with IV antibiotics and the abscess was deemed too small for IR guided drainage. Please take your medications as prescribed and report any changes in your symptoms to your PCP.  Secondary Diagnosis:	GIB (gastrointestinal bleeding)  Goal:	Determine etiology  Instructions for follow-up, activity and diet:	You had an EGD which was negative for acute bleeding. You received transfusions and yoru Hemoglobin was stable. Please take your medications as prescribed and report any changes in your symptoms to your PCP.  Secondary Diagnosis:	Methadone use  Goal:	Continue Methadone  Instructions for follow-up, activity and diet:	Your Methadone was confirmed by the NP at [facility]. Please take your medications as prescribed and report any changes in your symptoms to your PCP. Principal Discharge DX:	Staphylococcus aureus bacteremia  Goal:	To treat with IV antibiotics  Instructions for follow-up, activity and diet:	You have MSSA baceteremia and are on IV antibiotics. You need to continue taking the antibiotics until 07/25/2017. Please take your medications as prescribed and report any changes in your symptoms to your PCP.  Secondary Diagnosis:	Osteomyelitis  Goal:	Treat with IV antibiotics  Instructions for follow-up, activity and diet:	You were treated with IV antibiotics for Osteomyelitis. Please take your medications as prescribed and report any changes in your symptoms to your PCP.  Secondary Diagnosis:	Iliopsoas abscess on left  Goal:	Treat with IV antibiotics  Instructions for follow-up, activity and diet:	You were treated with IV antibiotics and the abscess was deemed too small for IR guided drainage. Please take your medications as prescribed and report any changes in your symptoms to your PCP.  Secondary Diagnosis:	GIB (gastrointestinal bleeding)  Goal:	Determine etiology  Instructions for follow-up, activity and diet:	You had an EGD which was negative for acute bleeding. You received transfusions and yoru Hemoglobin was stable. Please take your medications as prescribed and report any changes in your symptoms to your PCP.  Secondary Diagnosis:	Methadone use  Goal:	Continue Methadone  Instructions for follow-up, activity and diet:	Your Methadone was confirmed by the NP at [facility]. Please take your medications as prescribed and report any changes in your symptoms to your PCP. Principal Discharge DX:	Staphylococcus aureus bacteremia  Goal:	To treat with IV antibiotics  Instructions for follow-up, activity and diet:	You have MSSA baceteremia and are on IV antibiotics. You need to continue taking the antibiotics until 07/25/2017. Please take your medications as prescribed and report any changes in your symptoms to your PCP.  Secondary Diagnosis:	Osteomyelitis  Goal:	Treat with IV antibiotics  Instructions for follow-up, activity and diet:	You were treated with IV antibiotics for Osteomyelitis. Please take your medications as prescribed and report any changes in your symptoms to your PCP.  Secondary Diagnosis:	Iliopsoas abscess on left  Goal:	Treat with IV antibiotics  Instructions for follow-up, activity and diet:	You were treated with IV antibiotics and the abscess was deemed too small for IR guided drainage. Please take your medications as prescribed and report any changes in your symptoms to your PCP.  Secondary Diagnosis:	GIB (gastrointestinal bleeding)  Goal:	Determine etiology  Instructions for follow-up, activity and diet:	You had an EGD which was negative for acute bleeding. You received transfusions and yoru Hemoglobin was stable. Please take your medications as prescribed and report any changes in your symptoms to your PCP.  Secondary Diagnosis:	Methadone use  Goal:	Continue Methadone  Instructions for follow-up, activity and diet:	Your Methadone was confirmed by the NP at [facility]. Please take your medications as prescribed and report any changes in your symptoms to your PCP. Principal Discharge DX:	Staphylococcus aureus bacteremia  Goal:	To treat with IV antibiotics  Instructions for follow-up, activity and diet:	You have MSSA baceteremia and are on IV antibiotics. You need to continue taking the antibiotics until 07/25/2017. Please take your medications as prescribed and report any changes in your symptoms to your PCP.  Secondary Diagnosis:	Osteomyelitis  Goal:	Treat with IV antibiotics  Instructions for follow-up, activity and diet:	You were treated with IV antibiotics for Osteomyelitis. Please take your medications as prescribed and report any changes in your symptoms to your PCP.  Secondary Diagnosis:	Iliopsoas abscess on left  Goal:	Treat with IV antibiotics  Instructions for follow-up, activity and diet:	You were treated with IV antibiotics and the abscess was deemed too small for IR guided drainage. Please take your medications as prescribed and report any changes in your symptoms to your PCP.  Secondary Diagnosis:	GIB (gastrointestinal bleeding)  Goal:	Determine etiology  Instructions for follow-up, activity and diet:	You had an EGD which was negative for acute bleeding. You received transfusions and yoru Hemoglobin was stable. Please take your medications as prescribed and report any changes in your symptoms to your PCP.  Secondary Diagnosis:	Methadone use  Goal:	Continue Methadone  Instructions for follow-up, activity and diet:	Your Methadone was confirmed by the NP at [facility]. Please take your medications as prescribed and report any changes in your symptoms to your PCP. Principal Discharge DX:	Staphylococcus aureus bacteremia  Goal:	To treat with IV antibiotics  Instructions for follow-up, activity and diet:	You have MSSA baceteremia and are on IV antibiotics. You need to continue taking the antibiotics until 07/25/2017. Please take your medications as prescribed and report any changes in your symptoms to your PCP.  Secondary Diagnosis:	Osteomyelitis  Goal:	Treat with IV antibiotics  Instructions for follow-up, activity and diet:	You were treated with IV antibiotics for Osteomyelitis. Please take your medications as prescribed and report any changes in your symptoms to your PCP.  Secondary Diagnosis:	Iliopsoas abscess on left  Goal:	Treat with IV antibiotics  Instructions for follow-up, activity and diet:	You were treated with IV antibiotics and the abscess was deemed too small for IR guided drainage. Please take your medications as prescribed and report any changes in your symptoms to your PCP.  Secondary Diagnosis:	GIB (gastrointestinal bleeding)  Goal:	Determine etiology  Instructions for follow-up, activity and diet:	You had an EGD which was negative for acute bleeding. You received transfusions and yoru Hemoglobin was stable. Please take your medications as prescribed and report any changes in your symptoms to your PCP.  Secondary Diagnosis:	Methadone use  Goal:	Continue Methadone  Instructions for follow-up, activity and diet:	Your Methadone was confirmed by the NP at [facility]. Please take your medications as prescribed and report any changes in your symptoms to your PCP. Principal Discharge DX:	Staphylococcus aureus bacteremia  Goal:	To treat with IV antibiotics  Instructions for follow-up, activity and diet:	You have MSSA baceteremia and are on IV antibiotics. You need to continue taking the antibiotics until 07/25/2017. Please take your medications as prescribed and report any changes in your symptoms to your PCP.  Secondary Diagnosis:	Osteomyelitis  Goal:	Treat with IV antibiotics  Instructions for follow-up, activity and diet:	You were treated with IV antibiotics for Osteomyelitis. Please take your medications as prescribed and report any changes in your symptoms to your PCP.  Secondary Diagnosis:	Iliopsoas abscess on left  Goal:	Treat with IV antibiotics  Instructions for follow-up, activity and diet:	You were treated with IV antibiotics and the abscess was deemed too small for IR guided drainage. Please take your medications as prescribed and report any changes in your symptoms to your PCP.  Secondary Diagnosis:	GIB (gastrointestinal bleeding)  Goal:	Determine etiology  Instructions for follow-up, activity and diet:	You had an EGD which was negative for acute bleeding. You received transfusions and yoru Hemoglobin was stable. Please take your medications as prescribed and report any changes in your symptoms to your PCP.  Secondary Diagnosis:	Methadone use  Goal:	Continue Methadone  Instructions for follow-up, activity and diet:	Your Methadone was confirmed by the NP at [facility]. Please take your medications as prescribed and report any changes in your symptoms to your PCP.

## 2017-06-21 LAB
BACTERIA BLD CULT: SIGNIFICANT CHANGE UP
BASOPHILS # BLD AUTO: 0.04 K/UL — SIGNIFICANT CHANGE UP (ref 0–0.2)
BASOPHILS NFR BLD AUTO: 0.5 % — SIGNIFICANT CHANGE UP (ref 0–2)
EOSINOPHIL # BLD AUTO: 0.21 K/UL — SIGNIFICANT CHANGE UP (ref 0–0.5)
EOSINOPHIL NFR BLD AUTO: 2.7 % — SIGNIFICANT CHANGE UP (ref 0–6)
HCT VFR BLD CALC: 27 % — LOW (ref 39–50)
HGB BLD-MCNC: 8.2 G/DL — LOW (ref 13–17)
IMM GRANULOCYTES NFR BLD AUTO: 0.5 % — SIGNIFICANT CHANGE UP (ref 0–1.5)
LYMPHOCYTES # BLD AUTO: 1.87 K/UL — SIGNIFICANT CHANGE UP (ref 1–3.3)
LYMPHOCYTES # BLD AUTO: 24.3 % — SIGNIFICANT CHANGE UP (ref 13–44)
MCHC RBC-ENTMCNC: 27.9 PG — SIGNIFICANT CHANGE UP (ref 27–34)
MCHC RBC-ENTMCNC: 30.4 % — LOW (ref 32–36)
MCV RBC AUTO: 91.8 FL — SIGNIFICANT CHANGE UP (ref 80–100)
MONOCYTES # BLD AUTO: 0.85 K/UL — SIGNIFICANT CHANGE UP (ref 0–0.9)
MONOCYTES NFR BLD AUTO: 11 % — SIGNIFICANT CHANGE UP (ref 2–14)
NEUTROPHILS # BLD AUTO: 4.7 K/UL — SIGNIFICANT CHANGE UP (ref 1.8–7.4)
NEUTROPHILS NFR BLD AUTO: 61 % — SIGNIFICANT CHANGE UP (ref 43–77)
PLATELET # BLD AUTO: 608 K/UL — HIGH (ref 150–400)
PMV BLD: 8 FL — SIGNIFICANT CHANGE UP (ref 7–13)
RBC # BLD: 2.94 M/UL — LOW (ref 4.2–5.8)
RBC # FLD: 13.6 % — SIGNIFICANT CHANGE UP (ref 10.3–14.5)
WBC # BLD: 7.71 K/UL — SIGNIFICANT CHANGE UP (ref 3.8–10.5)
WBC # FLD AUTO: 7.71 K/UL — SIGNIFICANT CHANGE UP (ref 3.8–10.5)

## 2017-06-21 RX ORDER — METHADONE HYDROCHLORIDE 40 MG/1
105 TABLET ORAL EVERY 24 HOURS
Qty: 0 | Refills: 0 | Status: DISCONTINUED | OUTPATIENT
Start: 2017-06-21 | End: 2017-06-23

## 2017-06-21 RX ADMIN — PANTOPRAZOLE SODIUM 40 MILLIGRAM(S): 20 TABLET, DELAYED RELEASE ORAL at 06:00

## 2017-06-21 RX ADMIN — OXYCODONE HYDROCHLORIDE 5 MILLIGRAM(S): 5 TABLET ORAL at 00:33

## 2017-06-21 RX ADMIN — Medication 100 MILLIGRAM(S): at 13:53

## 2017-06-21 RX ADMIN — OXYCODONE HYDROCHLORIDE 5 MILLIGRAM(S): 5 TABLET ORAL at 08:56

## 2017-06-21 RX ADMIN — OXYCODONE HYDROCHLORIDE 5 MILLIGRAM(S): 5 TABLET ORAL at 08:26

## 2017-06-21 RX ADMIN — QUETIAPINE FUMARATE 200 MILLIGRAM(S): 200 TABLET, FILM COATED ORAL at 23:14

## 2017-06-21 RX ADMIN — Medication 100 MILLIGRAM(S): at 23:13

## 2017-06-21 RX ADMIN — METHADONE HYDROCHLORIDE 105 MILLIGRAM(S): 40 TABLET ORAL at 09:42

## 2017-06-21 RX ADMIN — MIRTAZAPINE 45 MILLIGRAM(S): 45 TABLET, ORALLY DISINTEGRATING ORAL at 23:14

## 2017-06-21 RX ADMIN — Medication 100 MILLIGRAM(S): at 06:00

## 2017-06-21 RX ADMIN — OXYCODONE HYDROCHLORIDE 5 MILLIGRAM(S): 5 TABLET ORAL at 19:30

## 2017-06-21 RX ADMIN — OXYCODONE HYDROCHLORIDE 5 MILLIGRAM(S): 5 TABLET ORAL at 18:58

## 2017-06-21 NOTE — PROGRESS NOTE ADULT - PROBLEM SELECTOR PLAN 1
Discitis/osteo of L3-L4 noted on MRI  c/w IV cefazolin for MSSA bacteremia   ID following-will likely need IV antibiotics till 07/25/2017  PICC line placed  LONDON negative for endocarditis

## 2017-06-21 NOTE — PROGRESS NOTE ADULT - SUBJECTIVE AND OBJECTIVE BOX
Patient is a 36y old  Male who presents with a chief complaint of ;3, L4 Osteo, left iliopsoas abscess and MSSA bacteremia  (09 Jun 2017 16:06)    CC: Back pain, cant ambulate    SUBJECTIVE / OVERNIGHT EVENTS: No acute events overnight. Afebrile. Ambulation improving       MEDICATIONS  (STANDING):  sodium chloride 0.9%. 1000milliLiter(s) IV Continuous <Continuous>  QUEtiapine 200milliGRAM(s) Oral at bedtime  mirtazapine 45milliGRAM(s) Oral at bedtime  ceFAZolin   IVPB 2000milliGRAM(s) IV Intermittent every 8 hours  ceFAZolin   IVPB  IV Intermittent   pantoprazole    Tablet 40milliGRAM(s) Oral before breakfast  methadone    Solution 105milliGRAM(s) Oral every 24 hours    MEDICATIONS  (PRN):  acetaminophen   Tablet 650milliGRAM(s) Oral every 6 hours PRN For Temp greater than 38 C (100.4 F)  traMADol 50milliGRAM(s) Oral once PRN Breakthrough pain no controlled on Dilaudid  oxyCODONE IR 5milliGRAM(s) Oral every 6 hours PRN Severe Pain (7 - 10)      Vital Signs Last 24 Hrs  T(C): 37, Max: 37.2 (06-20 @ 21:13)  T(F): 98.6, Max: 99 (06-20 @ 21:13)  HR: 73 (73 - 90)  BP: 122/61 (103/56 - 122/61)  BP(mean): --  RR: 18 (18 - 18)  SpO2: 98% (95% - 99%)    PHYSICAL EXAM:  GENERAL: NAD, thin male  HEAD:  Atraumatic, Normocephalic  EYES: EOMI, PERRLA, conjunctiva and sclera clear  NECK: Supple, No JVD  CHEST/LUNG: CTA in all fields     HEART: Regular rate and rhythm; No murmurs, rubs, or gallops  ABDOMEN: Soft, Nontender, Nondistended; Bowel sounds present  EXTREMITIES:  2+ Peripheral Pulses, No clubbing, cyanosis, or edema. Pain with extension, internal rotation and adduction of left hip   PSYCH: AAOx3  NEUROLOGY: Non focal.   SKIN: No rashes or lesions                            8.8    7.19  )-----------( [id number]      ( 20 Jun 2017 06:40 )             28.5     06-20    137  |  94<L>  |  10  ----------------------------<  104<H>  4.3   |  26  |  0.87    Ca    9.6      20 Jun 2017 06:40                          RADIOLOGY & ADDITIONAL TESTS:    Imaging Personally Reviewed:    Consultant(s) Notes Reviewed:      Care Discussed with Consultants/Other Providers:

## 2017-06-21 NOTE — PROGRESS NOTE ADULT - PROBLEM SELECTOR PLAN 3
MSSA bacteremia-repeat blood cx from 6/14 NGTD   Plan for PICC line placed for IV abx till 07/25/2017  LONDON negative for endocarditis

## 2017-06-22 RX ADMIN — METHADONE HYDROCHLORIDE 105 MILLIGRAM(S): 40 TABLET ORAL at 10:08

## 2017-06-22 RX ADMIN — PANTOPRAZOLE SODIUM 40 MILLIGRAM(S): 20 TABLET, DELAYED RELEASE ORAL at 07:02

## 2017-06-22 RX ADMIN — OXYCODONE HYDROCHLORIDE 5 MILLIGRAM(S): 5 TABLET ORAL at 03:30

## 2017-06-22 RX ADMIN — OXYCODONE HYDROCHLORIDE 5 MILLIGRAM(S): 5 TABLET ORAL at 22:53

## 2017-06-22 RX ADMIN — MIRTAZAPINE 45 MILLIGRAM(S): 45 TABLET, ORALLY DISINTEGRATING ORAL at 21:06

## 2017-06-22 RX ADMIN — Medication 100 MILLIGRAM(S): at 15:25

## 2017-06-22 RX ADMIN — OXYCODONE HYDROCHLORIDE 5 MILLIGRAM(S): 5 TABLET ORAL at 14:28

## 2017-06-22 RX ADMIN — OXYCODONE HYDROCHLORIDE 5 MILLIGRAM(S): 5 TABLET ORAL at 23:53

## 2017-06-22 RX ADMIN — QUETIAPINE FUMARATE 200 MILLIGRAM(S): 200 TABLET, FILM COATED ORAL at 21:06

## 2017-06-22 RX ADMIN — Medication 100 MILLIGRAM(S): at 21:07

## 2017-06-22 RX ADMIN — OXYCODONE HYDROCHLORIDE 5 MILLIGRAM(S): 5 TABLET ORAL at 02:42

## 2017-06-22 RX ADMIN — Medication 100 MILLIGRAM(S): at 07:02

## 2017-06-22 RX ADMIN — OXYCODONE HYDROCHLORIDE 5 MILLIGRAM(S): 5 TABLET ORAL at 15:26

## 2017-06-22 NOTE — PROGRESS NOTE ADULT - SUBJECTIVE AND OBJECTIVE BOX
Patient is a 36y old  Male who presents with a chief complaint of ;3, L4 Osteo, left iliopsoas abscess and MSSA bacteremia  (09 Jun 2017 16:06)    CC: Back pain, cant ambulate    SUBJECTIVE / OVERNIGHT EVENTS: No acute events overnight. Afebrile. Ambulation improving       MEDICATIONS  (STANDING):  sodium chloride 0.9%. 1000milliLiter(s) IV Continuous <Continuous>  QUEtiapine 200milliGRAM(s) Oral at bedtime  mirtazapine 45milliGRAM(s) Oral at bedtime  ceFAZolin   IVPB 2000milliGRAM(s) IV Intermittent every 8 hours  ceFAZolin   IVPB  IV Intermittent   pantoprazole    Tablet 40milliGRAM(s) Oral before breakfast  methadone    Solution 105milliGRAM(s) Oral every 24 hours    MEDICATIONS  (PRN):  acetaminophen   Tablet 650milliGRAM(s) Oral every 6 hours PRN For Temp greater than 38 C (100.4 F)  traMADol 50milliGRAM(s) Oral once PRN Breakthrough pain no controlled on Dilaudid  oxyCODONE IR 5milliGRAM(s) Oral every 6 hours PRN Severe Pain (7 - 10)      Vital Signs Last 24 Hrs  T(C): 36.9, Max: 37.2 (06-21 @ 13:01)  T(F): 98.5, Max: 99 (06-21 @ 13:01)  HR: 74 (74 - 86)  BP: 109/56 (107/64 - 119/62)  BP(mean): --  RR: 18 (17 - 18)  SpO2: 100% (98% - 100%)    PHYSICAL EXAM:  GENERAL: NAD, thin male  HEAD:  Atraumatic, Normocephalic  EYES: EOMI, PERRLA, conjunctiva and sclera clear  NECK: Supple, No JVD  CHEST/LUNG: CTA in all fields     HEART: Regular rate and rhythm; No murmurs, rubs, or gallops  ABDOMEN: Soft, Nontender, Nondistended; Bowel sounds present  EXTREMITIES:  2+ Peripheral Pulses, No clubbing, cyanosis, or edema. Pain with extension, internal rotation and adduction of left hip   PSYCH: AAOx3  NEUROLOGY: Non focal.   SKIN: No rashes or lesions                            8.2    7.71  )-----------( 608      ( 21 Jun 2017 06:30 )             27.0                           RADIOLOGY & ADDITIONAL TESTS:    Imaging Personally Reviewed:    Consultant(s) Notes Reviewed:      Care Discussed with Consultants/Other Providers:

## 2017-06-22 NOTE — PROGRESS NOTE ADULT - ATTENDING COMMENTS
I have seen/examined with the resident/fellow.   I agree with the assessment/plan as outlined above.    Staph aureus bacteremia in former IVDU with involvement of Left iliopsoas, gluteal muscles and discitis/OM L4/5.  Not clear if joint space is also involved.  Plan as outlined above.
I have discussed plan of care with consulting team
Please call Infectious Diseases if there is a change in status.  Thank you.  [phone].
I have discussed plan of care with consulting team
I have discussed plan of care with consulting team
Please call the ID service [phone] with questions or concernd over the ID service to see if called over the weekend
I have discussed plan of care with consulting team  I have seen/examined with the resident/fellow.   I agree with the assessment/plan as outlined above.
agree with House staff A and P
agree with above A and P
agree with housestaff A and P
Patient is a 36y old  Male with hx of IV drug abuse found to have L3-4 discitis/osteo and left iliopsoas bursitis/myositis with left gluteus abscess  anemia   will continue iv cefazolin   need tte to rule out vegetations  will need long term iv antibiotics  picc line once blood cultures clear   he is homeless and ivdu will likely need snf
pt seen and examine bedside. Agree with houstaff A and P
Patient is a 36y old  Male with hx of IV drug abuse found to have L3-4 discitis/osteo and left iliopsoas bursitis/myositis with left gluteus abscess and anemia   s/p EGD to today no active bleed found   monitor cbc transfuse as needed  staph bacteremia with l3-4 discitis and left iliopsoas  not amendable to IR drainage, no ortho intervention   will likely need long term antibiotics
ivdu discitis psoas abscess   need iv antibiotics  tte   follow up with id

## 2017-06-23 VITALS
RESPIRATION RATE: 19 BRPM | HEART RATE: 82 BPM | OXYGEN SATURATION: 97 % | TEMPERATURE: 99 F | DIASTOLIC BLOOD PRESSURE: 56 MMHG | SYSTOLIC BLOOD PRESSURE: 110 MMHG

## 2017-06-23 PROBLEM — Z00.00 ENCOUNTER FOR PREVENTIVE HEALTH EXAMINATION: Status: ACTIVE | Noted: 2017-06-23

## 2017-06-23 LAB
HCT VFR BLD CALC: 28.3 % — LOW (ref 39–50)
HGB BLD-MCNC: 8.6 G/DL — LOW (ref 13–17)
MCHC RBC-ENTMCNC: 27.7 PG — SIGNIFICANT CHANGE UP (ref 27–34)
MCHC RBC-ENTMCNC: 30.4 % — LOW (ref 32–36)
MCV RBC AUTO: 91 FL — SIGNIFICANT CHANGE UP (ref 80–100)
PLATELET # BLD AUTO: 543 K/UL — HIGH (ref 150–400)
PMV BLD: 8 FL — SIGNIFICANT CHANGE UP (ref 7–13)
RBC # BLD: 3.11 M/UL — LOW (ref 4.2–5.8)
RBC # FLD: 13.6 % — SIGNIFICANT CHANGE UP (ref 10.3–14.5)
WBC # BLD: 6.34 K/UL — SIGNIFICANT CHANGE UP (ref 3.8–10.5)
WBC # FLD AUTO: 6.34 K/UL — SIGNIFICANT CHANGE UP (ref 3.8–10.5)

## 2017-06-23 RX ORDER — TRAMADOL HYDROCHLORIDE 50 MG/1
1 TABLET ORAL
Qty: 0 | Refills: 0 | DISCHARGE
Start: 2017-06-23

## 2017-06-23 RX ORDER — PANTOPRAZOLE SODIUM 20 MG/1
1 TABLET, DELAYED RELEASE ORAL
Qty: 0 | Refills: 0 | DISCHARGE
Start: 2017-06-23

## 2017-06-23 RX ORDER — QUETIAPINE FUMARATE 200 MG/1
1 TABLET, FILM COATED ORAL
Qty: 0 | Refills: 0 | COMMUNITY

## 2017-06-23 RX ORDER — OXYCODONE HYDROCHLORIDE 5 MG/1
1 TABLET ORAL
Qty: 0 | Refills: 0 | DISCHARGE
Start: 2017-06-23

## 2017-06-23 RX ORDER — METHADONE HYDROCHLORIDE 40 MG/1
105 TABLET ORAL
Qty: 0 | Refills: 0 | COMMUNITY

## 2017-06-23 RX ORDER — QUETIAPINE FUMARATE 200 MG/1
1 TABLET, FILM COATED ORAL
Qty: 0 | Refills: 0 | DISCHARGE
Start: 2017-06-23

## 2017-06-23 RX ORDER — CEFAZOLIN SODIUM 1 G
2 VIAL (EA) INJECTION
Qty: 192 | Refills: 0
Start: 2017-06-23 | End: 2017-07-25

## 2017-06-23 RX ORDER — MIRTAZAPINE 45 MG/1
1 TABLET, ORALLY DISINTEGRATING ORAL
Qty: 0 | Refills: 0 | DISCHARGE
Start: 2017-06-23

## 2017-06-23 RX ORDER — CEFAZOLIN SODIUM 1 G
0 VIAL (EA) INJECTION
Qty: 0 | Refills: 0 | COMMUNITY
Start: 2017-06-23

## 2017-06-23 RX ORDER — MIRTAZAPINE 45 MG/1
1 TABLET, ORALLY DISINTEGRATING ORAL
Qty: 0 | Refills: 0 | COMMUNITY

## 2017-06-23 RX ORDER — METHADONE HYDROCHLORIDE 40 MG/1
21 TABLET ORAL
Qty: 0 | Refills: 0 | DISCHARGE
Start: 2017-06-23

## 2017-06-23 RX ADMIN — OXYCODONE HYDROCHLORIDE 5 MILLIGRAM(S): 5 TABLET ORAL at 13:14

## 2017-06-23 RX ADMIN — PANTOPRAZOLE SODIUM 40 MILLIGRAM(S): 20 TABLET, DELAYED RELEASE ORAL at 06:33

## 2017-06-23 RX ADMIN — Medication 100 MILLIGRAM(S): at 13:14

## 2017-06-23 RX ADMIN — OXYCODONE HYDROCHLORIDE 5 MILLIGRAM(S): 5 TABLET ORAL at 14:00

## 2017-06-23 RX ADMIN — METHADONE HYDROCHLORIDE 105 MILLIGRAM(S): 40 TABLET ORAL at 08:43

## 2017-06-23 RX ADMIN — Medication 100 MILLIGRAM(S): at 06:33

## 2017-06-23 NOTE — PROGRESS NOTE ADULT - PROBLEM SELECTOR PROBLEM 1
SIRS (systemic inflammatory response syndrome)
Discitis of lumbar region
SIRS (systemic inflammatory response syndrome)
SIRS (systemic inflammatory response syndrome)
Staphylococcus aureus bacteremia

## 2017-06-23 NOTE — PROGRESS NOTE ADULT - PROBLEM SELECTOR PROBLEM 2
Hip pain, acute, left
Iliopsoas abscess on left

## 2017-06-23 NOTE — PROVIDER CONTACT NOTE (OTHER) - DATE AND TIME:
18-Jun-2017 22:00
23-Jun-2017 05:42
10-Saul-2017 02:20
10-Saul-2017 22:30
11-Jun-2017 05:30
11-Jun-2017 17:55
12-Jun-2017 16:00
14-Jun-2017 21:22
17-Jun-2017 09:24
18-Jun-2017 01:08
19-Jun-2017 21:46
20-Jun-2017 06:15
20-Jun-2017 21:21

## 2017-06-23 NOTE — PROVIDER CONTACT NOTE (OTHER) - ACTION/TREATMENT ORDERED:
administered Tylenol as ordered
administered Tylenol as ordered
Administer Tylenol and continue to monitor.
Continue to monitor and administer Tylenol.
Continue to monitor.
Continue to monitor. No further interventions at this time.
Dr Andrew aware, no interventions ordered at this time. Will continue to monitor patient
Dr Murillo aware, ok to give due oxycodone. Will continue to monitor patient
Dr Murillo aware, pending on primary team to decide what medicine to order. Will continue to monitor patient
Dr Murillo notified. No interventions ordered . Will continue to monitor patient  Will continue to monitor patient
Team to review. Nursing will continue to monitor
Will retake blood pressure manually. Dr. Andrew notified and aware. Will continue to monitor patient.
continue to monitor

## 2017-06-23 NOTE — PROGRESS NOTE ADULT - PROBLEM SELECTOR PLAN 3
MSSA bacteremia-repeat blood cx from 6/14 NGTD   PICC line placed for IV abx till 07/25/2017  LONDON negative for endocarditis

## 2017-06-23 NOTE — PROGRESS NOTE ADULT - SUBJECTIVE AND OBJECTIVE BOX
Patient is a 36y old  Male who presents with a chief complaint of ;3, L4 Osteo, left iliopsoas abscess and MSSA bacteremia  (09 Jun 2017 16:06)    CC: Back pain, cant ambulate    SUBJECTIVE / OVERNIGHT EVENTS: No acute events overnight. Afebrile. Ambulating with assistance. Back pain under control with percocet.       MEDICATIONS  (STANDING):  sodium chloride 0.9%. 1000milliLiter(s) IV Continuous <Continuous>  QUEtiapine 200milliGRAM(s) Oral at bedtime  mirtazapine 45milliGRAM(s) Oral at bedtime  ceFAZolin   IVPB 2000milliGRAM(s) IV Intermittent every 8 hours  ceFAZolin   IVPB  IV Intermittent   pantoprazole    Tablet 40milliGRAM(s) Oral before breakfast  methadone    Solution 105milliGRAM(s) Oral every 24 hours    MEDICATIONS  (PRN):  acetaminophen   Tablet 650milliGRAM(s) Oral every 6 hours PRN For Temp greater than 38 C (100.4 F)  traMADol 50milliGRAM(s) Oral once PRN Breakthrough pain no controlled on Dilaudid  oxyCODONE IR 5milliGRAM(s) Oral every 6 hours PRN Severe Pain (7 - 10)      Vital Signs Last 24 Hrs  T(C): 36.9, Max: 37.1 (06-22 @ 13:04)  T(F): 98.4, Max: 98.8 (06-22 @ 13:04)  HR: 73 (73 - 83)  BP: 102/59 (102/59 - 114/73)  BP(mean): --  RR: 18 (18 - 18)  SpO2: 100% (98% - 100%)    PHYSICAL EXAM:  GENERAL: NAD, thin male  HEAD:  Atraumatic, Normocephalic  EYES: EOMI, PERRLA, conjunctiva and sclera clear  NECK: Supple, No JVD  CHEST/LUNG: CTA in all fields     HEART: Regular rate and rhythm; No murmurs, rubs, or gallops  ABDOMEN: Soft, Nontender, Nondistended; Bowel sounds present  EXTREMITIES:  2+ Peripheral Pulses, No clubbing, cyanosis, or edema. Pain with extension, internal rotation and adduction of left hip   PSYCH: AAOx3  NEUROLOGY: Non focal.   SKIN: No rashes or lesions                 pending labs                              RADIOLOGY & ADDITIONAL TESTS:    Imaging Personally Reviewed:    Consultant(s) Notes Reviewed:      Care Discussed with Consultants/Other Providers:

## 2017-06-23 NOTE — PROVIDER CONTACT NOTE (OTHER) - NAME OF MD/NP/PA/DO NOTIFIED:
Dr. Murillo
Dr Murillo
Dr. Adams
Dr. Andrew
Dr. Chowdhury
Dr. Flores
Dr. Marcos
MD Andrew
MD Mckeon
MD Mckeon
Dr Andrew

## 2017-06-23 NOTE — PROGRESS NOTE ADULT - PROBLEM SELECTOR PLAN 1
Discitis/osteo of L3-L4 noted on MRI  c/w IV cefazolin for MSSA bacteremia   ID recopmmended IV antibiotics till 07/25/2017  PICC line placed  LONDON negative for endocarditis

## 2017-06-23 NOTE — PROVIDER CONTACT NOTE (OTHER) - BACKGROUND
Pt. admitted with dorsalgia
Pt. admitted with sepsis
Dorsalgia, Staphylococcus aureus bacteremia
Paient dx with dorsalgia has left gluteus gabriel myositis hx of diverticulitis
Patient admitted for dorsalgia
Patient admitted with dorsalgia.
Patient dx with dorsalgia has left gluteus gabriel myositis hx of diverticulitis
Pt admitted with dorsalgia.
Pt admitted with dorsalgia.
patient admitted for dorsalgia
patient came in with dorsalgia

## 2017-06-23 NOTE — PROVIDER CONTACT NOTE (OTHER) - ASSESSMENT
temp 101.7
temp 102.7
/59, HR 73. No acute distress noted.
Blood pressure: 90/53. No acute distress noted.
Patient asymptomatic
Patient calm, no signs of acute distress.
Patient comfortable, calm. Patient feels very warm to touch.
Patient complaining of pain due for oxycodone
Patient complaining of pain no pain medicine ordered. patient aware. ok with waiting for primary team
Patient has had a low grade temperature throughout the shift.
patient blood pressure 102/57, patient alert no dizziness
patient resting in bed, no distress noted
see flow sheet

## 2017-06-23 NOTE — PROGRESS NOTE ADULT - PROBLEM SELECTOR PLAN 2
Abscess/bursitis seen on MRI-collection is about a cm and is usually overly enhanced in MRI as per IR-will not be accessible for drainage  c/w IV abx   Pain under adequate control with oxycodone

## 2017-06-23 NOTE — PROGRESS NOTE ADULT - PROVIDER SPECIALTY LIST ADULT
Gastroenterology
Infectious Disease
Internal Medicine
Orthopedics
Internal Medicine

## 2017-06-23 NOTE — PROVIDER CONTACT NOTE (OTHER) - REASON
Blood pressure: 90/53
HR -- 107
Low H/H
Temp - 100.5
diastolic <60
patient blood pressure 102/57
patient complaining 8/10 pain
temp - 102.2
temp 101.7
temp. 102.7
DBP low

## 2017-06-23 NOTE — PROVIDER CONTACT NOTE (OTHER) - RECOMMENDATIONS
administer Tylenol as ordered
administer Tylenol as ordered
Administer Tylenol and continue to monitor.
Assess patient, prepare to transfuse and call team.
Continue to monitor and administer Tylenol.
Continue to monitor.
Continue to monitor. MD notified and aware.
Md to be notified
Retake blood pressure manually. No acute distress noted.
contact provider

## 2017-06-23 NOTE — PROVIDER CONTACT NOTE (OTHER) - SITUATION
/54 hr 88
/56 hr 76
/56 hr 82
DBP low
Lab called to report patient H/H is 7.0/22.5
Patient blood pressure:90/53
Patient temperature - 102.2
Pt HR - 107
Pt temperature - 100.5
patient blood pressure 102/57
patient complaining of severe pain
increased temp
increased temp.

## 2017-07-14 PROBLEM — K57.92 DIVERTICULITIS OF INTESTINE, PART UNSPECIFIED, WITHOUT PERFORATION OR ABSCESS WITHOUT BLEEDING: Chronic | Status: ACTIVE | Noted: 2017-06-09

## 2017-07-24 ENCOUNTER — APPOINTMENT (OUTPATIENT)
Dept: INTERNAL MEDICINE | Facility: CLINIC | Age: 37
End: 2017-07-24

## 2017-08-17 PROBLEM — R78.81 BACTEREMIA DUE TO STAPHYLOCOCCUS AUREUS: Status: ACTIVE | Noted: 2017-08-17

## 2017-08-17 PROBLEM — M46.20 VERTEBRAL OSTEOMYELITIS, ACUTE: Status: ACTIVE | Noted: 2017-08-17

## 2017-08-21 ENCOUNTER — LABORATORY RESULT (OUTPATIENT)
Age: 37
End: 2017-08-21

## 2017-08-21 ENCOUNTER — APPOINTMENT (OUTPATIENT)
Dept: INFECTIOUS DISEASE | Facility: CLINIC | Age: 37
End: 2017-08-21
Payer: MEDICAID

## 2017-08-21 VITALS
TEMPERATURE: 98.3 F | BODY MASS INDEX: 23.49 KG/M2 | HEIGHT: 68 IN | WEIGHT: 155 LBS | OXYGEN SATURATION: 95 % | SYSTOLIC BLOOD PRESSURE: 127 MMHG | HEART RATE: 88 BPM | DIASTOLIC BLOOD PRESSURE: 75 MMHG

## 2017-08-21 DIAGNOSIS — R78.81 BACTEREMIA: ICD-10-CM

## 2017-08-21 DIAGNOSIS — Z87.891 PERSONAL HISTORY OF NICOTINE DEPENDENCE: ICD-10-CM

## 2017-08-21 DIAGNOSIS — Z86.59 PERSONAL HISTORY OF OTHER MENTAL AND BEHAVIORAL DISORDERS: ICD-10-CM

## 2017-08-21 DIAGNOSIS — M46.20 OSTEOMYELITIS OF VERTEBRA, SITE UNSPECIFIED: ICD-10-CM

## 2017-08-21 DIAGNOSIS — Z80.0 FAMILY HISTORY OF MALIGNANT NEOPLASM OF DIGESTIVE ORGANS: ICD-10-CM

## 2017-08-21 PROCEDURE — 99214 OFFICE O/P EST MOD 30 MIN: CPT

## 2017-08-21 RX ORDER — IRON/IRON ASP GLY/FA/MV-MIN 38 125-25-1MG
TABLET ORAL
Refills: 0 | Status: ACTIVE | COMMUNITY

## 2017-08-21 RX ORDER — METHADONE HCL 10 MG
TABLET ORAL
Refills: 0 | Status: ACTIVE | COMMUNITY

## 2017-08-21 RX ORDER — MIRTAZAPINE 30 MG/1
TABLET, FILM COATED ORAL
Refills: 0 | Status: ACTIVE | COMMUNITY

## 2017-08-21 RX ORDER — PAROXETINE HYDROCHLORIDE 40 MG/1
TABLET, FILM COATED ORAL
Refills: 0 | Status: ACTIVE | COMMUNITY

## 2017-08-23 LAB
CRP SERPL-MCNC: 5 MG/DL
ERYTHROCYTE [SEDIMENTATION RATE] IN BLOOD BY WESTERGREN METHOD: 55 MM/HR

## 2017-08-28 LAB
BACTERIA BLD CULT: NORMAL
BACTERIA BLD CULT: NORMAL

## 2017-09-05 ENCOUNTER — OUTPATIENT (OUTPATIENT)
Dept: OUTPATIENT SERVICES | Facility: HOSPITAL | Age: 37
LOS: 1 days | End: 2017-09-05
Payer: COMMERCIAL

## 2017-09-05 ENCOUNTER — APPOINTMENT (OUTPATIENT)
Dept: MRI IMAGING | Facility: CLINIC | Age: 37
End: 2017-09-05

## 2017-09-07 ENCOUNTER — OUTPATIENT (OUTPATIENT)
Dept: OUTPATIENT SERVICES | Facility: HOSPITAL | Age: 37
LOS: 1 days | End: 2017-09-07

## 2017-09-07 ENCOUNTER — APPOINTMENT (OUTPATIENT)
Dept: MRI IMAGING | Facility: CLINIC | Age: 37
End: 2017-09-07

## 2017-09-07 PROCEDURE — 72148 MRI LUMBAR SPINE W/O DYE: CPT

## 2017-09-07 PROCEDURE — 72197 MRI PELVIS W/O & W/DYE: CPT

## 2017-09-07 PROCEDURE — A9585: CPT

## 2017-09-13 ENCOUNTER — APPOINTMENT (OUTPATIENT)
Dept: INTERNAL MEDICINE | Facility: CLINIC | Age: 37
End: 2017-09-13

## 2017-10-30 ENCOUNTER — APPOINTMENT (OUTPATIENT)
Dept: ORTHOPEDIC SURGERY | Facility: CLINIC | Age: 37
End: 2017-10-30

## 2017-12-19 ENCOUNTER — APPOINTMENT (OUTPATIENT)
Dept: SPINE | Facility: CLINIC | Age: 37
End: 2017-12-19

## 2017-12-20 ENCOUNTER — APPOINTMENT (OUTPATIENT)
Dept: SPINE | Facility: CLINIC | Age: 37
End: 2017-12-20

## 2017-12-26 ENCOUNTER — APPOINTMENT (OUTPATIENT)
Dept: SPINE | Facility: CLINIC | Age: 37
End: 2017-12-26

## 2018-04-16 ENCOUNTER — APPOINTMENT (OUTPATIENT)
Dept: INFECTIOUS DISEASE | Facility: CLINIC | Age: 38
End: 2018-04-16

## 2018-05-01 ENCOUNTER — OUTPATIENT (OUTPATIENT)
Dept: OUTPATIENT SERVICES | Facility: HOSPITAL | Age: 38
LOS: 1 days | End: 2018-05-01
Payer: MEDICAID

## 2018-05-14 DIAGNOSIS — R69 ILLNESS, UNSPECIFIED: ICD-10-CM

## 2018-05-29 ENCOUNTER — OUTPATIENT (OUTPATIENT)
Dept: OUTPATIENT SERVICES | Facility: HOSPITAL | Age: 38
LOS: 1 days | Discharge: HOME | End: 2018-05-29

## 2018-06-01 PROCEDURE — G9005: CPT

## 2018-06-04 NOTE — CONSULT NOTE ADULT - PROBLEM SELECTOR RECOMMENDATION 3
"I called and spoke with her.  I reassured her.  She says she \"doesn't want anything else done to me\"  "
- serial CBC, transfuse to hgb goal > 7  - add iron studies with ferritin to pre-transfusion labs  - add reticulocyte count to pre-transfusion CBC
left sternoclavicular joint?  would image this as well to evaluate if seeded

## 2018-07-01 ENCOUNTER — OUTPATIENT (OUTPATIENT)
Dept: OUTPATIENT SERVICES | Facility: HOSPITAL | Age: 38
LOS: 1 days | End: 2018-07-01
Payer: MEDICAID

## 2018-07-01 DIAGNOSIS — Z76.89 PERSONS ENCOUNTERING HEALTH SERVICES IN OTHER SPECIFIED CIRCUMSTANCES: ICD-10-CM

## 2018-07-19 DIAGNOSIS — Z71.89 OTHER SPECIFIED COUNSELING: ICD-10-CM

## 2019-03-01 PROCEDURE — G9005: CPT

## 2019-04-01 ENCOUNTER — OUTPATIENT (OUTPATIENT)
Dept: OUTPATIENT SERVICES | Facility: HOSPITAL | Age: 39
LOS: 1 days | End: 2019-04-01
Payer: MEDICAID

## 2019-05-10 DIAGNOSIS — Z71.89 OTHER SPECIFIED COUNSELING: ICD-10-CM

## 2019-06-19 ENCOUNTER — EMERGENCY (EMERGENCY)
Facility: HOSPITAL | Age: 39
LOS: 1 days | Discharge: HOME | End: 2019-06-19
Admitting: EMERGENCY MEDICINE
Payer: MEDICAID

## 2019-06-19 ENCOUNTER — INPATIENT (INPATIENT)
Facility: HOSPITAL | Age: 39
LOS: 18 days | Discharge: HOME | End: 2019-07-08
Attending: PSYCHIATRY & NEUROLOGY | Admitting: PSYCHIATRY & NEUROLOGY
Payer: MEDICAID

## 2019-06-19 VITALS
TEMPERATURE: 98 F | OXYGEN SATURATION: 99 % | RESPIRATION RATE: 18 BRPM | SYSTOLIC BLOOD PRESSURE: 122 MMHG | DIASTOLIC BLOOD PRESSURE: 78 MMHG | HEART RATE: 76 BPM

## 2019-06-19 VITALS
SYSTOLIC BLOOD PRESSURE: 119 MMHG | DIASTOLIC BLOOD PRESSURE: 83 MMHG | OXYGEN SATURATION: 100 % | TEMPERATURE: 98 F | RESPIRATION RATE: 16 BRPM | HEART RATE: 57 BPM

## 2019-06-19 DIAGNOSIS — F11.20 OPIOID DEPENDENCE, UNCOMPLICATED: ICD-10-CM

## 2019-06-19 DIAGNOSIS — F32.9 MAJOR DEPRESSIVE DISORDER, SINGLE EPISODE, UNSPECIFIED: ICD-10-CM

## 2019-06-19 DIAGNOSIS — F33.2 MAJOR DEPRESSIVE DISORDER, RECURRENT SEVERE WITHOUT PSYCHOTIC FEATURES: ICD-10-CM

## 2019-06-19 LAB
ALBUMIN SERPL ELPH-MCNC: 4.4 G/DL — SIGNIFICANT CHANGE UP (ref 3.3–5)
ALP SERPL-CCNC: 76 U/L — SIGNIFICANT CHANGE UP (ref 40–120)
ALT FLD-CCNC: 15 U/L — SIGNIFICANT CHANGE UP (ref 4–41)
AMPHET UR-MCNC: NEGATIVE — SIGNIFICANT CHANGE UP
ANION GAP SERPL CALC-SCNC: 14 MMO/L — SIGNIFICANT CHANGE UP (ref 7–14)
APAP SERPL-MCNC: < 15 UG/ML — LOW (ref 15–25)
APPEARANCE UR: CLEAR — SIGNIFICANT CHANGE UP
AST SERPL-CCNC: 30 U/L — SIGNIFICANT CHANGE UP (ref 4–40)
BARBITURATES UR SCN-MCNC: NEGATIVE — SIGNIFICANT CHANGE UP
BASOPHILS # BLD AUTO: 0.03 K/UL — SIGNIFICANT CHANGE UP (ref 0–0.2)
BASOPHILS NFR BLD AUTO: 0.5 % — SIGNIFICANT CHANGE UP (ref 0–2)
BENZODIAZ UR-MCNC: POSITIVE — SIGNIFICANT CHANGE UP
BILIRUB SERPL-MCNC: < 0.2 MG/DL — LOW (ref 0.2–1.2)
BILIRUB UR-MCNC: NEGATIVE — SIGNIFICANT CHANGE UP
BLOOD UR QL VISUAL: NEGATIVE — SIGNIFICANT CHANGE UP
BUN SERPL-MCNC: 9 MG/DL — SIGNIFICANT CHANGE UP (ref 7–23)
CALCIUM SERPL-MCNC: 9.6 MG/DL — SIGNIFICANT CHANGE UP (ref 8.4–10.5)
CANNABINOIDS UR-MCNC: POSITIVE — SIGNIFICANT CHANGE UP
CHLORIDE SERPL-SCNC: 102 MMOL/L — SIGNIFICANT CHANGE UP (ref 98–107)
CO2 SERPL-SCNC: 24 MMOL/L — SIGNIFICANT CHANGE UP (ref 22–31)
COCAINE METAB.OTHER UR-MCNC: NEGATIVE — SIGNIFICANT CHANGE UP
COLOR SPEC: COLORLESS — SIGNIFICANT CHANGE UP
CREAT SERPL-MCNC: 0.63 MG/DL — SIGNIFICANT CHANGE UP (ref 0.5–1.3)
EOSINOPHIL # BLD AUTO: 0.18 K/UL — SIGNIFICANT CHANGE UP (ref 0–0.5)
EOSINOPHIL NFR BLD AUTO: 3 % — SIGNIFICANT CHANGE UP (ref 0–6)
ETHANOL BLD-MCNC: 119 MG/DL — HIGH
GLUCOSE SERPL-MCNC: 85 MG/DL — SIGNIFICANT CHANGE UP (ref 70–99)
GLUCOSE UR-MCNC: NEGATIVE — SIGNIFICANT CHANGE UP
HCT VFR BLD CALC: 40.8 % — SIGNIFICANT CHANGE UP (ref 39–50)
HGB BLD-MCNC: 13.5 G/DL — SIGNIFICANT CHANGE UP (ref 13–17)
IMM GRANULOCYTES NFR BLD AUTO: 0.3 % — SIGNIFICANT CHANGE UP (ref 0–1.5)
KETONES UR-MCNC: NEGATIVE — SIGNIFICANT CHANGE UP
LEUKOCYTE ESTERASE UR-ACNC: NEGATIVE — SIGNIFICANT CHANGE UP
LYMPHOCYTES # BLD AUTO: 2.64 K/UL — SIGNIFICANT CHANGE UP (ref 1–3.3)
LYMPHOCYTES # BLD AUTO: 43.6 % — SIGNIFICANT CHANGE UP (ref 13–44)
MCHC RBC-ENTMCNC: 29 PG — SIGNIFICANT CHANGE UP (ref 27–34)
MCHC RBC-ENTMCNC: 33.1 % — SIGNIFICANT CHANGE UP (ref 32–36)
MCV RBC AUTO: 87.7 FL — SIGNIFICANT CHANGE UP (ref 80–100)
METHADONE UR-MCNC: POSITIVE — SIGNIFICANT CHANGE UP
MONOCYTES # BLD AUTO: 0.35 K/UL — SIGNIFICANT CHANGE UP (ref 0–0.9)
MONOCYTES NFR BLD AUTO: 5.8 % — SIGNIFICANT CHANGE UP (ref 2–14)
NEUTROPHILS # BLD AUTO: 2.84 K/UL — SIGNIFICANT CHANGE UP (ref 1.8–7.4)
NEUTROPHILS NFR BLD AUTO: 46.8 % — SIGNIFICANT CHANGE UP (ref 43–77)
NITRITE UR-MCNC: NEGATIVE — SIGNIFICANT CHANGE UP
NRBC # FLD: 0 K/UL — SIGNIFICANT CHANGE UP (ref 0–0)
OPIATES UR-MCNC: NEGATIVE — SIGNIFICANT CHANGE UP
OXYCODONE UR-MCNC: NEGATIVE — SIGNIFICANT CHANGE UP
PCP UR-MCNC: NEGATIVE — SIGNIFICANT CHANGE UP
PH UR: 6 — SIGNIFICANT CHANGE UP (ref 5–8)
PLATELET # BLD AUTO: 341 K/UL — SIGNIFICANT CHANGE UP (ref 150–400)
PMV BLD: 8.7 FL — SIGNIFICANT CHANGE UP (ref 7–13)
POTASSIUM SERPL-MCNC: 4.2 MMOL/L — SIGNIFICANT CHANGE UP (ref 3.5–5.3)
POTASSIUM SERPL-SCNC: 4.2 MMOL/L — SIGNIFICANT CHANGE UP (ref 3.5–5.3)
PROT SERPL-MCNC: 8 G/DL — SIGNIFICANT CHANGE UP (ref 6–8.3)
PROT UR-MCNC: NEGATIVE — SIGNIFICANT CHANGE UP
RBC # BLD: 4.65 M/UL — SIGNIFICANT CHANGE UP (ref 4.2–5.8)
RBC # FLD: 12.5 % — SIGNIFICANT CHANGE UP (ref 10.3–14.5)
SALICYLATES SERPL-MCNC: < 5 MG/DL — LOW (ref 15–30)
SODIUM SERPL-SCNC: 140 MMOL/L — SIGNIFICANT CHANGE UP (ref 135–145)
SP GR SPEC: 1.01 — SIGNIFICANT CHANGE UP (ref 1–1.04)
TSH SERPL-MCNC: 1.72 UIU/ML — SIGNIFICANT CHANGE UP (ref 0.27–4.2)
UROBILINOGEN FLD QL: NORMAL — SIGNIFICANT CHANGE UP
WBC # BLD: 6.06 K/UL — SIGNIFICANT CHANGE UP (ref 3.8–10.5)
WBC # FLD AUTO: 6.06 K/UL — SIGNIFICANT CHANGE UP (ref 3.8–10.5)

## 2019-06-19 PROCEDURE — 99285 EMERGENCY DEPT VISIT HI MDM: CPT

## 2019-06-19 PROCEDURE — 99283 EMERGENCY DEPT VISIT LOW MDM: CPT | Mod: 25

## 2019-06-19 RX ORDER — TOPIRAMATE 25 MG
50 TABLET ORAL AT BEDTIME
Refills: 0 | Status: DISCONTINUED | OUTPATIENT
Start: 2019-06-20 | End: 2019-06-21

## 2019-06-19 RX ORDER — CLONAZEPAM 1 MG
1 TABLET ORAL THREE TIMES A DAY
Refills: 0 | Status: DISCONTINUED | OUTPATIENT
Start: 2019-06-19 | End: 2019-06-20

## 2019-06-19 RX ORDER — METHADONE HYDROCHLORIDE 40 MG/1
100 TABLET ORAL DAILY
Refills: 0 | Status: DISCONTINUED | OUTPATIENT
Start: 2019-06-19 | End: 2019-06-20

## 2019-06-19 RX ORDER — MIRTAZAPINE 45 MG/1
45 TABLET, ORALLY DISINTEGRATING ORAL AT BEDTIME
Refills: 0 | Status: DISCONTINUED | OUTPATIENT
Start: 2019-06-19 | End: 2019-07-08

## 2019-06-19 RX ORDER — METHADONE HYDROCHLORIDE 40 MG/1
5 TABLET ORAL DAILY
Refills: 0 | Status: DISCONTINUED | OUTPATIENT
Start: 2019-06-19 | End: 2019-06-20

## 2019-06-19 RX ORDER — METHADONE HYDROCHLORIDE 40 MG/1
100 TABLET ORAL DAILY
Refills: 0 | Status: DISCONTINUED | OUTPATIENT
Start: 2019-06-19 | End: 2019-06-19
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
## 2019-06-19 NOTE — ED BEHAVIORAL HEALTH ASSESSMENT NOTE - CASE SUMMARY
Patient is a 38 year old single, white male, undomiciled but reports living in a structure on the [address];  Disabled, on SSI;  non-caregiver;  PPH of major depressive disorder, post-traumatic stress disorder, Opioid use d/o; Patient with "dozens" of prior hospitalizations, last 14 years ago with multiple prior suicide attempts of o/d and injecting antifreeze; history of arrests in his teenage years; denies  active substance abuse but u/tox positive for THC and ETOH;  on methadone treatment; reports history of withdrawal seizures; PMH of oseteomylitis from dental carries; He was brought in by EMS, [facility] by EMS for depression. Pt is also on the meth program and feels overwhelmed. He wants help for the depression;  Pt is suicidal , depressed, an acute danger to self, admit

## 2019-06-19 NOTE — ED BEHAVIORAL HEALTH ASSESSMENT NOTE - RISK ASSESSMENT
High risk:  passive suicidal ideation with worsening depression, unable to safety plan if discharged;  No family/social supports;  history of suicide attempts;   Protective: connected to treatment;  medication compliant;

## 2019-06-19 NOTE — ED BEHAVIORAL HEALTH ASSESSMENT NOTE - DESCRIPTION
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 but reports living in a man made structure on the [address] rocking in chair prior to assessment; calm and cooperative during assessment;  Vital Signs Last 24 Hrs  T(C): 36.7 (19 Jun 2019 16:23), Max: 36.7 (19 Jun 2019 16:23)  T(F): 98 (19 Jun 2019 16:23), Max: 98 (19 Jun 2019 16:23)  HR: 76 (19 Jun 2019 16:23) (76 - 76)  BP: 122/78 (19 Jun 2019 16:23) (122/78 - 122/78)  BP(mean): --  RR: 18 (19 Jun 2019 16:23) (18 - 18)  SpO2: 99% (19 Jun 2019 16:23) (99% - 99%) rocking in chair prior to assessment; calm and cooperative during assessment; psyckes checked; Istop checked, verified klonopin 1 mg tid, Dr. Henry;  Vital Signs Last 24 Hrs  T(C): 36.7 (19 Jun 2019 16:23), Max: 36.7 (19 Jun 2019 16:23)  T(F): 98 (19 Jun 2019 16:23), Max: 98 (19 Jun 2019 16:23)  HR: 76 (19 Jun 2019 16:23) (76 - 76)  BP: 122/78 (19 Jun 2019 16:23) (122/78 - 122/78)  BP(mean): --  RR: 18 (19 Jun 2019 16:23) (18 - 18)  SpO2: 99% (19 Jun 2019 16:23) (99% - 99%) rocking in chair prior to assessment; calm and cooperative during assessment; psyckes checked; Istop checked, verified klonopin 1 mg tid, Dr. Henry;  u/tox positive for benzo's (prescribed), methadone (prescribed), Cannabis and ETOH (119 on admission);    Vital Signs Last 24 Hrs  T(C): 36.7 (19 Jun 2019 16:23), Max: 36.7 (19 Jun 2019 16:23)  T(F): 98 (19 Jun 2019 16:23), Max: 98 (19 Jun 2019 16:23)  HR: 76 (19 Jun 2019 16:23) (76 - 76)  BP: 122/78 (19 Jun 2019 16:23) (122/78 - 122/78)  BP(mean): --  RR: 18 (19 Jun 2019 16:23) (18 - 18)  SpO2: 99% (19 Jun 2019 16:23) (99% - 99%)
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
## 2019-06-19 NOTE — ED BEHAVIORAL HEALTH ASSESSMENT NOTE - HPI (INCLUDE ILLNESS QUALITY, SEVERITY, DURATION, TIMING, CONTEXT, MODIFYING FACTORS, ASSOCIATED SIGNS AND SYMPTOMS)
Patient is a 38 year old single, white male, undomiciled but reports living in a structure on the corner of [facility] and [facility];  Disabled, on SSI;  non-caregiver;  PPH of major depressive disorder, post-traumatic stress disorder, Opioid use d/o; Patient with "dozens" of prior hospitalizations, last 14 years ago with multiple prior suicide attempts of o/d and injecting antifreeze; history of arrests in his teenage years; denies  active substance abuse but u/tox positive for THC;  on methadone treatment; denies history of complicated withdrawal; PMH of oseteomylitis from dental carries; He was brought in by EMS, [facility] by EMS for depression. Pt is also on the meth program and feels overwhelmed. He wants help for the depression;    On current evaluation, patient reports feeling depressed and hopeless with passive suicidal ideation, stated ""I know it's going to happen, from my own hand";  He endorsed poor sleep with nightmares of prior trauma;  Poor appetite with unknown weight loss;  anhedonia, anergia; poor concentration;  He states "I have this ball in my head, it's always there.  I can't get rid of it".   He stated "I've given up hope;  I need help".  He denies a stimulus for the depression;  He denies active substance use, u/tox positive for THC;  admits to a few beers earlier today;  The patient denies manic symptoms, past and present.  The patient denies auditory or visual hallucinations, and no delusions could be elicited on direct questioning.  The patient denies active  suicidal ideation, homicidal ideation, intent, or plan.    Collateral:  brother, Reynaldo;  Refer to SW note for full collateral but in brief:  Brother reports living in [place] and having minimal contact with his brother, 1x month;  Reports brother is feeling more hopeless and passively suicidal;  Denies other, acute issues; Patient is a 38 year old single, white male, undomiciled but reports living in a structure on the corner of [facility] and [facility];  Disabled, on SSI;  non-caregiver;  PPH of major depressive disorder, post-traumatic stress disorder, Opioid use d/o; Patient with "dozens" of prior hospitalizations, last 14 years ago with multiple prior suicide attempts of o/d and injecting antifreeze; history of arrests in his teenage years; denies  active substance abuse but u/tox positive for THC;  on methadone treatment; denies history of complicated withdrawal; PMH of oseteomylitis from dental carries; He was brought in by EMS, [facility] by EMS for depression. Pt is also on the meth program and feels overwhelmed. He wants help for the depression;    On current evaluation, patient reports feeling depressed and hopeless with passive suicidal ideation, stated ""I know it's going to happen, from my own hand";  He endorsed poor sleep with nightmares of prior trauma;  Poor appetite with unknown weight loss;  anhedonia, anergia; poor concentration;  He states "I have this ball in my head, it's always there.  I can't get rid of it".   He stated "I've given up hope;  I need help".  He denies a stimulus for the depression;  He denies active substance use, u/tox positive for THC;  admits to a few beers earlier today;  The patient denies manic symptoms, past and present.  The patient denies auditory or visual hallucinations, and no delusions could be elicited on direct questioning.  The patient denies active  suicidal ideation, homicidal ideation, intent, or plan.    Collateral:  brother, Reynaldo;  Refer to SW note for full collateral but in brief:  Brother reports living in [place] and having minimal contact with his brother, 1x month;  Reports brother is feeling more hopeless and passively suicidal;  Reports he may be abusing benzo's (street xanax), patient denies; Patient is a 38 year old single, white male, undomiciled but reports living in a structure on the corner of [facility] and [facility];  Disabled, on SSI;  non-caregiver;  PPH of major depressive disorder, post-traumatic stress disorder, Opioid use d/o; Patient with "dozens" of prior hospitalizations, last 14 years ago with multiple prior suicide attempts of o/d and injecting antifreeze; history of arrests in his teenage years; denies  active substance abuse but u/tox positive for THC and ETOH;  on methadone treatment; reports history of withdrawal seizures; PMH of oseteomylitis from dental carries; He was brought in by EMS, [facility] by EMS for depression. Pt is also on the meth program and feels overwhelmed. He wants help for the depression;    On current evaluation, patient reports feeling depressed and hopeless with passive suicidal ideation, stated ""I know it's going to happen, from my own hand";  He endorsed poor sleep with nightmares of prior trauma;  Poor appetite with unknown weight loss;  anhedonia, anergia; poor concentration;  He states "I have this ball in my head, it's always there.  I can't get rid of it".   He stated "I've given up hope;  I need help".  He denies a stimulus for the depression;  He denies active substance use, u/tox positive for THC;  admits to a few beers earlier today;  The patient denies manic symptoms, past and present.  The patient denies auditory or visual hallucinations, and no delusions could be elicited on direct questioning.  The patient denies active  suicidal ideation, homicidal ideation, intent, or plan.    Collateral:  brotherReynaldo;  Refer to SW note for full collateral but in brief:  Brother reports living in [place] and having minimal contact with his brother, 1x month;  Reports brother is feeling more hopeless and passively suicidal;  Reports he may be abusing benzo's (street xanax), patient denies, states "i'm not using anything other than my prescribed klonopin".

## 2019-06-19 NOTE — ED PROVIDER NOTE - OBJECTIVE STATEMENT
39 yo Homeless M with a Hx of substance abuse (attends the methadone program at [facility]) and depression with prior psychiatric admissions here for worsening depression due to his benzo addiction. Pt is tearful on presentation to the ED, c/o insomnia and inability to cope. The pt states that suicidality is not a matter of if 'but a matter of when.' Denies HI, AH, VH. No medical complaints.

## 2019-06-19 NOTE — ED PROVIDER NOTE - CLINICAL SUMMARY MEDICAL DECISION MAKING FREE TEXT BOX
Depression 2/2 substance abuse with suicidality: labs for medical clearance and psych consult for admission to [facility].
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
## 2019-06-19 NOTE — ED BEHAVIORAL HEALTH ASSESSMENT NOTE - OTHER PAST PSYCHIATRIC HISTORY (INCLUDE DETAILS REGARDING ONSET, COURSE OF ILLNESS, INPATIENT/OUTPATIENT TREATMENT)
Reports history of greater than "a few dozen" psychiatric hospitalizations, last 14 years ago;  Current outpatient treatment [facility]  reports multiple prior suicide attempts;

## 2019-06-19 NOTE — ED BEHAVIORAL HEALTH ASSESSMENT NOTE - SUMMARY
Patient is a 38 year old single, white male, undomiciled but reports living in a structure on the corner of [facility] and [facility];  Disabled, on SSI;  non-caregiver;  PPH of major depressive disorder, post-traumatic stress disorder, Opioid use d/o; Patient with "dozens" of prior hospitalizations, last 14 years ago with multiple prior suicide attempts of o/d and injecting antifreeze; history of arrests in his teenage years; denies  active substance abuse but u/tox positive for THC;  on methadone treatment; denies history of complicated withdrawal; PMH of oseteomylitis from dental carries; He was brought in by EMS, [facility] by EMS for depression. Pt is also on the meth program and feels overwhelmed. He wants help for the depression;  Endorsed symptoms of worsening depression with passive suicidal ideation;  Has history of multiple sx attempts and px hospitalizations, last 14 years ago;  Patient in agreement for voluntary admission;  Patient requires acute, in-patient hospitalization for safety and stabilization of symptoms;

## 2019-06-19 NOTE — ED ADULT NURSE REASSESSMENT NOTE - NS ED NURSE REASSESS COMMENT FT1
Patient presents calm and cooperative with flat affect, NAD, blood work done, needs met, pt currently in BH room 2 awaiting further MD evaluation, will continue to monitor pt.
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
## 2019-06-19 NOTE — ED ADULT TRIAGE NOTE - CHIEF COMPLAINT QUOTE
Patient brought to ER from [facility] by EMS for depression. Pt is also on the meth program and feels overwhelmed. He wants help for the depression and admits to drinking a couple of beers earlier.   .

## 2019-06-19 NOTE — ED ADULT NURSE NOTE - CHIEF COMPLAINT QUOTE
Patient brought to ER from [facility] by EMS for depression. Pt is also on the meth program and feels overwhelmed. He wants help for the depression and admits to drinking a couple of beers earlier.   .

## 2019-06-20 VITALS
TEMPERATURE: 98 F | OXYGEN SATURATION: 100 % | SYSTOLIC BLOOD PRESSURE: 124 MMHG | HEART RATE: 84 BPM | RESPIRATION RATE: 18 BRPM | DIASTOLIC BLOOD PRESSURE: 83 MMHG

## 2019-06-20 PROCEDURE — 73610 X-RAY EXAM OF ANKLE: CPT | Mod: 26,LT

## 2019-06-20 PROCEDURE — 73630 X-RAY EXAM OF FOOT: CPT | Mod: 26,LT

## 2019-06-20 PROCEDURE — 99222 1ST HOSP IP/OBS MODERATE 55: CPT | Mod: GC

## 2019-06-20 RX ORDER — CLONAZEPAM 1 MG
1 TABLET ORAL THREE TIMES A DAY
Refills: 0 | Status: DISCONTINUED | OUTPATIENT
Start: 2019-06-20 | End: 2019-06-27

## 2019-06-20 RX ORDER — METHADONE HYDROCHLORIDE 40 MG/1
100 TABLET ORAL DAILY
Refills: 0 | Status: DISCONTINUED | OUTPATIENT
Start: 2019-06-20 | End: 2019-06-27

## 2019-06-20 RX ORDER — METHADONE HYDROCHLORIDE 40 MG/1
105 TABLET ORAL DAILY
Refills: 0 | Status: DISCONTINUED | OUTPATIENT
Start: 2019-06-20 | End: 2019-06-20

## 2019-06-20 RX ORDER — METHADONE HYDROCHLORIDE 40 MG/1
100 TABLET ORAL DAILY
Refills: 0 | Status: DISCONTINUED | OUTPATIENT
Start: 2019-06-20 | End: 2019-06-20

## 2019-06-20 RX ORDER — METHADONE HYDROCHLORIDE 40 MG/1
5 TABLET ORAL DAILY
Refills: 0 | Status: DISCONTINUED | OUTPATIENT
Start: 2019-06-20 | End: 2019-06-27

## 2019-06-20 RX ADMIN — MIRTAZAPINE 45 MILLIGRAM(S): 45 TABLET, ORALLY DISINTEGRATING ORAL at 05:40

## 2019-06-20 RX ADMIN — Medication 1 MILLIGRAM(S): at 12:09

## 2019-06-20 RX ADMIN — Medication 50 MILLIGRAM(S): at 21:19

## 2019-06-20 RX ADMIN — METHADONE HYDROCHLORIDE 5 MILLIGRAM(S): 40 TABLET ORAL at 09:29

## 2019-06-20 RX ADMIN — Medication 1 MILLIGRAM(S): at 20:54

## 2019-06-20 RX ADMIN — METHADONE HYDROCHLORIDE 100 MILLIGRAM(S): 40 TABLET ORAL at 09:29

## 2019-06-20 RX ADMIN — Medication 1 MILLIGRAM(S): at 09:29

## 2019-06-20 RX ADMIN — MIRTAZAPINE 45 MILLIGRAM(S): 45 TABLET, ORALLY DISINTEGRATING ORAL at 21:19

## 2019-06-20 RX ADMIN — Medication 50 MILLIGRAM(S): at 09:29

## 2019-06-20 NOTE — ED ADULT NURSE NOTE - NSIMPLEMENTINTERV_GEN_ALL_ED
Implemented All Universal Safety Interventions:  [place] to call system. Call bell, personal items and telephone within reach. Instruct patient to call for assistance. Room bathroom lighting operational. Non-slip footwear when patient is off stretcher. Physically safe environment: no spills, clutter or unnecessary equipment. Stretcher in lowest position, wheels locked, appropriate side rails in place.
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
## 2019-06-20 NOTE — ED PROVIDER NOTE - PHYSICAL EXAMINATION
MSK: L foot without erythema or ecchymosis, OTONIEL. Cap refill <2 seconds, intact sensation to light touch, no joint effusion. Palpable dorsal pulse.

## 2019-06-20 NOTE — ED PROVIDER NOTE - CLINICAL SUMMARY MEDICAL DECISION MAKING FREE TEXT BOX
Pt presents with L foot pain X 2 weeks. No traumatic findings on exam, pt neurovascularly intact and well appearing. X rays are negative for FX. Pt declines motrin/tylenol. Pt D/C'd back to [facility], security accompanied patient for transfer.
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
## 2019-06-20 NOTE — ED PROVIDER NOTE - NSFOLLOWUPINSTRUCTIONS_ED_ALL_ED_FT
Take Motrin and or tylenol as needed for pain. Follow up with a podiatrist, you can call  to schedule an appointment.  Advance activity as tolerated.  Continue all previously prescribed medications as directed.  Follow up with your primary care physician in 48-72 hours- bring copies of your results.  Return to the ER for worsening or persistent symptoms, and/or ANY NEW OR CONCERNING SYMPTOMS. If you have issues obtaining follow up, please call: [phone] to obtain a doctor or specialist who takes your insurance in your area.  You may call [phone] to make an appointment with the internal medicine clinic.
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
## 2019-06-21 PROCEDURE — 99232 SBSQ HOSP IP/OBS MODERATE 35: CPT

## 2019-06-21 RX ORDER — TOPIRAMATE 25 MG
25 TABLET ORAL AT BEDTIME
Refills: 0 | Status: DISCONTINUED | OUTPATIENT
Start: 2019-06-21 | End: 2019-06-21

## 2019-06-21 RX ORDER — TOPIRAMATE 25 MG
25 TABLET ORAL AT BEDTIME
Refills: 0 | Status: COMPLETED | OUTPATIENT
Start: 2019-06-21 | End: 2019-06-22

## 2019-06-21 RX ADMIN — Medication 25 MILLIGRAM(S): at 21:14

## 2019-06-21 RX ADMIN — Medication 1 MILLIGRAM(S): at 13:03

## 2019-06-21 RX ADMIN — MIRTAZAPINE 45 MILLIGRAM(S): 45 TABLET, ORALLY DISINTEGRATING ORAL at 21:13

## 2019-06-21 RX ADMIN — METHADONE HYDROCHLORIDE 5 MILLIGRAM(S): 40 TABLET ORAL at 09:08

## 2019-06-21 RX ADMIN — Medication 1 MILLIGRAM(S): at 09:08

## 2019-06-21 RX ADMIN — Medication 50 MILLIGRAM(S): at 09:29

## 2019-06-21 RX ADMIN — Medication 1 MILLIGRAM(S): at 20:07

## 2019-06-21 RX ADMIN — METHADONE HYDROCHLORIDE 100 MILLIGRAM(S): 40 TABLET ORAL at 09:29

## 2019-06-22 PROCEDURE — 99232 SBSQ HOSP IP/OBS MODERATE 35: CPT

## 2019-06-22 RX ADMIN — Medication 1 MILLIGRAM(S): at 12:51

## 2019-06-22 RX ADMIN — Medication 50 MILLIGRAM(S): at 09:10

## 2019-06-22 RX ADMIN — MIRTAZAPINE 45 MILLIGRAM(S): 45 TABLET, ORALLY DISINTEGRATING ORAL at 20:17

## 2019-06-22 RX ADMIN — Medication 1 MILLIGRAM(S): at 20:17

## 2019-06-22 RX ADMIN — METHADONE HYDROCHLORIDE 100 MILLIGRAM(S): 40 TABLET ORAL at 09:10

## 2019-06-22 RX ADMIN — Medication 1 MILLIGRAM(S): at 09:10

## 2019-06-22 RX ADMIN — METHADONE HYDROCHLORIDE 5 MILLIGRAM(S): 40 TABLET ORAL at 09:10

## 2019-06-22 RX ADMIN — Medication 25 MILLIGRAM(S): at 20:18

## 2019-06-23 PROCEDURE — 99232 SBSQ HOSP IP/OBS MODERATE 35: CPT

## 2019-06-23 RX ADMIN — METHADONE HYDROCHLORIDE 5 MILLIGRAM(S): 40 TABLET ORAL at 09:13

## 2019-06-23 RX ADMIN — Medication 1 MILLIGRAM(S): at 09:13

## 2019-06-23 RX ADMIN — Medication 1 MILLIGRAM(S): at 20:21

## 2019-06-23 RX ADMIN — MIRTAZAPINE 45 MILLIGRAM(S): 45 TABLET, ORALLY DISINTEGRATING ORAL at 20:21

## 2019-06-23 RX ADMIN — Medication 1 MILLIGRAM(S): at 12:50

## 2019-06-23 RX ADMIN — Medication 50 MILLIGRAM(S): at 09:13

## 2019-06-23 RX ADMIN — METHADONE HYDROCHLORIDE 100 MILLIGRAM(S): 40 TABLET ORAL at 09:13

## 2019-06-24 PROCEDURE — 99232 SBSQ HOSP IP/OBS MODERATE 35: CPT | Mod: GC

## 2019-06-24 RX ORDER — OLANZAPINE 15 MG/1
5 TABLET, FILM COATED ORAL AT BEDTIME
Refills: 0 | Status: DISCONTINUED | OUTPATIENT
Start: 2019-06-24 | End: 2019-06-26

## 2019-06-24 RX ADMIN — Medication 1 MILLIGRAM(S): at 13:16

## 2019-06-24 RX ADMIN — METHADONE HYDROCHLORIDE 5 MILLIGRAM(S): 40 TABLET ORAL at 08:37

## 2019-06-24 RX ADMIN — MIRTAZAPINE 45 MILLIGRAM(S): 45 TABLET, ORALLY DISINTEGRATING ORAL at 20:00

## 2019-06-24 RX ADMIN — OLANZAPINE 5 MILLIGRAM(S): 15 TABLET, FILM COATED ORAL at 20:00

## 2019-06-24 RX ADMIN — Medication 1 MILLIGRAM(S): at 08:37

## 2019-06-24 RX ADMIN — Medication 1 MILLIGRAM(S): at 20:00

## 2019-06-24 RX ADMIN — Medication 50 MILLIGRAM(S): at 08:37

## 2019-06-24 RX ADMIN — METHADONE HYDROCHLORIDE 100 MILLIGRAM(S): 40 TABLET ORAL at 08:37

## 2019-06-25 PROCEDURE — 99232 SBSQ HOSP IP/OBS MODERATE 35: CPT | Mod: GC

## 2019-06-25 RX ORDER — VENLAFAXINE HCL 75 MG
37.5 CAPSULE, EXT RELEASE 24 HR ORAL DAILY
Refills: 0 | Status: DISCONTINUED | OUTPATIENT
Start: 2019-06-25 | End: 2019-06-27

## 2019-06-25 RX ADMIN — MIRTAZAPINE 45 MILLIGRAM(S): 45 TABLET, ORALLY DISINTEGRATING ORAL at 20:16

## 2019-06-25 RX ADMIN — OLANZAPINE 5 MILLIGRAM(S): 15 TABLET, FILM COATED ORAL at 20:16

## 2019-06-25 RX ADMIN — Medication 1 MILLIGRAM(S): at 12:17

## 2019-06-25 RX ADMIN — METHADONE HYDROCHLORIDE 100 MILLIGRAM(S): 40 TABLET ORAL at 08:49

## 2019-06-25 RX ADMIN — Medication 37.5 MILLIGRAM(S): at 12:22

## 2019-06-25 RX ADMIN — Medication 1 MILLIGRAM(S): at 08:49

## 2019-06-25 RX ADMIN — Medication 1 MILLIGRAM(S): at 20:16

## 2019-06-25 RX ADMIN — Medication 40 MILLIGRAM(S): at 08:50

## 2019-06-25 RX ADMIN — METHADONE HYDROCHLORIDE 5 MILLIGRAM(S): 40 TABLET ORAL at 08:49

## 2019-06-26 LAB
CHOLEST SERPL-MCNC: 171 MG/DL — SIGNIFICANT CHANGE UP (ref 120–199)
HDLC SERPL-MCNC: 58 MG/DL — HIGH (ref 35–55)
LIPID PNL WITH DIRECT LDL SERPL: 109 MG/DL — SIGNIFICANT CHANGE UP
TRIGL SERPL-MCNC: 116 MG/DL — SIGNIFICANT CHANGE UP (ref 10–149)

## 2019-06-26 PROCEDURE — 99232 SBSQ HOSP IP/OBS MODERATE 35: CPT | Mod: GC

## 2019-06-26 RX ORDER — OLANZAPINE 15 MG/1
10 TABLET, FILM COATED ORAL AT BEDTIME
Refills: 0 | Status: DISCONTINUED | OUTPATIENT
Start: 2019-06-26 | End: 2019-07-08

## 2019-06-26 RX ADMIN — Medication 1 MILLIGRAM(S): at 20:01

## 2019-06-26 RX ADMIN — OLANZAPINE 10 MILLIGRAM(S): 15 TABLET, FILM COATED ORAL at 20:01

## 2019-06-26 RX ADMIN — Medication 37.5 MILLIGRAM(S): at 09:05

## 2019-06-26 RX ADMIN — MIRTAZAPINE 45 MILLIGRAM(S): 45 TABLET, ORALLY DISINTEGRATING ORAL at 20:01

## 2019-06-26 RX ADMIN — METHADONE HYDROCHLORIDE 100 MILLIGRAM(S): 40 TABLET ORAL at 09:05

## 2019-06-26 RX ADMIN — METHADONE HYDROCHLORIDE 5 MILLIGRAM(S): 40 TABLET ORAL at 09:05

## 2019-06-26 RX ADMIN — Medication 1 MILLIGRAM(S): at 09:05

## 2019-06-26 RX ADMIN — Medication 1 MILLIGRAM(S): at 12:42

## 2019-06-26 RX ADMIN — Medication 30 MILLIGRAM(S): at 09:05

## 2019-06-27 PROCEDURE — 99232 SBSQ HOSP IP/OBS MODERATE 35: CPT

## 2019-06-27 RX ORDER — CLONAZEPAM 1 MG
1 TABLET ORAL THREE TIMES A DAY
Refills: 0 | Status: DISCONTINUED | OUTPATIENT
Start: 2019-06-27 | End: 2019-07-04

## 2019-06-27 RX ORDER — METHADONE HYDROCHLORIDE 40 MG/1
80 TABLET ORAL DAILY
Refills: 0 | Status: DISCONTINUED | OUTPATIENT
Start: 2019-06-27 | End: 2019-06-27

## 2019-06-27 RX ORDER — METHADONE HYDROCHLORIDE 40 MG/1
100 TABLET ORAL DAILY
Refills: 0 | Status: DISCONTINUED | OUTPATIENT
Start: 2019-06-27 | End: 2019-06-27

## 2019-06-27 RX ORDER — VENLAFAXINE HCL 75 MG
75 CAPSULE, EXT RELEASE 24 HR ORAL DAILY
Refills: 0 | Status: COMPLETED | OUTPATIENT
Start: 2019-06-27 | End: 2019-06-28

## 2019-06-27 RX ORDER — METHADONE HYDROCHLORIDE 40 MG/1
5 TABLET ORAL DAILY
Refills: 0 | Status: DISCONTINUED | OUTPATIENT
Start: 2019-06-27 | End: 2019-07-04

## 2019-06-27 RX ORDER — METHADONE HYDROCHLORIDE 40 MG/1
25 TABLET ORAL DAILY
Refills: 0 | Status: DISCONTINUED | OUTPATIENT
Start: 2019-06-27 | End: 2019-06-27

## 2019-06-27 RX ORDER — METHADONE HYDROCHLORIDE 40 MG/1
5 TABLET ORAL DAILY
Refills: 0 | Status: DISCONTINUED | OUTPATIENT
Start: 2019-06-27 | End: 2019-06-27

## 2019-06-27 RX ORDER — VENLAFAXINE HCL 75 MG
75 CAPSULE, EXT RELEASE 24 HR ORAL DAILY
Refills: 0 | Status: DISCONTINUED | OUTPATIENT
Start: 2019-06-27 | End: 2019-06-27

## 2019-06-27 RX ORDER — VENLAFAXINE HCL 75 MG
112.5 CAPSULE, EXT RELEASE 24 HR ORAL DAILY
Refills: 0 | Status: COMPLETED | OUTPATIENT
Start: 2019-06-29 | End: 2019-06-30

## 2019-06-27 RX ORDER — VENLAFAXINE HCL 75 MG
150 CAPSULE, EXT RELEASE 24 HR ORAL DAILY
Refills: 0 | Status: DISCONTINUED | OUTPATIENT
Start: 2019-07-01 | End: 2019-07-01

## 2019-06-27 RX ORDER — METHADONE HYDROCHLORIDE 40 MG/1
100 TABLET ORAL DAILY
Refills: 0 | Status: DISCONTINUED | OUTPATIENT
Start: 2019-06-27 | End: 2019-07-04

## 2019-06-27 RX ADMIN — Medication 30 MILLIGRAM(S): at 10:00

## 2019-06-27 RX ADMIN — Medication 75 MILLIGRAM(S): at 10:01

## 2019-06-27 RX ADMIN — OLANZAPINE 10 MILLIGRAM(S): 15 TABLET, FILM COATED ORAL at 20:06

## 2019-06-27 RX ADMIN — METHADONE HYDROCHLORIDE 25 MILLIGRAM(S): 40 TABLET ORAL at 10:03

## 2019-06-27 RX ADMIN — METHADONE HYDROCHLORIDE 80 MILLIGRAM(S): 40 TABLET ORAL at 10:03

## 2019-06-27 RX ADMIN — Medication 1 MILLIGRAM(S): at 10:04

## 2019-06-27 RX ADMIN — Medication 1 MILLIGRAM(S): at 12:46

## 2019-06-27 RX ADMIN — MIRTAZAPINE 45 MILLIGRAM(S): 45 TABLET, ORALLY DISINTEGRATING ORAL at 20:06

## 2019-06-27 RX ADMIN — Medication 1 MILLIGRAM(S): at 20:06

## 2019-06-28 PROCEDURE — 99232 SBSQ HOSP IP/OBS MODERATE 35: CPT | Mod: GC

## 2019-06-28 RX ADMIN — Medication 20 MILLIGRAM(S): at 08:42

## 2019-06-28 RX ADMIN — OLANZAPINE 10 MILLIGRAM(S): 15 TABLET, FILM COATED ORAL at 20:51

## 2019-06-28 RX ADMIN — Medication 75 MILLIGRAM(S): at 08:42

## 2019-06-28 RX ADMIN — Medication 1 MILLIGRAM(S): at 20:51

## 2019-06-28 RX ADMIN — MIRTAZAPINE 45 MILLIGRAM(S): 45 TABLET, ORALLY DISINTEGRATING ORAL at 20:51

## 2019-06-28 RX ADMIN — Medication 1 MILLIGRAM(S): at 12:43

## 2019-06-28 RX ADMIN — METHADONE HYDROCHLORIDE 100 MILLIGRAM(S): 40 TABLET ORAL at 08:42

## 2019-06-28 RX ADMIN — Medication 1 MILLIGRAM(S): at 08:42

## 2019-06-28 RX ADMIN — METHADONE HYDROCHLORIDE 5 MILLIGRAM(S): 40 TABLET ORAL at 08:42

## 2019-06-29 RX ADMIN — METHADONE HYDROCHLORIDE 100 MILLIGRAM(S): 40 TABLET ORAL at 09:37

## 2019-06-29 RX ADMIN — Medication 1 MILLIGRAM(S): at 13:02

## 2019-06-29 RX ADMIN — OLANZAPINE 10 MILLIGRAM(S): 15 TABLET, FILM COATED ORAL at 20:28

## 2019-06-29 RX ADMIN — MIRTAZAPINE 45 MILLIGRAM(S): 45 TABLET, ORALLY DISINTEGRATING ORAL at 20:27

## 2019-06-29 RX ADMIN — METHADONE HYDROCHLORIDE 5 MILLIGRAM(S): 40 TABLET ORAL at 09:37

## 2019-06-29 RX ADMIN — Medication 1 MILLIGRAM(S): at 09:37

## 2019-06-29 RX ADMIN — Medication 20 MILLIGRAM(S): at 09:37

## 2019-06-29 RX ADMIN — Medication 1 MILLIGRAM(S): at 20:27

## 2019-06-29 RX ADMIN — Medication 112.5 MILLIGRAM(S): at 09:38

## 2019-06-30 RX ADMIN — Medication 112.5 MILLIGRAM(S): at 08:58

## 2019-06-30 RX ADMIN — METHADONE HYDROCHLORIDE 5 MILLIGRAM(S): 40 TABLET ORAL at 08:58

## 2019-06-30 RX ADMIN — Medication 1 MILLIGRAM(S): at 08:57

## 2019-06-30 RX ADMIN — Medication 10 MILLIGRAM(S): at 08:58

## 2019-06-30 RX ADMIN — MIRTAZAPINE 45 MILLIGRAM(S): 45 TABLET, ORALLY DISINTEGRATING ORAL at 20:06

## 2019-06-30 RX ADMIN — METHADONE HYDROCHLORIDE 100 MILLIGRAM(S): 40 TABLET ORAL at 08:58

## 2019-06-30 RX ADMIN — Medication 1 MILLIGRAM(S): at 20:06

## 2019-06-30 RX ADMIN — Medication 1 MILLIGRAM(S): at 13:28

## 2019-06-30 RX ADMIN — OLANZAPINE 10 MILLIGRAM(S): 15 TABLET, FILM COATED ORAL at 20:06

## 2019-07-01 PROCEDURE — 99232 SBSQ HOSP IP/OBS MODERATE 35: CPT | Mod: GC

## 2019-07-01 RX ORDER — VENLAFAXINE HCL 75 MG
37.5 CAPSULE, EXT RELEASE 24 HR ORAL ONCE
Refills: 0 | Status: COMPLETED | OUTPATIENT
Start: 2019-07-01 | End: 2019-07-01

## 2019-07-01 RX ORDER — VENLAFAXINE HCL 75 MG
187.5 CAPSULE, EXT RELEASE 24 HR ORAL DAILY
Refills: 0 | Status: DISCONTINUED | OUTPATIENT
Start: 2019-07-02 | End: 2019-07-02

## 2019-07-01 RX ADMIN — Medication 5 MILLIGRAM(S): at 17:57

## 2019-07-01 RX ADMIN — METHADONE HYDROCHLORIDE 100 MILLIGRAM(S): 40 TABLET ORAL at 08:34

## 2019-07-01 RX ADMIN — Medication 10 MILLIGRAM(S): at 08:34

## 2019-07-01 RX ADMIN — OLANZAPINE 10 MILLIGRAM(S): 15 TABLET, FILM COATED ORAL at 20:07

## 2019-07-01 RX ADMIN — Medication 1 MILLIGRAM(S): at 13:07

## 2019-07-01 RX ADMIN — Medication 1 MILLIGRAM(S): at 20:07

## 2019-07-01 RX ADMIN — Medication 37.5 MILLIGRAM(S): at 11:57

## 2019-07-01 RX ADMIN — Medication 1 MILLIGRAM(S): at 08:34

## 2019-07-01 RX ADMIN — MIRTAZAPINE 45 MILLIGRAM(S): 45 TABLET, ORALLY DISINTEGRATING ORAL at 20:07

## 2019-07-01 RX ADMIN — Medication 150 MILLIGRAM(S): at 08:35

## 2019-07-01 RX ADMIN — METHADONE HYDROCHLORIDE 5 MILLIGRAM(S): 40 TABLET ORAL at 08:34

## 2019-07-02 PROCEDURE — 99232 SBSQ HOSP IP/OBS MODERATE 35: CPT | Mod: GC

## 2019-07-02 RX ORDER — VENLAFAXINE HCL 75 MG
225 CAPSULE, EXT RELEASE 24 HR ORAL DAILY
Refills: 0 | Status: DISCONTINUED | OUTPATIENT
Start: 2019-07-02 | End: 2019-07-08

## 2019-07-02 RX ADMIN — METHADONE HYDROCHLORIDE 100 MILLIGRAM(S): 40 TABLET ORAL at 08:59

## 2019-07-02 RX ADMIN — MIRTAZAPINE 45 MILLIGRAM(S): 45 TABLET, ORALLY DISINTEGRATING ORAL at 20:59

## 2019-07-02 RX ADMIN — Medication 1 MILLIGRAM(S): at 08:59

## 2019-07-02 RX ADMIN — Medication 1 MILLIGRAM(S): at 20:59

## 2019-07-02 RX ADMIN — Medication 187.5 MILLIGRAM(S): at 08:59

## 2019-07-02 RX ADMIN — OLANZAPINE 10 MILLIGRAM(S): 15 TABLET, FILM COATED ORAL at 20:59

## 2019-07-02 RX ADMIN — Medication 1 MILLIGRAM(S): at 13:20

## 2019-07-02 RX ADMIN — METHADONE HYDROCHLORIDE 5 MILLIGRAM(S): 40 TABLET ORAL at 08:59

## 2019-07-03 PROCEDURE — 99232 SBSQ HOSP IP/OBS MODERATE 35: CPT | Mod: GC

## 2019-07-03 RX ADMIN — OLANZAPINE 10 MILLIGRAM(S): 15 TABLET, FILM COATED ORAL at 20:29

## 2019-07-03 RX ADMIN — Medication 225 MILLIGRAM(S): at 08:34

## 2019-07-03 RX ADMIN — Medication 1 MILLIGRAM(S): at 08:34

## 2019-07-03 RX ADMIN — Medication 1 MILLIGRAM(S): at 20:29

## 2019-07-03 RX ADMIN — Medication 1 MILLIGRAM(S): at 13:13

## 2019-07-03 RX ADMIN — MIRTAZAPINE 45 MILLIGRAM(S): 45 TABLET, ORALLY DISINTEGRATING ORAL at 20:29

## 2019-07-03 RX ADMIN — METHADONE HYDROCHLORIDE 100 MILLIGRAM(S): 40 TABLET ORAL at 08:34

## 2019-07-03 RX ADMIN — METHADONE HYDROCHLORIDE 5 MILLIGRAM(S): 40 TABLET ORAL at 08:34

## 2019-07-04 RX ADMIN — Medication 225 MILLIGRAM(S): at 09:05

## 2019-07-04 RX ADMIN — Medication 1 MILLIGRAM(S): at 12:30

## 2019-07-04 RX ADMIN — Medication 1 MILLIGRAM(S): at 09:05

## 2019-07-04 RX ADMIN — METHADONE HYDROCHLORIDE 100 MILLIGRAM(S): 40 TABLET ORAL at 09:05

## 2019-07-04 RX ADMIN — OLANZAPINE 10 MILLIGRAM(S): 15 TABLET, FILM COATED ORAL at 20:10

## 2019-07-04 RX ADMIN — MIRTAZAPINE 45 MILLIGRAM(S): 45 TABLET, ORALLY DISINTEGRATING ORAL at 20:10

## 2019-07-04 RX ADMIN — Medication 1 MILLIGRAM(S): at 20:10

## 2019-07-04 RX ADMIN — METHADONE HYDROCHLORIDE 5 MILLIGRAM(S): 40 TABLET ORAL at 09:05

## 2019-07-05 LAB
B BURGDOR AB SER-IMP: NEGATIVE — SIGNIFICANT CHANGE UP
B BURGDOR18KD IGG SER QL IB: SIGNIFICANT CHANGE UP
B BURGDOR23KD IGG SER QL IB: SIGNIFICANT CHANGE UP
B BURGDOR23KD IGM SER QL IB: SIGNIFICANT CHANGE UP
B BURGDOR28KD AB SER QL IB: SIGNIFICANT CHANGE UP
B BURGDOR28KD IGG SER QL IB: SIGNIFICANT CHANGE UP
B BURGDOR30KD AB SER QL IB: SIGNIFICANT CHANGE UP
B BURGDOR30KD IGG SER QL IB: SIGNIFICANT CHANGE UP
B BURGDOR31KD IGG SER QL IB: SIGNIFICANT CHANGE UP
B BURGDOR31KD IGM SER QL IB: SIGNIFICANT CHANGE UP
B BURGDOR39KD IGG SER QL IB: SIGNIFICANT CHANGE UP
B BURGDOR39KD IGM SER QL IB: SIGNIFICANT CHANGE UP
B BURGDOR41KD IGG SER QL IB: PRESENT — SIGNIFICANT CHANGE UP
B BURGDOR41KD IGM SER QL IB: PRESENT — SIGNIFICANT CHANGE UP
B BURGDOR45KD AB SER QL IB: SIGNIFICANT CHANGE UP
B BURGDOR45KD IGG SER QL IB: SIGNIFICANT CHANGE UP
B BURGDOR58KD AB SER QL IB: SIGNIFICANT CHANGE UP
B BURGDOR58KD IGG SER QL IB: SIGNIFICANT CHANGE UP
B BURGDOR66KD IGG SER QL IB: SIGNIFICANT CHANGE UP
B BURGDOR66KD IGM SER QL IB: SIGNIFICANT CHANGE UP
B BURGDOR93KD IGG SER QL IB: SIGNIFICANT CHANGE UP
B BURGDOR93KD IGM SER QL IB: SIGNIFICANT CHANGE UP
LYME AB WESTERN BLOT 18KD IGM: SIGNIFICANT CHANGE UP
LYME WB IGM INTERPRETATION: NEGATIVE — SIGNIFICANT CHANGE UP

## 2019-07-05 PROCEDURE — 99232 SBSQ HOSP IP/OBS MODERATE 35: CPT | Mod: GC

## 2019-07-05 RX ORDER — CLONAZEPAM 1 MG
1 TABLET ORAL
Qty: 42 | Refills: 0
Start: 2019-07-05 | End: 2019-07-18

## 2019-07-05 RX ORDER — OLANZAPINE 15 MG/1
1 TABLET, FILM COATED ORAL
Qty: 14 | Refills: 0
Start: 2019-07-05 | End: 2019-07-18

## 2019-07-05 RX ORDER — METHADONE HYDROCHLORIDE 40 MG/1
5 TABLET ORAL DAILY
Refills: 0 | Status: DISCONTINUED | OUTPATIENT
Start: 2019-07-05 | End: 2019-07-08

## 2019-07-05 RX ORDER — METHADONE HYDROCHLORIDE 40 MG/1
100 TABLET ORAL DAILY
Refills: 0 | Status: DISCONTINUED | OUTPATIENT
Start: 2019-07-05 | End: 2019-07-08

## 2019-07-05 RX ORDER — CLONAZEPAM 1 MG
1 TABLET ORAL THREE TIMES A DAY
Refills: 0 | Status: DISCONTINUED | OUTPATIENT
Start: 2019-07-05 | End: 2019-07-08

## 2019-07-05 RX ADMIN — Medication 1 MILLIGRAM(S): at 20:26

## 2019-07-05 RX ADMIN — METHADONE HYDROCHLORIDE 5 MILLIGRAM(S): 40 TABLET ORAL at 08:53

## 2019-07-05 RX ADMIN — Medication 1 MILLIGRAM(S): at 08:53

## 2019-07-05 RX ADMIN — METHADONE HYDROCHLORIDE 100 MILLIGRAM(S): 40 TABLET ORAL at 08:53

## 2019-07-05 RX ADMIN — OLANZAPINE 10 MILLIGRAM(S): 15 TABLET, FILM COATED ORAL at 20:27

## 2019-07-05 RX ADMIN — MIRTAZAPINE 45 MILLIGRAM(S): 45 TABLET, ORALLY DISINTEGRATING ORAL at 20:26

## 2019-07-05 RX ADMIN — Medication 1 MILLIGRAM(S): at 12:45

## 2019-07-05 RX ADMIN — Medication 225 MILLIGRAM(S): at 08:53

## 2019-07-06 RX ADMIN — Medication 225 MILLIGRAM(S): at 08:09

## 2019-07-06 RX ADMIN — Medication 1 MILLIGRAM(S): at 08:09

## 2019-07-06 RX ADMIN — METHADONE HYDROCHLORIDE 100 MILLIGRAM(S): 40 TABLET ORAL at 08:09

## 2019-07-06 RX ADMIN — MIRTAZAPINE 45 MILLIGRAM(S): 45 TABLET, ORALLY DISINTEGRATING ORAL at 20:04

## 2019-07-06 RX ADMIN — Medication 1 MILLIGRAM(S): at 12:33

## 2019-07-06 RX ADMIN — METHADONE HYDROCHLORIDE 5 MILLIGRAM(S): 40 TABLET ORAL at 08:09

## 2019-07-06 RX ADMIN — OLANZAPINE 10 MILLIGRAM(S): 15 TABLET, FILM COATED ORAL at 20:04

## 2019-07-06 RX ADMIN — Medication 1 MILLIGRAM(S): at 20:04

## 2019-07-07 RX ADMIN — METHADONE HYDROCHLORIDE 5 MILLIGRAM(S): 40 TABLET ORAL at 08:41

## 2019-07-07 RX ADMIN — Medication 1 MILLIGRAM(S): at 20:20

## 2019-07-07 RX ADMIN — Medication 1 MILLIGRAM(S): at 12:52

## 2019-07-07 RX ADMIN — Medication 1 MILLIGRAM(S): at 08:41

## 2019-07-07 RX ADMIN — OLANZAPINE 10 MILLIGRAM(S): 15 TABLET, FILM COATED ORAL at 20:20

## 2019-07-07 RX ADMIN — MIRTAZAPINE 45 MILLIGRAM(S): 45 TABLET, ORALLY DISINTEGRATING ORAL at 20:20

## 2019-07-07 RX ADMIN — Medication 225 MILLIGRAM(S): at 08:42

## 2019-07-07 RX ADMIN — METHADONE HYDROCHLORIDE 100 MILLIGRAM(S): 40 TABLET ORAL at 08:41

## 2019-07-08 VITALS — SYSTOLIC BLOOD PRESSURE: 109 MMHG | HEART RATE: 85 BPM | DIASTOLIC BLOOD PRESSURE: 72 MMHG

## 2019-07-08 PROCEDURE — 99238 HOSP IP/OBS DSCHRG MGMT 30/<: CPT | Mod: GC

## 2019-07-08 RX ORDER — MIRTAZAPINE 45 MG/1
1 TABLET, ORALLY DISINTEGRATING ORAL
Qty: 14 | Refills: 0
Start: 2019-07-08 | End: 2019-07-21

## 2019-07-08 RX ORDER — VENLAFAXINE HCL 75 MG
3 CAPSULE, EXT RELEASE 24 HR ORAL
Qty: 42 | Refills: 0
Start: 2019-07-08 | End: 2019-07-21

## 2019-07-08 RX ADMIN — METHADONE HYDROCHLORIDE 100 MILLIGRAM(S): 40 TABLET ORAL at 08:41

## 2019-07-08 RX ADMIN — Medication 1 MILLIGRAM(S): at 08:41

## 2019-07-08 RX ADMIN — Medication 225 MILLIGRAM(S): at 08:41

## 2019-07-08 RX ADMIN — METHADONE HYDROCHLORIDE 5 MILLIGRAM(S): 40 TABLET ORAL at 08:41

## 2019-07-31 DIAGNOSIS — Z76.89 PERSONS ENCOUNTERING HEALTH SERVICES IN OTHER SPECIFIED CIRCUMSTANCES: ICD-10-CM

## 2019-08-13 ENCOUNTER — INPATIENT (INPATIENT)
Facility: HOSPITAL | Age: 39
LOS: 64 days | Discharge: HOME | End: 2019-10-17
Attending: PSYCHIATRY & NEUROLOGY | Admitting: PSYCHIATRY & NEUROLOGY
Payer: MEDICAID

## 2019-08-13 VITALS — HEART RATE: 76 BPM | DIASTOLIC BLOOD PRESSURE: 83 MMHG | SYSTOLIC BLOOD PRESSURE: 120 MMHG | RESPIRATION RATE: 18 BRPM

## 2019-08-13 DIAGNOSIS — T14.91XA SUICIDE ATTEMPT, INITIAL ENCOUNTER: ICD-10-CM

## 2019-08-13 LAB
ALBUMIN SERPL ELPH-MCNC: 4.5 G/DL — SIGNIFICANT CHANGE UP (ref 3.3–5)
ALP SERPL-CCNC: 65 U/L — SIGNIFICANT CHANGE UP (ref 40–120)
ALT FLD-CCNC: 16 U/L — SIGNIFICANT CHANGE UP (ref 4–41)
ANION GAP SERPL CALC-SCNC: 11 MMO/L — SIGNIFICANT CHANGE UP (ref 7–14)
APAP SERPL-MCNC: < 15 UG/ML — LOW (ref 15–25)
AST SERPL-CCNC: 24 U/L — SIGNIFICANT CHANGE UP (ref 4–40)
BILIRUB SERPL-MCNC: 0.3 MG/DL — SIGNIFICANT CHANGE UP (ref 0.2–1.2)
BUN SERPL-MCNC: 13 MG/DL — SIGNIFICANT CHANGE UP (ref 7–23)
CALCIUM SERPL-MCNC: 9.7 MG/DL — SIGNIFICANT CHANGE UP (ref 8.4–10.5)
CHLORIDE SERPL-SCNC: 98 MMOL/L — SIGNIFICANT CHANGE UP (ref 98–107)
CO2 SERPL-SCNC: 30 MMOL/L — SIGNIFICANT CHANGE UP (ref 22–31)
CREAT SERPL-MCNC: 0.72 MG/DL — SIGNIFICANT CHANGE UP (ref 0.5–1.3)
ETHANOL BLD-MCNC: < 10 MG/DL — SIGNIFICANT CHANGE UP
GLUCOSE SERPL-MCNC: 82 MG/DL — SIGNIFICANT CHANGE UP (ref 70–99)
HCT VFR BLD CALC: 39.3 % — SIGNIFICANT CHANGE UP (ref 39–50)
HGB BLD-MCNC: 13 G/DL — SIGNIFICANT CHANGE UP (ref 13–17)
HIV COMBO RESULT: SIGNIFICANT CHANGE UP
HIV1+2 AB SPEC QL: SIGNIFICANT CHANGE UP
MCHC RBC-ENTMCNC: 28.5 PG — SIGNIFICANT CHANGE UP (ref 27–34)
MCHC RBC-ENTMCNC: 33.1 % — SIGNIFICANT CHANGE UP (ref 32–36)
MCV RBC AUTO: 86.2 FL — SIGNIFICANT CHANGE UP (ref 80–100)
NRBC # FLD: 0 K/UL — SIGNIFICANT CHANGE UP (ref 0–0)
PLATELET # BLD AUTO: 274 K/UL — SIGNIFICANT CHANGE UP (ref 150–400)
PMV BLD: 8.6 FL — SIGNIFICANT CHANGE UP (ref 7–13)
POTASSIUM SERPL-MCNC: 4 MMOL/L — SIGNIFICANT CHANGE UP (ref 3.5–5.3)
POTASSIUM SERPL-SCNC: 4 MMOL/L — SIGNIFICANT CHANGE UP (ref 3.5–5.3)
PROT SERPL-MCNC: 7.9 G/DL — SIGNIFICANT CHANGE UP (ref 6–8.3)
RBC # BLD: 4.56 M/UL — SIGNIFICANT CHANGE UP (ref 4.2–5.8)
RBC # FLD: 12.3 % — SIGNIFICANT CHANGE UP (ref 10.3–14.5)
SALICYLATES SERPL-MCNC: < 5 MG/DL — LOW (ref 15–30)
SODIUM SERPL-SCNC: 139 MMOL/L — SIGNIFICANT CHANGE UP (ref 135–145)
TSH SERPL-MCNC: 4.41 UIU/ML — HIGH (ref 0.27–4.2)
WBC # BLD: 5.62 K/UL — SIGNIFICANT CHANGE UP (ref 3.8–10.5)
WBC # FLD AUTO: 5.62 K/UL — SIGNIFICANT CHANGE UP (ref 3.8–10.5)

## 2019-08-13 PROCEDURE — 99285 EMERGENCY DEPT VISIT HI MDM: CPT

## 2019-08-13 RX ORDER — HALOPERIDOL DECANOATE 100 MG/ML
5 INJECTION INTRAMUSCULAR ONCE
Refills: 0 | Status: DISCONTINUED | OUTPATIENT
Start: 2019-08-13 | End: 2019-10-17

## 2019-08-13 RX ORDER — LIDOCAINE HCL 20 MG/ML
10 VIAL (ML) INJECTION ONCE
Refills: 0 | Status: DISCONTINUED | OUTPATIENT
Start: 2019-08-13 | End: 2019-10-17

## 2019-08-13 RX ORDER — OLANZAPINE 15 MG/1
10 TABLET, FILM COATED ORAL ONCE
Refills: 0 | Status: DISCONTINUED | OUTPATIENT
Start: 2019-08-13 | End: 2019-08-14

## 2019-08-13 RX ORDER — MIRTAZAPINE 45 MG/1
45 TABLET, ORALLY DISINTEGRATING ORAL AT BEDTIME
Refills: 0 | Status: DISCONTINUED | OUTPATIENT
Start: 2019-08-13 | End: 2019-10-17

## 2019-08-13 RX ORDER — TETANUS TOXOID, REDUCED DIPHTHERIA TOXOID AND ACELLULAR PERTUSSIS VACCINE, ADSORBED 5; 2.5; 8; 8; 2.5 [IU]/.5ML; [IU]/.5ML; UG/.5ML; UG/.5ML; UG/.5ML
0.5 SUSPENSION INTRAMUSCULAR ONCE
Refills: 0 | Status: COMPLETED | OUTPATIENT
Start: 2019-08-13 | End: 2019-08-13

## 2019-08-13 RX ORDER — VENLAFAXINE HCL 75 MG
225 CAPSULE, EXT RELEASE 24 HR ORAL DAILY
Refills: 0 | Status: DISCONTINUED | OUTPATIENT
Start: 2019-08-13 | End: 2019-08-14

## 2019-08-13 RX ORDER — HALOPERIDOL DECANOATE 100 MG/ML
5 INJECTION INTRAMUSCULAR EVERY 6 HOURS
Refills: 0 | Status: DISCONTINUED | OUTPATIENT
Start: 2019-08-13 | End: 2019-10-17

## 2019-08-13 RX ORDER — CLONAZEPAM 1 MG
1 TABLET ORAL THREE TIMES A DAY
Refills: 0 | Status: DISCONTINUED | OUTPATIENT
Start: 2019-08-13 | End: 2019-08-20

## 2019-08-13 RX ADMIN — TETANUS TOXOID, REDUCED DIPHTHERIA TOXOID AND ACELLULAR PERTUSSIS VACCINE, ADSORBED 0.5 MILLILITER(S): 5; 2.5; 8; 8; 2.5 SUSPENSION INTRAMUSCULAR at 16:37

## 2019-08-13 NOTE — ED ADULT NURSE NOTE - CHIEF COMPLAINT QUOTE
Pt arrives from [facility] for SI. Pt with depression cut b/l wrist razor , active bleeding noted from bandages . Pt states he is depressed had medication adjusted.

## 2019-08-13 NOTE — ED PROVIDER NOTE - OBJECTIVE STATEMENT
38M p/w suicidal ideations. He cut his wrists while being evaluated at an outpt medical facility today. Had bleeding at first that was stopped by applying pressure. No weakness or numbness in his hands. No difficulty moving his fingers. Last tetanus booster unknown.

## 2019-08-13 NOTE — ED BEHAVIORAL HEALTH ASSESSMENT NOTE - SUMMARY
Patient is a 38 year old single, white male, undomiciled but reports living in a structure on the corner of [facility];  Disabled, on SSI;  non-caregiver;  PPH of major depressive disorder, post-traumatic stress disorder, Opioid use d/o, currently on Methadone, followed by Dr. Henry at [facility], Patient with "dozens" of prior hospitalizations, last 06/19-07/08/19 at [facility], with multiple prior suicide attempts of OD and injecting antifreeze; history of arrests in his teenage years; denies active substance abuse but u/tox positive for THC and ETOH prior to admission in 06/2019; reports history of withdrawal seizures; PMH of oseteomylitis from dental carries; BIBEMS after pt cut both wrists with a knife in front of his outpatient psychiatrist, Dr. Henry. Patient is a 38 year old single, white male, undomiciled but reports living in a structure on the corner of [facility];  Disabled, on SSI;  non-caregiver;  PPH of major depressive disorder, post-traumatic stress disorder, Opioid use d/o, currently on Methadone, followed by Dr. Henry at [facility], Patient with "dozens" of prior hospitalizations, last 06/19-07/08/19 at [facility], with multiple prior suicide attempts of OD and injecting antifreeze; history of arrests in his teenage years; denies active substance abuse but u/tox positive for THC and ETOH prior to admission in 06/2019; reports history of withdrawal seizures; PMH of oseteomylitis from dental carries; BIBEMS after pt cut both wrists with a knife in front of his outpatient psychiatrist, Dr. Henry.  Patient presents an acute danger to self and requires inpatient psychiatric admission for safety and stabilization.

## 2019-08-13 NOTE — ED BEHAVIORAL HEALTH ASSESSMENT NOTE - OTHER PAST PSYCHIATRIC HISTORY (INCLUDE DETAILS REGARDING ONSET, COURSE OF ILLNESS, INPATIENT/OUTPATIENT TREATMENT)
Reports history of greater than "a few dozen" psychiatric hospitalizations, last 14 years ago;  Current outpatient treatment [facility]  reports multiple prior suicide attempts;
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
## 2019-08-13 NOTE — ED PROVIDER NOTE - PHYSICAL EXAMINATION
General: A&Ox3, well nourished, no acute distress  HENT: NC/AT. Posterior oropharynx clear. Patent airway  Eyes: PERRL, EOMI  CV: RRR, no m/r/g. 2+ peripheral pulses. Extremities are warm and well perfused.  Respiratory: CTAB no w/r/r. No respiratory distress.  Abdominal: soft, non-distended, non-tender, no rebound, guarding, or rigidity  Neuro: No focal deficits  Skin: 5 cm laceration on the left wrist running horizontally. More superficial 1-2 cm laceration on the right wrist.  Psych: flat tone, appears angry, poor eye contact
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
## 2019-08-13 NOTE — ED ADULT NURSE REASSESSMENT NOTE - NS ED NURSE REASSESS COMMENT FT1
report given to LEANDRA Petit 6.
Patient transported to [place] [id number] via EMS, safety maintained.

## 2019-08-13 NOTE — ED PROCEDURE NOTE - ATTENDING CONTRIBUTION TO CARE
I supervised all relevant portions of the procedure.
I supervised all relevant portions of the procedure.

## 2019-08-13 NOTE — ED ADULT NURSE NOTE - OBJECTIVE STATEMENT
Pt received to room 12, pt was brought in by EMS from [facility] for SI. Pt states he cut bilateral wrists with a razor. Pt noted to have lacerations to both wrists. Pt also complaining of feeling depressed. Pt states his medications were changed.
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
## 2019-08-13 NOTE — ED BEHAVIORAL HEALTH ASSESSMENT NOTE - CURRENT MEDICATION
zyprexa 10 mg PO QHS, Remeron 45 mg PO QHS, Methadone 105 mg PO QHS, Klonopin 1 mg PO TID, Effexor  mg PO Qdaily

## 2019-08-13 NOTE — ED PROVIDER NOTE - CLINICAL SUMMARY MEDICAL DECISION MAKING FREE TEXT BOX
Jonathan Weil, PGY3 - suicide attempt: will obtain labs to r/o metabolic contributing etiology and have  evaluate. 1:1 in place. Will update tetanus and repair lacerations.

## 2019-08-13 NOTE — ED PROVIDER NOTE - ATTENDING CONTRIBUTION TO CARE
I have personally performed a face to face bedside history and physical examination of this patient. I have discussed the history, examination, review of systems, assessment and plan of management with the resident. I have reviewed the electronic medical record and amended it to reflect my history, review of systems, physical exam, assessment and plan.    38M p/w suicidal ideations. He cut his wrists while being evaluated at an outpt medical facility today. Had bleeding at first that was stopped by applying pressure. No weakness or numbness in his hands. No difficulty moving his fingers. Last tetanus booster unknown. VSS afebrile.  Exam patient non-diaphoretic, RUE with linear superficial laceration to right wrist, no active bleeding, limited to dermis, no exposed tendon or retinaculum, ain/pin/u 5/5, silt m/r/u, brisk cap refill; deeper linear laceration to left wrist, no active bleeding, limited to dermis, no exposed tendon or retinaculum, ain/pin/u 5/5, silt m/r/u, brisk cap refill.  Extremities NVI.  Will give tdap, laceration repair, psych eval.  Dispo pending.

## 2019-08-13 NOTE — ED BEHAVIORAL HEALTH ASSESSMENT NOTE - RISK ASSESSMENT
High risk:  passive suicidal ideation with worsening depression, unable to safety plan if discharged;  No family/social supports;  history of suicide attempts;   Protective: connected to treatment;  medication compliant; pt is at acutely elevated risk of harm to self

## 2019-08-13 NOTE — ED ADULT TRIAGE NOTE - CHIEF COMPLAINT QUOTE
Pt arrives from [facility] for SI. Pt with depression cut b/l wrist razor , active bleeding noted from bandages . Pt states he is depressed had medication adjusted.

## 2019-08-14 LAB
HBV CORE AB SER-ACNC: NONREACTIVE — SIGNIFICANT CHANGE UP
HBV CORE IGM SER-ACNC: NONREACTIVE — SIGNIFICANT CHANGE UP
HBV SURFACE AB SER-ACNC: NONREACTIVE — SIGNIFICANT CHANGE UP

## 2019-08-14 RX ORDER — VENLAFAXINE HCL 75 MG
225 CAPSULE, EXT RELEASE 24 HR ORAL DAILY
Refills: 0 | Status: DISCONTINUED | OUTPATIENT
Start: 2019-08-14 | End: 2019-10-17

## 2019-08-14 RX ORDER — METHADONE HYDROCHLORIDE 40 MG/1
105 TABLET ORAL DAILY
Refills: 0 | Status: DISCONTINUED | OUTPATIENT
Start: 2019-08-14 | End: 2019-08-21

## 2019-08-14 RX ORDER — OLANZAPINE 15 MG/1
10 TABLET, FILM COATED ORAL AT BEDTIME
Refills: 0 | Status: DISCONTINUED | OUTPATIENT
Start: 2019-08-14 | End: 2019-08-22

## 2019-08-14 RX ADMIN — METHADONE HYDROCHLORIDE 105 MILLIGRAM(S): 40 TABLET ORAL at 09:53

## 2019-08-14 RX ADMIN — Medication 1 MILLIGRAM(S): at 13:21

## 2019-08-14 RX ADMIN — OLANZAPINE 10 MILLIGRAM(S): 15 TABLET, FILM COATED ORAL at 20:30

## 2019-08-14 RX ADMIN — Medication 225 MILLIGRAM(S): at 09:29

## 2019-08-14 RX ADMIN — Medication 1 MILLIGRAM(S): at 20:24

## 2019-08-14 RX ADMIN — Medication 1 MILLIGRAM(S): at 09:29

## 2019-08-14 RX ADMIN — MIRTAZAPINE 45 MILLIGRAM(S): 45 TABLET, ORALLY DISINTEGRATING ORAL at 20:30

## 2019-08-15 LAB
HCV RNA SERPL NAA DL=5-ACNC: NOT DETECTED IU/ML — SIGNIFICANT CHANGE UP
HCV RNA SPEC NAA+PROBE-LOG IU: SIGNIFICANT CHANGE UP LOGIU/ML

## 2019-08-15 PROCEDURE — 99232 SBSQ HOSP IP/OBS MODERATE 35: CPT

## 2019-08-15 PROCEDURE — 90853 GROUP PSYCHOTHERAPY: CPT

## 2019-08-15 RX ADMIN — Medication 2 MILLIGRAM(S): at 14:40

## 2019-08-15 RX ADMIN — Medication 2 MILLIGRAM(S): at 21:25

## 2019-08-15 RX ADMIN — Medication 2 MILLIGRAM(S): at 08:34

## 2019-08-15 RX ADMIN — Medication 1 MILLIGRAM(S): at 08:34

## 2019-08-15 RX ADMIN — Medication 1 MILLIGRAM(S): at 13:19

## 2019-08-15 RX ADMIN — Medication 225 MILLIGRAM(S): at 08:34

## 2019-08-15 RX ADMIN — OLANZAPINE 10 MILLIGRAM(S): 15 TABLET, FILM COATED ORAL at 20:20

## 2019-08-15 RX ADMIN — Medication 1 MILLIGRAM(S): at 20:20

## 2019-08-15 RX ADMIN — MIRTAZAPINE 45 MILLIGRAM(S): 45 TABLET, ORALLY DISINTEGRATING ORAL at 20:20

## 2019-08-15 RX ADMIN — METHADONE HYDROCHLORIDE 105 MILLIGRAM(S): 40 TABLET ORAL at 08:34

## 2019-08-16 PROCEDURE — 99232 SBSQ HOSP IP/OBS MODERATE 35: CPT

## 2019-08-16 RX ADMIN — MIRTAZAPINE 45 MILLIGRAM(S): 45 TABLET, ORALLY DISINTEGRATING ORAL at 20:13

## 2019-08-16 RX ADMIN — Medication 225 MILLIGRAM(S): at 09:07

## 2019-08-16 RX ADMIN — Medication 1 MILLIGRAM(S): at 12:37

## 2019-08-16 RX ADMIN — Medication 2 MILLIGRAM(S): at 21:22

## 2019-08-16 RX ADMIN — METHADONE HYDROCHLORIDE 105 MILLIGRAM(S): 40 TABLET ORAL at 09:07

## 2019-08-16 RX ADMIN — Medication 2 MILLIGRAM(S): at 15:10

## 2019-08-16 RX ADMIN — Medication 1 MILLIGRAM(S): at 09:06

## 2019-08-16 RX ADMIN — OLANZAPINE 10 MILLIGRAM(S): 15 TABLET, FILM COATED ORAL at 20:13

## 2019-08-16 RX ADMIN — Medication 1 MILLIGRAM(S): at 20:13

## 2019-08-16 RX ADMIN — Medication 2 MILLIGRAM(S): at 09:07

## 2019-08-17 PROCEDURE — 99232 SBSQ HOSP IP/OBS MODERATE 35: CPT

## 2019-08-17 RX ADMIN — METHADONE HYDROCHLORIDE 105 MILLIGRAM(S): 40 TABLET ORAL at 08:46

## 2019-08-17 RX ADMIN — OLANZAPINE 10 MILLIGRAM(S): 15 TABLET, FILM COATED ORAL at 21:04

## 2019-08-17 RX ADMIN — Medication 225 MILLIGRAM(S): at 08:47

## 2019-08-17 RX ADMIN — MIRTAZAPINE 45 MILLIGRAM(S): 45 TABLET, ORALLY DISINTEGRATING ORAL at 21:04

## 2019-08-17 RX ADMIN — Medication 1 MILLIGRAM(S): at 21:04

## 2019-08-17 RX ADMIN — Medication 2 MILLIGRAM(S): at 20:59

## 2019-08-17 RX ADMIN — Medication 1 MILLIGRAM(S): at 12:36

## 2019-08-17 RX ADMIN — Medication 2 MILLIGRAM(S): at 14:50

## 2019-08-17 RX ADMIN — Medication 2 MILLIGRAM(S): at 08:47

## 2019-08-17 RX ADMIN — Medication 1 MILLIGRAM(S): at 08:46

## 2019-08-18 PROCEDURE — 99232 SBSQ HOSP IP/OBS MODERATE 35: CPT

## 2019-08-18 RX ADMIN — Medication 225 MILLIGRAM(S): at 08:48

## 2019-08-18 RX ADMIN — METHADONE HYDROCHLORIDE 105 MILLIGRAM(S): 40 TABLET ORAL at 08:48

## 2019-08-18 RX ADMIN — Medication 1 MILLIGRAM(S): at 12:51

## 2019-08-18 RX ADMIN — OLANZAPINE 10 MILLIGRAM(S): 15 TABLET, FILM COATED ORAL at 20:35

## 2019-08-18 RX ADMIN — Medication 2 MILLIGRAM(S): at 14:29

## 2019-08-18 RX ADMIN — MIRTAZAPINE 45 MILLIGRAM(S): 45 TABLET, ORALLY DISINTEGRATING ORAL at 20:35

## 2019-08-18 RX ADMIN — Medication 2 MILLIGRAM(S): at 20:45

## 2019-08-18 RX ADMIN — Medication 1 MILLIGRAM(S): at 20:35

## 2019-08-18 RX ADMIN — Medication 1 MILLIGRAM(S): at 08:48

## 2019-08-18 RX ADMIN — Medication 2 MILLIGRAM(S): at 08:15

## 2019-08-19 PROCEDURE — 99232 SBSQ HOSP IP/OBS MODERATE 35: CPT

## 2019-08-19 RX ADMIN — Medication 2 MILLIGRAM(S): at 16:05

## 2019-08-19 RX ADMIN — MIRTAZAPINE 45 MILLIGRAM(S): 45 TABLET, ORALLY DISINTEGRATING ORAL at 22:05

## 2019-08-19 RX ADMIN — Medication 1 MILLIGRAM(S): at 08:37

## 2019-08-19 RX ADMIN — OLANZAPINE 10 MILLIGRAM(S): 15 TABLET, FILM COATED ORAL at 22:05

## 2019-08-19 RX ADMIN — Medication 1 MILLIGRAM(S): at 12:45

## 2019-08-19 RX ADMIN — METHADONE HYDROCHLORIDE 105 MILLIGRAM(S): 40 TABLET ORAL at 08:37

## 2019-08-19 RX ADMIN — Medication 2 MILLIGRAM(S): at 22:05

## 2019-08-19 RX ADMIN — Medication 2 MILLIGRAM(S): at 10:00

## 2019-08-19 RX ADMIN — Medication 1 MILLIGRAM(S): at 22:05

## 2019-08-19 RX ADMIN — Medication 225 MILLIGRAM(S): at 08:37

## 2019-08-20 PROCEDURE — 99232 SBSQ HOSP IP/OBS MODERATE 35: CPT

## 2019-08-20 PROCEDURE — 93010 ELECTROCARDIOGRAM REPORT: CPT

## 2019-08-20 RX ADMIN — Medication 2 MILLIGRAM(S): at 14:28

## 2019-08-20 RX ADMIN — OLANZAPINE 10 MILLIGRAM(S): 15 TABLET, FILM COATED ORAL at 20:40

## 2019-08-20 RX ADMIN — Medication 1 MILLIGRAM(S): at 20:40

## 2019-08-20 RX ADMIN — Medication 2 MILLIGRAM(S): at 20:41

## 2019-08-20 RX ADMIN — METHADONE HYDROCHLORIDE 105 MILLIGRAM(S): 40 TABLET ORAL at 08:54

## 2019-08-20 RX ADMIN — MIRTAZAPINE 45 MILLIGRAM(S): 45 TABLET, ORALLY DISINTEGRATING ORAL at 20:40

## 2019-08-20 RX ADMIN — Medication 1 MILLIGRAM(S): at 08:53

## 2019-08-20 RX ADMIN — Medication 225 MILLIGRAM(S): at 08:54

## 2019-08-20 RX ADMIN — Medication 1 MILLIGRAM(S): at 13:00

## 2019-08-20 RX ADMIN — Medication 2 MILLIGRAM(S): at 08:54

## 2019-08-21 LAB
ALBUMIN SERPL ELPH-MCNC: 4.7 G/DL — SIGNIFICANT CHANGE UP (ref 3.3–5)
ALP SERPL-CCNC: 69 U/L — SIGNIFICANT CHANGE UP (ref 40–120)
ALT FLD-CCNC: 16 U/L — SIGNIFICANT CHANGE UP (ref 4–41)
ANION GAP SERPL CALC-SCNC: 11 MMO/L — SIGNIFICANT CHANGE UP (ref 7–14)
ANION GAP SERPL CALC-SCNC: 11 MMO/L — SIGNIFICANT CHANGE UP (ref 7–14)
AST SERPL-CCNC: 20 U/L — SIGNIFICANT CHANGE UP (ref 4–40)
BILIRUB SERPL-MCNC: 0.3 MG/DL — SIGNIFICANT CHANGE UP (ref 0.2–1.2)
BUN SERPL-MCNC: 11 MG/DL — SIGNIFICANT CHANGE UP (ref 7–23)
BUN SERPL-MCNC: 11 MG/DL — SIGNIFICANT CHANGE UP (ref 7–23)
CALCIUM SERPL-MCNC: 9.5 MG/DL — SIGNIFICANT CHANGE UP (ref 8.4–10.5)
CALCIUM SERPL-MCNC: 9.5 MG/DL — SIGNIFICANT CHANGE UP (ref 8.4–10.5)
CHLORIDE SERPL-SCNC: 101 MMOL/L — SIGNIFICANT CHANGE UP (ref 98–107)
CHLORIDE SERPL-SCNC: 101 MMOL/L — SIGNIFICANT CHANGE UP (ref 98–107)
CHOLEST SERPL-MCNC: 145 MG/DL — SIGNIFICANT CHANGE UP (ref 120–199)
CO2 SERPL-SCNC: 28 MMOL/L — SIGNIFICANT CHANGE UP (ref 22–31)
CO2 SERPL-SCNC: 28 MMOL/L — SIGNIFICANT CHANGE UP (ref 22–31)
CREAT SERPL-MCNC: 0.7 MG/DL — SIGNIFICANT CHANGE UP (ref 0.5–1.3)
CREAT SERPL-MCNC: 0.7 MG/DL — SIGNIFICANT CHANGE UP (ref 0.5–1.3)
GLUCOSE SERPL-MCNC: 131 MG/DL — HIGH (ref 70–99)
GLUCOSE SERPL-MCNC: 131 MG/DL — HIGH (ref 70–99)
HBA1C BLD-MCNC: 4.8 % — SIGNIFICANT CHANGE UP (ref 4–5.6)
HCT VFR BLD CALC: 41.8 % — SIGNIFICANT CHANGE UP (ref 39–50)
HDLC SERPL-MCNC: 48 MG/DL — SIGNIFICANT CHANGE UP (ref 35–55)
HGB BLD-MCNC: 13.6 G/DL — SIGNIFICANT CHANGE UP (ref 13–17)
LIPID PNL WITH DIRECT LDL SERPL: 85 MG/DL — SIGNIFICANT CHANGE UP
MCHC RBC-ENTMCNC: 28.6 PG — SIGNIFICANT CHANGE UP (ref 27–34)
MCHC RBC-ENTMCNC: 32.5 % — SIGNIFICANT CHANGE UP (ref 32–36)
MCV RBC AUTO: 87.8 FL — SIGNIFICANT CHANGE UP (ref 80–100)
NRBC # FLD: 0 K/UL — SIGNIFICANT CHANGE UP (ref 0–0)
PLATELET # BLD AUTO: 278 K/UL — SIGNIFICANT CHANGE UP (ref 150–400)
PMV BLD: 9.4 FL — SIGNIFICANT CHANGE UP (ref 7–13)
POTASSIUM SERPL-MCNC: 4.6 MMOL/L — SIGNIFICANT CHANGE UP (ref 3.5–5.3)
POTASSIUM SERPL-MCNC: 4.6 MMOL/L — SIGNIFICANT CHANGE UP (ref 3.5–5.3)
POTASSIUM SERPL-SCNC: 4.6 MMOL/L — SIGNIFICANT CHANGE UP (ref 3.5–5.3)
POTASSIUM SERPL-SCNC: 4.6 MMOL/L — SIGNIFICANT CHANGE UP (ref 3.5–5.3)
PROT SERPL-MCNC: 8.2 G/DL — SIGNIFICANT CHANGE UP (ref 6–8.3)
RBC # BLD: 4.76 M/UL — SIGNIFICANT CHANGE UP (ref 4.2–5.8)
RBC # FLD: 11.9 % — SIGNIFICANT CHANGE UP (ref 10.3–14.5)
SODIUM SERPL-SCNC: 140 MMOL/L — SIGNIFICANT CHANGE UP (ref 135–145)
SODIUM SERPL-SCNC: 140 MMOL/L — SIGNIFICANT CHANGE UP (ref 135–145)
TRIGL SERPL-MCNC: 86 MG/DL — SIGNIFICANT CHANGE UP (ref 10–149)
WBC # BLD: 4.22 K/UL — SIGNIFICANT CHANGE UP (ref 3.8–10.5)
WBC # FLD AUTO: 4.22 K/UL — SIGNIFICANT CHANGE UP (ref 3.8–10.5)

## 2019-08-21 PROCEDURE — 90870 ELECTROCONVULSIVE THERAPY: CPT

## 2019-08-21 PROCEDURE — 99232 SBSQ HOSP IP/OBS MODERATE 35: CPT | Mod: 25

## 2019-08-21 RX ORDER — CLONAZEPAM 1 MG
1 TABLET ORAL THREE TIMES A DAY
Refills: 0 | Status: DISCONTINUED | OUTPATIENT
Start: 2019-08-21 | End: 2019-08-28

## 2019-08-21 RX ORDER — MIDAZOLAM HYDROCHLORIDE 1 MG/ML
2 INJECTION, SOLUTION INTRAMUSCULAR; INTRAVENOUS ONCE
Refills: 0 | Status: DISCONTINUED | OUTPATIENT
Start: 2019-08-21 | End: 2019-08-21

## 2019-08-21 RX ORDER — METHADONE HYDROCHLORIDE 40 MG/1
105 TABLET ORAL DAILY
Refills: 0 | Status: DISCONTINUED | OUTPATIENT
Start: 2019-08-21 | End: 2019-08-28

## 2019-08-21 RX ADMIN — MIRTAZAPINE 45 MILLIGRAM(S): 45 TABLET, ORALLY DISINTEGRATING ORAL at 20:58

## 2019-08-21 RX ADMIN — Medication 2 MILLIGRAM(S): at 18:06

## 2019-08-21 RX ADMIN — Medication 1 MILLIGRAM(S): at 12:39

## 2019-08-21 RX ADMIN — OLANZAPINE 10 MILLIGRAM(S): 15 TABLET, FILM COATED ORAL at 20:58

## 2019-08-21 RX ADMIN — MIDAZOLAM HYDROCHLORIDE 2 MILLIGRAM(S): 1 INJECTION, SOLUTION INTRAMUSCULAR; INTRAVENOUS at 16:53

## 2019-08-21 RX ADMIN — Medication 2 MILLIGRAM(S): at 09:45

## 2019-08-21 RX ADMIN — Medication 225 MILLIGRAM(S): at 08:44

## 2019-08-21 RX ADMIN — Medication 1 MILLIGRAM(S): at 08:44

## 2019-08-21 RX ADMIN — METHADONE HYDROCHLORIDE 105 MILLIGRAM(S): 40 TABLET ORAL at 08:44

## 2019-08-21 RX ADMIN — Medication 1 MILLIGRAM(S): at 20:58

## 2019-08-22 PROCEDURE — 90853 GROUP PSYCHOTHERAPY: CPT

## 2019-08-22 PROCEDURE — 99232 SBSQ HOSP IP/OBS MODERATE 35: CPT | Mod: GC

## 2019-08-22 RX ORDER — OLANZAPINE 15 MG/1
12.5 TABLET, FILM COATED ORAL AT BEDTIME
Refills: 0 | Status: DISCONTINUED | OUTPATIENT
Start: 2019-08-22 | End: 2019-10-17

## 2019-08-22 RX ADMIN — Medication 1 MILLIGRAM(S): at 08:44

## 2019-08-22 RX ADMIN — METHADONE HYDROCHLORIDE 105 MILLIGRAM(S): 40 TABLET ORAL at 08:44

## 2019-08-22 RX ADMIN — Medication 2 MILLIGRAM(S): at 08:44

## 2019-08-22 RX ADMIN — Medication 2 MILLIGRAM(S): at 14:44

## 2019-08-22 RX ADMIN — OLANZAPINE 12.5 MILLIGRAM(S): 15 TABLET, FILM COATED ORAL at 20:36

## 2019-08-22 RX ADMIN — Medication 1 MILLIGRAM(S): at 20:36

## 2019-08-22 RX ADMIN — Medication 2 MILLIGRAM(S): at 00:40

## 2019-08-22 RX ADMIN — Medication 225 MILLIGRAM(S): at 08:44

## 2019-08-22 RX ADMIN — Medication 2 MILLIGRAM(S): at 20:45

## 2019-08-22 RX ADMIN — Medication 1 MILLIGRAM(S): at 12:48

## 2019-08-22 RX ADMIN — MIRTAZAPINE 45 MILLIGRAM(S): 45 TABLET, ORALLY DISINTEGRATING ORAL at 20:36

## 2019-08-23 PROCEDURE — 99232 SBSQ HOSP IP/OBS MODERATE 35: CPT | Mod: 25

## 2019-08-23 PROCEDURE — 90870 ELECTROCONVULSIVE THERAPY: CPT

## 2019-08-23 RX ORDER — MIDAZOLAM HYDROCHLORIDE 1 MG/ML
2 INJECTION, SOLUTION INTRAMUSCULAR; INTRAVENOUS ONCE
Refills: 0 | Status: DISCONTINUED | OUTPATIENT
Start: 2019-08-23 | End: 2019-08-23

## 2019-08-23 RX ADMIN — Medication 2 MILLIGRAM(S): at 09:59

## 2019-08-23 RX ADMIN — Medication 2 MILLIGRAM(S): at 16:18

## 2019-08-23 RX ADMIN — Medication 1 MILLIGRAM(S): at 12:41

## 2019-08-23 RX ADMIN — METHADONE HYDROCHLORIDE 105 MILLIGRAM(S): 40 TABLET ORAL at 08:59

## 2019-08-23 RX ADMIN — Medication 1 MILLIGRAM(S): at 08:59

## 2019-08-23 RX ADMIN — MIRTAZAPINE 45 MILLIGRAM(S): 45 TABLET, ORALLY DISINTEGRATING ORAL at 20:35

## 2019-08-23 RX ADMIN — Medication 225 MILLIGRAM(S): at 08:59

## 2019-08-23 RX ADMIN — Medication 2 MILLIGRAM(S): at 22:32

## 2019-08-23 RX ADMIN — Medication 1 MILLIGRAM(S): at 20:51

## 2019-08-23 RX ADMIN — OLANZAPINE 12.5 MILLIGRAM(S): 15 TABLET, FILM COATED ORAL at 20:35

## 2019-08-23 RX ADMIN — MIDAZOLAM HYDROCHLORIDE 2 MILLIGRAM(S): 1 INJECTION, SOLUTION INTRAMUSCULAR; INTRAVENOUS at 15:54

## 2019-08-24 PROCEDURE — 99232 SBSQ HOSP IP/OBS MODERATE 35: CPT

## 2019-08-24 RX ADMIN — OLANZAPINE 12.5 MILLIGRAM(S): 15 TABLET, FILM COATED ORAL at 20:40

## 2019-08-24 RX ADMIN — Medication 1 MILLIGRAM(S): at 12:54

## 2019-08-24 RX ADMIN — MIRTAZAPINE 45 MILLIGRAM(S): 45 TABLET, ORALLY DISINTEGRATING ORAL at 20:40

## 2019-08-24 RX ADMIN — Medication 225 MILLIGRAM(S): at 08:13

## 2019-08-24 RX ADMIN — Medication 2 MILLIGRAM(S): at 09:03

## 2019-08-24 RX ADMIN — Medication 1 MILLIGRAM(S): at 08:12

## 2019-08-24 RX ADMIN — Medication 1 MILLIGRAM(S): at 20:40

## 2019-08-24 RX ADMIN — METHADONE HYDROCHLORIDE 105 MILLIGRAM(S): 40 TABLET ORAL at 08:12

## 2019-08-24 RX ADMIN — Medication 2 MILLIGRAM(S): at 21:16

## 2019-08-24 RX ADMIN — Medication 2 MILLIGRAM(S): at 15:15

## 2019-08-25 PROCEDURE — 99232 SBSQ HOSP IP/OBS MODERATE 35: CPT

## 2019-08-25 RX ADMIN — Medication 1 MILLIGRAM(S): at 12:46

## 2019-08-25 RX ADMIN — METHADONE HYDROCHLORIDE 105 MILLIGRAM(S): 40 TABLET ORAL at 09:01

## 2019-08-25 RX ADMIN — Medication 2 MILLIGRAM(S): at 14:47

## 2019-08-25 RX ADMIN — Medication 2 MILLIGRAM(S): at 09:01

## 2019-08-25 RX ADMIN — MIRTAZAPINE 45 MILLIGRAM(S): 45 TABLET, ORALLY DISINTEGRATING ORAL at 21:00

## 2019-08-25 RX ADMIN — Medication 1 MILLIGRAM(S): at 09:01

## 2019-08-25 RX ADMIN — Medication 2 MILLIGRAM(S): at 21:00

## 2019-08-25 RX ADMIN — Medication 1 MILLIGRAM(S): at 21:00

## 2019-08-25 RX ADMIN — Medication 225 MILLIGRAM(S): at 09:01

## 2019-08-25 RX ADMIN — OLANZAPINE 12.5 MILLIGRAM(S): 15 TABLET, FILM COATED ORAL at 21:00

## 2019-08-26 PROCEDURE — 90853 GROUP PSYCHOTHERAPY: CPT

## 2019-08-26 PROCEDURE — 99232 SBSQ HOSP IP/OBS MODERATE 35: CPT | Mod: 25

## 2019-08-26 PROCEDURE — 90870 ELECTROCONVULSIVE THERAPY: CPT

## 2019-08-26 RX ORDER — MIDAZOLAM HYDROCHLORIDE 1 MG/ML
2 INJECTION, SOLUTION INTRAMUSCULAR; INTRAVENOUS ONCE
Refills: 0 | Status: DISCONTINUED | OUTPATIENT
Start: 2019-08-26 | End: 2019-08-26

## 2019-08-26 RX ADMIN — OLANZAPINE 12.5 MILLIGRAM(S): 15 TABLET, FILM COATED ORAL at 20:41

## 2019-08-26 RX ADMIN — MIDAZOLAM HYDROCHLORIDE 2 MILLIGRAM(S): 1 INJECTION, SOLUTION INTRAMUSCULAR; INTRAVENOUS at 13:05

## 2019-08-26 RX ADMIN — Medication 1 MILLIGRAM(S): at 14:30

## 2019-08-26 RX ADMIN — Medication 1 MILLIGRAM(S): at 08:48

## 2019-08-26 RX ADMIN — Medication 225 MILLIGRAM(S): at 08:48

## 2019-08-26 RX ADMIN — Medication 1 MILLIGRAM(S): at 20:41

## 2019-08-26 RX ADMIN — MIRTAZAPINE 45 MILLIGRAM(S): 45 TABLET, ORALLY DISINTEGRATING ORAL at 20:41

## 2019-08-26 RX ADMIN — METHADONE HYDROCHLORIDE 105 MILLIGRAM(S): 40 TABLET ORAL at 08:48

## 2019-08-26 RX ADMIN — Medication 2 MILLIGRAM(S): at 22:50

## 2019-08-26 RX ADMIN — Medication 2 MILLIGRAM(S): at 08:48

## 2019-08-26 RX ADMIN — Medication 2 MILLIGRAM(S): at 16:24

## 2019-08-26 RX ADMIN — MIDAZOLAM HYDROCHLORIDE 2 MILLIGRAM(S): 1 INJECTION, SOLUTION INTRAMUSCULAR; INTRAVENOUS at 13:10

## 2019-08-27 PROCEDURE — 99232 SBSQ HOSP IP/OBS MODERATE 35: CPT

## 2019-08-27 RX ADMIN — Medication 1 MILLIGRAM(S): at 08:43

## 2019-08-27 RX ADMIN — Medication 1 MILLIGRAM(S): at 20:57

## 2019-08-27 RX ADMIN — OLANZAPINE 12.5 MILLIGRAM(S): 15 TABLET, FILM COATED ORAL at 20:57

## 2019-08-27 RX ADMIN — Medication 225 MILLIGRAM(S): at 08:37

## 2019-08-27 RX ADMIN — Medication 2 MILLIGRAM(S): at 14:19

## 2019-08-27 RX ADMIN — METHADONE HYDROCHLORIDE 105 MILLIGRAM(S): 40 TABLET ORAL at 08:43

## 2019-08-27 RX ADMIN — MIRTAZAPINE 45 MILLIGRAM(S): 45 TABLET, ORALLY DISINTEGRATING ORAL at 20:57

## 2019-08-27 RX ADMIN — Medication 2 MILLIGRAM(S): at 08:37

## 2019-08-27 RX ADMIN — Medication 2 MILLIGRAM(S): at 20:58

## 2019-08-27 RX ADMIN — Medication 1 MILLIGRAM(S): at 13:04

## 2019-08-28 PROCEDURE — 90870 ELECTROCONVULSIVE THERAPY: CPT

## 2019-08-28 PROCEDURE — 99232 SBSQ HOSP IP/OBS MODERATE 35: CPT | Mod: 25

## 2019-08-28 RX ORDER — METHADONE HYDROCHLORIDE 40 MG/1
105 TABLET ORAL DAILY
Refills: 0 | Status: DISCONTINUED | OUTPATIENT
Start: 2019-08-28 | End: 2019-09-04

## 2019-08-28 RX ORDER — MIDAZOLAM HYDROCHLORIDE 1 MG/ML
4 INJECTION, SOLUTION INTRAMUSCULAR; INTRAVENOUS ONCE
Refills: 0 | Status: DISCONTINUED | OUTPATIENT
Start: 2019-08-28 | End: 2019-08-28

## 2019-08-28 RX ORDER — CLONAZEPAM 1 MG
1 TABLET ORAL THREE TIMES A DAY
Refills: 0 | Status: DISCONTINUED | OUTPATIENT
Start: 2019-08-28 | End: 2019-09-04

## 2019-08-28 RX ADMIN — OLANZAPINE 12.5 MILLIGRAM(S): 15 TABLET, FILM COATED ORAL at 20:46

## 2019-08-28 RX ADMIN — MIDAZOLAM HYDROCHLORIDE 4 MILLIGRAM(S): 1 INJECTION, SOLUTION INTRAMUSCULAR; INTRAVENOUS at 13:20

## 2019-08-28 RX ADMIN — Medication 225 MILLIGRAM(S): at 08:40

## 2019-08-28 RX ADMIN — Medication 2 MILLIGRAM(S): at 08:30

## 2019-08-28 RX ADMIN — MIRTAZAPINE 45 MILLIGRAM(S): 45 TABLET, ORALLY DISINTEGRATING ORAL at 20:46

## 2019-08-28 RX ADMIN — Medication 1 MILLIGRAM(S): at 20:46

## 2019-08-28 RX ADMIN — Medication 1 MILLIGRAM(S): at 14:29

## 2019-08-28 RX ADMIN — METHADONE HYDROCHLORIDE 105 MILLIGRAM(S): 40 TABLET ORAL at 08:40

## 2019-08-28 RX ADMIN — Medication 1 MILLIGRAM(S): at 08:40

## 2019-08-28 RX ADMIN — Medication 2 MILLIGRAM(S): at 14:39

## 2019-08-29 LAB
ANION GAP SERPL CALC-SCNC: 12 MMO/L — SIGNIFICANT CHANGE UP (ref 7–14)
BASOPHILS # BLD AUTO: 0.02 K/UL — SIGNIFICANT CHANGE UP (ref 0–0.2)
BASOPHILS NFR BLD AUTO: 0.5 % — SIGNIFICANT CHANGE UP (ref 0–2)
BUN SERPL-MCNC: 16 MG/DL — SIGNIFICANT CHANGE UP (ref 7–23)
CALCIUM SERPL-MCNC: 9.8 MG/DL — SIGNIFICANT CHANGE UP (ref 8.4–10.5)
CHLORIDE SERPL-SCNC: 100 MMOL/L — SIGNIFICANT CHANGE UP (ref 98–107)
CO2 SERPL-SCNC: 27 MMOL/L — SIGNIFICANT CHANGE UP (ref 22–31)
CREAT SERPL-MCNC: 0.8 MG/DL — SIGNIFICANT CHANGE UP (ref 0.5–1.3)
EOSINOPHIL # BLD AUTO: 0.21 K/UL — SIGNIFICANT CHANGE UP (ref 0–0.5)
EOSINOPHIL NFR BLD AUTO: 5.1 % — SIGNIFICANT CHANGE UP (ref 0–6)
GLUCOSE SERPL-MCNC: 132 MG/DL — HIGH (ref 70–99)
HCT VFR BLD CALC: 41.1 % — SIGNIFICANT CHANGE UP (ref 39–50)
HGB BLD-MCNC: 13.4 G/DL — SIGNIFICANT CHANGE UP (ref 13–17)
IMM GRANULOCYTES NFR BLD AUTO: 0.2 % — SIGNIFICANT CHANGE UP (ref 0–1.5)
LYMPHOCYTES # BLD AUTO: 1.71 K/UL — SIGNIFICANT CHANGE UP (ref 1–3.3)
LYMPHOCYTES # BLD AUTO: 41.6 % — SIGNIFICANT CHANGE UP (ref 13–44)
MCHC RBC-ENTMCNC: 28.5 PG — SIGNIFICANT CHANGE UP (ref 27–34)
MCHC RBC-ENTMCNC: 32.6 % — SIGNIFICANT CHANGE UP (ref 32–36)
MCV RBC AUTO: 87.3 FL — SIGNIFICANT CHANGE UP (ref 80–100)
MONOCYTES # BLD AUTO: 0.37 K/UL — SIGNIFICANT CHANGE UP (ref 0–0.9)
MONOCYTES NFR BLD AUTO: 9 % — SIGNIFICANT CHANGE UP (ref 2–14)
NEUTROPHILS # BLD AUTO: 1.79 K/UL — LOW (ref 1.8–7.4)
NEUTROPHILS NFR BLD AUTO: 43.6 % — SIGNIFICANT CHANGE UP (ref 43–77)
NRBC # FLD: 0.03 K/UL — SIGNIFICANT CHANGE UP (ref 0–0)
PLATELET # BLD AUTO: 271 K/UL — SIGNIFICANT CHANGE UP (ref 150–400)
PMV BLD: 9.6 FL — SIGNIFICANT CHANGE UP (ref 7–13)
POTASSIUM SERPL-MCNC: 4.6 MMOL/L — SIGNIFICANT CHANGE UP (ref 3.5–5.3)
POTASSIUM SERPL-SCNC: 4.6 MMOL/L — SIGNIFICANT CHANGE UP (ref 3.5–5.3)
RBC # BLD: 4.71 M/UL — SIGNIFICANT CHANGE UP (ref 4.2–5.8)
RBC # FLD: 11.9 % — SIGNIFICANT CHANGE UP (ref 10.3–14.5)
SODIUM SERPL-SCNC: 139 MMOL/L — SIGNIFICANT CHANGE UP (ref 135–145)
WBC # BLD: 4.11 K/UL — SIGNIFICANT CHANGE UP (ref 3.8–10.5)
WBC # FLD AUTO: 4.11 K/UL — SIGNIFICANT CHANGE UP (ref 3.8–10.5)

## 2019-08-29 PROCEDURE — 99232 SBSQ HOSP IP/OBS MODERATE 35: CPT

## 2019-08-29 RX ORDER — TUBERCULIN PURIFIED PROTEIN DERIVATIVE 5 [IU]/.1ML
5 INJECTION, SOLUTION INTRADERMAL ONCE
Refills: 0 | Status: COMPLETED | OUTPATIENT
Start: 2019-08-29 | End: 2019-08-29

## 2019-08-29 RX ADMIN — MIRTAZAPINE 45 MILLIGRAM(S): 45 TABLET, ORALLY DISINTEGRATING ORAL at 20:38

## 2019-08-29 RX ADMIN — METHADONE HYDROCHLORIDE 105 MILLIGRAM(S): 40 TABLET ORAL at 08:07

## 2019-08-29 RX ADMIN — Medication 2 MILLIGRAM(S): at 08:07

## 2019-08-29 RX ADMIN — OLANZAPINE 12.5 MILLIGRAM(S): 15 TABLET, FILM COATED ORAL at 20:38

## 2019-08-29 RX ADMIN — Medication 1 MILLIGRAM(S): at 12:52

## 2019-08-29 RX ADMIN — TUBERCULIN PURIFIED PROTEIN DERIVATIVE 5 UNIT(S): 5 INJECTION, SOLUTION INTRADERMAL at 15:22

## 2019-08-29 RX ADMIN — Medication 2 MILLIGRAM(S): at 15:40

## 2019-08-29 RX ADMIN — Medication 1 MILLIGRAM(S): at 20:38

## 2019-08-29 RX ADMIN — Medication 1 MILLIGRAM(S): at 08:07

## 2019-08-29 RX ADMIN — Medication 225 MILLIGRAM(S): at 08:07

## 2019-08-30 PROCEDURE — 90870 ELECTROCONVULSIVE THERAPY: CPT

## 2019-08-30 PROCEDURE — 99232 SBSQ HOSP IP/OBS MODERATE 35: CPT | Mod: 25

## 2019-08-30 RX ORDER — MIDAZOLAM HYDROCHLORIDE 1 MG/ML
4 INJECTION, SOLUTION INTRAMUSCULAR; INTRAVENOUS ONCE
Refills: 0 | Status: DISCONTINUED | OUTPATIENT
Start: 2019-08-30 | End: 2019-08-30

## 2019-08-30 RX ADMIN — Medication 1 MILLIGRAM(S): at 20:53

## 2019-08-30 RX ADMIN — Medication 2 MILLIGRAM(S): at 20:01

## 2019-08-30 RX ADMIN — Medication 1 MILLIGRAM(S): at 08:11

## 2019-08-30 RX ADMIN — METHADONE HYDROCHLORIDE 105 MILLIGRAM(S): 40 TABLET ORAL at 08:11

## 2019-08-30 RX ADMIN — OLANZAPINE 12.5 MILLIGRAM(S): 15 TABLET, FILM COATED ORAL at 20:53

## 2019-08-30 RX ADMIN — MIRTAZAPINE 45 MILLIGRAM(S): 45 TABLET, ORALLY DISINTEGRATING ORAL at 20:53

## 2019-08-30 RX ADMIN — Medication 1 MILLIGRAM(S): at 13:06

## 2019-08-30 RX ADMIN — Medication 2 MILLIGRAM(S): at 03:30

## 2019-08-30 RX ADMIN — Medication 2 MILLIGRAM(S): at 12:00

## 2019-08-30 RX ADMIN — MIDAZOLAM HYDROCHLORIDE 4 MILLIGRAM(S): 1 INJECTION, SOLUTION INTRAMUSCULAR; INTRAVENOUS at 15:58

## 2019-08-30 RX ADMIN — Medication 225 MILLIGRAM(S): at 08:11

## 2019-08-30 NOTE — H&P ADULT - ASSESSMENT
178 Patient is a 36 years old male with PMH of IV drug abuse (last used 20 years ago) on methadone and depression who presents with acute onset of left sided hip and buttock pain of 7 days in duration.

## 2019-08-31 RX ORDER — NICOTINE POLACRILEX 2 MG
2 GUM BUCCAL
Refills: 0 | Status: DISCONTINUED | OUTPATIENT
Start: 2019-08-31 | End: 2019-10-17

## 2019-08-31 RX ADMIN — Medication 1 MILLIGRAM(S): at 20:34

## 2019-08-31 RX ADMIN — MIRTAZAPINE 45 MILLIGRAM(S): 45 TABLET, ORALLY DISINTEGRATING ORAL at 20:34

## 2019-08-31 RX ADMIN — METHADONE HYDROCHLORIDE 105 MILLIGRAM(S): 40 TABLET ORAL at 08:31

## 2019-08-31 RX ADMIN — Medication 1 MILLIGRAM(S): at 12:25

## 2019-08-31 RX ADMIN — Medication 2 MILLIGRAM(S): at 14:11

## 2019-08-31 RX ADMIN — Medication 2 MILLIGRAM(S): at 22:11

## 2019-08-31 RX ADMIN — OLANZAPINE 12.5 MILLIGRAM(S): 15 TABLET, FILM COATED ORAL at 20:34

## 2019-08-31 RX ADMIN — Medication 2 MILLIGRAM(S): at 06:15

## 2019-08-31 RX ADMIN — Medication 225 MILLIGRAM(S): at 08:31

## 2019-08-31 RX ADMIN — Medication 1 MILLIGRAM(S): at 08:31

## 2019-09-01 RX ADMIN — Medication 1 MILLIGRAM(S): at 12:12

## 2019-09-01 RX ADMIN — Medication 1 MILLIGRAM(S): at 08:21

## 2019-09-01 RX ADMIN — OLANZAPINE 12.5 MILLIGRAM(S): 15 TABLET, FILM COATED ORAL at 20:51

## 2019-09-01 RX ADMIN — MIRTAZAPINE 45 MILLIGRAM(S): 45 TABLET, ORALLY DISINTEGRATING ORAL at 20:51

## 2019-09-01 RX ADMIN — Medication 1 MILLIGRAM(S): at 20:51

## 2019-09-01 RX ADMIN — Medication 2 MILLIGRAM(S): at 08:20

## 2019-09-01 RX ADMIN — TUBERCULIN PURIFIED PROTEIN DERIVATIVE 5 UNIT(S): 5 INJECTION, SOLUTION INTRADERMAL at 12:56

## 2019-09-01 RX ADMIN — Medication 2 MILLIGRAM(S): at 16:04

## 2019-09-01 RX ADMIN — METHADONE HYDROCHLORIDE 105 MILLIGRAM(S): 40 TABLET ORAL at 08:21

## 2019-09-01 RX ADMIN — Medication 225 MILLIGRAM(S): at 08:21

## 2019-09-02 RX ADMIN — OLANZAPINE 12.5 MILLIGRAM(S): 15 TABLET, FILM COATED ORAL at 20:44

## 2019-09-02 RX ADMIN — Medication 2 MILLIGRAM(S): at 15:54

## 2019-09-02 RX ADMIN — Medication 1 MILLIGRAM(S): at 20:44

## 2019-09-02 RX ADMIN — Medication 2 MILLIGRAM(S): at 07:45

## 2019-09-02 RX ADMIN — MIRTAZAPINE 45 MILLIGRAM(S): 45 TABLET, ORALLY DISINTEGRATING ORAL at 20:44

## 2019-09-02 RX ADMIN — Medication 1 MILLIGRAM(S): at 12:55

## 2019-09-02 RX ADMIN — Medication 1 MILLIGRAM(S): at 08:18

## 2019-09-02 RX ADMIN — METHADONE HYDROCHLORIDE 105 MILLIGRAM(S): 40 TABLET ORAL at 08:18

## 2019-09-02 RX ADMIN — Medication 225 MILLIGRAM(S): at 08:18

## 2019-09-03 PROCEDURE — 99232 SBSQ HOSP IP/OBS MODERATE 35: CPT

## 2019-09-03 RX ADMIN — MIRTAZAPINE 45 MILLIGRAM(S): 45 TABLET, ORALLY DISINTEGRATING ORAL at 20:22

## 2019-09-03 RX ADMIN — METHADONE HYDROCHLORIDE 105 MILLIGRAM(S): 40 TABLET ORAL at 08:18

## 2019-09-03 RX ADMIN — Medication 2 MILLIGRAM(S): at 00:45

## 2019-09-03 RX ADMIN — Medication 1 MILLIGRAM(S): at 08:18

## 2019-09-03 RX ADMIN — Medication 2 MILLIGRAM(S): at 08:45

## 2019-09-03 RX ADMIN — Medication 225 MILLIGRAM(S): at 08:18

## 2019-09-03 RX ADMIN — Medication 1 MILLIGRAM(S): at 12:34

## 2019-09-03 RX ADMIN — Medication 1 MILLIGRAM(S): at 20:22

## 2019-09-03 RX ADMIN — Medication 2 MILLIGRAM(S): at 16:22

## 2019-09-03 RX ADMIN — OLANZAPINE 12.5 MILLIGRAM(S): 15 TABLET, FILM COATED ORAL at 20:22

## 2019-09-04 PROCEDURE — 90870 ELECTROCONVULSIVE THERAPY: CPT

## 2019-09-04 PROCEDURE — 99232 SBSQ HOSP IP/OBS MODERATE 35: CPT | Mod: 25

## 2019-09-04 RX ORDER — CLONAZEPAM 1 MG
1 TABLET ORAL THREE TIMES A DAY
Refills: 0 | Status: DISCONTINUED | OUTPATIENT
Start: 2019-09-04 | End: 2019-09-11

## 2019-09-04 RX ORDER — MIDAZOLAM HYDROCHLORIDE 1 MG/ML
4 INJECTION, SOLUTION INTRAMUSCULAR; INTRAVENOUS ONCE
Refills: 0 | Status: DISCONTINUED | OUTPATIENT
Start: 2019-09-04 | End: 2019-09-04

## 2019-09-04 RX ORDER — METHADONE HYDROCHLORIDE 40 MG/1
105 TABLET ORAL DAILY
Refills: 0 | Status: DISCONTINUED | OUTPATIENT
Start: 2019-09-05 | End: 2019-09-11

## 2019-09-04 RX ADMIN — Medication 2 MILLIGRAM(S): at 08:50

## 2019-09-04 RX ADMIN — METHADONE HYDROCHLORIDE 105 MILLIGRAM(S): 40 TABLET ORAL at 08:44

## 2019-09-04 RX ADMIN — Medication 1 MILLIGRAM(S): at 12:41

## 2019-09-04 RX ADMIN — Medication 1 MILLIGRAM(S): at 08:44

## 2019-09-04 RX ADMIN — OLANZAPINE 12.5 MILLIGRAM(S): 15 TABLET, FILM COATED ORAL at 20:35

## 2019-09-04 RX ADMIN — Medication 2 MILLIGRAM(S): at 16:10

## 2019-09-04 RX ADMIN — MIRTAZAPINE 45 MILLIGRAM(S): 45 TABLET, ORALLY DISINTEGRATING ORAL at 20:35

## 2019-09-04 RX ADMIN — Medication 225 MILLIGRAM(S): at 08:44

## 2019-09-04 RX ADMIN — Medication 2 MILLIGRAM(S): at 00:47

## 2019-09-04 RX ADMIN — MIDAZOLAM HYDROCHLORIDE 4 MILLIGRAM(S): 1 INJECTION, SOLUTION INTRAMUSCULAR; INTRAVENOUS at 14:38

## 2019-09-04 RX ADMIN — Medication 1 MILLIGRAM(S): at 20:35

## 2019-09-05 PROCEDURE — 90853 GROUP PSYCHOTHERAPY: CPT

## 2019-09-05 PROCEDURE — 90832 PSYTX W PT 30 MINUTES: CPT | Mod: 59

## 2019-09-05 PROCEDURE — 99232 SBSQ HOSP IP/OBS MODERATE 35: CPT

## 2019-09-05 RX ADMIN — OLANZAPINE 12.5 MILLIGRAM(S): 15 TABLET, FILM COATED ORAL at 20:20

## 2019-09-05 RX ADMIN — Medication 225 MILLIGRAM(S): at 08:31

## 2019-09-05 RX ADMIN — MIRTAZAPINE 45 MILLIGRAM(S): 45 TABLET, ORALLY DISINTEGRATING ORAL at 20:21

## 2019-09-05 RX ADMIN — Medication 2 MILLIGRAM(S): at 08:36

## 2019-09-05 RX ADMIN — METHADONE HYDROCHLORIDE 105 MILLIGRAM(S): 40 TABLET ORAL at 08:31

## 2019-09-05 RX ADMIN — Medication 2 MILLIGRAM(S): at 16:40

## 2019-09-05 RX ADMIN — Medication 1 MILLIGRAM(S): at 20:21

## 2019-09-05 RX ADMIN — Medication 1 MILLIGRAM(S): at 08:31

## 2019-09-05 RX ADMIN — Medication 1 MILLIGRAM(S): at 12:54

## 2019-09-05 RX ADMIN — Medication 2 MILLIGRAM(S): at 00:36

## 2019-09-06 PROCEDURE — 90870 ELECTROCONVULSIVE THERAPY: CPT

## 2019-09-06 PROCEDURE — 99232 SBSQ HOSP IP/OBS MODERATE 35: CPT | Mod: 25

## 2019-09-06 RX ORDER — MIDAZOLAM HYDROCHLORIDE 1 MG/ML
4 INJECTION, SOLUTION INTRAMUSCULAR; INTRAVENOUS ONCE
Refills: 0 | Status: DISCONTINUED | OUTPATIENT
Start: 2019-09-06 | End: 2019-09-06

## 2019-09-06 RX ADMIN — OLANZAPINE 12.5 MILLIGRAM(S): 15 TABLET, FILM COATED ORAL at 20:25

## 2019-09-06 RX ADMIN — Medication 2 MILLIGRAM(S): at 02:16

## 2019-09-06 RX ADMIN — METHADONE HYDROCHLORIDE 105 MILLIGRAM(S): 40 TABLET ORAL at 08:16

## 2019-09-06 RX ADMIN — Medication 225 MILLIGRAM(S): at 08:16

## 2019-09-06 RX ADMIN — Medication 2 MILLIGRAM(S): at 18:35

## 2019-09-06 RX ADMIN — Medication 1 MILLIGRAM(S): at 12:45

## 2019-09-06 RX ADMIN — Medication 1 MILLIGRAM(S): at 08:16

## 2019-09-06 RX ADMIN — Medication 1 MILLIGRAM(S): at 20:25

## 2019-09-06 RX ADMIN — MIDAZOLAM HYDROCHLORIDE 4 MILLIGRAM(S): 1 INJECTION, SOLUTION INTRAMUSCULAR; INTRAVENOUS at 16:29

## 2019-09-06 RX ADMIN — Medication 2 MILLIGRAM(S): at 10:21

## 2019-09-06 RX ADMIN — MIRTAZAPINE 45 MILLIGRAM(S): 45 TABLET, ORALLY DISINTEGRATING ORAL at 20:25

## 2019-09-07 RX ADMIN — MIRTAZAPINE 45 MILLIGRAM(S): 45 TABLET, ORALLY DISINTEGRATING ORAL at 20:31

## 2019-09-07 RX ADMIN — Medication 2 MILLIGRAM(S): at 18:40

## 2019-09-07 RX ADMIN — OLANZAPINE 12.5 MILLIGRAM(S): 15 TABLET, FILM COATED ORAL at 20:31

## 2019-09-07 RX ADMIN — Medication 1 MILLIGRAM(S): at 12:46

## 2019-09-07 RX ADMIN — Medication 2 MILLIGRAM(S): at 02:50

## 2019-09-07 RX ADMIN — Medication 225 MILLIGRAM(S): at 08:51

## 2019-09-07 RX ADMIN — Medication 1 MILLIGRAM(S): at 08:51

## 2019-09-07 RX ADMIN — Medication 2 MILLIGRAM(S): at 10:38

## 2019-09-07 RX ADMIN — METHADONE HYDROCHLORIDE 105 MILLIGRAM(S): 40 TABLET ORAL at 08:51

## 2019-09-07 RX ADMIN — Medication 1 MILLIGRAM(S): at 20:31

## 2019-09-08 RX ADMIN — Medication 1 MILLIGRAM(S): at 20:49

## 2019-09-08 RX ADMIN — Medication 2 MILLIGRAM(S): at 17:40

## 2019-09-08 RX ADMIN — Medication 2 MILLIGRAM(S): at 03:35

## 2019-09-08 RX ADMIN — Medication 2 MILLIGRAM(S): at 11:35

## 2019-09-08 RX ADMIN — Medication 1 MILLIGRAM(S): at 08:42

## 2019-09-08 RX ADMIN — Medication 1 MILLIGRAM(S): at 13:04

## 2019-09-08 RX ADMIN — MIRTAZAPINE 45 MILLIGRAM(S): 45 TABLET, ORALLY DISINTEGRATING ORAL at 20:49

## 2019-09-08 RX ADMIN — Medication 2 MILLIGRAM(S): at 19:37

## 2019-09-08 RX ADMIN — Medication 225 MILLIGRAM(S): at 08:42

## 2019-09-08 RX ADMIN — METHADONE HYDROCHLORIDE 105 MILLIGRAM(S): 40 TABLET ORAL at 08:42

## 2019-09-08 RX ADMIN — OLANZAPINE 12.5 MILLIGRAM(S): 15 TABLET, FILM COATED ORAL at 20:49

## 2019-09-09 PROCEDURE — 90853 GROUP PSYCHOTHERAPY: CPT

## 2019-09-09 PROCEDURE — 99232 SBSQ HOSP IP/OBS MODERATE 35: CPT

## 2019-09-09 RX ORDER — ACETAMINOPHEN 500 MG
650 TABLET ORAL EVERY 6 HOURS
Refills: 0 | Status: DISCONTINUED | OUTPATIENT
Start: 2019-09-09 | End: 2019-10-17

## 2019-09-09 RX ADMIN — Medication 650 MILLIGRAM(S): at 12:10

## 2019-09-09 RX ADMIN — Medication 1 MILLIGRAM(S): at 08:19

## 2019-09-09 RX ADMIN — METHADONE HYDROCHLORIDE 105 MILLIGRAM(S): 40 TABLET ORAL at 08:19

## 2019-09-09 RX ADMIN — Medication 650 MILLIGRAM(S): at 12:50

## 2019-09-09 RX ADMIN — Medication 1 MILLIGRAM(S): at 20:34

## 2019-09-09 RX ADMIN — OLANZAPINE 12.5 MILLIGRAM(S): 15 TABLET, FILM COATED ORAL at 20:35

## 2019-09-09 RX ADMIN — MIRTAZAPINE 45 MILLIGRAM(S): 45 TABLET, ORALLY DISINTEGRATING ORAL at 20:34

## 2019-09-09 RX ADMIN — Medication 225 MILLIGRAM(S): at 08:19

## 2019-09-09 RX ADMIN — Medication 1 MILLIGRAM(S): at 12:50

## 2019-09-09 RX ADMIN — Medication 2 MILLIGRAM(S): at 19:58

## 2019-09-09 RX ADMIN — Medication 2 MILLIGRAM(S): at 12:00

## 2019-09-10 PROCEDURE — 90832 PSYTX W PT 30 MINUTES: CPT

## 2019-09-10 PROCEDURE — 99232 SBSQ HOSP IP/OBS MODERATE 35: CPT

## 2019-09-10 RX ADMIN — Medication 2 MILLIGRAM(S): at 04:17

## 2019-09-10 RX ADMIN — Medication 2 MILLIGRAM(S): at 16:48

## 2019-09-10 RX ADMIN — MIRTAZAPINE 45 MILLIGRAM(S): 45 TABLET, ORALLY DISINTEGRATING ORAL at 20:43

## 2019-09-10 RX ADMIN — METHADONE HYDROCHLORIDE 105 MILLIGRAM(S): 40 TABLET ORAL at 08:32

## 2019-09-10 RX ADMIN — Medication 225 MILLIGRAM(S): at 08:32

## 2019-09-10 RX ADMIN — Medication 1 MILLIGRAM(S): at 20:43

## 2019-09-10 RX ADMIN — Medication 2 MILLIGRAM(S): at 20:02

## 2019-09-10 RX ADMIN — Medication 2 MILLIGRAM(S): at 12:00

## 2019-09-10 RX ADMIN — Medication 1 MILLIGRAM(S): at 13:11

## 2019-09-10 RX ADMIN — Medication 1 MILLIGRAM(S): at 08:32

## 2019-09-10 RX ADMIN — OLANZAPINE 12.5 MILLIGRAM(S): 15 TABLET, FILM COATED ORAL at 20:43

## 2019-09-11 LAB
ANION GAP SERPL CALC-SCNC: 16 MMO/L — HIGH (ref 7–14)
BASOPHILS # BLD AUTO: 0.03 K/UL — SIGNIFICANT CHANGE UP (ref 0–0.2)
BASOPHILS NFR BLD AUTO: 0.5 % — SIGNIFICANT CHANGE UP (ref 0–2)
BUN SERPL-MCNC: 14 MG/DL — SIGNIFICANT CHANGE UP (ref 7–23)
CALCIUM SERPL-MCNC: 8.8 MG/DL — SIGNIFICANT CHANGE UP (ref 8.4–10.5)
CHLORIDE SERPL-SCNC: 101 MMOL/L — SIGNIFICANT CHANGE UP (ref 98–107)
CO2 SERPL-SCNC: 22 MMOL/L — SIGNIFICANT CHANGE UP (ref 22–31)
CREAT SERPL-MCNC: 0.61 MG/DL — SIGNIFICANT CHANGE UP (ref 0.5–1.3)
EOSINOPHIL # BLD AUTO: 0.31 K/UL — SIGNIFICANT CHANGE UP (ref 0–0.5)
EOSINOPHIL NFR BLD AUTO: 5.4 % — SIGNIFICANT CHANGE UP (ref 0–6)
GLUCOSE SERPL-MCNC: 117 MG/DL — HIGH (ref 70–99)
HCT VFR BLD CALC: 39.7 % — SIGNIFICANT CHANGE UP (ref 39–50)
HGB BLD-MCNC: 12.9 G/DL — LOW (ref 13–17)
IMM GRANULOCYTES NFR BLD AUTO: 0.2 % — SIGNIFICANT CHANGE UP (ref 0–1.5)
LYMPHOCYTES # BLD AUTO: 2.69 K/UL — SIGNIFICANT CHANGE UP (ref 1–3.3)
LYMPHOCYTES # BLD AUTO: 47.2 % — HIGH (ref 13–44)
MCHC RBC-ENTMCNC: 28.4 PG — SIGNIFICANT CHANGE UP (ref 27–34)
MCHC RBC-ENTMCNC: 32.5 % — SIGNIFICANT CHANGE UP (ref 32–36)
MCV RBC AUTO: 87.3 FL — SIGNIFICANT CHANGE UP (ref 80–100)
MONOCYTES # BLD AUTO: 0.51 K/UL — SIGNIFICANT CHANGE UP (ref 0–0.9)
MONOCYTES NFR BLD AUTO: 8.9 % — SIGNIFICANT CHANGE UP (ref 2–14)
NEUTROPHILS # BLD AUTO: 2.15 K/UL — SIGNIFICANT CHANGE UP (ref 1.8–7.4)
NEUTROPHILS NFR BLD AUTO: 37.8 % — LOW (ref 43–77)
NRBC # FLD: 0 K/UL — SIGNIFICANT CHANGE UP (ref 0–0)
PLATELET # BLD AUTO: 305 K/UL — SIGNIFICANT CHANGE UP (ref 150–400)
PMV BLD: 9.1 FL — SIGNIFICANT CHANGE UP (ref 7–13)
POTASSIUM SERPL-MCNC: 4.2 MMOL/L — SIGNIFICANT CHANGE UP (ref 3.5–5.3)
POTASSIUM SERPL-SCNC: 4.2 MMOL/L — SIGNIFICANT CHANGE UP (ref 3.5–5.3)
RBC # BLD: 4.55 M/UL — SIGNIFICANT CHANGE UP (ref 4.2–5.8)
RBC # FLD: 12.1 % — SIGNIFICANT CHANGE UP (ref 10.3–14.5)
SODIUM SERPL-SCNC: 139 MMOL/L — SIGNIFICANT CHANGE UP (ref 135–145)
WBC # BLD: 5.7 K/UL — SIGNIFICANT CHANGE UP (ref 3.8–10.5)
WBC # FLD AUTO: 5.7 K/UL — SIGNIFICANT CHANGE UP (ref 3.8–10.5)

## 2019-09-11 PROCEDURE — 90870 ELECTROCONVULSIVE THERAPY: CPT

## 2019-09-11 PROCEDURE — 99232 SBSQ HOSP IP/OBS MODERATE 35: CPT | Mod: 25

## 2019-09-11 RX ORDER — CLONAZEPAM 1 MG
1 TABLET ORAL THREE TIMES A DAY
Refills: 0 | Status: DISCONTINUED | OUTPATIENT
Start: 2019-09-11 | End: 2019-09-18

## 2019-09-11 RX ORDER — METHADONE HYDROCHLORIDE 40 MG/1
105 TABLET ORAL DAILY
Refills: 0 | Status: DISCONTINUED | OUTPATIENT
Start: 2019-09-11 | End: 2019-09-18

## 2019-09-11 RX ORDER — MIDAZOLAM HYDROCHLORIDE 1 MG/ML
4 INJECTION, SOLUTION INTRAMUSCULAR; INTRAVENOUS ONCE
Refills: 0 | Status: DISCONTINUED | OUTPATIENT
Start: 2019-09-11 | End: 2019-09-11

## 2019-09-11 RX ADMIN — Medication 2 MILLIGRAM(S): at 12:33

## 2019-09-11 RX ADMIN — Medication 1 MILLIGRAM(S): at 21:08

## 2019-09-11 RX ADMIN — OLANZAPINE 12.5 MILLIGRAM(S): 15 TABLET, FILM COATED ORAL at 20:38

## 2019-09-11 RX ADMIN — Medication 1 MILLIGRAM(S): at 08:22

## 2019-09-11 RX ADMIN — Medication 1 MILLIGRAM(S): at 12:32

## 2019-09-11 RX ADMIN — Medication 225 MILLIGRAM(S): at 08:22

## 2019-09-11 RX ADMIN — MIRTAZAPINE 45 MILLIGRAM(S): 45 TABLET, ORALLY DISINTEGRATING ORAL at 20:38

## 2019-09-11 RX ADMIN — METHADONE HYDROCHLORIDE 105 MILLIGRAM(S): 40 TABLET ORAL at 08:22

## 2019-09-11 RX ADMIN — Medication 2 MILLIGRAM(S): at 20:38

## 2019-09-11 RX ADMIN — MIDAZOLAM HYDROCHLORIDE 4 MILLIGRAM(S): 1 INJECTION, SOLUTION INTRAMUSCULAR; INTRAVENOUS at 15:32

## 2019-09-11 RX ADMIN — Medication 2 MILLIGRAM(S): at 05:15

## 2019-09-12 PROCEDURE — 99232 SBSQ HOSP IP/OBS MODERATE 35: CPT

## 2019-09-12 PROCEDURE — 90853 GROUP PSYCHOTHERAPY: CPT

## 2019-09-12 RX ADMIN — Medication 225 MILLIGRAM(S): at 08:43

## 2019-09-12 RX ADMIN — Medication 2 MILLIGRAM(S): at 21:05

## 2019-09-12 RX ADMIN — Medication 2 MILLIGRAM(S): at 13:05

## 2019-09-12 RX ADMIN — Medication 2 MILLIGRAM(S): at 15:55

## 2019-09-12 RX ADMIN — Medication 1 MILLIGRAM(S): at 13:06

## 2019-09-12 RX ADMIN — Medication 1 MILLIGRAM(S): at 08:43

## 2019-09-12 RX ADMIN — Medication 1 MILLIGRAM(S): at 20:44

## 2019-09-12 RX ADMIN — METHADONE HYDROCHLORIDE 105 MILLIGRAM(S): 40 TABLET ORAL at 08:43

## 2019-09-12 RX ADMIN — MIRTAZAPINE 45 MILLIGRAM(S): 45 TABLET, ORALLY DISINTEGRATING ORAL at 20:25

## 2019-09-12 RX ADMIN — OLANZAPINE 12.5 MILLIGRAM(S): 15 TABLET, FILM COATED ORAL at 20:26

## 2019-09-12 RX ADMIN — Medication 2 MILLIGRAM(S): at 04:57

## 2019-09-13 PROCEDURE — 90870 ELECTROCONVULSIVE THERAPY: CPT

## 2019-09-13 PROCEDURE — 99231 SBSQ HOSP IP/OBS SF/LOW 25: CPT | Mod: 25

## 2019-09-13 RX ORDER — MIDAZOLAM HYDROCHLORIDE 1 MG/ML
4 INJECTION, SOLUTION INTRAMUSCULAR; INTRAVENOUS ONCE
Refills: 0 | Status: DISCONTINUED | OUTPATIENT
Start: 2019-09-13 | End: 2019-09-13

## 2019-09-13 RX ADMIN — Medication 2 MILLIGRAM(S): at 13:35

## 2019-09-13 RX ADMIN — MIRTAZAPINE 45 MILLIGRAM(S): 45 TABLET, ORALLY DISINTEGRATING ORAL at 20:39

## 2019-09-13 RX ADMIN — Medication 225 MILLIGRAM(S): at 08:24

## 2019-09-13 RX ADMIN — OLANZAPINE 12.5 MILLIGRAM(S): 15 TABLET, FILM COATED ORAL at 20:39

## 2019-09-13 RX ADMIN — Medication 1 MILLIGRAM(S): at 13:35

## 2019-09-13 RX ADMIN — METHADONE HYDROCHLORIDE 105 MILLIGRAM(S): 40 TABLET ORAL at 08:23

## 2019-09-13 RX ADMIN — Medication 2 MILLIGRAM(S): at 05:35

## 2019-09-13 RX ADMIN — MIDAZOLAM HYDROCHLORIDE 4 MILLIGRAM(S): 1 INJECTION, SOLUTION INTRAMUSCULAR; INTRAVENOUS at 14:14

## 2019-09-13 RX ADMIN — Medication 1 MILLIGRAM(S): at 08:23

## 2019-09-13 RX ADMIN — Medication 1 MILLIGRAM(S): at 20:39

## 2019-09-13 RX ADMIN — Medication 2 MILLIGRAM(S): at 21:46

## 2019-09-14 RX ORDER — DIPHENHYDRAMINE HCL 50 MG
50 CAPSULE ORAL ONCE
Refills: 0 | Status: DISCONTINUED | OUTPATIENT
Start: 2019-09-14 | End: 2019-10-17

## 2019-09-14 RX ADMIN — METHADONE HYDROCHLORIDE 105 MILLIGRAM(S): 40 TABLET ORAL at 08:45

## 2019-09-14 RX ADMIN — Medication 1 MILLIGRAM(S): at 13:12

## 2019-09-14 RX ADMIN — Medication 2 MILLIGRAM(S): at 15:37

## 2019-09-14 RX ADMIN — Medication 1 MILLIGRAM(S): at 21:18

## 2019-09-14 RX ADMIN — MIRTAZAPINE 45 MILLIGRAM(S): 45 TABLET, ORALLY DISINTEGRATING ORAL at 21:18

## 2019-09-14 RX ADMIN — OLANZAPINE 12.5 MILLIGRAM(S): 15 TABLET, FILM COATED ORAL at 21:18

## 2019-09-14 RX ADMIN — Medication 225 MILLIGRAM(S): at 08:45

## 2019-09-14 RX ADMIN — Medication 1 MILLIGRAM(S): at 08:45

## 2019-09-14 RX ADMIN — Medication 2 MILLIGRAM(S): at 07:37

## 2019-09-15 RX ADMIN — Medication 2 MILLIGRAM(S): at 08:39

## 2019-09-15 RX ADMIN — METHADONE HYDROCHLORIDE 105 MILLIGRAM(S): 40 TABLET ORAL at 08:37

## 2019-09-15 RX ADMIN — Medication 2 MILLIGRAM(S): at 16:50

## 2019-09-15 RX ADMIN — Medication 225 MILLIGRAM(S): at 08:36

## 2019-09-15 RX ADMIN — Medication 2 MILLIGRAM(S): at 11:41

## 2019-09-15 RX ADMIN — Medication 1 MILLIGRAM(S): at 12:13

## 2019-09-15 RX ADMIN — Medication 1 MILLIGRAM(S): at 08:36

## 2019-09-15 RX ADMIN — OLANZAPINE 12.5 MILLIGRAM(S): 15 TABLET, FILM COATED ORAL at 21:11

## 2019-09-15 RX ADMIN — Medication 1 MILLIGRAM(S): at 21:11

## 2019-09-15 RX ADMIN — MIRTAZAPINE 45 MILLIGRAM(S): 45 TABLET, ORALLY DISINTEGRATING ORAL at 21:11

## 2019-09-16 PROCEDURE — 99232 SBSQ HOSP IP/OBS MODERATE 35: CPT

## 2019-09-16 PROCEDURE — 90853 GROUP PSYCHOTHERAPY: CPT

## 2019-09-16 RX ADMIN — Medication 1 MILLIGRAM(S): at 13:06

## 2019-09-16 RX ADMIN — Medication 2 MILLIGRAM(S): at 17:00

## 2019-09-16 RX ADMIN — METHADONE HYDROCHLORIDE 105 MILLIGRAM(S): 40 TABLET ORAL at 08:28

## 2019-09-16 RX ADMIN — OLANZAPINE 12.5 MILLIGRAM(S): 15 TABLET, FILM COATED ORAL at 20:43

## 2019-09-16 RX ADMIN — MIRTAZAPINE 45 MILLIGRAM(S): 45 TABLET, ORALLY DISINTEGRATING ORAL at 20:43

## 2019-09-16 RX ADMIN — Medication 2 MILLIGRAM(S): at 09:08

## 2019-09-16 RX ADMIN — Medication 1 MILLIGRAM(S): at 20:43

## 2019-09-16 RX ADMIN — Medication 225 MILLIGRAM(S): at 08:28

## 2019-09-16 RX ADMIN — Medication 1 MILLIGRAM(S): at 08:28

## 2019-09-16 RX ADMIN — Medication 2 MILLIGRAM(S): at 00:51

## 2019-09-17 PROCEDURE — 90832 PSYTX W PT 30 MINUTES: CPT

## 2019-09-17 PROCEDURE — 99232 SBSQ HOSP IP/OBS MODERATE 35: CPT

## 2019-09-17 RX ADMIN — MIRTAZAPINE 45 MILLIGRAM(S): 45 TABLET, ORALLY DISINTEGRATING ORAL at 20:50

## 2019-09-17 RX ADMIN — METHADONE HYDROCHLORIDE 105 MILLIGRAM(S): 40 TABLET ORAL at 08:16

## 2019-09-17 RX ADMIN — Medication 1 MILLIGRAM(S): at 20:49

## 2019-09-17 RX ADMIN — Medication 2 MILLIGRAM(S): at 08:16

## 2019-09-17 RX ADMIN — Medication 1 MILLIGRAM(S): at 08:16

## 2019-09-17 RX ADMIN — OLANZAPINE 12.5 MILLIGRAM(S): 15 TABLET, FILM COATED ORAL at 20:50

## 2019-09-17 RX ADMIN — Medication 1 MILLIGRAM(S): at 13:15

## 2019-09-17 RX ADMIN — Medication 2 MILLIGRAM(S): at 16:16

## 2019-09-17 RX ADMIN — Medication 225 MILLIGRAM(S): at 08:16

## 2019-09-18 PROCEDURE — 90870 ELECTROCONVULSIVE THERAPY: CPT

## 2019-09-18 PROCEDURE — 99232 SBSQ HOSP IP/OBS MODERATE 35: CPT | Mod: 25

## 2019-09-18 RX ORDER — METHADONE HYDROCHLORIDE 40 MG/1
105 TABLET ORAL DAILY
Refills: 0 | Status: DISCONTINUED | OUTPATIENT
Start: 2019-09-18 | End: 2019-09-25

## 2019-09-18 RX ORDER — MIDAZOLAM HYDROCHLORIDE 1 MG/ML
4 INJECTION, SOLUTION INTRAMUSCULAR; INTRAVENOUS ONCE
Refills: 0 | Status: DISCONTINUED | OUTPATIENT
Start: 2019-09-18 | End: 2019-09-18

## 2019-09-18 RX ORDER — CLONAZEPAM 1 MG
1 TABLET ORAL THREE TIMES A DAY
Refills: 0 | Status: DISCONTINUED | OUTPATIENT
Start: 2019-09-18 | End: 2019-09-25

## 2019-09-18 RX ADMIN — MIDAZOLAM HYDROCHLORIDE 4 MILLIGRAM(S): 1 INJECTION, SOLUTION INTRAMUSCULAR; INTRAVENOUS at 13:17

## 2019-09-18 RX ADMIN — Medication 1 MILLIGRAM(S): at 20:34

## 2019-09-18 RX ADMIN — METHADONE HYDROCHLORIDE 105 MILLIGRAM(S): 40 TABLET ORAL at 08:49

## 2019-09-18 RX ADMIN — Medication 1 MILLIGRAM(S): at 08:49

## 2019-09-18 RX ADMIN — Medication 2 MILLIGRAM(S): at 00:41

## 2019-09-18 RX ADMIN — Medication 1 MILLIGRAM(S): at 14:18

## 2019-09-18 RX ADMIN — Medication 225 MILLIGRAM(S): at 08:49

## 2019-09-18 RX ADMIN — Medication 2 MILLIGRAM(S): at 08:42

## 2019-09-18 RX ADMIN — OLANZAPINE 12.5 MILLIGRAM(S): 15 TABLET, FILM COATED ORAL at 20:35

## 2019-09-18 RX ADMIN — Medication 2 MILLIGRAM(S): at 16:31

## 2019-09-18 RX ADMIN — MIRTAZAPINE 45 MILLIGRAM(S): 45 TABLET, ORALLY DISINTEGRATING ORAL at 20:34

## 2019-09-19 PROCEDURE — 99231 SBSQ HOSP IP/OBS SF/LOW 25: CPT

## 2019-09-19 RX ADMIN — Medication 2 MILLIGRAM(S): at 18:17

## 2019-09-19 RX ADMIN — OLANZAPINE 12.5 MILLIGRAM(S): 15 TABLET, FILM COATED ORAL at 20:25

## 2019-09-19 RX ADMIN — Medication 225 MILLIGRAM(S): at 08:29

## 2019-09-19 RX ADMIN — Medication 1 MILLIGRAM(S): at 13:01

## 2019-09-19 RX ADMIN — Medication 2 MILLIGRAM(S): at 02:17

## 2019-09-19 RX ADMIN — Medication 1 MILLIGRAM(S): at 08:29

## 2019-09-19 RX ADMIN — Medication 1 MILLIGRAM(S): at 20:25

## 2019-09-19 RX ADMIN — Medication 2 MILLIGRAM(S): at 13:01

## 2019-09-19 RX ADMIN — MIRTAZAPINE 45 MILLIGRAM(S): 45 TABLET, ORALLY DISINTEGRATING ORAL at 20:25

## 2019-09-19 RX ADMIN — Medication 2 MILLIGRAM(S): at 10:17

## 2019-09-19 RX ADMIN — METHADONE HYDROCHLORIDE 105 MILLIGRAM(S): 40 TABLET ORAL at 08:29

## 2019-09-20 PROCEDURE — 90870 ELECTROCONVULSIVE THERAPY: CPT

## 2019-09-20 PROCEDURE — 99231 SBSQ HOSP IP/OBS SF/LOW 25: CPT | Mod: 25

## 2019-09-20 RX ORDER — MIDAZOLAM HYDROCHLORIDE 1 MG/ML
4 INJECTION, SOLUTION INTRAMUSCULAR; INTRAVENOUS ONCE
Refills: 0 | Status: DISCONTINUED | OUTPATIENT
Start: 2019-09-20 | End: 2019-09-20

## 2019-09-20 RX ADMIN — Medication 225 MILLIGRAM(S): at 08:39

## 2019-09-20 RX ADMIN — Medication 2 MILLIGRAM(S): at 06:56

## 2019-09-20 RX ADMIN — MIRTAZAPINE 45 MILLIGRAM(S): 45 TABLET, ORALLY DISINTEGRATING ORAL at 20:07

## 2019-09-20 RX ADMIN — OLANZAPINE 12.5 MILLIGRAM(S): 15 TABLET, FILM COATED ORAL at 20:07

## 2019-09-20 RX ADMIN — METHADONE HYDROCHLORIDE 105 MILLIGRAM(S): 40 TABLET ORAL at 08:39

## 2019-09-20 RX ADMIN — MIDAZOLAM HYDROCHLORIDE 4 MILLIGRAM(S): 1 INJECTION, SOLUTION INTRAMUSCULAR; INTRAVENOUS at 14:50

## 2019-09-20 RX ADMIN — Medication 2 MILLIGRAM(S): at 15:34

## 2019-09-20 RX ADMIN — Medication 2 MILLIGRAM(S): at 17:06

## 2019-09-20 RX ADMIN — Medication 1 MILLIGRAM(S): at 08:39

## 2019-09-20 RX ADMIN — Medication 1 MILLIGRAM(S): at 13:16

## 2019-09-20 RX ADMIN — Medication 1 MILLIGRAM(S): at 20:07

## 2019-09-21 RX ADMIN — METHADONE HYDROCHLORIDE 105 MILLIGRAM(S): 40 TABLET ORAL at 08:34

## 2019-09-21 RX ADMIN — Medication 1 MILLIGRAM(S): at 20:59

## 2019-09-21 RX ADMIN — Medication 2 MILLIGRAM(S): at 00:17

## 2019-09-21 RX ADMIN — Medication 1 MILLIGRAM(S): at 13:02

## 2019-09-21 RX ADMIN — Medication 225 MILLIGRAM(S): at 08:34

## 2019-09-21 RX ADMIN — Medication 1 MILLIGRAM(S): at 08:34

## 2019-09-21 RX ADMIN — OLANZAPINE 12.5 MILLIGRAM(S): 15 TABLET, FILM COATED ORAL at 20:58

## 2019-09-21 RX ADMIN — Medication 2 MILLIGRAM(S): at 16:13

## 2019-09-21 RX ADMIN — Medication 2 MILLIGRAM(S): at 08:34

## 2019-09-21 RX ADMIN — MIRTAZAPINE 45 MILLIGRAM(S): 45 TABLET, ORALLY DISINTEGRATING ORAL at 20:56

## 2019-09-22 RX ADMIN — Medication 2 MILLIGRAM(S): at 16:48

## 2019-09-22 RX ADMIN — Medication 2 MILLIGRAM(S): at 08:45

## 2019-09-22 RX ADMIN — Medication 2 MILLIGRAM(S): at 00:51

## 2019-09-22 RX ADMIN — Medication 1 MILLIGRAM(S): at 21:14

## 2019-09-22 RX ADMIN — Medication 1 MILLIGRAM(S): at 08:05

## 2019-09-22 RX ADMIN — Medication 225 MILLIGRAM(S): at 08:05

## 2019-09-22 RX ADMIN — METHADONE HYDROCHLORIDE 105 MILLIGRAM(S): 40 TABLET ORAL at 08:05

## 2019-09-22 RX ADMIN — MIRTAZAPINE 45 MILLIGRAM(S): 45 TABLET, ORALLY DISINTEGRATING ORAL at 21:14

## 2019-09-22 RX ADMIN — Medication 1 MILLIGRAM(S): at 13:03

## 2019-09-22 RX ADMIN — OLANZAPINE 12.5 MILLIGRAM(S): 15 TABLET, FILM COATED ORAL at 21:15

## 2019-09-23 PROCEDURE — 90870 ELECTROCONVULSIVE THERAPY: CPT

## 2019-09-23 PROCEDURE — 99231 SBSQ HOSP IP/OBS SF/LOW 25: CPT | Mod: 25

## 2019-09-23 RX ORDER — MIDAZOLAM HYDROCHLORIDE 1 MG/ML
4 INJECTION, SOLUTION INTRAMUSCULAR; INTRAVENOUS ONCE
Refills: 0 | Status: DISCONTINUED | OUTPATIENT
Start: 2019-09-23 | End: 2019-10-17

## 2019-09-23 RX ADMIN — OLANZAPINE 12.5 MILLIGRAM(S): 15 TABLET, FILM COATED ORAL at 20:43

## 2019-09-23 RX ADMIN — METHADONE HYDROCHLORIDE 105 MILLIGRAM(S): 40 TABLET ORAL at 08:32

## 2019-09-23 RX ADMIN — Medication 1 MILLIGRAM(S): at 19:48

## 2019-09-23 RX ADMIN — Medication 1 MILLIGRAM(S): at 12:52

## 2019-09-23 RX ADMIN — Medication 2 MILLIGRAM(S): at 08:01

## 2019-09-23 RX ADMIN — Medication 1 MILLIGRAM(S): at 08:32

## 2019-09-23 RX ADMIN — MIRTAZAPINE 45 MILLIGRAM(S): 45 TABLET, ORALLY DISINTEGRATING ORAL at 20:43

## 2019-09-23 RX ADMIN — Medication 2 MILLIGRAM(S): at 16:10

## 2019-09-23 RX ADMIN — Medication 225 MILLIGRAM(S): at 08:32

## 2019-09-24 PROCEDURE — 99231 SBSQ HOSP IP/OBS SF/LOW 25: CPT

## 2019-09-24 RX ADMIN — MIRTAZAPINE 45 MILLIGRAM(S): 45 TABLET, ORALLY DISINTEGRATING ORAL at 21:08

## 2019-09-24 RX ADMIN — Medication 1 MILLIGRAM(S): at 21:08

## 2019-09-24 RX ADMIN — METHADONE HYDROCHLORIDE 105 MILLIGRAM(S): 40 TABLET ORAL at 08:48

## 2019-09-24 RX ADMIN — Medication 2 MILLIGRAM(S): at 16:30

## 2019-09-24 RX ADMIN — Medication 1 MILLIGRAM(S): at 08:48

## 2019-09-24 RX ADMIN — Medication 1 MILLIGRAM(S): at 12:56

## 2019-09-24 RX ADMIN — OLANZAPINE 12.5 MILLIGRAM(S): 15 TABLET, FILM COATED ORAL at 21:08

## 2019-09-24 RX ADMIN — Medication 2 MILLIGRAM(S): at 08:30

## 2019-09-24 RX ADMIN — Medication 2 MILLIGRAM(S): at 00:15

## 2019-09-24 RX ADMIN — Medication 225 MILLIGRAM(S): at 08:48

## 2019-09-25 PROCEDURE — 99231 SBSQ HOSP IP/OBS SF/LOW 25: CPT | Mod: 25

## 2019-09-25 PROCEDURE — 90870 ELECTROCONVULSIVE THERAPY: CPT

## 2019-09-25 RX ORDER — CLONAZEPAM 1 MG
1 TABLET ORAL THREE TIMES A DAY
Refills: 0 | Status: DISCONTINUED | OUTPATIENT
Start: 2019-09-25 | End: 2019-10-02

## 2019-09-25 RX ORDER — METHADONE HYDROCHLORIDE 40 MG/1
105 TABLET ORAL DAILY
Refills: 0 | Status: DISCONTINUED | OUTPATIENT
Start: 2019-09-25 | End: 2019-10-02

## 2019-09-25 RX ORDER — MIDAZOLAM HYDROCHLORIDE 1 MG/ML
4 INJECTION, SOLUTION INTRAMUSCULAR; INTRAVENOUS ONCE
Refills: 0 | Status: DISCONTINUED | OUTPATIENT
Start: 2019-09-25 | End: 2019-09-25

## 2019-09-25 RX ADMIN — OLANZAPINE 12.5 MILLIGRAM(S): 15 TABLET, FILM COATED ORAL at 21:13

## 2019-09-25 RX ADMIN — Medication 2 MILLIGRAM(S): at 18:05

## 2019-09-25 RX ADMIN — MIRTAZAPINE 45 MILLIGRAM(S): 45 TABLET, ORALLY DISINTEGRATING ORAL at 21:12

## 2019-09-25 RX ADMIN — Medication 225 MILLIGRAM(S): at 09:05

## 2019-09-25 RX ADMIN — Medication 2 MILLIGRAM(S): at 10:08

## 2019-09-25 RX ADMIN — Medication 1 MILLIGRAM(S): at 21:12

## 2019-09-25 RX ADMIN — Medication 1 MILLIGRAM(S): at 13:08

## 2019-09-25 RX ADMIN — Medication 1 MILLIGRAM(S): at 09:05

## 2019-09-25 RX ADMIN — METHADONE HYDROCHLORIDE 105 MILLIGRAM(S): 40 TABLET ORAL at 09:05

## 2019-09-25 RX ADMIN — MIDAZOLAM HYDROCHLORIDE 4 MILLIGRAM(S): 1 INJECTION, SOLUTION INTRAMUSCULAR; INTRAVENOUS at 13:54

## 2019-09-26 LAB
ANION GAP SERPL CALC-SCNC: 12 MMO/L — SIGNIFICANT CHANGE UP (ref 7–14)
BASOPHILS # BLD AUTO: 0.03 K/UL — SIGNIFICANT CHANGE UP (ref 0–0.2)
BASOPHILS NFR BLD AUTO: 0.5 % — SIGNIFICANT CHANGE UP (ref 0–2)
BUN SERPL-MCNC: 18 MG/DL — SIGNIFICANT CHANGE UP (ref 7–23)
CALCIUM SERPL-MCNC: 9.4 MG/DL — SIGNIFICANT CHANGE UP (ref 8.4–10.5)
CHLORIDE SERPL-SCNC: 101 MMOL/L — SIGNIFICANT CHANGE UP (ref 98–107)
CO2 SERPL-SCNC: 26 MMOL/L — SIGNIFICANT CHANGE UP (ref 22–31)
CREAT SERPL-MCNC: 0.75 MG/DL — SIGNIFICANT CHANGE UP (ref 0.5–1.3)
EOSINOPHIL # BLD AUTO: 0.18 K/UL — SIGNIFICANT CHANGE UP (ref 0–0.5)
EOSINOPHIL NFR BLD AUTO: 2.7 % — SIGNIFICANT CHANGE UP (ref 0–6)
GLUCOSE SERPL-MCNC: 121 MG/DL — HIGH (ref 70–99)
HCT VFR BLD CALC: 41.5 % — SIGNIFICANT CHANGE UP (ref 39–50)
HGB BLD-MCNC: 13.4 G/DL — SIGNIFICANT CHANGE UP (ref 13–17)
IMM GRANULOCYTES NFR BLD AUTO: 0.3 % — SIGNIFICANT CHANGE UP (ref 0–1.5)
LYMPHOCYTES # BLD AUTO: 1.87 K/UL — SIGNIFICANT CHANGE UP (ref 1–3.3)
LYMPHOCYTES # BLD AUTO: 28.2 % — SIGNIFICANT CHANGE UP (ref 13–44)
MCHC RBC-ENTMCNC: 27.7 PG — SIGNIFICANT CHANGE UP (ref 27–34)
MCHC RBC-ENTMCNC: 32.3 % — SIGNIFICANT CHANGE UP (ref 32–36)
MCV RBC AUTO: 85.7 FL — SIGNIFICANT CHANGE UP (ref 80–100)
MONOCYTES # BLD AUTO: 0.43 K/UL — SIGNIFICANT CHANGE UP (ref 0–0.9)
MONOCYTES NFR BLD AUTO: 6.5 % — SIGNIFICANT CHANGE UP (ref 2–14)
NEUTROPHILS # BLD AUTO: 4.09 K/UL — SIGNIFICANT CHANGE UP (ref 1.8–7.4)
NEUTROPHILS NFR BLD AUTO: 61.8 % — SIGNIFICANT CHANGE UP (ref 43–77)
NRBC # FLD: 0 K/UL — SIGNIFICANT CHANGE UP (ref 0–0)
PLATELET # BLD AUTO: 282 K/UL — SIGNIFICANT CHANGE UP (ref 150–400)
PMV BLD: 8.9 FL — SIGNIFICANT CHANGE UP (ref 7–13)
POTASSIUM SERPL-MCNC: 4.4 MMOL/L — SIGNIFICANT CHANGE UP (ref 3.5–5.3)
POTASSIUM SERPL-SCNC: 4.4 MMOL/L — SIGNIFICANT CHANGE UP (ref 3.5–5.3)
RBC # BLD: 4.84 M/UL — SIGNIFICANT CHANGE UP (ref 4.2–5.8)
RBC # FLD: 11.8 % — SIGNIFICANT CHANGE UP (ref 10.3–14.5)
SODIUM SERPL-SCNC: 139 MMOL/L — SIGNIFICANT CHANGE UP (ref 135–145)
WBC # BLD: 6.62 K/UL — SIGNIFICANT CHANGE UP (ref 3.8–10.5)
WBC # FLD AUTO: 6.62 K/UL — SIGNIFICANT CHANGE UP (ref 3.8–10.5)

## 2019-09-26 PROCEDURE — 99231 SBSQ HOSP IP/OBS SF/LOW 25: CPT

## 2019-09-26 PROCEDURE — 90832 PSYTX W PT 30 MINUTES: CPT

## 2019-09-26 RX ADMIN — Medication 225 MILLIGRAM(S): at 08:29

## 2019-09-26 RX ADMIN — Medication 2 MILLIGRAM(S): at 16:29

## 2019-09-26 RX ADMIN — Medication 1 MILLIGRAM(S): at 08:29

## 2019-09-26 RX ADMIN — Medication 1 MILLIGRAM(S): at 12:57

## 2019-09-26 RX ADMIN — Medication 1 MILLIGRAM(S): at 20:09

## 2019-09-26 RX ADMIN — Medication 2 MILLIGRAM(S): at 08:15

## 2019-09-26 RX ADMIN — MIRTAZAPINE 45 MILLIGRAM(S): 45 TABLET, ORALLY DISINTEGRATING ORAL at 20:09

## 2019-09-26 RX ADMIN — OLANZAPINE 12.5 MILLIGRAM(S): 15 TABLET, FILM COATED ORAL at 20:09

## 2019-09-26 RX ADMIN — METHADONE HYDROCHLORIDE 105 MILLIGRAM(S): 40 TABLET ORAL at 08:29

## 2019-09-27 PROCEDURE — 99231 SBSQ HOSP IP/OBS SF/LOW 25: CPT

## 2019-09-27 RX ADMIN — Medication 225 MILLIGRAM(S): at 09:06

## 2019-09-27 RX ADMIN — Medication 2 MILLIGRAM(S): at 18:19

## 2019-09-27 RX ADMIN — OLANZAPINE 12.5 MILLIGRAM(S): 15 TABLET, FILM COATED ORAL at 20:22

## 2019-09-27 RX ADMIN — Medication 2 MILLIGRAM(S): at 10:07

## 2019-09-27 RX ADMIN — METHADONE HYDROCHLORIDE 105 MILLIGRAM(S): 40 TABLET ORAL at 09:06

## 2019-09-27 RX ADMIN — Medication 1 MILLIGRAM(S): at 13:10

## 2019-09-27 RX ADMIN — Medication 2 MILLIGRAM(S): at 02:10

## 2019-09-27 RX ADMIN — Medication 1 MILLIGRAM(S): at 09:06

## 2019-09-27 RX ADMIN — Medication 1 MILLIGRAM(S): at 20:22

## 2019-09-27 RX ADMIN — MIRTAZAPINE 45 MILLIGRAM(S): 45 TABLET, ORALLY DISINTEGRATING ORAL at 20:22

## 2019-09-28 RX ADMIN — OLANZAPINE 12.5 MILLIGRAM(S): 15 TABLET, FILM COATED ORAL at 21:09

## 2019-09-28 RX ADMIN — METHADONE HYDROCHLORIDE 105 MILLIGRAM(S): 40 TABLET ORAL at 08:49

## 2019-09-28 RX ADMIN — Medication 1 MILLIGRAM(S): at 21:09

## 2019-09-28 RX ADMIN — Medication 1 MILLIGRAM(S): at 08:49

## 2019-09-28 RX ADMIN — Medication 1 MILLIGRAM(S): at 13:05

## 2019-09-28 RX ADMIN — Medication 2 MILLIGRAM(S): at 09:36

## 2019-09-28 RX ADMIN — MIRTAZAPINE 45 MILLIGRAM(S): 45 TABLET, ORALLY DISINTEGRATING ORAL at 21:09

## 2019-09-28 RX ADMIN — Medication 225 MILLIGRAM(S): at 08:50

## 2019-09-28 RX ADMIN — Medication 2 MILLIGRAM(S): at 17:36

## 2019-09-29 RX ORDER — CLONAZEPAM 1 MG
1 TABLET ORAL ONCE
Refills: 0 | Status: DISCONTINUED | OUTPATIENT
Start: 2019-09-29 | End: 2019-09-29

## 2019-09-29 RX ADMIN — Medication 1 MILLIGRAM(S): at 13:18

## 2019-09-29 RX ADMIN — OLANZAPINE 12.5 MILLIGRAM(S): 15 TABLET, FILM COATED ORAL at 21:36

## 2019-09-29 RX ADMIN — Medication 1 MILLIGRAM(S): at 09:36

## 2019-09-29 RX ADMIN — Medication 2 MILLIGRAM(S): at 11:08

## 2019-09-29 RX ADMIN — Medication 2 MILLIGRAM(S): at 19:09

## 2019-09-29 RX ADMIN — Medication 2 MILLIGRAM(S): at 03:06

## 2019-09-29 RX ADMIN — Medication 225 MILLIGRAM(S): at 09:37

## 2019-09-29 RX ADMIN — METHADONE HYDROCHLORIDE 105 MILLIGRAM(S): 40 TABLET ORAL at 09:37

## 2019-09-29 RX ADMIN — Medication 1 MILLIGRAM(S): at 21:36

## 2019-09-29 RX ADMIN — MIRTAZAPINE 45 MILLIGRAM(S): 45 TABLET, ORALLY DISINTEGRATING ORAL at 21:36

## 2019-09-30 PROCEDURE — 99231 SBSQ HOSP IP/OBS SF/LOW 25: CPT

## 2019-09-30 RX ADMIN — MIRTAZAPINE 45 MILLIGRAM(S): 45 TABLET, ORALLY DISINTEGRATING ORAL at 21:14

## 2019-09-30 RX ADMIN — Medication 2 MILLIGRAM(S): at 12:03

## 2019-09-30 RX ADMIN — Medication 1 MILLIGRAM(S): at 22:01

## 2019-09-30 RX ADMIN — Medication 1 MILLIGRAM(S): at 08:48

## 2019-09-30 RX ADMIN — METHADONE HYDROCHLORIDE 105 MILLIGRAM(S): 40 TABLET ORAL at 08:48

## 2019-09-30 RX ADMIN — Medication 2 MILLIGRAM(S): at 04:07

## 2019-09-30 RX ADMIN — Medication 1 TABLET(S): at 21:14

## 2019-09-30 RX ADMIN — Medication 225 MILLIGRAM(S): at 08:48

## 2019-09-30 RX ADMIN — Medication 2 MILLIGRAM(S): at 21:14

## 2019-09-30 RX ADMIN — OLANZAPINE 12.5 MILLIGRAM(S): 15 TABLET, FILM COATED ORAL at 21:14

## 2019-09-30 RX ADMIN — Medication 1 MILLIGRAM(S): at 13:08

## 2019-09-30 NOTE — CONSULT NOTE ADULT - ASSESSMENT
38 year old male with psych disorder now with exacerbation complains of right ear discomfort.  1.  RIGHT EAR PAIN:  ON EXAM CANAL WITH PUS.   OTITIS MEDIA.  START AUGMENTIN 875 MG BID FOR TEN DAYS.  IF DISCOMFORT PERSISTS, REFER TO ENT.  CHECK CBC.  2.  S/P BILATERAL WRIST LACERATIONS.  INTACT.  3.  PSYCH: AS PER ATTENDING

## 2019-09-30 NOTE — CONSULT NOTE ADULT - CONSULT REASON
Asked by attending to see this 38 year old male with psych disorder now with exacerbation admitted on 08/13/2019 after cutting wrists now with right ear pain.

## 2019-09-30 NOTE — CONSULT NOTE ADULT - SUBJECTIVE AND OBJECTIVE BOX
HPI:  38 year old male with psych disorder now with exacerbation complains of right ear pain.    PAST MEDICAL & SURGICAL HISTORY:  Diverticulitis  No significant past surgical history      Review of Systems:   CONSTITUTIONAL: No fever, weight loss, or fatigue  EYES: No eye pain, visual disturbances, or discharge  ENMT:  RIGHT EAR PAIN  NECK: No pain or stiffness  RESPIRATORY: No cough, wheezing, chills or hemoptysis; No shortness of breath  CARDIOVASCULAR: No chest pain, palpitations, dizziness, or leg swelling  GASTROINTESTINAL: No abdominal or epigastric pain. No nausea, vomiting, or hematemesis; No diarrhea or constipation. No melena or hematochezia.  GENITOURINARY: No dysuria, frequency, hematuria, or incontinence  NEUROLOGICAL: No headaches, memory loss, loss of strength, numbness, or tremors  SKIN: No itching, burning, rashes, or lesions   LYMPH NODES: No enlarged glands  ENDOCRINE: No heat or cold intolerance; No hair loss  MUSCULOSKELETAL: No joint pain or swelling; No muscle, back, or extremity pain  HEME/LYMPH: No easy bruising, or bleeding gums  ALLERY AND IMMUNOLOGIC: No hives or eczema    Allergies    No Known Allergies    Social History:  +CIGS, -ETOH, -DRUGS    FAMILY HISTORY:  No pertinent family history in first degree relatives      MEDICATIONS  (STANDING):  amoxicillin  875 milliGRAM(s)/clavulanate 1 Tablet(s) Oral two times a day  clonazePAM  Tablet 1 milliGRAM(s) Oral three times a day  haloperidol    Injectable 5 milliGRAM(s) IntraMuscular once  lidocaine 1% (Preservative-free) Injectable 10 milliLiter(s) Local Injection Once  methadone    Tablet 105 milliGRAM(s) Oral daily  midazolam Injectable 4 milliGRAM(s) IV Push once  mirtazapine 45 milliGRAM(s) Oral at bedtime  OLANZapine 12.5 milliGRAM(s) Oral at bedtime  venlafaxine  milliGRAM(s) Oral daily    MEDICATIONS  (PRN):  acetaminophen   Tablet .. 650 milliGRAM(s) Oral every 6 hours PRN Mild Pain (1 - 3), Moderate Pain (4 - 6), Severe Pain (7 - 10)  diphenhydrAMINE   Injectable 50 milliGRAM(s) IntraMuscular once PRN eps ppx  diphenhydrAMINE   Injectable 50 milliGRAM(s) IntraMuscular once PRN eps ppx  haloperidol     Tablet 5 milliGRAM(s) Oral every 6 hours PRN agitation  LORazepam     Tablet 2 milliGRAM(s) Oral every 8 hours PRN anxiety  nicotine  Polacrilex Gum 2 milliGRAM(s) Oral every 2 hours PRN nicotine withdrawal    VS:  116/72  73 T 97.7    PHYSICAL EXAM:  GENERAL: NAD, well-developed  HEAD:  Atraumatic, Normocephalic  EYES: EOMI,  conjunctiva and sclera clear  EARS:  RIGHT EAR WITH PUS END OF CANAL.  LEFT EAR TM INTACT.  NECK: Supple, No JVD  CHEST/LUNG: Clear to auscultation bilaterally; No wheeze  HEART: Regular rate and rhythm; No murmurs, rubs, or gallops  ABDOMEN: Soft, Nontender, Nondistended; Bowel sounds present  EXTREMITIES: No clubbing, cyanosis, or edema  NEUROLOGY: non-focal  SKIN: No rashes or lesions    LABS:  2019  WBC 6.62 H/H  13.4/41.5 PLAT 282   K 4.4  CO2 26 GLUC 121 BUN 18 CR 0.75    EK2019  NSR WNL QTC .417      Consultant(s) Notes Reviewed:  NOTES REVIEWED    Care Discussed with Consultants/Other Providers:  ATTENDING AND STAFF

## 2019-10-01 PROCEDURE — 99231 SBSQ HOSP IP/OBS SF/LOW 25: CPT

## 2019-10-01 PROCEDURE — 90832 PSYTX W PT 30 MINUTES: CPT

## 2019-10-01 RX ADMIN — MIRTAZAPINE 45 MILLIGRAM(S): 45 TABLET, ORALLY DISINTEGRATING ORAL at 21:06

## 2019-10-01 RX ADMIN — Medication 1 MILLIGRAM(S): at 21:06

## 2019-10-01 RX ADMIN — Medication 225 MILLIGRAM(S): at 08:39

## 2019-10-01 RX ADMIN — Medication 2 MILLIGRAM(S): at 17:43

## 2019-10-01 RX ADMIN — Medication 1 TABLET(S): at 21:06

## 2019-10-01 RX ADMIN — Medication 2 MILLIGRAM(S): at 05:17

## 2019-10-01 RX ADMIN — Medication 1 MILLIGRAM(S): at 08:39

## 2019-10-01 RX ADMIN — METHADONE HYDROCHLORIDE 105 MILLIGRAM(S): 40 TABLET ORAL at 08:39

## 2019-10-01 RX ADMIN — Medication 1 MILLIGRAM(S): at 13:23

## 2019-10-01 RX ADMIN — OLANZAPINE 12.5 MILLIGRAM(S): 15 TABLET, FILM COATED ORAL at 21:06

## 2019-10-01 RX ADMIN — Medication 2 MILLIGRAM(S): at 14:04

## 2019-10-01 RX ADMIN — Medication 1 TABLET(S): at 08:39

## 2019-10-01 RX ADMIN — Medication 2 MILLIGRAM(S): at 22:04

## 2019-10-02 LAB
HCT VFR BLD CALC: 43 % — SIGNIFICANT CHANGE UP (ref 39–50)
HGB BLD-MCNC: 13.5 G/DL — SIGNIFICANT CHANGE UP (ref 13–17)
MCHC RBC-ENTMCNC: 27.6 PG — SIGNIFICANT CHANGE UP (ref 27–34)
MCHC RBC-ENTMCNC: 31.4 % — LOW (ref 32–36)
MCV RBC AUTO: 87.9 FL — SIGNIFICANT CHANGE UP (ref 80–100)
NRBC # FLD: 0 K/UL — SIGNIFICANT CHANGE UP (ref 0–0)
PLATELET # BLD AUTO: 304 K/UL — SIGNIFICANT CHANGE UP (ref 150–400)
PMV BLD: 8.9 FL — SIGNIFICANT CHANGE UP (ref 7–13)
RBC # BLD: 4.89 M/UL — SIGNIFICANT CHANGE UP (ref 4.2–5.8)
RBC # FLD: 11.8 % — SIGNIFICANT CHANGE UP (ref 10.3–14.5)
WBC # BLD: 6.22 K/UL — SIGNIFICANT CHANGE UP (ref 3.8–10.5)
WBC # FLD AUTO: 6.22 K/UL — SIGNIFICANT CHANGE UP (ref 3.8–10.5)

## 2019-10-02 PROCEDURE — 99231 SBSQ HOSP IP/OBS SF/LOW 25: CPT

## 2019-10-02 RX ORDER — METHADONE HYDROCHLORIDE 40 MG/1
105 TABLET ORAL DAILY
Refills: 0 | Status: DISCONTINUED | OUTPATIENT
Start: 2019-10-02 | End: 2019-10-09

## 2019-10-02 RX ORDER — CLONAZEPAM 1 MG
1 TABLET ORAL THREE TIMES A DAY
Refills: 0 | Status: DISCONTINUED | OUTPATIENT
Start: 2019-10-02 | End: 2019-10-09

## 2019-10-02 RX ADMIN — Medication 1 MILLIGRAM(S): at 12:58

## 2019-10-02 RX ADMIN — Medication 2 MILLIGRAM(S): at 14:34

## 2019-10-02 RX ADMIN — MIRTAZAPINE 45 MILLIGRAM(S): 45 TABLET, ORALLY DISINTEGRATING ORAL at 21:00

## 2019-10-02 RX ADMIN — Medication 1 MILLIGRAM(S): at 09:09

## 2019-10-02 RX ADMIN — OLANZAPINE 12.5 MILLIGRAM(S): 15 TABLET, FILM COATED ORAL at 21:00

## 2019-10-02 RX ADMIN — Medication 2 MILLIGRAM(S): at 06:07

## 2019-10-02 RX ADMIN — Medication 1 TABLET(S): at 21:00

## 2019-10-02 RX ADMIN — METHADONE HYDROCHLORIDE 105 MILLIGRAM(S): 40 TABLET ORAL at 09:09

## 2019-10-02 RX ADMIN — Medication 1 TABLET(S): at 09:09

## 2019-10-02 RX ADMIN — Medication 2 MILLIGRAM(S): at 22:34

## 2019-10-02 RX ADMIN — Medication 225 MILLIGRAM(S): at 09:09

## 2019-10-02 RX ADMIN — Medication 1 MILLIGRAM(S): at 21:00

## 2019-10-03 PROCEDURE — 99231 SBSQ HOSP IP/OBS SF/LOW 25: CPT

## 2019-10-03 RX ADMIN — Medication 1 TABLET(S): at 20:16

## 2019-10-03 RX ADMIN — Medication 2 MILLIGRAM(S): at 06:34

## 2019-10-03 RX ADMIN — METHADONE HYDROCHLORIDE 105 MILLIGRAM(S): 40 TABLET ORAL at 08:23

## 2019-10-03 RX ADMIN — Medication 1 TABLET(S): at 08:23

## 2019-10-03 RX ADMIN — OLANZAPINE 12.5 MILLIGRAM(S): 15 TABLET, FILM COATED ORAL at 20:16

## 2019-10-03 RX ADMIN — MIRTAZAPINE 45 MILLIGRAM(S): 45 TABLET, ORALLY DISINTEGRATING ORAL at 20:16

## 2019-10-03 RX ADMIN — Medication 225 MILLIGRAM(S): at 08:23

## 2019-10-03 RX ADMIN — Medication 1 MILLIGRAM(S): at 20:16

## 2019-10-03 RX ADMIN — Medication 2 MILLIGRAM(S): at 22:48

## 2019-10-03 RX ADMIN — Medication 1 MILLIGRAM(S): at 12:30

## 2019-10-03 RX ADMIN — Medication 1 MILLIGRAM(S): at 08:23

## 2019-10-03 RX ADMIN — Medication 2 MILLIGRAM(S): at 14:35

## 2019-10-04 RX ADMIN — Medication 2 MILLIGRAM(S): at 15:17

## 2019-10-04 RX ADMIN — Medication 2 MILLIGRAM(S): at 23:20

## 2019-10-04 RX ADMIN — Medication 225 MILLIGRAM(S): at 08:25

## 2019-10-04 RX ADMIN — METHADONE HYDROCHLORIDE 105 MILLIGRAM(S): 40 TABLET ORAL at 08:25

## 2019-10-04 RX ADMIN — Medication 2 MILLIGRAM(S): at 12:55

## 2019-10-04 RX ADMIN — Medication 1 TABLET(S): at 20:08

## 2019-10-04 RX ADMIN — MIRTAZAPINE 45 MILLIGRAM(S): 45 TABLET, ORALLY DISINTEGRATING ORAL at 20:08

## 2019-10-04 RX ADMIN — Medication 1 MILLIGRAM(S): at 08:25

## 2019-10-04 RX ADMIN — Medication 1 MILLIGRAM(S): at 20:08

## 2019-10-04 RX ADMIN — Medication 1 TABLET(S): at 08:25

## 2019-10-04 RX ADMIN — Medication 2 MILLIGRAM(S): at 07:16

## 2019-10-04 RX ADMIN — OLANZAPINE 12.5 MILLIGRAM(S): 15 TABLET, FILM COATED ORAL at 20:08

## 2019-10-04 RX ADMIN — Medication 1 MILLIGRAM(S): at 12:56

## 2019-10-05 RX ADMIN — Medication 1 MILLIGRAM(S): at 08:28

## 2019-10-05 RX ADMIN — MIRTAZAPINE 45 MILLIGRAM(S): 45 TABLET, ORALLY DISINTEGRATING ORAL at 20:59

## 2019-10-05 RX ADMIN — Medication 1 TABLET(S): at 08:28

## 2019-10-05 RX ADMIN — Medication 1 TABLET(S): at 20:59

## 2019-10-05 RX ADMIN — OLANZAPINE 12.5 MILLIGRAM(S): 15 TABLET, FILM COATED ORAL at 20:59

## 2019-10-05 RX ADMIN — Medication 1 MILLIGRAM(S): at 20:59

## 2019-10-05 RX ADMIN — METHADONE HYDROCHLORIDE 105 MILLIGRAM(S): 40 TABLET ORAL at 08:28

## 2019-10-05 RX ADMIN — Medication 225 MILLIGRAM(S): at 08:28

## 2019-10-05 RX ADMIN — Medication 2 MILLIGRAM(S): at 13:00

## 2019-10-05 RX ADMIN — Medication 2 MILLIGRAM(S): at 07:26

## 2019-10-05 RX ADMIN — Medication 1 MILLIGRAM(S): at 13:00

## 2019-10-05 RX ADMIN — Medication 2 MILLIGRAM(S): at 15:26

## 2019-10-06 RX ADMIN — METHADONE HYDROCHLORIDE 105 MILLIGRAM(S): 40 TABLET ORAL at 09:40

## 2019-10-06 RX ADMIN — Medication 2 MILLIGRAM(S): at 16:15

## 2019-10-06 RX ADMIN — Medication 1 MILLIGRAM(S): at 14:10

## 2019-10-06 RX ADMIN — Medication 2 MILLIGRAM(S): at 00:08

## 2019-10-06 RX ADMIN — Medication 1 TABLET(S): at 09:40

## 2019-10-06 RX ADMIN — Medication 1 MILLIGRAM(S): at 20:43

## 2019-10-06 RX ADMIN — MIRTAZAPINE 45 MILLIGRAM(S): 45 TABLET, ORALLY DISINTEGRATING ORAL at 20:43

## 2019-10-06 RX ADMIN — Medication 1 MILLIGRAM(S): at 09:40

## 2019-10-06 RX ADMIN — Medication 2 MILLIGRAM(S): at 08:10

## 2019-10-06 RX ADMIN — Medication 1 TABLET(S): at 20:43

## 2019-10-06 RX ADMIN — OLANZAPINE 12.5 MILLIGRAM(S): 15 TABLET, FILM COATED ORAL at 20:43

## 2019-10-06 RX ADMIN — Medication 225 MILLIGRAM(S): at 09:40

## 2019-10-07 PROCEDURE — 99231 SBSQ HOSP IP/OBS SF/LOW 25: CPT

## 2019-10-07 RX ADMIN — Medication 2 MILLIGRAM(S): at 08:35

## 2019-10-07 RX ADMIN — Medication 1 TABLET(S): at 20:20

## 2019-10-07 RX ADMIN — Medication 225 MILLIGRAM(S): at 09:04

## 2019-10-07 RX ADMIN — METHADONE HYDROCHLORIDE 105 MILLIGRAM(S): 40 TABLET ORAL at 08:35

## 2019-10-07 RX ADMIN — MIRTAZAPINE 45 MILLIGRAM(S): 45 TABLET, ORALLY DISINTEGRATING ORAL at 20:21

## 2019-10-07 RX ADMIN — Medication 1 MILLIGRAM(S): at 20:20

## 2019-10-07 RX ADMIN — OLANZAPINE 12.5 MILLIGRAM(S): 15 TABLET, FILM COATED ORAL at 20:21

## 2019-10-07 RX ADMIN — Medication 1 MILLIGRAM(S): at 13:10

## 2019-10-07 RX ADMIN — Medication 1 MILLIGRAM(S): at 08:35

## 2019-10-07 RX ADMIN — Medication 2 MILLIGRAM(S): at 00:30

## 2019-10-07 RX ADMIN — Medication 2 MILLIGRAM(S): at 16:35

## 2019-10-07 RX ADMIN — Medication 1 TABLET(S): at 08:35

## 2019-10-08 PROCEDURE — 99231 SBSQ HOSP IP/OBS SF/LOW 25: CPT

## 2019-10-08 RX ADMIN — Medication 2 MILLIGRAM(S): at 08:37

## 2019-10-08 RX ADMIN — Medication 225 MILLIGRAM(S): at 08:27

## 2019-10-08 RX ADMIN — OLANZAPINE 12.5 MILLIGRAM(S): 15 TABLET, FILM COATED ORAL at 20:51

## 2019-10-08 RX ADMIN — MIRTAZAPINE 45 MILLIGRAM(S): 45 TABLET, ORALLY DISINTEGRATING ORAL at 20:51

## 2019-10-08 RX ADMIN — Medication 2 MILLIGRAM(S): at 00:37

## 2019-10-08 RX ADMIN — Medication 1 TABLET(S): at 08:27

## 2019-10-08 RX ADMIN — Medication 1 MILLIGRAM(S): at 12:50

## 2019-10-08 RX ADMIN — METHADONE HYDROCHLORIDE 105 MILLIGRAM(S): 40 TABLET ORAL at 08:27

## 2019-10-08 RX ADMIN — Medication 2 MILLIGRAM(S): at 16:37

## 2019-10-08 RX ADMIN — Medication 1 TABLET(S): at 20:50

## 2019-10-08 RX ADMIN — Medication 1 MILLIGRAM(S): at 20:50

## 2019-10-08 RX ADMIN — Medication 1 MILLIGRAM(S): at 08:27

## 2019-10-09 PROCEDURE — 90832 PSYTX W PT 30 MINUTES: CPT

## 2019-10-09 PROCEDURE — 99231 SBSQ HOSP IP/OBS SF/LOW 25: CPT

## 2019-10-09 RX ORDER — METHADONE HYDROCHLORIDE 40 MG/1
105 TABLET ORAL DAILY
Refills: 0 | Status: DISCONTINUED | OUTPATIENT
Start: 2019-10-09 | End: 2019-10-16

## 2019-10-09 RX ORDER — CLONAZEPAM 1 MG
1 TABLET ORAL THREE TIMES A DAY
Refills: 0 | Status: DISCONTINUED | OUTPATIENT
Start: 2019-10-09 | End: 2019-10-16

## 2019-10-09 RX ADMIN — OLANZAPINE 12.5 MILLIGRAM(S): 15 TABLET, FILM COATED ORAL at 20:59

## 2019-10-09 RX ADMIN — Medication 1 MILLIGRAM(S): at 20:59

## 2019-10-09 RX ADMIN — Medication 1 MILLIGRAM(S): at 08:00

## 2019-10-09 RX ADMIN — METHADONE HYDROCHLORIDE 105 MILLIGRAM(S): 40 TABLET ORAL at 08:00

## 2019-10-09 RX ADMIN — Medication 2 MILLIGRAM(S): at 09:51

## 2019-10-09 RX ADMIN — Medication 225 MILLIGRAM(S): at 08:44

## 2019-10-09 RX ADMIN — Medication 1 TABLET(S): at 20:59

## 2019-10-09 RX ADMIN — Medication 2 MILLIGRAM(S): at 17:30

## 2019-10-09 RX ADMIN — Medication 1 MILLIGRAM(S): at 13:11

## 2019-10-09 RX ADMIN — Medication 2 MILLIGRAM(S): at 01:47

## 2019-10-09 RX ADMIN — MIRTAZAPINE 45 MILLIGRAM(S): 45 TABLET, ORALLY DISINTEGRATING ORAL at 20:59

## 2019-10-09 RX ADMIN — Medication 1 TABLET(S): at 08:43

## 2019-10-10 PROCEDURE — 99231 SBSQ HOSP IP/OBS SF/LOW 25: CPT

## 2019-10-10 RX ADMIN — Medication 1 MILLIGRAM(S): at 12:55

## 2019-10-10 RX ADMIN — Medication 2 MILLIGRAM(S): at 17:47

## 2019-10-10 RX ADMIN — METHADONE HYDROCHLORIDE 105 MILLIGRAM(S): 40 TABLET ORAL at 08:54

## 2019-10-10 RX ADMIN — Medication 2 MILLIGRAM(S): at 09:53

## 2019-10-10 RX ADMIN — OLANZAPINE 12.5 MILLIGRAM(S): 15 TABLET, FILM COATED ORAL at 21:15

## 2019-10-10 RX ADMIN — Medication 1 MILLIGRAM(S): at 21:15

## 2019-10-10 RX ADMIN — Medication 1 TABLET(S): at 08:54

## 2019-10-10 RX ADMIN — Medication 225 MILLIGRAM(S): at 08:54

## 2019-10-10 RX ADMIN — Medication 2 MILLIGRAM(S): at 01:45

## 2019-10-10 RX ADMIN — Medication 1 MILLIGRAM(S): at 08:54

## 2019-10-10 RX ADMIN — MIRTAZAPINE 45 MILLIGRAM(S): 45 TABLET, ORALLY DISINTEGRATING ORAL at 21:15

## 2019-10-11 PROCEDURE — 99231 SBSQ HOSP IP/OBS SF/LOW 25: CPT

## 2019-10-11 RX ADMIN — Medication 2 MILLIGRAM(S): at 18:44

## 2019-10-11 RX ADMIN — METHADONE HYDROCHLORIDE 105 MILLIGRAM(S): 40 TABLET ORAL at 09:10

## 2019-10-11 RX ADMIN — Medication 1 MILLIGRAM(S): at 13:11

## 2019-10-11 RX ADMIN — Medication 1 MILLIGRAM(S): at 09:10

## 2019-10-11 RX ADMIN — Medication 2 MILLIGRAM(S): at 10:52

## 2019-10-11 RX ADMIN — OLANZAPINE 12.5 MILLIGRAM(S): 15 TABLET, FILM COATED ORAL at 20:20

## 2019-10-11 RX ADMIN — MIRTAZAPINE 45 MILLIGRAM(S): 45 TABLET, ORALLY DISINTEGRATING ORAL at 20:20

## 2019-10-11 RX ADMIN — Medication 2 MILLIGRAM(S): at 02:38

## 2019-10-11 RX ADMIN — Medication 225 MILLIGRAM(S): at 09:10

## 2019-10-11 RX ADMIN — Medication 1 MILLIGRAM(S): at 20:20

## 2019-10-12 RX ADMIN — Medication 225 MILLIGRAM(S): at 08:45

## 2019-10-12 RX ADMIN — Medication 2 MILLIGRAM(S): at 10:48

## 2019-10-12 RX ADMIN — METHADONE HYDROCHLORIDE 105 MILLIGRAM(S): 40 TABLET ORAL at 08:45

## 2019-10-12 RX ADMIN — Medication 2 MILLIGRAM(S): at 02:46

## 2019-10-12 RX ADMIN — Medication 1 MILLIGRAM(S): at 13:03

## 2019-10-12 RX ADMIN — Medication 2 MILLIGRAM(S): at 18:49

## 2019-10-12 RX ADMIN — MIRTAZAPINE 45 MILLIGRAM(S): 45 TABLET, ORALLY DISINTEGRATING ORAL at 21:14

## 2019-10-12 RX ADMIN — Medication 1 MILLIGRAM(S): at 08:45

## 2019-10-12 RX ADMIN — OLANZAPINE 12.5 MILLIGRAM(S): 15 TABLET, FILM COATED ORAL at 21:14

## 2019-10-12 RX ADMIN — Medication 1 MILLIGRAM(S): at 21:14

## 2019-10-13 RX ADMIN — OLANZAPINE 12.5 MILLIGRAM(S): 15 TABLET, FILM COATED ORAL at 20:39

## 2019-10-13 RX ADMIN — Medication 2 MILLIGRAM(S): at 05:20

## 2019-10-13 RX ADMIN — Medication 225 MILLIGRAM(S): at 08:37

## 2019-10-13 RX ADMIN — MIRTAZAPINE 45 MILLIGRAM(S): 45 TABLET, ORALLY DISINTEGRATING ORAL at 20:39

## 2019-10-13 RX ADMIN — Medication 1 MILLIGRAM(S): at 12:37

## 2019-10-13 RX ADMIN — Medication 1 MILLIGRAM(S): at 08:37

## 2019-10-13 RX ADMIN — Medication 2 MILLIGRAM(S): at 21:24

## 2019-10-13 RX ADMIN — Medication 1 MILLIGRAM(S): at 20:39

## 2019-10-13 RX ADMIN — METHADONE HYDROCHLORIDE 105 MILLIGRAM(S): 40 TABLET ORAL at 08:37

## 2019-10-13 RX ADMIN — Medication 2 MILLIGRAM(S): at 13:20

## 2019-10-14 PROCEDURE — 99231 SBSQ HOSP IP/OBS SF/LOW 25: CPT

## 2019-10-14 RX ADMIN — Medication 1 MILLIGRAM(S): at 20:45

## 2019-10-14 RX ADMIN — Medication 2 MILLIGRAM(S): at 05:59

## 2019-10-14 RX ADMIN — Medication 2 MILLIGRAM(S): at 22:25

## 2019-10-14 RX ADMIN — Medication 2 MILLIGRAM(S): at 14:20

## 2019-10-14 RX ADMIN — Medication 225 MILLIGRAM(S): at 08:19

## 2019-10-14 RX ADMIN — MIRTAZAPINE 45 MILLIGRAM(S): 45 TABLET, ORALLY DISINTEGRATING ORAL at 20:45

## 2019-10-14 RX ADMIN — Medication 1 MILLIGRAM(S): at 08:19

## 2019-10-14 RX ADMIN — METHADONE HYDROCHLORIDE 105 MILLIGRAM(S): 40 TABLET ORAL at 08:19

## 2019-10-14 RX ADMIN — Medication 1 MILLIGRAM(S): at 12:55

## 2019-10-14 RX ADMIN — Medication 2 MILLIGRAM(S): at 14:16

## 2019-10-14 RX ADMIN — OLANZAPINE 12.5 MILLIGRAM(S): 15 TABLET, FILM COATED ORAL at 20:45

## 2019-10-15 PROCEDURE — 99231 SBSQ HOSP IP/OBS SF/LOW 25: CPT

## 2019-10-15 RX ADMIN — Medication 1 MILLIGRAM(S): at 08:55

## 2019-10-15 RX ADMIN — OLANZAPINE 12.5 MILLIGRAM(S): 15 TABLET, FILM COATED ORAL at 20:46

## 2019-10-15 RX ADMIN — Medication 1 MILLIGRAM(S): at 20:46

## 2019-10-15 RX ADMIN — MIRTAZAPINE 45 MILLIGRAM(S): 45 TABLET, ORALLY DISINTEGRATING ORAL at 20:46

## 2019-10-15 RX ADMIN — METHADONE HYDROCHLORIDE 105 MILLIGRAM(S): 40 TABLET ORAL at 08:55

## 2019-10-15 RX ADMIN — Medication 2 MILLIGRAM(S): at 14:30

## 2019-10-15 RX ADMIN — Medication 2 MILLIGRAM(S): at 06:30

## 2019-10-15 RX ADMIN — Medication 225 MILLIGRAM(S): at 08:55

## 2019-10-15 RX ADMIN — Medication 2 MILLIGRAM(S): at 22:35

## 2019-10-15 RX ADMIN — Medication 1 MILLIGRAM(S): at 12:27

## 2019-10-16 PROCEDURE — 99231 SBSQ HOSP IP/OBS SF/LOW 25: CPT

## 2019-10-16 RX ORDER — METHADONE HYDROCHLORIDE 40 MG/1
105 TABLET ORAL DAILY
Refills: 0 | Status: DISCONTINUED | OUTPATIENT
Start: 2019-10-17 | End: 2019-10-17

## 2019-10-16 RX ORDER — CLONAZEPAM 1 MG
1 TABLET ORAL THREE TIMES A DAY
Refills: 0 | Status: DISCONTINUED | OUTPATIENT
Start: 2019-10-16 | End: 2019-10-17

## 2019-10-16 RX ADMIN — METHADONE HYDROCHLORIDE 105 MILLIGRAM(S): 40 TABLET ORAL at 08:57

## 2019-10-16 RX ADMIN — MIRTAZAPINE 45 MILLIGRAM(S): 45 TABLET, ORALLY DISINTEGRATING ORAL at 20:41

## 2019-10-16 RX ADMIN — Medication 2 MILLIGRAM(S): at 22:45

## 2019-10-16 RX ADMIN — Medication 225 MILLIGRAM(S): at 08:57

## 2019-10-16 RX ADMIN — Medication 1 MILLIGRAM(S): at 20:41

## 2019-10-16 RX ADMIN — Medication 2 MILLIGRAM(S): at 06:36

## 2019-10-16 RX ADMIN — Medication 2 MILLIGRAM(S): at 14:45

## 2019-10-16 RX ADMIN — Medication 1 MILLIGRAM(S): at 13:19

## 2019-10-16 RX ADMIN — OLANZAPINE 12.5 MILLIGRAM(S): 15 TABLET, FILM COATED ORAL at 20:41

## 2019-10-16 RX ADMIN — Medication 1 MILLIGRAM(S): at 08:57

## 2019-10-17 VITALS — TEMPERATURE: 99 F

## 2019-10-17 PROCEDURE — 99238 HOSP IP/OBS DSCHRG MGMT 30/<: CPT

## 2019-10-17 RX ORDER — CLONAZEPAM 1 MG
1 TABLET ORAL
Qty: 21 | Refills: 0
Start: 2019-10-17 | End: 2019-10-23

## 2019-10-17 RX ORDER — CLONAZEPAM 1 MG
2 TABLET ORAL THREE TIMES A DAY
Refills: 0 | Status: DISCONTINUED | OUTPATIENT
Start: 2019-10-17 | End: 2019-10-17

## 2019-10-17 RX ORDER — MIRTAZAPINE 45 MG/1
1 TABLET, ORALLY DISINTEGRATING ORAL
Qty: 14 | Refills: 0
Start: 2019-10-17 | End: 2019-10-30

## 2019-10-17 RX ORDER — VENLAFAXINE HCL 75 MG
3 CAPSULE, EXT RELEASE 24 HR ORAL
Qty: 42 | Refills: 0
Start: 2019-10-17 | End: 2019-10-30

## 2019-10-17 RX ORDER — OLANZAPINE 15 MG/1
5 TABLET, FILM COATED ORAL
Qty: 70 | Refills: 0
Start: 2019-10-17 | End: 2019-10-30

## 2019-10-17 RX ADMIN — Medication 2 MILLIGRAM(S): at 06:45

## 2019-10-17 RX ADMIN — Medication 2 MILLIGRAM(S): at 13:00

## 2019-10-17 RX ADMIN — Medication 1 MILLIGRAM(S): at 08:00

## 2019-10-17 RX ADMIN — METHADONE HYDROCHLORIDE 105 MILLIGRAM(S): 40 TABLET ORAL at 08:00

## 2019-10-17 RX ADMIN — Medication 225 MILLIGRAM(S): at 08:00

## 2019-12-17 ENCOUNTER — INPATIENT (INPATIENT)
Facility: HOSPITAL | Age: 39
LOS: 15 days | Discharge: HOME | End: 2020-01-02
Attending: PSYCHIATRY & NEUROLOGY | Admitting: PSYCHIATRY & NEUROLOGY
Payer: COMMERCIAL

## 2019-12-17 DIAGNOSIS — F33.2 MAJOR DEPRESSIVE DISORDER, RECURRENT SEVERE WITHOUT PSYCHOTIC FEATURES: ICD-10-CM

## 2019-12-17 PROCEDURE — 99222 1ST HOSP IP/OBS MODERATE 55: CPT

## 2019-12-17 RX ORDER — OLANZAPINE 15 MG/1
12.5 TABLET, FILM COATED ORAL AT BEDTIME
Refills: 0 | Status: DISCONTINUED | OUTPATIENT
Start: 2019-12-17 | End: 2020-01-02

## 2019-12-17 RX ORDER — LANOLIN ALCOHOL/MO/W.PET/CERES
5 CREAM (GRAM) TOPICAL AT BEDTIME
Refills: 0 | Status: DISCONTINUED | OUTPATIENT
Start: 2019-12-17 | End: 2020-01-02

## 2019-12-17 RX ORDER — VENLAFAXINE HCL 75 MG
225 CAPSULE, EXT RELEASE 24 HR ORAL DAILY
Refills: 0 | Status: DISCONTINUED | OUTPATIENT
Start: 2019-12-17 | End: 2019-12-18

## 2019-12-17 RX ORDER — CLONAZEPAM 1 MG
2 TABLET ORAL THREE TIMES A DAY
Refills: 0 | Status: DISCONTINUED | OUTPATIENT
Start: 2019-12-17 | End: 2019-12-24

## 2019-12-17 RX ORDER — METHADONE HYDROCHLORIDE 40 MG/1
80 TABLET ORAL DAILY
Refills: 0 | Status: DISCONTINUED | OUTPATIENT
Start: 2019-12-17 | End: 2019-12-24

## 2019-12-17 RX ORDER — METHADONE HYDROCHLORIDE 40 MG/1
30 TABLET ORAL DAILY
Refills: 0 | Status: DISCONTINUED | OUTPATIENT
Start: 2019-12-17 | End: 2019-12-24

## 2019-12-17 RX ORDER — METHADONE HYDROCHLORIDE 40 MG/1
110 TABLET ORAL DAILY
Refills: 0 | Status: DISCONTINUED | OUTPATIENT
Start: 2019-12-17 | End: 2019-12-17

## 2019-12-17 RX ORDER — METHADONE HYDROCHLORIDE 40 MG/1
105 TABLET ORAL
Qty: 0 | Refills: 0 | DISCHARGE

## 2019-12-17 RX ORDER — HYDROXYZINE HCL 10 MG
75 TABLET ORAL EVERY 6 HOURS
Refills: 0 | Status: DISCONTINUED | OUTPATIENT
Start: 2019-12-17 | End: 2020-01-02

## 2019-12-17 RX ORDER — MIRTAZAPINE 45 MG/1
45 TABLET, ORALLY DISINTEGRATING ORAL AT BEDTIME
Refills: 0 | Status: DISCONTINUED | OUTPATIENT
Start: 2019-12-17 | End: 2020-01-02

## 2019-12-17 NOTE — CHART NOTE - NSCHARTNOTEFT_GEN_A_CORE
Screening Medical Evaluation  Patient Admitted from: [facility] admitting diagnosis: Severe episode of recurrent major depressive disorder, without psychotic features      PAST MEDICAL & SURGICAL HISTORY:  Diverticulitis  No significant past surgical history        Allergies    No Known Allergies    Intolerances        Social History:     FAMILY HISTORY:  No pertinent family history in first degree relatives      MEDICATIONS  (STANDING):  clonazePAM  Tablet 2 milliGRAM(s) Oral three times a day  methadone    Tablet 30 milliGRAM(s) Oral daily  methadone   Dispersible Tablet 80 milliGRAM(s) Oral daily  mirtazapine 45 milliGRAM(s) Oral at bedtime  OLANZapine 12.5 milliGRAM(s) Oral at bedtime  venlafaxine  milliGRAM(s) Oral daily    MEDICATIONS  (PRN):  hydrOXYzine hydrochloride 75 milliGRAM(s) Oral every 6 hours PRN anxiety  melatonin. 5 milliGRAM(s) Oral at bedtime PRN Insomnia      Vital Signs Last 24 Hrs  T(C): --  T(F): --  HR: --  BP: --  BP(mean): --  RR: --  SpO2: --  CAPILLARY BLOOD GLUCOSE            PHYSICAL EXAM:  GENERAL: NAD, well-developed  HEAD:  Atraumatic, Normocephalic  EYES: EOMI, PERRLA, conjunctiva and sclera clear  NECK: Supple, No JVD  CHEST/LUNG: Clear to auscultation bilaterally; No wheeze  HEART: Regular rate and rhythm; No murmurs, rubs, or gallops  ABDOMEN: Soft, Nontender, Nondistended; Bowel sounds present  EXTREMITIES:  2+ Peripheral Pulses, No clubbing, cyanosis, or edema  PSYCH: AAOx3  NEUROLOGY: non-focal  SKIN: In tact    LABS:                    RADIOLOGY & ADDITIONAL TESTS:    Assessment and Plan: 38 yo M with PMH of diverticulitis is admitted to [facility] with a primary psychiatric diagnosis of Severe episode of recurrent major depressive disorder, without psychotic features. The pt currently denies having any medical complaints such as chest pain, sob, abdominal pain, n/v/d/c, or any problems with urination or bowel movements. The rest of his screening physical is unremarkable.    1.Severe episode of recurrent major depressive disorder, without psychotic features-Plan: continue with meds as per primary psychiatric team

## 2019-12-18 LAB
ALBUMIN SERPL ELPH-MCNC: 4.7 G/DL — SIGNIFICANT CHANGE UP (ref 3.3–5)
ALP SERPL-CCNC: 94 U/L — SIGNIFICANT CHANGE UP (ref 40–120)
ALT FLD-CCNC: 32 U/L — SIGNIFICANT CHANGE UP (ref 4–41)
ANION GAP SERPL CALC-SCNC: 11 MMO/L — SIGNIFICANT CHANGE UP (ref 7–14)
AST SERPL-CCNC: 39 U/L — SIGNIFICANT CHANGE UP (ref 4–40)
BASOPHILS # BLD AUTO: 0.05 K/UL — SIGNIFICANT CHANGE UP (ref 0–0.2)
BASOPHILS NFR BLD AUTO: 0.9 % — SIGNIFICANT CHANGE UP (ref 0–2)
BILIRUB SERPL-MCNC: 0.6 MG/DL — SIGNIFICANT CHANGE UP (ref 0.2–1.2)
BUN SERPL-MCNC: 15 MG/DL — SIGNIFICANT CHANGE UP (ref 7–23)
CALCIUM SERPL-MCNC: 10.2 MG/DL — SIGNIFICANT CHANGE UP (ref 8.4–10.5)
CHLORIDE SERPL-SCNC: 96 MMOL/L — LOW (ref 98–107)
CHOLEST SERPL-MCNC: 228 MG/DL — HIGH (ref 120–199)
CO2 SERPL-SCNC: 29 MMOL/L — SIGNIFICANT CHANGE UP (ref 22–31)
CREAT SERPL-MCNC: 0.71 MG/DL — SIGNIFICANT CHANGE UP (ref 0.5–1.3)
EOSINOPHIL # BLD AUTO: 0.49 K/UL — SIGNIFICANT CHANGE UP (ref 0–0.5)
EOSINOPHIL NFR BLD AUTO: 8.5 % — HIGH (ref 0–6)
GLUCOSE SERPL-MCNC: 123 MG/DL — HIGH (ref 70–99)
HBA1C BLD-MCNC: 5.1 % — SIGNIFICANT CHANGE UP (ref 4–5.6)
HCT VFR BLD CALC: 46.4 % — SIGNIFICANT CHANGE UP (ref 39–50)
HDLC SERPL-MCNC: 64 MG/DL — HIGH (ref 35–55)
HGB BLD-MCNC: 14.7 G/DL — SIGNIFICANT CHANGE UP (ref 13–17)
IMM GRANULOCYTES NFR BLD AUTO: 0.3 % — SIGNIFICANT CHANGE UP (ref 0–1.5)
LIPID PNL WITH DIRECT LDL SERPL: 150 MG/DL — SIGNIFICANT CHANGE UP
LYMPHOCYTES # BLD AUTO: 1.68 K/UL — SIGNIFICANT CHANGE UP (ref 1–3.3)
LYMPHOCYTES # BLD AUTO: 29.3 % — SIGNIFICANT CHANGE UP (ref 13–44)
MCHC RBC-ENTMCNC: 27.9 PG — SIGNIFICANT CHANGE UP (ref 27–34)
MCHC RBC-ENTMCNC: 31.7 % — LOW (ref 32–36)
MCV RBC AUTO: 88 FL — SIGNIFICANT CHANGE UP (ref 80–100)
MONOCYTES # BLD AUTO: 0.47 K/UL — SIGNIFICANT CHANGE UP (ref 0–0.9)
MONOCYTES NFR BLD AUTO: 8.2 % — SIGNIFICANT CHANGE UP (ref 2–14)
NEUTROPHILS # BLD AUTO: 3.03 K/UL — SIGNIFICANT CHANGE UP (ref 1.8–7.4)
NEUTROPHILS NFR BLD AUTO: 52.8 % — SIGNIFICANT CHANGE UP (ref 43–77)
NRBC # FLD: 0 K/UL — SIGNIFICANT CHANGE UP (ref 0–0)
PLATELET # BLD AUTO: 305 K/UL — SIGNIFICANT CHANGE UP (ref 150–400)
PMV BLD: 9 FL — SIGNIFICANT CHANGE UP (ref 7–13)
POTASSIUM SERPL-MCNC: 5.7 MMOL/L — HIGH (ref 3.5–5.3)
POTASSIUM SERPL-SCNC: 5.7 MMOL/L — HIGH (ref 3.5–5.3)
PROT SERPL-MCNC: 8.7 G/DL — HIGH (ref 6–8.3)
RBC # BLD: 5.27 M/UL — SIGNIFICANT CHANGE UP (ref 4.2–5.8)
RBC # FLD: 13.1 % — SIGNIFICANT CHANGE UP (ref 10.3–14.5)
SODIUM SERPL-SCNC: 136 MMOL/L — SIGNIFICANT CHANGE UP (ref 135–145)
TRIGL SERPL-MCNC: 151 MG/DL — HIGH (ref 10–149)
TSH SERPL-MCNC: 0.83 UIU/ML — SIGNIFICANT CHANGE UP (ref 0.27–4.2)
WBC # BLD: 5.74 K/UL — SIGNIFICANT CHANGE UP (ref 3.8–10.5)
WBC # FLD AUTO: 5.74 K/UL — SIGNIFICANT CHANGE UP (ref 3.8–10.5)

## 2019-12-18 RX ORDER — SERTRALINE 25 MG/1
50 TABLET, FILM COATED ORAL DAILY
Refills: 0 | Status: DISCONTINUED | OUTPATIENT
Start: 2019-12-18 | End: 2019-12-20

## 2019-12-18 RX ADMIN — MIRTAZAPINE 45 MILLIGRAM(S): 45 TABLET, ORALLY DISINTEGRATING ORAL at 20:12

## 2019-12-18 RX ADMIN — Medication 225 MILLIGRAM(S): at 08:03

## 2019-12-18 RX ADMIN — Medication 2 MILLIGRAM(S): at 08:03

## 2019-12-18 RX ADMIN — OLANZAPINE 12.5 MILLIGRAM(S): 15 TABLET, FILM COATED ORAL at 20:12

## 2019-12-18 RX ADMIN — METHADONE HYDROCHLORIDE 80 MILLIGRAM(S): 40 TABLET ORAL at 08:03

## 2019-12-18 RX ADMIN — METHADONE HYDROCHLORIDE 30 MILLIGRAM(S): 40 TABLET ORAL at 08:03

## 2019-12-18 RX ADMIN — Medication 2 MILLIGRAM(S): at 12:10

## 2019-12-18 RX ADMIN — Medication 2 MILLIGRAM(S): at 20:12

## 2019-12-19 VITALS — TEMPERATURE: 98 F

## 2019-12-19 PROCEDURE — 90832 PSYTX W PT 30 MINUTES: CPT

## 2019-12-19 PROCEDURE — 99232 SBSQ HOSP IP/OBS MODERATE 35: CPT

## 2019-12-19 RX ADMIN — METHADONE HYDROCHLORIDE 30 MILLIGRAM(S): 40 TABLET ORAL at 09:30

## 2019-12-19 RX ADMIN — SERTRALINE 50 MILLIGRAM(S): 25 TABLET, FILM COATED ORAL at 09:30

## 2019-12-19 RX ADMIN — Medication 75 MILLIGRAM(S): at 17:16

## 2019-12-19 RX ADMIN — Medication 2 MILLIGRAM(S): at 13:02

## 2019-12-19 RX ADMIN — Medication 2 MILLIGRAM(S): at 20:29

## 2019-12-19 RX ADMIN — MIRTAZAPINE 45 MILLIGRAM(S): 45 TABLET, ORALLY DISINTEGRATING ORAL at 20:29

## 2019-12-19 RX ADMIN — Medication 2 MILLIGRAM(S): at 09:30

## 2019-12-19 RX ADMIN — OLANZAPINE 12.5 MILLIGRAM(S): 15 TABLET, FILM COATED ORAL at 20:29

## 2019-12-19 RX ADMIN — METHADONE HYDROCHLORIDE 80 MILLIGRAM(S): 40 TABLET ORAL at 09:30

## 2019-12-20 PROCEDURE — 99232 SBSQ HOSP IP/OBS MODERATE 35: CPT

## 2019-12-20 RX ORDER — SERTRALINE 25 MG/1
100 TABLET, FILM COATED ORAL DAILY
Refills: 0 | Status: DISCONTINUED | OUTPATIENT
Start: 2019-12-20 | End: 2019-12-23

## 2019-12-20 RX ORDER — DIPHENHYDRAMINE HCL 50 MG
50 CAPSULE ORAL EVERY 4 HOURS
Refills: 0 | Status: DISCONTINUED | OUTPATIENT
Start: 2019-12-20 | End: 2020-01-02

## 2019-12-20 RX ORDER — OLANZAPINE 15 MG/1
5 TABLET, FILM COATED ORAL EVERY 6 HOURS
Refills: 0 | Status: DISCONTINUED | OUTPATIENT
Start: 2019-12-20 | End: 2020-01-02

## 2019-12-20 RX ADMIN — Medication 2 MILLIGRAM(S): at 20:04

## 2019-12-20 RX ADMIN — Medication 2 MILLIGRAM(S): at 08:19

## 2019-12-20 RX ADMIN — METHADONE HYDROCHLORIDE 80 MILLIGRAM(S): 40 TABLET ORAL at 08:19

## 2019-12-20 RX ADMIN — Medication 2 MILLIGRAM(S): at 12:22

## 2019-12-20 RX ADMIN — MIRTAZAPINE 45 MILLIGRAM(S): 45 TABLET, ORALLY DISINTEGRATING ORAL at 20:04

## 2019-12-20 RX ADMIN — SERTRALINE 50 MILLIGRAM(S): 25 TABLET, FILM COATED ORAL at 08:19

## 2019-12-20 RX ADMIN — METHADONE HYDROCHLORIDE 30 MILLIGRAM(S): 40 TABLET ORAL at 08:19

## 2019-12-20 RX ADMIN — OLANZAPINE 12.5 MILLIGRAM(S): 15 TABLET, FILM COATED ORAL at 20:04

## 2019-12-21 PROCEDURE — 99232 SBSQ HOSP IP/OBS MODERATE 35: CPT

## 2019-12-21 RX ORDER — SENNA PLUS 8.6 MG/1
2 TABLET ORAL AT BEDTIME
Refills: 0 | Status: DISCONTINUED | OUTPATIENT
Start: 2019-12-21 | End: 2020-01-02

## 2019-12-21 RX ADMIN — SERTRALINE 100 MILLIGRAM(S): 25 TABLET, FILM COATED ORAL at 08:56

## 2019-12-21 RX ADMIN — Medication 2 MILLIGRAM(S): at 08:56

## 2019-12-21 RX ADMIN — Medication 75 MILLIGRAM(S): at 12:48

## 2019-12-21 RX ADMIN — Medication 2 MILLIGRAM(S): at 12:48

## 2019-12-21 RX ADMIN — MIRTAZAPINE 45 MILLIGRAM(S): 45 TABLET, ORALLY DISINTEGRATING ORAL at 20:03

## 2019-12-21 RX ADMIN — Medication 2 MILLIGRAM(S): at 20:03

## 2019-12-21 RX ADMIN — METHADONE HYDROCHLORIDE 80 MILLIGRAM(S): 40 TABLET ORAL at 08:56

## 2019-12-21 RX ADMIN — OLANZAPINE 12.5 MILLIGRAM(S): 15 TABLET, FILM COATED ORAL at 20:03

## 2019-12-21 RX ADMIN — METHADONE HYDROCHLORIDE 30 MILLIGRAM(S): 40 TABLET ORAL at 08:56

## 2019-12-22 PROCEDURE — 99232 SBSQ HOSP IP/OBS MODERATE 35: CPT

## 2019-12-22 RX ADMIN — METHADONE HYDROCHLORIDE 30 MILLIGRAM(S): 40 TABLET ORAL at 08:17

## 2019-12-22 RX ADMIN — SERTRALINE 100 MILLIGRAM(S): 25 TABLET, FILM COATED ORAL at 08:17

## 2019-12-22 RX ADMIN — METHADONE HYDROCHLORIDE 80 MILLIGRAM(S): 40 TABLET ORAL at 08:17

## 2019-12-22 RX ADMIN — Medication 2 MILLIGRAM(S): at 12:15

## 2019-12-22 RX ADMIN — OLANZAPINE 12.5 MILLIGRAM(S): 15 TABLET, FILM COATED ORAL at 20:11

## 2019-12-22 RX ADMIN — MIRTAZAPINE 45 MILLIGRAM(S): 45 TABLET, ORALLY DISINTEGRATING ORAL at 20:11

## 2019-12-22 RX ADMIN — Medication 5 MILLIGRAM(S): at 20:11

## 2019-12-22 RX ADMIN — Medication 2 MILLIGRAM(S): at 08:17

## 2019-12-22 RX ADMIN — Medication 75 MILLIGRAM(S): at 12:15

## 2019-12-22 RX ADMIN — Medication 2 MILLIGRAM(S): at 20:11

## 2019-12-23 PROCEDURE — 99232 SBSQ HOSP IP/OBS MODERATE 35: CPT

## 2019-12-23 PROCEDURE — 90834 PSYTX W PT 45 MINUTES: CPT

## 2019-12-23 RX ORDER — SERTRALINE 25 MG/1
150 TABLET, FILM COATED ORAL DAILY
Refills: 0 | Status: DISCONTINUED | OUTPATIENT
Start: 2019-12-23 | End: 2019-12-26

## 2019-12-23 RX ADMIN — Medication 2 MILLIGRAM(S): at 20:14

## 2019-12-23 RX ADMIN — Medication 2 MILLIGRAM(S): at 08:25

## 2019-12-23 RX ADMIN — METHADONE HYDROCHLORIDE 30 MILLIGRAM(S): 40 TABLET ORAL at 08:25

## 2019-12-23 RX ADMIN — MIRTAZAPINE 45 MILLIGRAM(S): 45 TABLET, ORALLY DISINTEGRATING ORAL at 20:14

## 2019-12-23 RX ADMIN — OLANZAPINE 12.5 MILLIGRAM(S): 15 TABLET, FILM COATED ORAL at 20:14

## 2019-12-23 RX ADMIN — METHADONE HYDROCHLORIDE 80 MILLIGRAM(S): 40 TABLET ORAL at 08:25

## 2019-12-23 RX ADMIN — SERTRALINE 150 MILLIGRAM(S): 25 TABLET, FILM COATED ORAL at 08:25

## 2019-12-23 RX ADMIN — Medication 2 MILLIGRAM(S): at 12:11

## 2019-12-24 PROCEDURE — 99232 SBSQ HOSP IP/OBS MODERATE 35: CPT

## 2019-12-24 RX ADMIN — METHADONE HYDROCHLORIDE 80 MILLIGRAM(S): 40 TABLET ORAL at 08:57

## 2019-12-24 RX ADMIN — SERTRALINE 150 MILLIGRAM(S): 25 TABLET, FILM COATED ORAL at 08:57

## 2019-12-24 RX ADMIN — MIRTAZAPINE 45 MILLIGRAM(S): 45 TABLET, ORALLY DISINTEGRATING ORAL at 20:04

## 2019-12-24 RX ADMIN — Medication 2 MILLIGRAM(S): at 08:57

## 2019-12-24 RX ADMIN — Medication 2 MILLIGRAM(S): at 20:04

## 2019-12-24 RX ADMIN — Medication 75 MILLIGRAM(S): at 16:58

## 2019-12-24 RX ADMIN — Medication 2 MILLIGRAM(S): at 12:41

## 2019-12-24 RX ADMIN — OLANZAPINE 12.5 MILLIGRAM(S): 15 TABLET, FILM COATED ORAL at 20:04

## 2019-12-24 RX ADMIN — METHADONE HYDROCHLORIDE 30 MILLIGRAM(S): 40 TABLET ORAL at 08:57

## 2019-12-24 RX ADMIN — Medication 5 MILLIGRAM(S): at 20:04

## 2019-12-25 RX ORDER — METHADONE HYDROCHLORIDE 40 MG/1
30 TABLET ORAL DAILY
Refills: 0 | Status: DISCONTINUED | OUTPATIENT
Start: 2019-12-25 | End: 2019-12-25

## 2019-12-25 RX ORDER — METHADONE HYDROCHLORIDE 40 MG/1
30 TABLET ORAL DAILY
Refills: 0 | Status: DISCONTINUED | OUTPATIENT
Start: 2019-12-25 | End: 2020-01-01

## 2019-12-25 RX ORDER — CLONAZEPAM 1 MG
2 TABLET ORAL THREE TIMES A DAY
Refills: 0 | Status: DISCONTINUED | OUTPATIENT
Start: 2019-12-25 | End: 2020-01-01

## 2019-12-25 RX ORDER — METHADONE HYDROCHLORIDE 40 MG/1
80 TABLET ORAL DAILY
Refills: 0 | Status: DISCONTINUED | OUTPATIENT
Start: 2019-12-25 | End: 2020-01-01

## 2019-12-25 RX ADMIN — MIRTAZAPINE 45 MILLIGRAM(S): 45 TABLET, ORALLY DISINTEGRATING ORAL at 20:00

## 2019-12-25 RX ADMIN — METHADONE HYDROCHLORIDE 30 MILLIGRAM(S): 40 TABLET ORAL at 09:19

## 2019-12-25 RX ADMIN — Medication 2 MILLIGRAM(S): at 09:19

## 2019-12-25 RX ADMIN — Medication 2 MILLIGRAM(S): at 20:00

## 2019-12-25 RX ADMIN — SERTRALINE 150 MILLIGRAM(S): 25 TABLET, FILM COATED ORAL at 09:19

## 2019-12-25 RX ADMIN — Medication 75 MILLIGRAM(S): at 12:23

## 2019-12-25 RX ADMIN — OLANZAPINE 12.5 MILLIGRAM(S): 15 TABLET, FILM COATED ORAL at 20:00

## 2019-12-25 RX ADMIN — METHADONE HYDROCHLORIDE 80 MILLIGRAM(S): 40 TABLET ORAL at 09:19

## 2019-12-25 RX ADMIN — Medication 2 MILLIGRAM(S): at 12:22

## 2019-12-26 PROCEDURE — 99231 SBSQ HOSP IP/OBS SF/LOW 25: CPT

## 2019-12-26 RX ORDER — SERTRALINE 25 MG/1
200 TABLET, FILM COATED ORAL DAILY
Refills: 0 | Status: DISCONTINUED | OUTPATIENT
Start: 2019-12-26 | End: 2020-01-02

## 2019-12-26 RX ADMIN — Medication 5 MILLIGRAM(S): at 20:02

## 2019-12-26 RX ADMIN — MIRTAZAPINE 45 MILLIGRAM(S): 45 TABLET, ORALLY DISINTEGRATING ORAL at 20:02

## 2019-12-26 RX ADMIN — Medication 75 MILLIGRAM(S): at 13:32

## 2019-12-26 RX ADMIN — SERTRALINE 200 MILLIGRAM(S): 25 TABLET, FILM COATED ORAL at 12:10

## 2019-12-26 RX ADMIN — OLANZAPINE 12.5 MILLIGRAM(S): 15 TABLET, FILM COATED ORAL at 20:02

## 2019-12-26 RX ADMIN — Medication 2 MILLIGRAM(S): at 20:02

## 2019-12-26 RX ADMIN — Medication 2 MILLIGRAM(S): at 08:39

## 2019-12-26 RX ADMIN — Medication 2 MILLIGRAM(S): at 12:10

## 2019-12-26 RX ADMIN — METHADONE HYDROCHLORIDE 80 MILLIGRAM(S): 40 TABLET ORAL at 08:39

## 2019-12-26 RX ADMIN — METHADONE HYDROCHLORIDE 30 MILLIGRAM(S): 40 TABLET ORAL at 08:39

## 2019-12-27 PROCEDURE — 99231 SBSQ HOSP IP/OBS SF/LOW 25: CPT

## 2019-12-27 RX ADMIN — MIRTAZAPINE 45 MILLIGRAM(S): 45 TABLET, ORALLY DISINTEGRATING ORAL at 20:18

## 2019-12-27 RX ADMIN — METHADONE HYDROCHLORIDE 80 MILLIGRAM(S): 40 TABLET ORAL at 08:39

## 2019-12-27 RX ADMIN — Medication 2 MILLIGRAM(S): at 20:18

## 2019-12-27 RX ADMIN — OLANZAPINE 12.5 MILLIGRAM(S): 15 TABLET, FILM COATED ORAL at 20:18

## 2019-12-27 RX ADMIN — METHADONE HYDROCHLORIDE 30 MILLIGRAM(S): 40 TABLET ORAL at 08:39

## 2019-12-27 RX ADMIN — Medication 2 MILLIGRAM(S): at 13:55

## 2019-12-27 RX ADMIN — SERTRALINE 200 MILLIGRAM(S): 25 TABLET, FILM COATED ORAL at 08:39

## 2019-12-27 RX ADMIN — Medication 75 MILLIGRAM(S): at 13:22

## 2019-12-27 RX ADMIN — Medication 2 MILLIGRAM(S): at 08:39

## 2019-12-28 RX ADMIN — SERTRALINE 200 MILLIGRAM(S): 25 TABLET, FILM COATED ORAL at 08:25

## 2019-12-28 RX ADMIN — Medication 2 MILLIGRAM(S): at 12:13

## 2019-12-28 RX ADMIN — Medication 2 MILLIGRAM(S): at 20:14

## 2019-12-28 RX ADMIN — OLANZAPINE 12.5 MILLIGRAM(S): 15 TABLET, FILM COATED ORAL at 20:14

## 2019-12-28 RX ADMIN — METHADONE HYDROCHLORIDE 30 MILLIGRAM(S): 40 TABLET ORAL at 08:25

## 2019-12-28 RX ADMIN — MIRTAZAPINE 45 MILLIGRAM(S): 45 TABLET, ORALLY DISINTEGRATING ORAL at 20:14

## 2019-12-28 RX ADMIN — METHADONE HYDROCHLORIDE 80 MILLIGRAM(S): 40 TABLET ORAL at 08:25

## 2019-12-28 RX ADMIN — Medication 2 MILLIGRAM(S): at 08:25

## 2019-12-28 RX ADMIN — Medication 75 MILLIGRAM(S): at 12:10

## 2019-12-29 RX ADMIN — MIRTAZAPINE 45 MILLIGRAM(S): 45 TABLET, ORALLY DISINTEGRATING ORAL at 20:07

## 2019-12-29 RX ADMIN — METHADONE HYDROCHLORIDE 30 MILLIGRAM(S): 40 TABLET ORAL at 08:19

## 2019-12-29 RX ADMIN — Medication 2 MILLIGRAM(S): at 08:19

## 2019-12-29 RX ADMIN — OLANZAPINE 12.5 MILLIGRAM(S): 15 TABLET, FILM COATED ORAL at 20:07

## 2019-12-29 RX ADMIN — METHADONE HYDROCHLORIDE 80 MILLIGRAM(S): 40 TABLET ORAL at 08:19

## 2019-12-29 RX ADMIN — SERTRALINE 200 MILLIGRAM(S): 25 TABLET, FILM COATED ORAL at 08:19

## 2019-12-29 RX ADMIN — Medication 2 MILLIGRAM(S): at 20:07

## 2019-12-29 RX ADMIN — Medication 2 MILLIGRAM(S): at 12:18

## 2019-12-29 RX ADMIN — Medication 75 MILLIGRAM(S): at 12:18

## 2019-12-30 PROCEDURE — 90832 PSYTX W PT 30 MINUTES: CPT

## 2019-12-30 RX ADMIN — MIRTAZAPINE 45 MILLIGRAM(S): 45 TABLET, ORALLY DISINTEGRATING ORAL at 20:04

## 2019-12-30 RX ADMIN — METHADONE HYDROCHLORIDE 30 MILLIGRAM(S): 40 TABLET ORAL at 08:29

## 2019-12-30 RX ADMIN — METHADONE HYDROCHLORIDE 80 MILLIGRAM(S): 40 TABLET ORAL at 08:29

## 2019-12-30 RX ADMIN — Medication 75 MILLIGRAM(S): at 13:16

## 2019-12-30 RX ADMIN — Medication 2 MILLIGRAM(S): at 08:29

## 2019-12-30 RX ADMIN — Medication 2 MILLIGRAM(S): at 12:27

## 2019-12-30 RX ADMIN — OLANZAPINE 12.5 MILLIGRAM(S): 15 TABLET, FILM COATED ORAL at 20:04

## 2019-12-30 RX ADMIN — Medication 2 MILLIGRAM(S): at 20:04

## 2019-12-30 RX ADMIN — SERTRALINE 200 MILLIGRAM(S): 25 TABLET, FILM COATED ORAL at 08:29

## 2019-12-31 PROCEDURE — 99231 SBSQ HOSP IP/OBS SF/LOW 25: CPT

## 2019-12-31 RX ADMIN — SERTRALINE 200 MILLIGRAM(S): 25 TABLET, FILM COATED ORAL at 08:22

## 2019-12-31 RX ADMIN — OLANZAPINE 12.5 MILLIGRAM(S): 15 TABLET, FILM COATED ORAL at 20:09

## 2019-12-31 RX ADMIN — METHADONE HYDROCHLORIDE 30 MILLIGRAM(S): 40 TABLET ORAL at 08:22

## 2019-12-31 RX ADMIN — Medication 2 MILLIGRAM(S): at 08:22

## 2019-12-31 RX ADMIN — MIRTAZAPINE 45 MILLIGRAM(S): 45 TABLET, ORALLY DISINTEGRATING ORAL at 20:09

## 2019-12-31 RX ADMIN — Medication 75 MILLIGRAM(S): at 13:59

## 2019-12-31 RX ADMIN — Medication 2 MILLIGRAM(S): at 12:24

## 2019-12-31 RX ADMIN — METHADONE HYDROCHLORIDE 80 MILLIGRAM(S): 40 TABLET ORAL at 08:22

## 2019-12-31 RX ADMIN — Medication 2 MILLIGRAM(S): at 20:09

## 2020-01-01 RX ORDER — METHADONE HYDROCHLORIDE 40 MG/1
30 TABLET ORAL DAILY
Refills: 0 | Status: DISCONTINUED | OUTPATIENT
Start: 2020-01-01 | End: 2020-01-02

## 2020-01-01 RX ORDER — METHADONE HYDROCHLORIDE 40 MG/1
80 TABLET ORAL DAILY
Refills: 0 | Status: DISCONTINUED | OUTPATIENT
Start: 2020-01-01 | End: 2020-01-02

## 2020-01-01 RX ADMIN — OLANZAPINE 12.5 MILLIGRAM(S): 15 TABLET, FILM COATED ORAL at 20:08

## 2020-01-01 RX ADMIN — Medication 75 MILLIGRAM(S): at 12:52

## 2020-01-01 RX ADMIN — METHADONE HYDROCHLORIDE 80 MILLIGRAM(S): 40 TABLET ORAL at 08:49

## 2020-01-01 RX ADMIN — Medication 2 MILLIGRAM(S): at 20:08

## 2020-01-01 RX ADMIN — SERTRALINE 200 MILLIGRAM(S): 25 TABLET, FILM COATED ORAL at 08:49

## 2020-01-01 RX ADMIN — MIRTAZAPINE 45 MILLIGRAM(S): 45 TABLET, ORALLY DISINTEGRATING ORAL at 20:08

## 2020-01-01 RX ADMIN — Medication 2 MILLIGRAM(S): at 08:49

## 2020-01-01 RX ADMIN — METHADONE HYDROCHLORIDE 30 MILLIGRAM(S): 40 TABLET ORAL at 08:49

## 2020-01-01 RX ADMIN — Medication 2 MILLIGRAM(S): at 12:34

## 2020-01-01 NOTE — PHARMACOTHERAPY INTERVENTION NOTE - COMMENTS
Contacted THUAN Dr. Espino to recommend renewing Methadone 30 mG daily & Methadone 80 mG daily (total daily dose of 110 mG).  Md accepted recommendation & orders renewed.

## 2020-01-02 VITALS — TEMPERATURE: 98 F

## 2020-01-02 PROCEDURE — 99238 HOSP IP/OBS DSCHRG MGMT 30/<: CPT

## 2020-01-02 RX ORDER — CLONAZEPAM 1 MG
1 TABLET ORAL
Qty: 21 | Refills: 0
Start: 2020-01-02 | End: 2020-01-08

## 2020-01-02 RX ORDER — SERTRALINE 25 MG/1
2 TABLET, FILM COATED ORAL
Qty: 60 | Refills: 0
Start: 2020-01-02 | End: 2020-01-31

## 2020-01-02 RX ORDER — OLANZAPINE 15 MG/1
5 TABLET, FILM COATED ORAL
Qty: 70 | Refills: 0
Start: 2020-01-02 | End: 2020-01-15

## 2020-01-02 RX ORDER — METHADONE HYDROCHLORIDE 40 MG/1
2 TABLET ORAL
Qty: 0 | Refills: 0 | DISCHARGE
Start: 2020-01-02

## 2020-01-02 RX ORDER — CLONAZEPAM 1 MG
2 TABLET ORAL THREE TIMES A DAY
Refills: 0 | Status: DISCONTINUED | OUTPATIENT
Start: 2020-01-02 | End: 2020-01-02

## 2020-01-02 RX ORDER — MIRTAZAPINE 45 MG/1
1 TABLET, ORALLY DISINTEGRATING ORAL
Qty: 30 | Refills: 0
Start: 2020-01-02 | End: 2020-01-31

## 2020-01-02 RX ORDER — METHADONE HYDROCHLORIDE 40 MG/1
3 TABLET ORAL
Qty: 0 | Refills: 0 | DISCHARGE
Start: 2020-01-02

## 2020-01-02 RX ADMIN — SERTRALINE 200 MILLIGRAM(S): 25 TABLET, FILM COATED ORAL at 08:23

## 2020-01-02 RX ADMIN — METHADONE HYDROCHLORIDE 30 MILLIGRAM(S): 40 TABLET ORAL at 08:23

## 2020-01-02 RX ADMIN — Medication 2 MILLIGRAM(S): at 08:35

## 2020-01-02 RX ADMIN — METHADONE HYDROCHLORIDE 80 MILLIGRAM(S): 40 TABLET ORAL at 08:23

## 2020-04-12 ENCOUNTER — EMERGENCY (EMERGENCY)
Facility: HOSPITAL | Age: 40
LOS: 1 days | Discharge: ANOTHER HOSPITAL | End: 2020-04-12
Attending: EMERGENCY MEDICINE | Admitting: STUDENT IN AN ORGANIZED HEALTH CARE EDUCATION/TRAINING PROGRAM
Payer: MEDICAID

## 2020-04-12 VITALS
HEART RATE: 76 BPM | RESPIRATION RATE: 18 BRPM | OXYGEN SATURATION: 100 % | SYSTOLIC BLOOD PRESSURE: 142 MMHG | DIASTOLIC BLOOD PRESSURE: 79 MMHG | TEMPERATURE: 98 F

## 2020-04-12 LAB
ALBUMIN SERPL ELPH-MCNC: 4.6 G/DL — SIGNIFICANT CHANGE UP (ref 3.3–5)
ALP SERPL-CCNC: 82 U/L — SIGNIFICANT CHANGE UP (ref 40–120)
ALT FLD-CCNC: 12 U/L — SIGNIFICANT CHANGE UP (ref 4–41)
AMPHET UR-MCNC: NEGATIVE — SIGNIFICANT CHANGE UP
ANION GAP SERPL CALC-SCNC: 14 MMO/L — SIGNIFICANT CHANGE UP (ref 7–14)
APAP SERPL-MCNC: < 15 UG/ML — LOW (ref 15–25)
APPEARANCE UR: CLEAR — SIGNIFICANT CHANGE UP
AST SERPL-CCNC: 22 U/L — SIGNIFICANT CHANGE UP (ref 4–40)
BARBITURATES UR SCN-MCNC: NEGATIVE — SIGNIFICANT CHANGE UP
BASOPHILS # BLD AUTO: 0.04 K/UL — SIGNIFICANT CHANGE UP (ref 0–0.2)
BASOPHILS NFR BLD AUTO: 0.6 % — SIGNIFICANT CHANGE UP (ref 0–2)
BENZODIAZ UR-MCNC: NEGATIVE — SIGNIFICANT CHANGE UP
BILIRUB SERPL-MCNC: 0.2 MG/DL — SIGNIFICANT CHANGE UP (ref 0.2–1.2)
BILIRUB UR-MCNC: NEGATIVE — SIGNIFICANT CHANGE UP
BLOOD UR QL VISUAL: NEGATIVE — SIGNIFICANT CHANGE UP
BUN SERPL-MCNC: 9 MG/DL — SIGNIFICANT CHANGE UP (ref 7–23)
CALCIUM SERPL-MCNC: 9.7 MG/DL — SIGNIFICANT CHANGE UP (ref 8.4–10.5)
CANNABINOIDS UR-MCNC: NEGATIVE — SIGNIFICANT CHANGE UP
CHLORIDE SERPL-SCNC: 100 MMOL/L — SIGNIFICANT CHANGE UP (ref 98–107)
CO2 SERPL-SCNC: 26 MMOL/L — SIGNIFICANT CHANGE UP (ref 22–31)
COCAINE METAB.OTHER UR-MCNC: NEGATIVE — SIGNIFICANT CHANGE UP
COLOR SPEC: SIGNIFICANT CHANGE UP
CREAT SERPL-MCNC: 0.6 MG/DL — SIGNIFICANT CHANGE UP (ref 0.5–1.3)
EOSINOPHIL # BLD AUTO: 0.17 K/UL — SIGNIFICANT CHANGE UP (ref 0–0.5)
EOSINOPHIL NFR BLD AUTO: 2.4 % — SIGNIFICANT CHANGE UP (ref 0–6)
ETHANOL BLD-MCNC: 123 MG/DL — HIGH
GLUCOSE SERPL-MCNC: 96 MG/DL — SIGNIFICANT CHANGE UP (ref 70–99)
GLUCOSE UR-MCNC: NEGATIVE — SIGNIFICANT CHANGE UP
HCT VFR BLD CALC: 40.3 % — SIGNIFICANT CHANGE UP (ref 39–50)
HGB BLD-MCNC: 13.7 G/DL — SIGNIFICANT CHANGE UP (ref 13–17)
IMM GRANULOCYTES NFR BLD AUTO: 0.4 % — SIGNIFICANT CHANGE UP (ref 0–1.5)
KETONES UR-MCNC: NEGATIVE — SIGNIFICANT CHANGE UP
LEUKOCYTE ESTERASE UR-ACNC: NEGATIVE — SIGNIFICANT CHANGE UP
LYMPHOCYTES # BLD AUTO: 2.67 K/UL — SIGNIFICANT CHANGE UP (ref 1–3.3)
LYMPHOCYTES # BLD AUTO: 37.3 % — SIGNIFICANT CHANGE UP (ref 13–44)
MCHC RBC-ENTMCNC: 28.6 PG — SIGNIFICANT CHANGE UP (ref 27–34)
MCHC RBC-ENTMCNC: 34 % — SIGNIFICANT CHANGE UP (ref 32–36)
MCV RBC AUTO: 84.1 FL — SIGNIFICANT CHANGE UP (ref 80–100)
METHADONE UR-MCNC: POSITIVE — SIGNIFICANT CHANGE UP
MONOCYTES # BLD AUTO: 0.5 K/UL — SIGNIFICANT CHANGE UP (ref 0–0.9)
MONOCYTES NFR BLD AUTO: 7 % — SIGNIFICANT CHANGE UP (ref 2–14)
NEUTROPHILS # BLD AUTO: 3.75 K/UL — SIGNIFICANT CHANGE UP (ref 1.8–7.4)
NEUTROPHILS NFR BLD AUTO: 52.3 % — SIGNIFICANT CHANGE UP (ref 43–77)
NITRITE UR-MCNC: NEGATIVE — SIGNIFICANT CHANGE UP
NRBC # FLD: 0 K/UL — SIGNIFICANT CHANGE UP (ref 0–0)
OPIATES UR-MCNC: NEGATIVE — SIGNIFICANT CHANGE UP
OXYCODONE UR-MCNC: NEGATIVE — SIGNIFICANT CHANGE UP
PCP UR-MCNC: NEGATIVE — SIGNIFICANT CHANGE UP
PH UR: 6 — SIGNIFICANT CHANGE UP (ref 5–8)
PLATELET # BLD AUTO: 328 K/UL — SIGNIFICANT CHANGE UP (ref 150–400)
PMV BLD: 8.5 FL — SIGNIFICANT CHANGE UP (ref 7–13)
POTASSIUM SERPL-MCNC: 3.7 MMOL/L — SIGNIFICANT CHANGE UP (ref 3.5–5.3)
POTASSIUM SERPL-SCNC: 3.7 MMOL/L — SIGNIFICANT CHANGE UP (ref 3.5–5.3)
PROT SERPL-MCNC: 8.3 G/DL — SIGNIFICANT CHANGE UP (ref 6–8.3)
PROT UR-MCNC: NEGATIVE — SIGNIFICANT CHANGE UP
RBC # BLD: 4.79 M/UL — SIGNIFICANT CHANGE UP (ref 4.2–5.8)
RBC # FLD: 12.7 % — SIGNIFICANT CHANGE UP (ref 10.3–14.5)
SALICYLATES SERPL-MCNC: < 5 MG/DL — LOW (ref 15–30)
SODIUM SERPL-SCNC: 140 MMOL/L — SIGNIFICANT CHANGE UP (ref 135–145)
SP GR SPEC: 1.01 — SIGNIFICANT CHANGE UP (ref 1–1.04)
TSH SERPL-MCNC: 0.99 UIU/ML — SIGNIFICANT CHANGE UP (ref 0.27–4.2)
UROBILINOGEN FLD QL: NORMAL — SIGNIFICANT CHANGE UP
WBC # BLD: 7.16 K/UL — SIGNIFICANT CHANGE UP (ref 3.8–10.5)
WBC # FLD AUTO: 7.16 K/UL — SIGNIFICANT CHANGE UP (ref 3.8–10.5)

## 2020-04-12 PROCEDURE — 12002 RPR S/N/AX/GEN/TRNK2.6-7.5CM: CPT

## 2020-04-12 PROCEDURE — 99285 EMERGENCY DEPT VISIT HI MDM: CPT | Mod: 25

## 2020-04-12 RX ORDER — CLONAZEPAM 1 MG
2 TABLET ORAL ONCE
Refills: 0 | Status: DISCONTINUED | OUTPATIENT
Start: 2020-04-12 | End: 2020-04-12

## 2020-04-12 RX ORDER — MIRTAZAPINE 45 MG/1
30 TABLET, ORALLY DISINTEGRATING ORAL ONCE
Refills: 0 | Status: COMPLETED | OUTPATIENT
Start: 2020-04-12 | End: 2020-04-12

## 2020-04-12 RX ORDER — OLANZAPINE 15 MG/1
15 TABLET, FILM COATED ORAL ONCE
Refills: 0 | Status: COMPLETED | OUTPATIENT
Start: 2020-04-12 | End: 2020-04-12

## 2020-04-12 RX ADMIN — OLANZAPINE 15 MILLIGRAM(S): 15 TABLET, FILM COATED ORAL at 21:38

## 2020-04-12 RX ADMIN — MIRTAZAPINE 30 MILLIGRAM(S): 45 TABLET, ORALLY DISINTEGRATING ORAL at 21:38

## 2020-04-12 RX ADMIN — Medication 2 MILLIGRAM(S): at 22:57

## 2020-04-12 NOTE — ED BEHAVIORAL HEALTH NOTE - BEHAVIORAL HEALTH NOTE
Pt is a 39 year old male pending COVID-19 results for psychiatric admission. Writer contacted [facility] at [phone]. Writer spoke with Marlen and provided pt information for case to be reviewed. [facility] to be informed when test results become available. Pt is a 39 year old male pending COVID-19 results for psychiatric admission. Writer contacted [facility] at [phone]. Writer spoke with Marlen and provided pt information for case to be reviewed. [facility] to be informed when test results become available.    EKG faxed to [facility] at [phone].

## 2020-04-12 NOTE — ED BEHAVIORAL HEALTH ASSESSMENT NOTE - OTHER
homeless CVM unable to contact provider secondary to COVID isolation hair clean, nails dirty and long sitting in bed pending COVID results

## 2020-04-12 NOTE — ED BEHAVIORAL HEALTH ASSESSMENT NOTE - NS ED BHA MSE SPEECH ARTICULATION
[Supportive] : Goals of care discussed with patient: Supportive [Palliative Care Plan] : not applicable at this time [FreeTextEntry1] : 66 year old female with chronic lymphocytic leukemia stage 0 ; stable white cell count since 2017 approximately 13.5 000 with 60 % lymphocytes. She has no lymphadenopathy and no detectable organomegaly. I would recommend observation at this time. No autoimmune phenomena noted Normal

## 2020-04-12 NOTE — ED BEHAVIORAL HEALTH ASSESSMENT NOTE - OTHER PAST PSYCHIATRIC HISTORY (INCLUDE DETAILS REGARDING ONSET, COURSE OF ILLNESS, INPATIENT/OUTPATIENT TREATMENT)
Reports history of greater than "a few dozen" psychiatric hospitalizations, last one at [facility] - 12/-01/02/2020  Current outpatient treatment [facility] - ARS with Dr. Henry and in methadone tx - current dose of 110mg  reports multiple prior suicide attempts;
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
## 2020-04-12 NOTE — ED BEHAVIORAL HEALTH ASSESSMENT NOTE - ADDITIONAL DETAILS ALL
most recently cut his wrist with a  knife hoping to "bleed out"; numerous prior suicide attempts, injecting antifreeze, riding bike into traffic;

## 2020-04-12 NOTE — ED PROVIDER NOTE - PROGRESS NOTE DETAILS
JOSE ALBERTO Kurtz: Placed 7 sutures to close laceration, pt tolerated the procedure well. Spoke with psych they will see pt in ED, dispo pending COVID results. Spoke with lab, they will expedite COVID results Lien Collazo M.D: pt was signed out to me pending COVID swab for medical clearance. discussed with in [facility] lab who note they ran the swab here and it was negative but moraima had to send it to core lab for confirmation. spoke with core lab who have the specimen and have it as stat. lab here notes they will not imput the negative result until confirmed by core lab. Lien Collazo M.D: per psych, bed at [facility] available, but cannot accept him until COVID officially negative. will hold in ED GHULAM:  Patient signed out to me pending covid-19 result from core lab.  Patient stable, no active complaint.  Called Core lab and specimen is processing.  Patient dispo pending covid-19 pcr. Jose A SOTO MD PGY2: COVID negative. Psych social worker contacted to arrange transportation to [facility]/inpatient facility.

## 2020-04-12 NOTE — ED ADULT NURSE REASSESSMENT NOTE - NS ED NURSE REASSESS COMMENT FT1
Pt on CO with PCA at bedside. Vital signs as noted. Pt resting in stretcher, appears comfortable. Respirations even and unlabored. Will continue to monitor.
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
## 2020-04-12 NOTE — ED PROVIDER NOTE - PHYSICAL EXAMINATION
pt is tearful  skin: 4cm linear superficial laceration to left forearm, bleeding resolved with direct pressure, FROM of left hand/wrist, sensation intact

## 2020-04-12 NOTE — ED BEHAVIORAL HEALTH ASSESSMENT NOTE - RISK ASSESSMENT
pt is at both a high acute and chronic suicide risk given his hx of multiple SA, multiple psych hospitalizations, past hx of trauma, hx of substance use, most recent SA of high lethality, being an undomiciled middle age male, minimal access to therapeutic services, feelings of hopelessness and minimal social support.   Protective factors include able to seek help, adherence to psychiatric tx and has a good therapeutic relationship with providers and no active substance use or access to firearms.   Given his high acute risk, patient requires psychiatric hospitalization. High Acute Suicide Risk
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
## 2020-04-12 NOTE — ED BEHAVIORAL HEALTH ASSESSMENT NOTE - PSYCHIATRIC ISSUES AND PLAN (INCLUDE STANDING AND PRN MEDICATION)
c/w psych meds: Klonopin 2mg po tid, Zyprexa 15mg po qhs, Sertraline 225mg po daily, Remeron 30mg po qhs
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
## 2020-04-12 NOTE — ED PROVIDER NOTE - CLINICAL SUMMARY MEDICAL DECISION MAKING FREE TEXT BOX
39yM w/pmhx MDD (multiple psychiatric admissions and history of suicide attempts), substance use disorder, currently homeless presented with laceration to left forearm due to suicide attempt. Will repair laceration, concern for COVID given cough will swab to r/o, will check cbc/cmp/tsh/tox screen, EKG, UA. Pt ordered for 1:1 John att: 40 yo M w/pmhx MDD (multiple psychiatric admissions and history of suicide attempts), substance use disorder, currently homeless presented with laceration to left forearm due to suicide attempt. Will repair laceration, concern for COVID given cough will swab to r/o, will check cbc/cmp/tsh/tox screen, EKG, UA. Pt ordered for 1:1

## 2020-04-12 NOTE — ED BEHAVIORAL HEALTH ASSESSMENT NOTE - SUICIDE RISK FACTORS
Current mood episode/Alcohol/Substance abuse disorders/Conduct problems current/past/Recent onset of current/past psychiatric diagnosis/PTSD current/past/Unable to engage in safety planning/Chronic pain/Insomnia/Hopelessness or despair/Access to lethal methods (pills, firearm, etc.: Ask specifically about presence or absence of a firearm in the home or ease of accessing/History of abuse/trauma/Cluster B Personality disorders or traits current/past

## 2020-04-12 NOTE — ED BEHAVIORAL HEALTH ASSESSMENT NOTE - CASE SUMMARY
Pt seen, chart reviewed, case discussed with team. Pt is s/p suicide attempt, some ETOH on board, but on exam, pt reports worsening depression and SI. He has no social supports currently, is homeless, has prior attempts. He is unable to safety plan, feels that he would reattempt if did not receive help. Pt states that he would feel safe on unit and comfortable to approach staff is SI worsens and he is unable to control impulses. Pt agreeable and appropriate for voluntary admission. No psychotic or manic sxs worsened. Please call Methadone clinic for verification of dose in am. Awaiting Covid test for appropriate transfer. Pt in agreement with plan.

## 2020-04-12 NOTE — ED BEHAVIORAL HEALTH ASSESSMENT NOTE - CURRENT MEDICATION
zyprexa 15 mg PO QHS, Remeron 30 mg PO QHS, Methadone 110 mg PO QHS, Klonopin 2 mg PO TID, Sertraline 225mg po daily
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
## 2020-04-12 NOTE — ED BEHAVIORAL HEALTH ASSESSMENT NOTE - DESCRIPTION
calm and cooperative with interview but at times got emotional and tearful.   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

## 2020-04-12 NOTE — ED PROVIDER NOTE - ATTENDING CONTRIBUTION TO CARE
I performed a history and physical exam of the patient and discussed their management with the resident. I reviewed the resident's note and agree with the documented findings and plan of care. My medical decision making and observations are found above. I performed a history and physical exam of the patient and discussed their management with the advanced care provider. I reviewed the advanced care provider's note and agree with the documented findings and plan of care. My medical decision making and objective findings are found above.
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
## 2020-04-13 VITALS
TEMPERATURE: 98 F | SYSTOLIC BLOOD PRESSURE: 121 MMHG | OXYGEN SATURATION: 100 % | DIASTOLIC BLOOD PRESSURE: 62 MMHG | HEART RATE: 62 BPM | RESPIRATION RATE: 16 BRPM

## 2020-04-13 LAB — SARS-COV-2 RNA SPEC QL NAA+PROBE: SIGNIFICANT CHANGE UP

## 2020-04-13 RX ORDER — CLONAZEPAM 1 MG
2 TABLET ORAL ONCE
Refills: 0 | Status: DISCONTINUED | OUTPATIENT
Start: 2020-04-13 | End: 2020-04-13

## 2020-04-13 RX ORDER — METHADONE HYDROCHLORIDE 40 MG/1
40 TABLET ORAL ONCE
Refills: 0 | Status: DISCONTINUED | OUTPATIENT
Start: 2020-04-13 | End: 2020-04-13

## 2020-04-13 RX ORDER — SERTRALINE 25 MG/1
100 TABLET, FILM COATED ORAL ONCE
Refills: 0 | Status: COMPLETED | OUTPATIENT
Start: 2020-04-13 | End: 2020-04-13

## 2020-04-13 RX ORDER — METHADONE HYDROCHLORIDE 40 MG/1
110 TABLET ORAL ONCE
Refills: 0 | Status: DISCONTINUED | OUTPATIENT
Start: 2020-04-13 | End: 2020-04-13

## 2020-04-13 RX ADMIN — Medication 2 MILLIGRAM(S): at 15:25

## 2020-04-13 RX ADMIN — SERTRALINE 100 MILLIGRAM(S): 25 TABLET, FILM COATED ORAL at 07:16

## 2020-04-13 RX ADMIN — Medication 2 MILLIGRAM(S): at 07:17

## 2020-04-13 RX ADMIN — METHADONE HYDROCHLORIDE 110 MILLIGRAM(S): 40 TABLET ORAL at 07:38

## 2020-04-13 NOTE — ED ADULT NURSE REASSESSMENT NOTE - NS ED NURSE REASSESS COMMENT FT1
Evaluated and cleared by psychiatry, MC by MD for admission to [facility] .  Pt calm aware of admission denies s/i h/i/avh presently, pt transported via EMS.

## 2020-04-13 NOTE — ED ADULT NURSE REASSESSMENT NOTE - NS ED NURSE REASSESS COMMENT FT1
Pt arrived to  from main emergency room. Upon arrival Pt denied suicidal ideation, homicidal, visual and auditory hallucinations. Pt changed into  clothing. Safety maintained.

## 2020-04-13 NOTE — ED BEHAVIORAL HEALTH NOTE - BEHAVIORAL HEALTH NOTE
Writer was informed by medicine that patient was covid Negative and required inpatient psychiatric admission.  Writer called central intake for a census bed.

## 2020-04-13 NOTE — ED BEHAVIORAL HEALTH NOTE - BEHAVIORAL HEALTH NOTE
Writer called pt's insurance to obtain authorization.  Auth# [id number] for 9 days concurrent review due 4/21/20 to the general number.

## 2020-04-13 NOTE — ED BEHAVIORAL HEALTH NOTE - BEHAVIORAL HEALTH NOTE
writer received a call from Liv at [facility] Pt was accepted by Dr. Covarrubias to [place].

## 2020-04-13 NOTE — ED BEHAVIORAL HEALTH NOTE - BEHAVIORAL HEALTH NOTE
Discussed with EM Dr. Jefferson (specrta [id number]) and informed the confirmatory test is currently pending.  Results are suspected to be back this afternoon with psychiatric disposition pending.  Plan is for voluntary psychiatric admission with location pending Covid19 test results.  Call psychiatry with questions and/or concerns.

## 2020-04-13 NOTE — ED ADULT NURSE REASSESSMENT NOTE - NS ED NURSE REASSESS COMMENT FT1
resumed care from NOC RN. patient sitting in bed with 1:1 sitter at bedside. patient calm/cooperative at this time no s/s of distress or agitation.
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
## 2020-06-03 ENCOUNTER — INPATIENT (INPATIENT)
Facility: HOSPITAL | Age: 40
LOS: 77 days | Discharge: HOME | End: 2020-08-20
Attending: PSYCHIATRY & NEUROLOGY | Admitting: PSYCHIATRY & NEUROLOGY
Payer: COMMERCIAL

## 2020-06-03 VITALS
RESPIRATION RATE: 20 BRPM | HEART RATE: 78 BPM | SYSTOLIC BLOOD PRESSURE: 126 MMHG | TEMPERATURE: 98 F | OXYGEN SATURATION: 97 % | DIASTOLIC BLOOD PRESSURE: 87 MMHG

## 2020-06-03 DIAGNOSIS — F33.9 MAJOR DEPRESSIVE DISORDER, RECURRENT, UNSPECIFIED: ICD-10-CM

## 2020-06-03 LAB
ALBUMIN SERPL ELPH-MCNC: 4.4 G/DL — SIGNIFICANT CHANGE UP (ref 3.3–5)
ALP SERPL-CCNC: 75 U/L — SIGNIFICANT CHANGE UP (ref 40–120)
ALT FLD-CCNC: 11 U/L — SIGNIFICANT CHANGE UP (ref 4–41)
ANION GAP SERPL CALC-SCNC: 12 MMO/L — SIGNIFICANT CHANGE UP (ref 7–14)
APAP SERPL-MCNC: < 15 UG/ML — LOW (ref 15–25)
APTT BLD: 38.4 SEC — HIGH (ref 27.5–36.3)
AST SERPL-CCNC: 20 U/L — SIGNIFICANT CHANGE UP (ref 4–40)
BASOPHILS # BLD AUTO: 0.03 K/UL — SIGNIFICANT CHANGE UP (ref 0–0.2)
BASOPHILS NFR BLD AUTO: 0.4 % — SIGNIFICANT CHANGE UP (ref 0–2)
BILIRUB SERPL-MCNC: < 0.2 MG/DL — LOW (ref 0.2–1.2)
BLD GP AB SCN SERPL QL: NEGATIVE — SIGNIFICANT CHANGE UP
BUN SERPL-MCNC: 6 MG/DL — LOW (ref 7–23)
CALCIUM SERPL-MCNC: 9.3 MG/DL — SIGNIFICANT CHANGE UP (ref 8.4–10.5)
CHLORIDE SERPL-SCNC: 96 MMOL/L — LOW (ref 98–107)
CO2 SERPL-SCNC: 27 MMOL/L — SIGNIFICANT CHANGE UP (ref 22–31)
CREAT SERPL-MCNC: 0.68 MG/DL — SIGNIFICANT CHANGE UP (ref 0.5–1.3)
EOSINOPHIL # BLD AUTO: 0.14 K/UL — SIGNIFICANT CHANGE UP (ref 0–0.5)
EOSINOPHIL NFR BLD AUTO: 2.1 % — SIGNIFICANT CHANGE UP (ref 0–6)
ETHANOL BLD-MCNC: 44 MG/DL — HIGH
GLUCOSE SERPL-MCNC: 87 MG/DL — SIGNIFICANT CHANGE UP (ref 70–99)
HCT VFR BLD CALC: 36.6 % — LOW (ref 39–50)
HGB BLD-MCNC: 12.5 G/DL — LOW (ref 13–17)
IMM GRANULOCYTES NFR BLD AUTO: 0.3 % — SIGNIFICANT CHANGE UP (ref 0–1.5)
INR BLD: 0.98 — SIGNIFICANT CHANGE UP (ref 0.88–1.17)
LYMPHOCYTES # BLD AUTO: 2.55 K/UL — SIGNIFICANT CHANGE UP (ref 1–3.3)
LYMPHOCYTES # BLD AUTO: 37.8 % — SIGNIFICANT CHANGE UP (ref 13–44)
MCHC RBC-ENTMCNC: 29.6 PG — SIGNIFICANT CHANGE UP (ref 27–34)
MCHC RBC-ENTMCNC: 34.2 % — SIGNIFICANT CHANGE UP (ref 32–36)
MCV RBC AUTO: 86.5 FL — SIGNIFICANT CHANGE UP (ref 80–100)
MONOCYTES # BLD AUTO: 0.45 K/UL — SIGNIFICANT CHANGE UP (ref 0–0.9)
MONOCYTES NFR BLD AUTO: 6.7 % — SIGNIFICANT CHANGE UP (ref 2–14)
NEUTROPHILS # BLD AUTO: 3.55 K/UL — SIGNIFICANT CHANGE UP (ref 1.8–7.4)
NEUTROPHILS NFR BLD AUTO: 52.7 % — SIGNIFICANT CHANGE UP (ref 43–77)
NRBC # FLD: 0 K/UL — SIGNIFICANT CHANGE UP (ref 0–0)
PLATELET # BLD AUTO: 292 K/UL — SIGNIFICANT CHANGE UP (ref 150–400)
PMV BLD: 8.9 FL — SIGNIFICANT CHANGE UP (ref 7–13)
POTASSIUM SERPL-MCNC: 3.8 MMOL/L — SIGNIFICANT CHANGE UP (ref 3.5–5.3)
POTASSIUM SERPL-SCNC: 3.8 MMOL/L — SIGNIFICANT CHANGE UP (ref 3.5–5.3)
PROT SERPL-MCNC: 7.4 G/DL — SIGNIFICANT CHANGE UP (ref 6–8.3)
PROTHROM AB SERPL-ACNC: 11.2 SEC — SIGNIFICANT CHANGE UP (ref 9.8–13.1)
RBC # BLD: 4.23 M/UL — SIGNIFICANT CHANGE UP (ref 4.2–5.8)
RBC # FLD: 12.8 % — SIGNIFICANT CHANGE UP (ref 10.3–14.5)
RH IG SCN BLD-IMP: POSITIVE — SIGNIFICANT CHANGE UP
SALICYLATES SERPL-MCNC: < 5 MG/DL — LOW (ref 15–30)
SARS-COV-2 RNA SPEC QL NAA+PROBE: SIGNIFICANT CHANGE UP
SODIUM SERPL-SCNC: 135 MMOL/L — SIGNIFICANT CHANGE UP (ref 135–145)
WBC # BLD: 6.74 K/UL — SIGNIFICANT CHANGE UP (ref 3.8–10.5)
WBC # FLD AUTO: 6.74 K/UL — SIGNIFICANT CHANGE UP (ref 3.8–10.5)

## 2020-06-03 PROCEDURE — 99285 EMERGENCY DEPT VISIT HI MDM: CPT

## 2020-06-03 RX ORDER — METHADONE HYDROCHLORIDE 40 MG/1
80 TABLET ORAL DAILY
Refills: 0 | Status: DISCONTINUED | OUTPATIENT
Start: 2020-06-03 | End: 2020-06-04

## 2020-06-03 RX ORDER — OLANZAPINE 15 MG/1
10 TABLET, FILM COATED ORAL AT BEDTIME
Refills: 0 | Status: DISCONTINUED | OUTPATIENT
Start: 2020-06-03 | End: 2020-06-04

## 2020-06-03 RX ORDER — METHADONE HYDROCHLORIDE 40 MG/1
30 TABLET ORAL DAILY
Refills: 0 | Status: DISCONTINUED | OUTPATIENT
Start: 2020-06-03 | End: 2020-06-04

## 2020-06-03 RX ORDER — OLANZAPINE 15 MG/1
2.5 TABLET, FILM COATED ORAL AT BEDTIME
Refills: 0 | Status: DISCONTINUED | OUTPATIENT
Start: 2020-06-03 | End: 2020-06-04

## 2020-06-03 RX ORDER — CLONAZEPAM 1 MG
2 TABLET ORAL THREE TIMES A DAY
Refills: 0 | Status: DISCONTINUED | OUTPATIENT
Start: 2020-06-03 | End: 2020-06-10

## 2020-06-03 RX ORDER — CLONAZEPAM 1 MG
2 TABLET ORAL ONCE
Refills: 0 | Status: DISCONTINUED | OUTPATIENT
Start: 2020-06-03 | End: 2020-06-03

## 2020-06-03 RX ORDER — SERTRALINE 25 MG/1
150 TABLET, FILM COATED ORAL DAILY
Refills: 0 | Status: DISCONTINUED | OUTPATIENT
Start: 2020-06-03 | End: 2020-06-04

## 2020-06-03 RX ORDER — METHADONE HYDROCHLORIDE 40 MG/1
110 TABLET ORAL DAILY
Refills: 0 | Status: DISCONTINUED | OUTPATIENT
Start: 2020-06-03 | End: 2020-06-03

## 2020-06-03 RX ORDER — PRAZOSIN HCL 2 MG
2 CAPSULE ORAL AT BEDTIME
Refills: 0 | Status: DISCONTINUED | OUTPATIENT
Start: 2020-06-03 | End: 2020-06-04

## 2020-06-03 RX ORDER — MIRTAZAPINE 45 MG/1
45 TABLET, ORALLY DISINTEGRATING ORAL AT BEDTIME
Refills: 0 | Status: DISCONTINUED | OUTPATIENT
Start: 2020-06-03 | End: 2020-07-02

## 2020-06-03 RX ORDER — PANTOPRAZOLE SODIUM 20 MG/1
40 TABLET, DELAYED RELEASE ORAL
Refills: 0 | Status: DISCONTINUED | OUTPATIENT
Start: 2020-06-03 | End: 2020-07-07

## 2020-06-03 RX ADMIN — OLANZAPINE 10 MILLIGRAM(S): 15 TABLET, FILM COATED ORAL at 22:53

## 2020-06-03 RX ADMIN — Medication 2 MILLIGRAM(S): at 22:53

## 2020-06-03 RX ADMIN — Medication 2 MILLIGRAM(S): at 17:29

## 2020-06-03 RX ADMIN — MIRTAZAPINE 45 MILLIGRAM(S): 45 TABLET, ORALLY DISINTEGRATING ORAL at 22:53

## 2020-06-03 RX ADMIN — OLANZAPINE 2.5 MILLIGRAM(S): 15 TABLET, FILM COATED ORAL at 22:53

## 2020-06-03 NOTE — ED BEHAVIORAL HEALTH ASSESSMENT NOTE - ACTIVATING EVENTS/STRESSORS
Current or pending social isolation/Other Other/Current or pending social isolation/Pending incarceration or homelessness

## 2020-06-03 NOTE — ED BEHAVIORAL HEALTH ASSESSMENT NOTE - CURRENT MEDICATION
zyprexa 12.5 mg PO QHS, Remeron 30 mg PO QHS, Methadone 110 mg PO QHS, Klonopin 2 mg PO TID, Sertraline 150mg po daily, omeprazole 10mg po qam, minipress 2mg po qhs
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
## 2020-06-03 NOTE — ED BEHAVIORAL HEALTH ASSESSMENT NOTE - PSYCHIATRIC ISSUES AND PLAN (INCLUDE STANDING AND PRN MEDICATION)
increase remeron to 45mg po qhs, c/w klonopin 2mg po TID, zyprexa 12.5mg po qhs, Zoloft 150mg po daily, Minipress 2mg po qhs
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
## 2020-06-03 NOTE — ED BEHAVIORAL HEALTH NOTE - BEHAVIORAL HEALTH NOTE
Worker called patient’s  Richy Hardy from [facility] ([phone]) for collateral information.    Ms. Hardy states that she has not had recent contact with the patient. She states that she outreached to the patient on May 15 but did not speak to the patient. She states that the patient goes to the methadone clinic every day. She states that the patient was discharged from inpatient hospitalization from [facility] in April. She states that the patient is connected to Andreea at the Methadone clinic and she has more contact with the patient. Patient is currently taking Zyprexa 10mg hs, Zoloft 150mg QID, Remeron 15mg HS, Minipress 2mg hs, and Klonopin 2mg TID.  Patient is homeless.  Worker called patient’s brother [phone] and left message for call back. Worker then called [phone] [facility] and left a message for call back on answering service.

## 2020-06-03 NOTE — ED BEHAVIORAL HEALTH ASSESSMENT NOTE - OTHER PAST PSYCHIATRIC HISTORY (INCLUDE DETAILS REGARDING ONSET, COURSE OF ILLNESS, INPATIENT/OUTPATIENT TREATMENT)
Reports history of greater than "a few dozen" psychiatric hospitalizations, last one at [facility] April 2020 for similar presentation.  Current outpatient treatment [facility] - ARS with Dr. Henry and in methadone tx - current dose of 110mg  reports multiple prior suicide attempts; Dx major depressive disorder, post-traumatic stress disorder, anxiety disorder  Reports history of greater than "a few dozen" psychiatric hospitalizations, last one at [facility] April 2020 for similar presentation.  Current outpatient treatment [facility] - ARS with Dr. Henry and in methadone tx - current dose of 110mg  reports multiple prior suicide attempts; reports hx ECT

## 2020-06-03 NOTE — ED PROVIDER NOTE - PROGRESS NOTE DETAILS
JOSE ALBERTO Kurtz: Placed 12 sutures to close laceration to left forearm, pt sent to  for psychiatric evaluation

## 2020-06-03 NOTE — ED ADULT NURSE NOTE - NSIMPLEMENTINTERV_GEN_ALL_ED
Implemented All Universal Safety Interventions:  [place] to call system. Call bell, personal items and telephone within reach. Instruct patient to call for assistance. Room bathroom lighting operational. Non-slip footwear when patient is off stretcher. Physically safe environment: no spills, clutter or unnecessary equipment. Stretcher in lowest position, wheels locked, appropriate side rails in place.
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
## 2020-06-03 NOTE — ED PROVIDER NOTE - PHYSICAL EXAMINATION
skin: 5cm linear superficial laceration to left forearm  sensation intact to hand and b/l extremities, FROM of left hand

## 2020-06-03 NOTE — ED ADULT TRIAGE NOTE - CHIEF COMPLAINT QUOTE
pt walk in with large laceration to left ventral arm, pt stated SI.   pt with large amount of bleeding in triage, place pt in RM 11.     not able to do full triage.

## 2020-06-03 NOTE — ED BEHAVIORAL HEALTH ASSESSMENT NOTE - SUICIDE RISK FACTORS
PTSD current/past/Hopelessness or despair/Conduct problems current/past/Recent onset of current/past psychiatric diagnosis/Current mood episode/Alcohol/Substance abuse disorders/Insomnia/Chronic pain/Access to lethal methods (pills, firearm, etc.: Ask specifically about presence or absence of a firearm in the home or ease of accessing/History of abuse/trauma/Cluster B Personality disorders or traits current/past/Unable to engage in safety planning Cluster B Personality disorders or traits current/past/Recent onset of current/past psychiatric diagnosis/Unable to engage in safety planning/Insomnia/Alcohol/Substance abuse disorders/Agitation/Severe Anxiety/Panic/Chronic pain/Access to lethal methods (pills, firearm, etc.: Ask specifically about presence or absence of a firearm in the home or ease of accessing/Current mood episode/Conduct problems current/past/History of abuse/trauma/PTSD current/past/Hopelessness or despair
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
## 2020-06-03 NOTE — ED BEHAVIORAL HEALTH ASSESSMENT NOTE - OTHER
homeless ZACK, [facility]  #: [phone] unable to contact provider secondary to COVID isolation hair clean, nails dirty and long sitting in bed tearful, mild irritability vague [id number]
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
## 2020-06-03 NOTE — ED BEHAVIORAL HEALTH ASSESSMENT NOTE - DIFFERENTIAL
MDD  substance abuse  PTSD by history  r/o personality disorder r/o personality disorder  Depression with mixed features/agitated depression   Bipolar disorder  Schizoaffective disorder, depressed type
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
## 2020-06-03 NOTE — ED PROVIDER NOTE - CLINICAL SUMMARY MEDICAL DECISION MAKING FREE TEXT BOX
40 yo M w/pmhx MDD (multiple psychiatric admissions and history of suicide attempts), substance use disorder, currently homeless presented with laceration to left forearm in suicide attempt 45 minutes prior to arrival. Pt denies drug use. Plan: suture laceration, will check cbc/cmp/tsh/tox screen,  evaluation
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
## 2020-06-04 LAB
CHOLEST SERPL-MCNC: 188 MG/DL — SIGNIFICANT CHANGE UP (ref 120–199)
HBA1C BLD-MCNC: 4.7 % — SIGNIFICANT CHANGE UP (ref 4–5.6)
HDLC SERPL-MCNC: 57 MG/DL — HIGH (ref 35–55)
LIPID PNL WITH DIRECT LDL SERPL: 104 MG/DL — SIGNIFICANT CHANGE UP
TRIGL SERPL-MCNC: 288 MG/DL — HIGH (ref 10–149)

## 2020-06-04 RX ORDER — OLANZAPINE 15 MG/1
15 TABLET, FILM COATED ORAL AT BEDTIME
Refills: 0 | Status: DISCONTINUED | OUTPATIENT
Start: 2020-06-04 | End: 2020-06-26

## 2020-06-04 RX ORDER — METHADONE HYDROCHLORIDE 40 MG/1
80 TABLET ORAL DAILY
Refills: 0 | Status: DISCONTINUED | OUTPATIENT
Start: 2020-06-04 | End: 2020-06-10

## 2020-06-04 RX ORDER — METHADONE HYDROCHLORIDE 40 MG/1
30 TABLET ORAL DAILY
Refills: 0 | Status: DISCONTINUED | OUTPATIENT
Start: 2020-06-04 | End: 2020-06-10

## 2020-06-04 RX ORDER — SERTRALINE 25 MG/1
75 TABLET, FILM COATED ORAL DAILY
Refills: 0 | Status: DISCONTINUED | OUTPATIENT
Start: 2020-06-05 | End: 2020-06-15

## 2020-06-04 RX ORDER — PRAZOSIN HCL 2 MG
3 CAPSULE ORAL AT BEDTIME
Refills: 0 | Status: DISCONTINUED | OUTPATIENT
Start: 2020-06-04 | End: 2020-06-07

## 2020-06-04 RX ORDER — METHADONE HYDROCHLORIDE 40 MG/1
110 TABLET ORAL DAILY
Refills: 0 | Status: DISCONTINUED | OUTPATIENT
Start: 2020-06-04 | End: 2020-06-04

## 2020-06-04 RX ADMIN — Medication 3 MILLIGRAM(S): at 20:50

## 2020-06-04 RX ADMIN — Medication 2 MILLIGRAM(S): at 08:40

## 2020-06-04 RX ADMIN — MIRTAZAPINE 45 MILLIGRAM(S): 45 TABLET, ORALLY DISINTEGRATING ORAL at 20:50

## 2020-06-04 RX ADMIN — Medication 2 MILLIGRAM(S): at 20:50

## 2020-06-04 RX ADMIN — Medication 2 MILLIGRAM(S): at 12:26

## 2020-06-04 RX ADMIN — OLANZAPINE 15 MILLIGRAM(S): 15 TABLET, FILM COATED ORAL at 20:50

## 2020-06-04 RX ADMIN — SERTRALINE 150 MILLIGRAM(S): 25 TABLET, FILM COATED ORAL at 08:40

## 2020-06-04 RX ADMIN — PANTOPRAZOLE SODIUM 40 MILLIGRAM(S): 20 TABLET, DELAYED RELEASE ORAL at 08:40

## 2020-06-04 RX ADMIN — METHADONE HYDROCHLORIDE 80 MILLIGRAM(S): 40 TABLET ORAL at 08:40

## 2020-06-04 RX ADMIN — METHADONE HYDROCHLORIDE 30 MILLIGRAM(S): 40 TABLET ORAL at 08:40

## 2020-06-05 PROCEDURE — 90832 PSYTX W PT 30 MINUTES: CPT

## 2020-06-05 RX ORDER — DESVENLAFAXINE 50 MG/1
25 TABLET, EXTENDED RELEASE ORAL DAILY
Refills: 0 | Status: DISCONTINUED | OUTPATIENT
Start: 2020-06-05 | End: 2020-06-08

## 2020-06-05 RX ADMIN — MIRTAZAPINE 45 MILLIGRAM(S): 45 TABLET, ORALLY DISINTEGRATING ORAL at 20:44

## 2020-06-05 RX ADMIN — SERTRALINE 75 MILLIGRAM(S): 25 TABLET, FILM COATED ORAL at 08:25

## 2020-06-05 RX ADMIN — METHADONE HYDROCHLORIDE 30 MILLIGRAM(S): 40 TABLET ORAL at 08:25

## 2020-06-05 RX ADMIN — DESVENLAFAXINE 25 MILLIGRAM(S): 50 TABLET, EXTENDED RELEASE ORAL at 15:54

## 2020-06-05 RX ADMIN — Medication 2 MILLIGRAM(S): at 08:25

## 2020-06-05 RX ADMIN — OLANZAPINE 15 MILLIGRAM(S): 15 TABLET, FILM COATED ORAL at 20:44

## 2020-06-05 RX ADMIN — Medication 2 MILLIGRAM(S): at 12:46

## 2020-06-05 RX ADMIN — Medication 2 MILLIGRAM(S): at 20:44

## 2020-06-05 RX ADMIN — METHADONE HYDROCHLORIDE 80 MILLIGRAM(S): 40 TABLET ORAL at 08:25

## 2020-06-05 RX ADMIN — Medication 3 MILLIGRAM(S): at 20:44

## 2020-06-05 RX ADMIN — PANTOPRAZOLE SODIUM 40 MILLIGRAM(S): 20 TABLET, DELAYED RELEASE ORAL at 08:25

## 2020-06-06 RX ADMIN — PANTOPRAZOLE SODIUM 40 MILLIGRAM(S): 20 TABLET, DELAYED RELEASE ORAL at 08:04

## 2020-06-06 RX ADMIN — OLANZAPINE 15 MILLIGRAM(S): 15 TABLET, FILM COATED ORAL at 20:43

## 2020-06-06 RX ADMIN — METHADONE HYDROCHLORIDE 80 MILLIGRAM(S): 40 TABLET ORAL at 08:30

## 2020-06-06 RX ADMIN — MIRTAZAPINE 45 MILLIGRAM(S): 45 TABLET, ORALLY DISINTEGRATING ORAL at 20:43

## 2020-06-06 RX ADMIN — Medication 2 MILLIGRAM(S): at 20:43

## 2020-06-06 RX ADMIN — SERTRALINE 75 MILLIGRAM(S): 25 TABLET, FILM COATED ORAL at 08:31

## 2020-06-06 RX ADMIN — Medication 3 MILLIGRAM(S): at 20:43

## 2020-06-06 RX ADMIN — DESVENLAFAXINE 25 MILLIGRAM(S): 50 TABLET, EXTENDED RELEASE ORAL at 08:30

## 2020-06-06 RX ADMIN — METHADONE HYDROCHLORIDE 30 MILLIGRAM(S): 40 TABLET ORAL at 08:30

## 2020-06-06 RX ADMIN — Medication 2 MILLIGRAM(S): at 12:37

## 2020-06-06 RX ADMIN — Medication 2 MILLIGRAM(S): at 08:30

## 2020-06-07 PROCEDURE — 99232 SBSQ HOSP IP/OBS MODERATE 35: CPT

## 2020-06-07 RX ORDER — PRAZOSIN HCL 2 MG
2 CAPSULE ORAL AT BEDTIME
Refills: 0 | Status: DISCONTINUED | OUTPATIENT
Start: 2020-06-07 | End: 2020-06-08

## 2020-06-07 RX ADMIN — OLANZAPINE 15 MILLIGRAM(S): 15 TABLET, FILM COATED ORAL at 20:51

## 2020-06-07 RX ADMIN — Medication 2 MILLIGRAM(S): at 20:51

## 2020-06-07 RX ADMIN — MIRTAZAPINE 45 MILLIGRAM(S): 45 TABLET, ORALLY DISINTEGRATING ORAL at 20:51

## 2020-06-07 RX ADMIN — SERTRALINE 75 MILLIGRAM(S): 25 TABLET, FILM COATED ORAL at 08:59

## 2020-06-07 RX ADMIN — METHADONE HYDROCHLORIDE 30 MILLIGRAM(S): 40 TABLET ORAL at 08:58

## 2020-06-07 RX ADMIN — METHADONE HYDROCHLORIDE 80 MILLIGRAM(S): 40 TABLET ORAL at 08:58

## 2020-06-07 RX ADMIN — Medication 2 MILLIGRAM(S): at 11:53

## 2020-06-07 RX ADMIN — DESVENLAFAXINE 25 MILLIGRAM(S): 50 TABLET, EXTENDED RELEASE ORAL at 08:58

## 2020-06-07 RX ADMIN — PANTOPRAZOLE SODIUM 40 MILLIGRAM(S): 20 TABLET, DELAYED RELEASE ORAL at 08:59

## 2020-06-07 RX ADMIN — Medication 2 MILLIGRAM(S): at 08:58

## 2020-06-08 PROCEDURE — 90832 PSYTX W PT 30 MINUTES: CPT

## 2020-06-08 RX ORDER — DESVENLAFAXINE 50 MG/1
50 TABLET, EXTENDED RELEASE ORAL DAILY
Refills: 0 | Status: DISCONTINUED | OUTPATIENT
Start: 2020-06-09 | End: 2020-06-15

## 2020-06-08 RX ORDER — PRAZOSIN HCL 2 MG
3 CAPSULE ORAL AT BEDTIME
Refills: 0 | Status: DISCONTINUED | OUTPATIENT
Start: 2020-06-08 | End: 2020-07-24

## 2020-06-08 RX ADMIN — DESVENLAFAXINE 25 MILLIGRAM(S): 50 TABLET, EXTENDED RELEASE ORAL at 08:25

## 2020-06-08 RX ADMIN — SERTRALINE 75 MILLIGRAM(S): 25 TABLET, FILM COATED ORAL at 08:25

## 2020-06-08 RX ADMIN — PANTOPRAZOLE SODIUM 40 MILLIGRAM(S): 20 TABLET, DELAYED RELEASE ORAL at 08:25

## 2020-06-08 RX ADMIN — Medication 2 MILLIGRAM(S): at 20:33

## 2020-06-08 RX ADMIN — Medication 3 MILLIGRAM(S): at 20:33

## 2020-06-08 RX ADMIN — OLANZAPINE 15 MILLIGRAM(S): 15 TABLET, FILM COATED ORAL at 20:33

## 2020-06-08 RX ADMIN — METHADONE HYDROCHLORIDE 30 MILLIGRAM(S): 40 TABLET ORAL at 08:25

## 2020-06-08 RX ADMIN — Medication 2 MILLIGRAM(S): at 08:25

## 2020-06-08 RX ADMIN — METHADONE HYDROCHLORIDE 80 MILLIGRAM(S): 40 TABLET ORAL at 08:25

## 2020-06-08 RX ADMIN — MIRTAZAPINE 45 MILLIGRAM(S): 45 TABLET, ORALLY DISINTEGRATING ORAL at 20:33

## 2020-06-08 RX ADMIN — Medication 2 MILLIGRAM(S): at 12:32

## 2020-06-09 RX ADMIN — Medication 2 MILLIGRAM(S): at 20:31

## 2020-06-09 RX ADMIN — Medication 2 MILLIGRAM(S): at 12:52

## 2020-06-09 RX ADMIN — OLANZAPINE 15 MILLIGRAM(S): 15 TABLET, FILM COATED ORAL at 20:31

## 2020-06-09 RX ADMIN — METHADONE HYDROCHLORIDE 30 MILLIGRAM(S): 40 TABLET ORAL at 08:15

## 2020-06-09 RX ADMIN — Medication 3 MILLIGRAM(S): at 20:31

## 2020-06-09 RX ADMIN — PANTOPRAZOLE SODIUM 40 MILLIGRAM(S): 20 TABLET, DELAYED RELEASE ORAL at 08:15

## 2020-06-09 RX ADMIN — MIRTAZAPINE 45 MILLIGRAM(S): 45 TABLET, ORALLY DISINTEGRATING ORAL at 20:31

## 2020-06-09 RX ADMIN — Medication 2 MILLIGRAM(S): at 08:15

## 2020-06-09 RX ADMIN — METHADONE HYDROCHLORIDE 80 MILLIGRAM(S): 40 TABLET ORAL at 08:15

## 2020-06-09 RX ADMIN — DESVENLAFAXINE 50 MILLIGRAM(S): 50 TABLET, EXTENDED RELEASE ORAL at 08:15

## 2020-06-09 RX ADMIN — SERTRALINE 75 MILLIGRAM(S): 25 TABLET, FILM COATED ORAL at 08:15

## 2020-06-10 PROCEDURE — 99222 1ST HOSP IP/OBS MODERATE 55: CPT

## 2020-06-10 PROCEDURE — 90832 PSYTX W PT 30 MINUTES: CPT

## 2020-06-10 RX ORDER — METHADONE HYDROCHLORIDE 40 MG/1
80 TABLET ORAL DAILY
Refills: 0 | Status: DISCONTINUED | OUTPATIENT
Start: 2020-06-10 | End: 2020-06-17

## 2020-06-10 RX ORDER — METHADONE HYDROCHLORIDE 40 MG/1
30 TABLET ORAL DAILY
Refills: 0 | Status: DISCONTINUED | OUTPATIENT
Start: 2020-06-10 | End: 2020-06-17

## 2020-06-10 RX ORDER — CLONAZEPAM 1 MG
2 TABLET ORAL THREE TIMES A DAY
Refills: 0 | Status: DISCONTINUED | OUTPATIENT
Start: 2020-06-10 | End: 2020-06-17

## 2020-06-10 RX ORDER — BACITRACIN ZINC 500 UNIT/G
1 OINTMENT IN PACKET (EA) TOPICAL
Refills: 0 | Status: DISCONTINUED | OUTPATIENT
Start: 2020-06-10 | End: 2020-06-29

## 2020-06-10 RX ADMIN — METHADONE HYDROCHLORIDE 80 MILLIGRAM(S): 40 TABLET ORAL at 08:17

## 2020-06-10 RX ADMIN — PANTOPRAZOLE SODIUM 40 MILLIGRAM(S): 20 TABLET, DELAYED RELEASE ORAL at 08:17

## 2020-06-10 RX ADMIN — Medication 2 MILLIGRAM(S): at 08:17

## 2020-06-10 RX ADMIN — OLANZAPINE 15 MILLIGRAM(S): 15 TABLET, FILM COATED ORAL at 20:22

## 2020-06-10 RX ADMIN — Medication 3 MILLIGRAM(S): at 20:22

## 2020-06-10 RX ADMIN — SERTRALINE 75 MILLIGRAM(S): 25 TABLET, FILM COATED ORAL at 08:17

## 2020-06-10 RX ADMIN — DESVENLAFAXINE 50 MILLIGRAM(S): 50 TABLET, EXTENDED RELEASE ORAL at 08:17

## 2020-06-10 RX ADMIN — Medication 2 MILLIGRAM(S): at 20:22

## 2020-06-10 RX ADMIN — Medication 2 MILLIGRAM(S): at 13:03

## 2020-06-10 RX ADMIN — METHADONE HYDROCHLORIDE 30 MILLIGRAM(S): 40 TABLET ORAL at 08:17

## 2020-06-10 RX ADMIN — MIRTAZAPINE 45 MILLIGRAM(S): 45 TABLET, ORALLY DISINTEGRATING ORAL at 20:22

## 2020-06-10 NOTE — CONSULT NOTE ADULT - ASSESSMENT
39M etoh abuse, opioid dependence admitted after slashing his left wrist on 6/10. Patient has had multiple admissions in the past after due to suicide attempts, including wrist slashings.       #Slash to left wrist  S/P stitches on 6/3, mild surrounding erythema, does not appear toxic or infected. No fevers or chills. 1-2 stitches missing but wound is closed.  Bacitracin added and could be applied over wound. Patient encouraged to pick at wound.   Would take out the stitches on Friday, June 12th
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
## 2020-06-11 PROCEDURE — 99232 SBSQ HOSP IP/OBS MODERATE 35: CPT

## 2020-06-11 RX ADMIN — DESVENLAFAXINE 50 MILLIGRAM(S): 50 TABLET, EXTENDED RELEASE ORAL at 08:13

## 2020-06-11 RX ADMIN — OLANZAPINE 15 MILLIGRAM(S): 15 TABLET, FILM COATED ORAL at 20:38

## 2020-06-11 RX ADMIN — METHADONE HYDROCHLORIDE 30 MILLIGRAM(S): 40 TABLET ORAL at 08:13

## 2020-06-11 RX ADMIN — Medication 2 MILLIGRAM(S): at 13:01

## 2020-06-11 RX ADMIN — PANTOPRAZOLE SODIUM 40 MILLIGRAM(S): 20 TABLET, DELAYED RELEASE ORAL at 08:13

## 2020-06-11 RX ADMIN — Medication 1 APPLICATION(S): at 08:12

## 2020-06-11 RX ADMIN — MIRTAZAPINE 45 MILLIGRAM(S): 45 TABLET, ORALLY DISINTEGRATING ORAL at 20:38

## 2020-06-11 RX ADMIN — METHADONE HYDROCHLORIDE 80 MILLIGRAM(S): 40 TABLET ORAL at 08:13

## 2020-06-11 RX ADMIN — Medication 2 MILLIGRAM(S): at 08:12

## 2020-06-11 RX ADMIN — Medication 3 MILLIGRAM(S): at 20:38

## 2020-06-11 RX ADMIN — Medication 1 APPLICATION(S): at 20:38

## 2020-06-11 RX ADMIN — SERTRALINE 75 MILLIGRAM(S): 25 TABLET, FILM COATED ORAL at 08:13

## 2020-06-11 RX ADMIN — Medication 2 MILLIGRAM(S): at 20:38

## 2020-06-11 NOTE — PROGRESS NOTE ADULT - SUBJECTIVE AND OBJECTIVE BOX
CC/Reason for Consult: Wrist slashing    SUBJECTIVE / OVERNIGHT EVENTS:  Patient seen and examined on 6/11. Two more sutures appear to have been missing, no further progression in the erythema. Also no fevers or chills. Sutures to come out on 6/12.      MEDICATIONS  (STANDING):  BACItracin   Ointment 1 Application(s) Topical two times a day  clonazePAM  Tablet 2 milliGRAM(s) Oral three times a day  desvenlafaxine ER 50 milliGRAM(s) Oral daily  methadone    Tablet 30 milliGRAM(s) Oral daily  methadone   Dispersible Tablet 80 milliGRAM(s) Oral daily  mirtazapine 45 milliGRAM(s) Oral at bedtime  OLANZapine 15 milliGRAM(s) Oral at bedtime  pantoprazole    Tablet 40 milliGRAM(s) Oral before breakfast  prazosin. 3 milliGRAM(s) Oral at bedtime  sertraline 75 milliGRAM(s) Oral daily    MEDICATIONS  (PRN):      Vital Signs Last 24 Hrs  T(C): 36.9 (12 Jun 2020 07:43), Max: 36.9 (12 Jun 2020 07:43)  T(F): 98.4 (12 Jun 2020 07:43), Max: 98.4 (12 Jun 2020 07:43)  HR: 77 (12 Jun 2020 07:43) (77 - 77)  BP: 107/73 (12 Jun 2020 07:43) (107/73 - 107/73)  BP(mean): --  RR: --  SpO2: --  CAPILLARY BLOOD GLUCOSE            PHYSICAL EXAM:  GENERAL: NAD, well-developed  HEAD:  Atraumatic, Normocephalic  EYES: EOMI, conjunctiva and sclera clear  NECK: Supple, No JVD  CHEST/LUNG: Clear to auscultation bilaterally; No wheeze  HEART: Regular rate and rhythm; No murmurs, rubs, or gallops  ABDOMEN: Soft, Nontender, Nondistended; Bowel sounds present  EXTREMITIES: Cut to left forearm, just proximal to wrist with stitches noted, two-three of which "fell out". Very mild surrounding erythema, cut is closed. Multiple old/healing scars noted from prior wrists slashes noted.  PSYCH: AAOx3  NEUROLOGY: non-focal  SKIN: No rashes or lesions    LABS:                    RADIOLOGY & ADDITIONAL TESTS:    Imaging Personally Reviewed:    Consultant(s) Notes Reviewed:      Care Discussed with Consultants/Other Providers:

## 2020-06-12 PROCEDURE — 99232 SBSQ HOSP IP/OBS MODERATE 35: CPT

## 2020-06-12 PROCEDURE — 90832 PSYTX W PT 30 MINUTES: CPT

## 2020-06-12 RX ADMIN — MIRTAZAPINE 45 MILLIGRAM(S): 45 TABLET, ORALLY DISINTEGRATING ORAL at 20:37

## 2020-06-12 RX ADMIN — PANTOPRAZOLE SODIUM 40 MILLIGRAM(S): 20 TABLET, DELAYED RELEASE ORAL at 08:14

## 2020-06-12 RX ADMIN — Medication 2 MILLIGRAM(S): at 12:32

## 2020-06-12 RX ADMIN — Medication 3 MILLIGRAM(S): at 20:37

## 2020-06-12 RX ADMIN — SERTRALINE 75 MILLIGRAM(S): 25 TABLET, FILM COATED ORAL at 08:14

## 2020-06-12 RX ADMIN — METHADONE HYDROCHLORIDE 30 MILLIGRAM(S): 40 TABLET ORAL at 08:14

## 2020-06-12 RX ADMIN — Medication 1 APPLICATION(S): at 21:00

## 2020-06-12 RX ADMIN — OLANZAPINE 15 MILLIGRAM(S): 15 TABLET, FILM COATED ORAL at 20:37

## 2020-06-12 RX ADMIN — METHADONE HYDROCHLORIDE 80 MILLIGRAM(S): 40 TABLET ORAL at 08:14

## 2020-06-12 RX ADMIN — DESVENLAFAXINE 50 MILLIGRAM(S): 50 TABLET, EXTENDED RELEASE ORAL at 08:14

## 2020-06-12 RX ADMIN — Medication 2 MILLIGRAM(S): at 20:37

## 2020-06-12 RX ADMIN — Medication 2 MILLIGRAM(S): at 08:14

## 2020-06-12 RX ADMIN — Medication 1 APPLICATION(S): at 08:14

## 2020-06-12 NOTE — CHART NOTE - NSCHARTNOTEFT_GEN_A_CORE
40 yo M seen for suture removal from L forearm. 11 sutures removed from the L arm without any complications. Wound appears erythematous, but is healed. No blood, discharge or oozing noted. Pt reports pain to touch but denies having any fever, chills, or recent discharge from the wound area. Vitals WNL. Pt advised to avoid rubbing the area. Staff advised to apply bacitracin. 38 yo M seen for suture removal from L forearm. 11 sutures removed from the L arm without any complications. Wound appears erythematous, but is healed. No blood, discharge or oozing noted. Pt reports pain to touch but denies having any fever, chills, or recent discharge from the wound area. Vitals WNL. Pt also seen by hospitalist who noted two sutures were missing and reported no progression in the erythema.  Pt advised to avoid rubbing the area. Staff advised to apply bacitracin.

## 2020-06-13 RX ORDER — NICOTINE POLACRILEX 2 MG
2 GUM BUCCAL
Refills: 0 | Status: DISCONTINUED | OUTPATIENT
Start: 2020-06-13 | End: 2020-08-20

## 2020-06-13 RX ADMIN — Medication 1 APPLICATION(S): at 20:41

## 2020-06-13 RX ADMIN — Medication 2 MILLIGRAM(S): at 12:55

## 2020-06-13 RX ADMIN — DESVENLAFAXINE 50 MILLIGRAM(S): 50 TABLET, EXTENDED RELEASE ORAL at 08:53

## 2020-06-13 RX ADMIN — PANTOPRAZOLE SODIUM 40 MILLIGRAM(S): 20 TABLET, DELAYED RELEASE ORAL at 08:53

## 2020-06-13 RX ADMIN — MIRTAZAPINE 45 MILLIGRAM(S): 45 TABLET, ORALLY DISINTEGRATING ORAL at 20:41

## 2020-06-13 RX ADMIN — Medication 2 MILLIGRAM(S): at 08:54

## 2020-06-13 RX ADMIN — METHADONE HYDROCHLORIDE 30 MILLIGRAM(S): 40 TABLET ORAL at 08:53

## 2020-06-13 RX ADMIN — Medication 2 MILLIGRAM(S): at 20:41

## 2020-06-13 RX ADMIN — Medication 1 APPLICATION(S): at 08:54

## 2020-06-13 RX ADMIN — METHADONE HYDROCHLORIDE 80 MILLIGRAM(S): 40 TABLET ORAL at 08:53

## 2020-06-13 RX ADMIN — SERTRALINE 75 MILLIGRAM(S): 25 TABLET, FILM COATED ORAL at 08:54

## 2020-06-13 RX ADMIN — OLANZAPINE 15 MILLIGRAM(S): 15 TABLET, FILM COATED ORAL at 20:41

## 2020-06-13 RX ADMIN — Medication 3 MILLIGRAM(S): at 20:41

## 2020-06-14 RX ADMIN — Medication 1 APPLICATION(S): at 20:41

## 2020-06-14 RX ADMIN — Medication 1 APPLICATION(S): at 08:25

## 2020-06-14 RX ADMIN — Medication 2 MILLIGRAM(S): at 12:39

## 2020-06-14 RX ADMIN — METHADONE HYDROCHLORIDE 80 MILLIGRAM(S): 40 TABLET ORAL at 08:26

## 2020-06-14 RX ADMIN — PANTOPRAZOLE SODIUM 40 MILLIGRAM(S): 20 TABLET, DELAYED RELEASE ORAL at 08:26

## 2020-06-14 RX ADMIN — Medication 2 MILLIGRAM(S): at 10:08

## 2020-06-14 RX ADMIN — SERTRALINE 75 MILLIGRAM(S): 25 TABLET, FILM COATED ORAL at 08:26

## 2020-06-14 RX ADMIN — Medication 2 MILLIGRAM(S): at 20:41

## 2020-06-14 RX ADMIN — METHADONE HYDROCHLORIDE 30 MILLIGRAM(S): 40 TABLET ORAL at 08:26

## 2020-06-14 RX ADMIN — MIRTAZAPINE 45 MILLIGRAM(S): 45 TABLET, ORALLY DISINTEGRATING ORAL at 20:41

## 2020-06-14 RX ADMIN — Medication 3 MILLIGRAM(S): at 20:41

## 2020-06-14 RX ADMIN — Medication 2 MILLIGRAM(S): at 08:26

## 2020-06-14 RX ADMIN — DESVENLAFAXINE 50 MILLIGRAM(S): 50 TABLET, EXTENDED RELEASE ORAL at 08:26

## 2020-06-14 RX ADMIN — OLANZAPINE 15 MILLIGRAM(S): 15 TABLET, FILM COATED ORAL at 20:41

## 2020-06-15 PROCEDURE — 99232 SBSQ HOSP IP/OBS MODERATE 35: CPT

## 2020-06-15 RX ORDER — SERTRALINE 25 MG/1
50 TABLET, FILM COATED ORAL DAILY
Refills: 0 | Status: DISCONTINUED | OUTPATIENT
Start: 2020-06-16 | End: 2020-06-19

## 2020-06-15 RX ADMIN — PANTOPRAZOLE SODIUM 40 MILLIGRAM(S): 20 TABLET, DELAYED RELEASE ORAL at 08:14

## 2020-06-15 RX ADMIN — Medication 2 MILLIGRAM(S): at 11:43

## 2020-06-15 RX ADMIN — MIRTAZAPINE 45 MILLIGRAM(S): 45 TABLET, ORALLY DISINTEGRATING ORAL at 20:47

## 2020-06-15 RX ADMIN — METHADONE HYDROCHLORIDE 30 MILLIGRAM(S): 40 TABLET ORAL at 08:14

## 2020-06-15 RX ADMIN — Medication 2 MILLIGRAM(S): at 20:47

## 2020-06-15 RX ADMIN — Medication 1 APPLICATION(S): at 08:14

## 2020-06-15 RX ADMIN — Medication 3 MILLIGRAM(S): at 20:47

## 2020-06-15 RX ADMIN — METHADONE HYDROCHLORIDE 80 MILLIGRAM(S): 40 TABLET ORAL at 08:14

## 2020-06-15 RX ADMIN — SERTRALINE 75 MILLIGRAM(S): 25 TABLET, FILM COATED ORAL at 08:14

## 2020-06-15 RX ADMIN — OLANZAPINE 15 MILLIGRAM(S): 15 TABLET, FILM COATED ORAL at 20:47

## 2020-06-15 RX ADMIN — Medication 2 MILLIGRAM(S): at 08:14

## 2020-06-15 RX ADMIN — Medication 2 MILLIGRAM(S): at 12:32

## 2020-06-15 RX ADMIN — DESVENLAFAXINE 50 MILLIGRAM(S): 50 TABLET, EXTENDED RELEASE ORAL at 08:14

## 2020-06-15 RX ADMIN — Medication 1 APPLICATION(S): at 20:47

## 2020-06-15 NOTE — PROGRESS NOTE ADULT - SUBJECTIVE AND OBJECTIVE BOX
CC/Reason for Consult: Suicide attempt- slash heal    SUBJECTIVE / OVERNIGHT EVENTS:  Patient seen and examined this morning    MEDICATIONS  (STANDING):  BACItracin   Ointment 1 Application(s) Topical two times a day  clonazePAM  Tablet 2 milliGRAM(s) Oral three times a day  desvenlafaxine ER 50 milliGRAM(s) Oral daily  methadone    Tablet 30 milliGRAM(s) Oral daily  methadone   Dispersible Tablet 80 milliGRAM(s) Oral daily  mirtazapine 45 milliGRAM(s) Oral at bedtime  OLANZapine 15 milliGRAM(s) Oral at bedtime  pantoprazole    Tablet 40 milliGRAM(s) Oral before breakfast  prazosin. 3 milliGRAM(s) Oral at bedtime  sertraline 75 milliGRAM(s) Oral daily    MEDICATIONS  (PRN):  nicotine  Polacrilex Gum 2 milliGRAM(s) Oral every 2 hours PRN nicotine dependence      Vital Signs Last 24 Hrs  T(C): 36.1 (15 Saul 2020 07:42), Max: 36.2 (14 Jun 2020 20:20)  T(F): 97 (15 Saul 2020 07:42), Max: 97.1 (14 Jun 2020 20:20)  HR: --  BP: --  BP(mean): --  RR: --  SpO2: --  CAPILLARY BLOOD GLUCOSE            PHYSICAL EXAM:  GENERAL: NAD, well-developed  HEAD:  Atraumatic, Normocephalic  EYES: EOMI, conjunctiva and sclera clear  NECK: Supple, No JVD  CHEST/LUNG: Clear to auscultation bilaterally; No wheeze  HEART: Regular rate and rhythm; No murmurs, rubs, or gallops  ABDOMEN: Soft, Nontender, Nondistended; Bowel sounds present  EXTREMITIES:  2+ Peripheral Pulses, No clubbing, cyanosis, or edema  PSYCH: AAOx3  NEUROLOGY: non-focal  SKIN: No rashes or lesions    LABS:                    RADIOLOGY & ADDITIONAL TESTS:    Imaging Personally Reviewed:    Consultant(s) Notes Reviewed:      Care Discussed with Consultants/Other Providers: CC/Reason for Consult: Suicide attempt- slash heal    SUBJECTIVE / OVERNIGHT EVENTS:  Patient seen and examined this morning, no new complaints, sutures removed on Friday. No fevers or chills.     MEDICATIONS  (STANDING):  BACItracin   Ointment 1 Application(s) Topical two times a day  clonazePAM  Tablet 2 milliGRAM(s) Oral three times a day  desvenlafaxine ER 50 milliGRAM(s) Oral daily  methadone    Tablet 30 milliGRAM(s) Oral daily  methadone   Dispersible Tablet 80 milliGRAM(s) Oral daily  mirtazapine 45 milliGRAM(s) Oral at bedtime  OLANZapine 15 milliGRAM(s) Oral at bedtime  pantoprazole    Tablet 40 milliGRAM(s) Oral before breakfast  prazosin. 3 milliGRAM(s) Oral at bedtime  sertraline 75 milliGRAM(s) Oral daily    MEDICATIONS  (PRN):  nicotine  Polacrilex Gum 2 milliGRAM(s) Oral every 2 hours PRN nicotine dependence      Vital Signs Last 24 Hrs  T(C): 36.1 (15 Saul 2020 07:42), Max: 36.2 (14 Jun 2020 20:20)  T(F): 97 (15 Saul 2020 07:42), Max: 97.1 (14 Jun 2020 20:20)  HR: --  BP: --  BP(mean): --  RR: --  SpO2: --  CAPILLARY BLOOD GLUCOSE            PHYSICAL EXAM:  GENERAL: NAD, well-developed  HEAD:  Atraumatic, Normocephalic  EYES: EOMI, conjunctiva and sclera clear  NECK: Supple, No JVD  CHEST/LUNG: Clear to auscultation bilaterally; No wheeze  HEART: Regular rate and rhythm; No murmurs, rubs, or gallops  ABDOMEN: Soft, Nontender, Nondistended; Bowel sounds present  EXTREMITIES:  2+ Peripheral Pulses, No clubbing, cyanosis, or edema  PSYCH: AAOx3  NEUROLOGY: non-focal  SKIN:  Cut to left forearm, just proximal to wrist, stitches removed. Surrounding erythema, cut is closed improving compared to last week. Multiple old/healing scars noted from prior wrists slashes noted.      LABS:                    RADIOLOGY & ADDITIONAL TESTS:    Imaging Personally Reviewed:    Consultant(s) Notes Reviewed:      Care Discussed with Consultants/Other Providers: CC/Reason for Consult: Suicide attempt-     SUBJECTIVE / OVERNIGHT EVENTS:  Patient seen and examined this morning, no new complaints, sutures removed on Friday. No fevers or chills.     MEDICATIONS  (STANDING):  BACItracin   Ointment 1 Application(s) Topical two times a day  clonazePAM  Tablet 2 milliGRAM(s) Oral three times a day  desvenlafaxine ER 50 milliGRAM(s) Oral daily  methadone    Tablet 30 milliGRAM(s) Oral daily  methadone   Dispersible Tablet 80 milliGRAM(s) Oral daily  mirtazapine 45 milliGRAM(s) Oral at bedtime  OLANZapine 15 milliGRAM(s) Oral at bedtime  pantoprazole    Tablet 40 milliGRAM(s) Oral before breakfast  prazosin. 3 milliGRAM(s) Oral at bedtime  sertraline 75 milliGRAM(s) Oral daily    MEDICATIONS  (PRN):  nicotine  Polacrilex Gum 2 milliGRAM(s) Oral every 2 hours PRN nicotine dependence      Vital Signs Last 24 Hrs  T(C): 36.1 (15 Saul 2020 07:42), Max: 36.2 (14 Jun 2020 20:20)  T(F): 97 (15 Saul 2020 07:42), Max: 97.1 (14 Jun 2020 20:20)  HR: --  BP: --  BP(mean): --  RR: --  SpO2: --  CAPILLARY BLOOD GLUCOSE            PHYSICAL EXAM:  GENERAL: NAD, well-developed  HEAD:  Atraumatic, Normocephalic  EYES: EOMI, conjunctiva and sclera clear  NECK: Supple, No JVD  CHEST/LUNG: Clear to auscultation bilaterally; No wheeze  HEART: Regular rate and rhythm; No murmurs, rubs, or gallops  ABDOMEN: Soft, Nontender, Nondistended; Bowel sounds present  EXTREMITIES:  2+ Peripheral Pulses, No clubbing, cyanosis, or edema  PSYCH: AAOx3  NEUROLOGY: non-focal  SKIN:  Cut to left forearm, just proximal to wrist, stitches removed. Surrounding erythema, cut is closed improving compared to last week. Multiple old/healing scars noted from prior wrists slashes noted.      LABS:                    RADIOLOGY & ADDITIONAL TESTS:    Imaging Personally Reviewed:    Consultant(s) Notes Reviewed:      Care Discussed with Consultants/Other Providers:

## 2020-06-15 NOTE — PROGRESS NOTE ADULT - ASSESSMENT
39M etoh abuse, opioid dependence admitted after slashing his left wrist on 6/10. Patient has had multiple admissions in the past after due to suicide attempts, including wrist slashings.       #Slash to left wrist  S/P stitches on 6/3, mild surrounding erythema, does not appear toxic or infected. No fevers or chills. 1-2 stitches missing but wound is closed.  Bacitracin added and could be applied over wound. Patient encouraged to pick at wound.   Would take out the stitches on Friday, June 12th
39M etoh abuse, opioid dependence admitted after slashing his left wrist on 6/10. Patient has had multiple admissions in the past after due to suicide attempts, including wrist slashings.       #Slash to left wrist  S/P stitches removal on Firday, mild surrounding erythema, does not appear toxic or infected. No fevers or chills.   Bacitracin added and could be applied over wound. Patient encouraged not to pick at wound.

## 2020-06-16 PROCEDURE — 99232 SBSQ HOSP IP/OBS MODERATE 35: CPT

## 2020-06-16 PROCEDURE — 90832 PSYTX W PT 30 MINUTES: CPT

## 2020-06-16 RX ORDER — DESVENLAFAXINE 50 MG/1
75 TABLET, EXTENDED RELEASE ORAL DAILY
Refills: 0 | Status: DISCONTINUED | OUTPATIENT
Start: 2020-06-16 | End: 2020-06-18

## 2020-06-16 RX ORDER — OLANZAPINE 15 MG/1
405 TABLET, FILM COATED ORAL
Qty: 1 | Refills: 0
Start: 2020-06-16 | End: 2020-06-16

## 2020-06-16 RX ADMIN — Medication 2 MILLIGRAM(S): at 08:19

## 2020-06-16 RX ADMIN — Medication 2 MILLIGRAM(S): at 13:00

## 2020-06-16 RX ADMIN — MIRTAZAPINE 45 MILLIGRAM(S): 45 TABLET, ORALLY DISINTEGRATING ORAL at 20:30

## 2020-06-16 RX ADMIN — OLANZAPINE 15 MILLIGRAM(S): 15 TABLET, FILM COATED ORAL at 20:30

## 2020-06-16 RX ADMIN — Medication 2 MILLIGRAM(S): at 11:39

## 2020-06-16 RX ADMIN — METHADONE HYDROCHLORIDE 30 MILLIGRAM(S): 40 TABLET ORAL at 08:19

## 2020-06-16 RX ADMIN — Medication 1 APPLICATION(S): at 20:30

## 2020-06-16 RX ADMIN — Medication 2 MILLIGRAM(S): at 09:05

## 2020-06-16 RX ADMIN — METHADONE HYDROCHLORIDE 80 MILLIGRAM(S): 40 TABLET ORAL at 08:19

## 2020-06-16 RX ADMIN — Medication 2 MILLIGRAM(S): at 20:30

## 2020-06-16 RX ADMIN — DESVENLAFAXINE 75 MILLIGRAM(S): 50 TABLET, EXTENDED RELEASE ORAL at 10:30

## 2020-06-16 RX ADMIN — SERTRALINE 50 MILLIGRAM(S): 25 TABLET, FILM COATED ORAL at 08:19

## 2020-06-16 RX ADMIN — PANTOPRAZOLE SODIUM 40 MILLIGRAM(S): 20 TABLET, DELAYED RELEASE ORAL at 08:19

## 2020-06-16 RX ADMIN — Medication 3 MILLIGRAM(S): at 20:30

## 2020-06-17 PROCEDURE — 99232 SBSQ HOSP IP/OBS MODERATE 35: CPT

## 2020-06-17 RX ADMIN — Medication 2 MILLIGRAM(S): at 20:34

## 2020-06-17 RX ADMIN — METHADONE HYDROCHLORIDE 80 MILLIGRAM(S): 40 TABLET ORAL at 08:46

## 2020-06-17 RX ADMIN — MIRTAZAPINE 45 MILLIGRAM(S): 45 TABLET, ORALLY DISINTEGRATING ORAL at 20:34

## 2020-06-17 RX ADMIN — METHADONE HYDROCHLORIDE 30 MILLIGRAM(S): 40 TABLET ORAL at 08:46

## 2020-06-17 RX ADMIN — DESVENLAFAXINE 75 MILLIGRAM(S): 50 TABLET, EXTENDED RELEASE ORAL at 08:46

## 2020-06-17 RX ADMIN — Medication 2 MILLIGRAM(S): at 08:46

## 2020-06-17 RX ADMIN — Medication 3 MILLIGRAM(S): at 20:34

## 2020-06-17 RX ADMIN — OLANZAPINE 15 MILLIGRAM(S): 15 TABLET, FILM COATED ORAL at 20:34

## 2020-06-17 RX ADMIN — Medication 2 MILLIGRAM(S): at 13:09

## 2020-06-17 RX ADMIN — Medication 1 APPLICATION(S): at 20:34

## 2020-06-18 PROCEDURE — 90832 PSYTX W PT 30 MINUTES: CPT

## 2020-06-18 RX ORDER — MULTIVIT WITH MIN/MFOLATE/K2 340-15/3 G
1 POWDER (GRAM) ORAL DAILY
Refills: 0 | Status: COMPLETED | OUTPATIENT
Start: 2020-06-18 | End: 2020-07-13

## 2020-06-18 RX ORDER — CLONAZEPAM 1 MG
2 TABLET ORAL THREE TIMES A DAY
Refills: 0 | Status: DISCONTINUED | OUTPATIENT
Start: 2020-06-18 | End: 2020-06-24

## 2020-06-18 RX ORDER — DESVENLAFAXINE 50 MG/1
100 TABLET, EXTENDED RELEASE ORAL DAILY
Refills: 0 | Status: DISCONTINUED | OUTPATIENT
Start: 2020-06-21 | End: 2020-08-20

## 2020-06-18 RX ORDER — METHADONE HYDROCHLORIDE 40 MG/1
30 TABLET ORAL DAILY
Refills: 0 | Status: DISCONTINUED | OUTPATIENT
Start: 2020-06-18 | End: 2020-06-24

## 2020-06-18 RX ORDER — POLYETHYLENE GLYCOL 3350 17 G/17G
17 POWDER, FOR SOLUTION ORAL
Refills: 0 | Status: DISCONTINUED | OUTPATIENT
Start: 2020-06-18 | End: 2020-06-29

## 2020-06-18 RX ORDER — DESVENLAFAXINE 50 MG/1
75 TABLET, EXTENDED RELEASE ORAL DAILY
Refills: 0 | Status: COMPLETED | OUTPATIENT
Start: 2020-06-19 | End: 2020-06-20

## 2020-06-18 RX ORDER — METHADONE HYDROCHLORIDE 40 MG/1
80 TABLET ORAL DAILY
Refills: 0 | Status: DISCONTINUED | OUTPATIENT
Start: 2020-06-18 | End: 2020-06-24

## 2020-06-18 RX ADMIN — Medication 2 MILLIGRAM(S): at 08:58

## 2020-06-18 RX ADMIN — Medication 1 APPLICATION(S): at 20:51

## 2020-06-18 RX ADMIN — MIRTAZAPINE 45 MILLIGRAM(S): 45 TABLET, ORALLY DISINTEGRATING ORAL at 20:51

## 2020-06-18 RX ADMIN — Medication 2 MILLIGRAM(S): at 09:07

## 2020-06-18 RX ADMIN — PANTOPRAZOLE SODIUM 40 MILLIGRAM(S): 20 TABLET, DELAYED RELEASE ORAL at 08:04

## 2020-06-18 RX ADMIN — METHADONE HYDROCHLORIDE 80 MILLIGRAM(S): 40 TABLET ORAL at 08:58

## 2020-06-18 RX ADMIN — Medication 2 MILLIGRAM(S): at 20:51

## 2020-06-18 RX ADMIN — DESVENLAFAXINE 75 MILLIGRAM(S): 50 TABLET, EXTENDED RELEASE ORAL at 08:58

## 2020-06-18 RX ADMIN — POLYETHYLENE GLYCOL 3350 17 GRAM(S): 17 POWDER, FOR SOLUTION ORAL at 20:51

## 2020-06-18 RX ADMIN — Medication 3 MILLIGRAM(S): at 20:51

## 2020-06-18 RX ADMIN — Medication 2 MILLIGRAM(S): at 12:58

## 2020-06-18 RX ADMIN — OLANZAPINE 15 MILLIGRAM(S): 15 TABLET, FILM COATED ORAL at 20:51

## 2020-06-18 RX ADMIN — Medication 1 APPLICATION(S): at 08:04

## 2020-06-18 RX ADMIN — SERTRALINE 50 MILLIGRAM(S): 25 TABLET, FILM COATED ORAL at 08:58

## 2020-06-18 RX ADMIN — METHADONE HYDROCHLORIDE 30 MILLIGRAM(S): 40 TABLET ORAL at 08:58

## 2020-06-18 NOTE — PHARMACOTHERAPY INTERVENTION NOTE - COMMENTS
Contacted Dr. Noel & left message recommending to renew  controlled substance Clonazepam 2 mG TID. Contacted Dr. Noel & left message recommending to renew  controlled substance Clonazepam 2 mG TID.  Md accepted recommendation.

## 2020-06-18 NOTE — PHARMACOTHERAPY INTERVENTION NOTE - COMMENTS
Contacted Dr. Noel to recommend renewing Methadone 30 mG daily & 80 mG daily.  Md accepted recommendation.

## 2020-06-19 PROCEDURE — 90834 PSYTX W PT 45 MINUTES: CPT

## 2020-06-19 RX ORDER — CHLORPROMAZINE HCL 10 MG
100 TABLET ORAL ONCE
Refills: 0 | Status: DISCONTINUED | OUTPATIENT
Start: 2020-06-19 | End: 2020-08-20

## 2020-06-19 RX ORDER — SERTRALINE 25 MG/1
50 TABLET, FILM COATED ORAL DAILY
Refills: 0 | Status: COMPLETED | OUTPATIENT
Start: 2020-06-20 | End: 2020-06-20

## 2020-06-19 RX ORDER — CHLORPROMAZINE HCL 10 MG
100 TABLET ORAL EVERY 4 HOURS
Refills: 0 | Status: DISCONTINUED | OUTPATIENT
Start: 2020-06-19 | End: 2020-08-20

## 2020-06-19 RX ORDER — OLANZAPINE 15 MG/1
5 TABLET, FILM COATED ORAL EVERY 6 HOURS
Refills: 0 | Status: DISCONTINUED | OUTPATIENT
Start: 2020-06-19 | End: 2020-08-20

## 2020-06-19 RX ADMIN — OLANZAPINE 15 MILLIGRAM(S): 15 TABLET, FILM COATED ORAL at 20:17

## 2020-06-19 RX ADMIN — Medication 2 MILLIGRAM(S): at 08:34

## 2020-06-19 RX ADMIN — POLYETHYLENE GLYCOL 3350 17 GRAM(S): 17 POWDER, FOR SOLUTION ORAL at 08:34

## 2020-06-19 RX ADMIN — POLYETHYLENE GLYCOL 3350 17 GRAM(S): 17 POWDER, FOR SOLUTION ORAL at 20:17

## 2020-06-19 RX ADMIN — METHADONE HYDROCHLORIDE 30 MILLIGRAM(S): 40 TABLET ORAL at 08:34

## 2020-06-19 RX ADMIN — Medication 1 APPLICATION(S): at 08:34

## 2020-06-19 RX ADMIN — Medication 1 APPLICATION(S): at 20:39

## 2020-06-19 RX ADMIN — Medication 3 MILLIGRAM(S): at 20:17

## 2020-06-19 RX ADMIN — METHADONE HYDROCHLORIDE 80 MILLIGRAM(S): 40 TABLET ORAL at 08:34

## 2020-06-19 RX ADMIN — Medication 2 MILLIGRAM(S): at 13:05

## 2020-06-19 RX ADMIN — MIRTAZAPINE 45 MILLIGRAM(S): 45 TABLET, ORALLY DISINTEGRATING ORAL at 20:17

## 2020-06-19 RX ADMIN — Medication 2 MILLIGRAM(S): at 20:17

## 2020-06-19 RX ADMIN — DESVENLAFAXINE 75 MILLIGRAM(S): 50 TABLET, EXTENDED RELEASE ORAL at 08:34

## 2020-06-19 RX ADMIN — Medication 2 MILLIGRAM(S): at 14:43

## 2020-06-19 RX ADMIN — PANTOPRAZOLE SODIUM 40 MILLIGRAM(S): 20 TABLET, DELAYED RELEASE ORAL at 08:34

## 2020-06-19 RX ADMIN — SERTRALINE 50 MILLIGRAM(S): 25 TABLET, FILM COATED ORAL at 08:34

## 2020-06-20 RX ADMIN — METHADONE HYDROCHLORIDE 80 MILLIGRAM(S): 40 TABLET ORAL at 08:19

## 2020-06-20 RX ADMIN — SERTRALINE 50 MILLIGRAM(S): 25 TABLET, FILM COATED ORAL at 08:19

## 2020-06-20 RX ADMIN — MIRTAZAPINE 45 MILLIGRAM(S): 45 TABLET, ORALLY DISINTEGRATING ORAL at 20:29

## 2020-06-20 RX ADMIN — METHADONE HYDROCHLORIDE 30 MILLIGRAM(S): 40 TABLET ORAL at 08:19

## 2020-06-20 RX ADMIN — POLYETHYLENE GLYCOL 3350 17 GRAM(S): 17 POWDER, FOR SOLUTION ORAL at 08:19

## 2020-06-20 RX ADMIN — Medication 1 APPLICATION(S): at 20:28

## 2020-06-20 RX ADMIN — Medication 2 MILLIGRAM(S): at 08:19

## 2020-06-20 RX ADMIN — Medication 1 APPLICATION(S): at 08:19

## 2020-06-20 RX ADMIN — Medication 3 MILLIGRAM(S): at 20:29

## 2020-06-20 RX ADMIN — PANTOPRAZOLE SODIUM 40 MILLIGRAM(S): 20 TABLET, DELAYED RELEASE ORAL at 08:19

## 2020-06-20 RX ADMIN — Medication 2 MILLIGRAM(S): at 20:29

## 2020-06-20 RX ADMIN — Medication 2 MILLIGRAM(S): at 12:55

## 2020-06-20 RX ADMIN — OLANZAPINE 15 MILLIGRAM(S): 15 TABLET, FILM COATED ORAL at 20:29

## 2020-06-20 RX ADMIN — Medication 2 MILLIGRAM(S): at 10:28

## 2020-06-20 RX ADMIN — Medication 1 BOTTLE: at 10:27

## 2020-06-20 RX ADMIN — DESVENLAFAXINE 75 MILLIGRAM(S): 50 TABLET, EXTENDED RELEASE ORAL at 08:19

## 2020-06-21 RX ORDER — HYDROXYZINE HCL 10 MG
50 TABLET ORAL ONCE
Refills: 0 | Status: COMPLETED | OUTPATIENT
Start: 2020-06-21 | End: 2020-06-21

## 2020-06-21 RX ADMIN — Medication 2 MILLIGRAM(S): at 08:22

## 2020-06-21 RX ADMIN — POLYETHYLENE GLYCOL 3350 17 GRAM(S): 17 POWDER, FOR SOLUTION ORAL at 08:22

## 2020-06-21 RX ADMIN — Medication 2 MILLIGRAM(S): at 12:39

## 2020-06-21 RX ADMIN — MIRTAZAPINE 45 MILLIGRAM(S): 45 TABLET, ORALLY DISINTEGRATING ORAL at 19:59

## 2020-06-21 RX ADMIN — Medication 50 MILLIGRAM(S): at 14:07

## 2020-06-21 RX ADMIN — Medication 1 APPLICATION(S): at 19:59

## 2020-06-21 RX ADMIN — OLANZAPINE 15 MILLIGRAM(S): 15 TABLET, FILM COATED ORAL at 19:59

## 2020-06-21 RX ADMIN — DESVENLAFAXINE 100 MILLIGRAM(S): 50 TABLET, EXTENDED RELEASE ORAL at 08:22

## 2020-06-21 RX ADMIN — Medication 2 MILLIGRAM(S): at 13:20

## 2020-06-21 RX ADMIN — METHADONE HYDROCHLORIDE 80 MILLIGRAM(S): 40 TABLET ORAL at 08:22

## 2020-06-21 RX ADMIN — Medication 3 MILLIGRAM(S): at 19:59

## 2020-06-21 RX ADMIN — Medication 2 MILLIGRAM(S): at 10:05

## 2020-06-21 RX ADMIN — Medication 2 MILLIGRAM(S): at 19:59

## 2020-06-21 RX ADMIN — PANTOPRAZOLE SODIUM 40 MILLIGRAM(S): 20 TABLET, DELAYED RELEASE ORAL at 08:22

## 2020-06-21 RX ADMIN — METHADONE HYDROCHLORIDE 30 MILLIGRAM(S): 40 TABLET ORAL at 08:22

## 2020-06-21 RX ADMIN — Medication 1 APPLICATION(S): at 08:49

## 2020-06-22 RX ORDER — HYDROXYZINE HCL 10 MG
25 TABLET ORAL EVERY 8 HOURS
Refills: 0 | Status: DISCONTINUED | OUTPATIENT
Start: 2020-06-22 | End: 2020-06-30

## 2020-06-22 RX ADMIN — Medication 3 MILLIGRAM(S): at 20:39

## 2020-06-22 RX ADMIN — METHADONE HYDROCHLORIDE 30 MILLIGRAM(S): 40 TABLET ORAL at 08:08

## 2020-06-22 RX ADMIN — Medication 2 MILLIGRAM(S): at 08:08

## 2020-06-22 RX ADMIN — Medication 1 BOTTLE: at 10:26

## 2020-06-22 RX ADMIN — MIRTAZAPINE 45 MILLIGRAM(S): 45 TABLET, ORALLY DISINTEGRATING ORAL at 20:39

## 2020-06-22 RX ADMIN — METHADONE HYDROCHLORIDE 80 MILLIGRAM(S): 40 TABLET ORAL at 08:08

## 2020-06-22 RX ADMIN — Medication 1 APPLICATION(S): at 20:38

## 2020-06-22 RX ADMIN — Medication 25 MILLIGRAM(S): at 20:39

## 2020-06-22 RX ADMIN — Medication 2 MILLIGRAM(S): at 10:04

## 2020-06-22 RX ADMIN — OLANZAPINE 15 MILLIGRAM(S): 15 TABLET, FILM COATED ORAL at 20:39

## 2020-06-22 RX ADMIN — Medication 2 MILLIGRAM(S): at 20:38

## 2020-06-22 RX ADMIN — DESVENLAFAXINE 100 MILLIGRAM(S): 50 TABLET, EXTENDED RELEASE ORAL at 08:08

## 2020-06-22 RX ADMIN — PANTOPRAZOLE SODIUM 40 MILLIGRAM(S): 20 TABLET, DELAYED RELEASE ORAL at 08:08

## 2020-06-22 RX ADMIN — Medication 2 MILLIGRAM(S): at 12:39

## 2020-06-23 ENCOUNTER — EMERGENCY (EMERGENCY)
Facility: HOSPITAL | Age: 40
LOS: 1 days | Discharge: HOME | End: 2020-06-23
Attending: EMERGENCY MEDICINE | Admitting: EMERGENCY MEDICINE
Payer: MEDICAID

## 2020-06-23 VITALS
RESPIRATION RATE: 16 BRPM | OXYGEN SATURATION: 98 % | SYSTOLIC BLOOD PRESSURE: 112 MMHG | DIASTOLIC BLOOD PRESSURE: 80 MMHG | TEMPERATURE: 98 F | HEART RATE: 70 BPM

## 2020-06-23 PROCEDURE — 99283 EMERGENCY DEPT VISIT LOW MDM: CPT

## 2020-06-23 PROCEDURE — 90832 PSYTX W PT 30 MINUTES: CPT

## 2020-06-23 RX ORDER — NEOMYCIN/POLYMYXIN B/HYDROCORT
1 SUSPENSION, DROPS(FINAL DOSAGE FORM)(ML) OTIC (EAR) THREE TIMES A DAY
Refills: 0 | Status: DISCONTINUED | OUTPATIENT
Start: 2020-06-23 | End: 2020-06-23

## 2020-06-23 RX ORDER — AMOXICILLIN 250 MG/5ML
875 SUSPENSION, RECONSTITUTED, ORAL (ML) ORAL
Refills: 0 | Status: DISCONTINUED | OUTPATIENT
Start: 2020-06-23 | End: 2020-06-23

## 2020-06-23 RX ORDER — ACETAMINOPHEN 500 MG
650 TABLET ORAL EVERY 6 HOURS
Refills: 0 | Status: DISCONTINUED | OUTPATIENT
Start: 2020-06-23 | End: 2020-08-20

## 2020-06-23 RX ORDER — NEOMYCIN/POLYMYXIN B/HYDROCORT
4 SUSPENSION, DROPS(FINAL DOSAGE FORM)(ML) OTIC (EAR) THREE TIMES A DAY
Refills: 0 | Status: DISCONTINUED | OUTPATIENT
Start: 2020-06-23 | End: 2020-06-24

## 2020-06-23 RX ORDER — NEOMYCIN/POLYMYXIN B/HYDROCORT
3 SUSPENSION, DROPS(FINAL DOSAGE FORM)(ML) OTIC (EAR)
Qty: 63 | Refills: 0
Start: 2020-06-23 | End: 2020-06-29

## 2020-06-23 RX ORDER — AMOXICILLIN 250 MG/5ML
500 SUSPENSION, RECONSTITUTED, ORAL (ML) ORAL EVERY 8 HOURS
Refills: 0 | Status: DISCONTINUED | OUTPATIENT
Start: 2020-06-23 | End: 2020-06-29

## 2020-06-23 RX ORDER — AMOXICILLIN 250 MG/5ML
1 SUSPENSION, RECONSTITUTED, ORAL (ML) ORAL
Qty: 20 | Refills: 0
Start: 2020-06-23 | End: 2020-07-02

## 2020-06-23 RX ADMIN — Medication 2 MILLIGRAM(S): at 08:04

## 2020-06-23 RX ADMIN — MIRTAZAPINE 45 MILLIGRAM(S): 45 TABLET, ORALLY DISINTEGRATING ORAL at 20:40

## 2020-06-23 RX ADMIN — Medication 2 MILLIGRAM(S): at 10:11

## 2020-06-23 RX ADMIN — Medication 4 DROP(S): at 22:00

## 2020-06-23 RX ADMIN — METHADONE HYDROCHLORIDE 80 MILLIGRAM(S): 40 TABLET ORAL at 08:04

## 2020-06-23 RX ADMIN — METHADONE HYDROCHLORIDE 30 MILLIGRAM(S): 40 TABLET ORAL at 08:04

## 2020-06-23 RX ADMIN — Medication 2 MILLIGRAM(S): at 20:40

## 2020-06-23 RX ADMIN — DESVENLAFAXINE 100 MILLIGRAM(S): 50 TABLET, EXTENDED RELEASE ORAL at 08:04

## 2020-06-23 RX ADMIN — Medication 25 MILLIGRAM(S): at 13:55

## 2020-06-23 RX ADMIN — OLANZAPINE 15 MILLIGRAM(S): 15 TABLET, FILM COATED ORAL at 20:40

## 2020-06-23 RX ADMIN — Medication 3 MILLIGRAM(S): at 20:40

## 2020-06-23 RX ADMIN — Medication 1 APPLICATION(S): at 20:40

## 2020-06-23 RX ADMIN — PANTOPRAZOLE SODIUM 40 MILLIGRAM(S): 20 TABLET, DELAYED RELEASE ORAL at 08:04

## 2020-06-23 RX ADMIN — Medication 2 MILLIGRAM(S): at 13:07

## 2020-06-23 RX ADMIN — Medication 500 MILLIGRAM(S): at 22:36

## 2020-06-23 NOTE — ED PROVIDER NOTE - TEMPLATE, MLM
Improved a bit s/p trejo, but still probably ATN rather than obstructive uropathy.  Monitor renal function.  Continue IVF. Patient has been afebrile in last 24 hours   Leucocytosis however trending up  Likely from Pneumonia ( RLL infiltrate ), from aspiration ?  Continue with Zosyn   UA mild + , Urine cultures + for E. coli , follow final cultures   ID , Dr. Merino Patient has been afebrile in last 24 hours   Leucocytosis however trending up  Likely from Pneumonia ( RLL infiltrate ), from aspiration ?  Continue with Zosyn   UA mild + , Urine cultures + for E. coli , follow final cultures   ID , Dr. Merino Monitor renal function.  Continue IVF. Patient has been afebrile in last 24 hours   Leucocytosis however trending up  Likely from Pneumonia ( RLL infiltrate ), from aspiration ?  Continue with Zosyn   UA mild + , Urine cultures + for E. coli , follow final cultures   ID , Dr. Merino General

## 2020-06-23 NOTE — ED PROVIDER NOTE - ATTENDING CONTRIBUTION TO CARE
Dr Bloch, ATTENDING MD-  I performed a face to face bedside interview with patient regarding history of present illness, review of symptoms and past medical history. I completed an independent physical exam.  I have discussed patient's plan of care with PA.   Documentation as above in the note.  Patient with left ear pain, Left ear with white d/c and opacified TM. No mastoid tenderness. mild tragus tenderness.no cervical adenopathy. heart soundnml lungs clear

## 2020-06-23 NOTE — ED PROVIDER NOTE - OBJECTIVE STATEMENT
40 Y/O M presents to ER with LT sided ear pain. Experinced throbbing 8/10 for 2 days. This morning noticed yellowish discharge from ear. Reports muffled hearing. Denies ringing of ear, hx of DM, use of q-tips, fever or nausea/vomiting 38 Y/O M presents to ER with LT sided ear pain. Experienced throbbing 8/10 pain for 2 days. This morning noticed yellowish discharge from ear. Reports muffled hearing. Denies ringing of ear, hx of DM, use of q-tips, fever or nausea/vomiting

## 2020-06-23 NOTE — ED PROVIDER NOTE - CLINICAL SUMMARY MEDICAL DECISION MAKING FREE TEXT BOX
38 y/o M 40 Y/O M presents to ER with LT sided ear pain. Experienced throbbing 8/10 pain for 2 days. Discussed most likely DX of otitis externa. Will discharge with amoxicillin and cortisporin. Precautions provided. Will f/u with PCP or ER for worsening symptoms

## 2020-06-23 NOTE — ED PROVIDER NOTE - PHYSICAL EXAMINATION
Vital signs reviewed.   CONSTITUTIONAL: Well-appearing; well-nourished; in no apparent distress. Non-toxic appearing.   HEAD: Normocephalic, atraumatic.  EYES: PERRL, EOM intact, conjunctiva and no sclera injection noted  ENT: TTP over tragus, + discharge noted throughout external canal. Opaque TM.   NECK/LYMPH: Supple; non-tender  CARD: Normal S1, S2  RESP: Normal chest excursion with respiration; breath sounds clear and equal bilaterally  ABD/GI: soft, non-distended; non-tender; no palpable organomegaly, no pulsatile mass.  EXT/MS: moves all extremities; distal pulses are normal, no pedal edema.  SKIN: Normal for age and race; warm; dry; good turgor; no apparent lesions or exudate noted.  NEURO: Awake, alert, oriented x 3, no gross deficits, CN II-XII grossly intact, no motor or sensory deficit noted.  PSYCH: Normal mood; appropriate affect.

## 2020-06-23 NOTE — ED ADULT TRIAGE NOTE - CHIEF COMPLAINT QUOTE
Pt from [facility] accompanied by staff member for L ear pain. Pt has had swelling, redness, pain & discharge from L ear x 2 days. Denies fevers.

## 2020-06-23 NOTE — ED PROVIDER NOTE - PATIENT PORTAL LINK FT
You can access the FollowMyHealth Patient Portal offered by [facility] by registering at the following website: [email or web address]. By joining [email or web address]’s FollowMyHealth portal, you will also be able to view your health information using other applications (apps) compatible with our system.

## 2020-06-23 NOTE — ED ADULT NURSE NOTE - CHIEF COMPLAINT QUOTE
Pt from [facility] accompanied by staff member for L ear pain. Pt has had swelling, redness, pain & discharge from L ear x 2 days. Denies fevers.

## 2020-06-23 NOTE — CHART NOTE - NSCHARTNOTEFT_GEN_A_CORE
[facility] Inpatient to ED Transfer Summary    Reason for Transfer/Medical Summary: 38 yo WM with PPH of MDD, PTSD, BPD and opiate use disorder on Methadone MTP (110 mg qd) who complains acutely of L ear pain and redness, discharge in ear canal. MED team defers tx and requests immediate transfer to ED to r/o acute infection needing ABX and further eval and best mgmt.      PAST MEDICAL & SURGICAL HISTORY:  Diverticulitis  No significant past surgical history      Allergies    No Known Allergies    Intolerances        MEDICATIONS  (STANDING):  BACItracin   Ointment 1 Application(s) Topical two times a day  clonazePAM  Tablet 2 milliGRAM(s) Oral three times a day  desvenlafaxine  milliGRAM(s) Oral daily  methadone    Tablet 30 milliGRAM(s) Oral daily  methadone   Dispersible Tablet 80 milliGRAM(s) Oral daily  mirtazapine 45 milliGRAM(s) Oral at bedtime  OLANZapine 15 milliGRAM(s) Oral at bedtime  pantoprazole    Tablet 40 milliGRAM(s) Oral before breakfast  polyethylene glycol 3350 17 Gram(s) Oral two times a day  prazosin. 3 milliGRAM(s) Oral at bedtime    MEDICATIONS  (PRN):  chlorproMAZINE    Injectable 100 milliGRAM(s) IntraMuscular once PRN psychosis or agitation  chlorproMAZINE    Tablet 100 milliGRAM(s) Oral every 4 hours PRN psychosis or agitation  hydrOXYzine hydrochloride 25 milliGRAM(s) Oral every 8 hours PRN anxiety  magnesium citrate Oral Solution 1 Bottle Oral daily PRN constipation  nicotine  Polacrilex Gum 2 milliGRAM(s) Oral every 2 hours PRN nicotine dependence  OLANZapine 5 milliGRAM(s) Oral every 6 hours PRN psychosis or agitation      Vital Signs Last 24 Hrs  T(C): 36.1 (23 Jun 2020 16:10), Max: 36.6 (22 Jun 2020 20:45)  T(F): 97 (23 Jun 2020 16:10), Max: 97.8 (22 Jun 2020 20:45)  HR: --  BP: --  BP(mean): --  RR: --  SpO2: --  CAPILLARY BLOOD GLUCOSE            PHYSICAL EXAM:  GENERAL: NAD, well-developed  HEAD:  Atraumatic, Normocephalic  EYES: EOMI, PERRLA, conjunctiva and sclera clear  NECK: Supple, No JVD  CHEST/LUNG: Clear to auscultation bilaterally; No wheeze  HEART: Regular rate and rhythm; No murmurs, rubs, or gallops  ABDOMEN: Soft, Nontender, Nondistended; Bowel sounds present  EXTREMITIES:  2+ Peripheral Pulses, No clubbing, cyanosis, or edema  PSYCH: AAOx3  NEUROLOGY: non-focal  SKIN: No rashes or lesions    LABS:                    Psychiatry Section:  Psychiatric Summary/[facility] admitting diagnosis:    Psychiatric Recommendations:    Observation status (check one):   (x) Constant Observation  ( ) Enhanced care  ( ) Routine checks    Risk Status (check all that apply if present):  ( ) at risk for suicide/self-injury  ( ) at risk for aggressive behavior  ( ) at risk for elopement  (x ) other risk: denies SI, HI, elopement risk but will be transferred by staff from [facility].  Contact Dr. Marion, Unit Chief, if any quesitons: pager [phone], or [phone], office. Dr. ITZEL THOMAS

## 2020-06-24 RX ORDER — POLYETHYLENE GLYCOL 3350 17 G/17G
17 POWDER, FOR SOLUTION ORAL
Refills: 0 | Status: DISCONTINUED | OUTPATIENT
Start: 2020-06-24 | End: 2020-08-20

## 2020-06-24 RX ORDER — NEOMYCIN/POLYMYXIN B/HYDROCORT
4 SUSPENSION, DROPS(FINAL DOSAGE FORM)(ML) OTIC (EAR) THREE TIMES A DAY
Refills: 0 | Status: COMPLETED | OUTPATIENT
Start: 2020-06-24 | End: 2020-07-01

## 2020-06-24 RX ORDER — NEOMYCIN/POLYMYXIN B/HYDROCORT
4 SUSPENSION, DROPS(FINAL DOSAGE FORM)(ML) OTIC (EAR) THREE TIMES A DAY
Refills: 0 | Status: DISCONTINUED | OUTPATIENT
Start: 2020-06-24 | End: 2020-06-24

## 2020-06-24 RX ORDER — METHADONE HYDROCHLORIDE 40 MG/1
80 TABLET ORAL DAILY
Refills: 0 | Status: DISCONTINUED | OUTPATIENT
Start: 2020-06-24 | End: 2020-06-29

## 2020-06-24 RX ORDER — METHADONE HYDROCHLORIDE 40 MG/1
30 TABLET ORAL DAILY
Refills: 0 | Status: DISCONTINUED | OUTPATIENT
Start: 2020-06-24 | End: 2020-06-29

## 2020-06-24 RX ORDER — CLONAZEPAM 1 MG
2 TABLET ORAL THREE TIMES A DAY
Refills: 0 | Status: DISCONTINUED | OUTPATIENT
Start: 2020-06-24 | End: 2020-06-29

## 2020-06-24 RX ADMIN — Medication 2 MILLIGRAM(S): at 13:08

## 2020-06-24 RX ADMIN — Medication 4 DROP(S): at 15:04

## 2020-06-24 RX ADMIN — Medication 25 MILLIGRAM(S): at 13:08

## 2020-06-24 RX ADMIN — OLANZAPINE 5 MILLIGRAM(S): 15 TABLET, FILM COATED ORAL at 18:02

## 2020-06-24 RX ADMIN — Medication 4 DROP(S): at 20:41

## 2020-06-24 RX ADMIN — Medication 4 DROP(S): at 09:23

## 2020-06-24 RX ADMIN — Medication 2 MILLIGRAM(S): at 15:05

## 2020-06-24 RX ADMIN — OLANZAPINE 15 MILLIGRAM(S): 15 TABLET, FILM COATED ORAL at 20:41

## 2020-06-24 RX ADMIN — Medication 3 MILLIGRAM(S): at 20:42

## 2020-06-24 RX ADMIN — Medication 2 MILLIGRAM(S): at 20:41

## 2020-06-24 RX ADMIN — Medication 500 MILLIGRAM(S): at 15:04

## 2020-06-24 RX ADMIN — POLYETHYLENE GLYCOL 3350 17 GRAM(S): 17 POWDER, FOR SOLUTION ORAL at 09:23

## 2020-06-24 RX ADMIN — METHADONE HYDROCHLORIDE 30 MILLIGRAM(S): 40 TABLET ORAL at 09:23

## 2020-06-24 RX ADMIN — DESVENLAFAXINE 100 MILLIGRAM(S): 50 TABLET, EXTENDED RELEASE ORAL at 09:23

## 2020-06-24 RX ADMIN — Medication 2 MILLIGRAM(S): at 09:22

## 2020-06-24 RX ADMIN — Medication 500 MILLIGRAM(S): at 09:23

## 2020-06-24 RX ADMIN — Medication 1 APPLICATION(S): at 20:41

## 2020-06-24 RX ADMIN — MIRTAZAPINE 45 MILLIGRAM(S): 45 TABLET, ORALLY DISINTEGRATING ORAL at 20:41

## 2020-06-24 RX ADMIN — PANTOPRAZOLE SODIUM 40 MILLIGRAM(S): 20 TABLET, DELAYED RELEASE ORAL at 09:23

## 2020-06-24 RX ADMIN — Medication 500 MILLIGRAM(S): at 21:01

## 2020-06-24 RX ADMIN — METHADONE HYDROCHLORIDE 80 MILLIGRAM(S): 40 TABLET ORAL at 09:23

## 2020-06-25 RX ORDER — PALIPERIDONE 1.5 MG/1
3 TABLET, EXTENDED RELEASE ORAL AT BEDTIME
Refills: 0 | Status: COMPLETED | OUTPATIENT
Start: 2020-06-25 | End: 2020-06-27

## 2020-06-25 RX ADMIN — PANTOPRAZOLE SODIUM 40 MILLIGRAM(S): 20 TABLET, DELAYED RELEASE ORAL at 09:26

## 2020-06-25 RX ADMIN — Medication 2 MILLIGRAM(S): at 13:35

## 2020-06-25 RX ADMIN — Medication 1 APPLICATION(S): at 09:10

## 2020-06-25 RX ADMIN — DESVENLAFAXINE 100 MILLIGRAM(S): 50 TABLET, EXTENDED RELEASE ORAL at 09:26

## 2020-06-25 RX ADMIN — Medication 2 MILLIGRAM(S): at 15:15

## 2020-06-25 RX ADMIN — Medication 500 MILLIGRAM(S): at 20:26

## 2020-06-25 RX ADMIN — Medication 3 MILLIGRAM(S): at 20:22

## 2020-06-25 RX ADMIN — Medication 25 MILLIGRAM(S): at 15:15

## 2020-06-25 RX ADMIN — OLANZAPINE 5 MILLIGRAM(S): 15 TABLET, FILM COATED ORAL at 15:15

## 2020-06-25 RX ADMIN — Medication 4 DROP(S): at 20:19

## 2020-06-25 RX ADMIN — Medication 4 DROP(S): at 09:30

## 2020-06-25 RX ADMIN — MIRTAZAPINE 45 MILLIGRAM(S): 45 TABLET, ORALLY DISINTEGRATING ORAL at 20:22

## 2020-06-25 RX ADMIN — Medication 500 MILLIGRAM(S): at 13:35

## 2020-06-25 RX ADMIN — Medication 2 MILLIGRAM(S): at 09:26

## 2020-06-25 RX ADMIN — Medication 4 DROP(S): at 13:35

## 2020-06-25 RX ADMIN — OLANZAPINE 15 MILLIGRAM(S): 15 TABLET, FILM COATED ORAL at 20:22

## 2020-06-25 RX ADMIN — Medication 2 MILLIGRAM(S): at 10:05

## 2020-06-25 RX ADMIN — PALIPERIDONE 3 MILLIGRAM(S): 1.5 TABLET, EXTENDED RELEASE ORAL at 20:22

## 2020-06-25 RX ADMIN — METHADONE HYDROCHLORIDE 30 MILLIGRAM(S): 40 TABLET ORAL at 09:26

## 2020-06-25 RX ADMIN — METHADONE HYDROCHLORIDE 80 MILLIGRAM(S): 40 TABLET ORAL at 09:26

## 2020-06-25 RX ADMIN — Medication 500 MILLIGRAM(S): at 06:24

## 2020-06-25 RX ADMIN — Medication 2 MILLIGRAM(S): at 20:22

## 2020-06-26 RX ORDER — OLANZAPINE 15 MG/1
15 TABLET, FILM COATED ORAL AT BEDTIME
Refills: 0 | Status: COMPLETED | OUTPATIENT
Start: 2020-06-26 | End: 2020-06-26

## 2020-06-26 RX ORDER — OLANZAPINE 15 MG/1
10 TABLET, FILM COATED ORAL AT BEDTIME
Refills: 0 | Status: DISCONTINUED | OUTPATIENT
Start: 2020-06-27 | End: 2020-06-30

## 2020-06-26 RX ORDER — TUBERCULIN PURIFIED PROTEIN DERIVATIVE 5 [IU]/.1ML
5 INJECTION, SOLUTION INTRADERMAL ONCE
Refills: 0 | Status: COMPLETED | OUTPATIENT
Start: 2020-06-26 | End: 2020-06-26

## 2020-06-26 RX ORDER — PALIPERIDONE 1.5 MG/1
6 TABLET, EXTENDED RELEASE ORAL AT BEDTIME
Refills: 0 | Status: DISCONTINUED | OUTPATIENT
Start: 2020-06-27 | End: 2020-06-30

## 2020-06-26 RX ADMIN — TUBERCULIN PURIFIED PROTEIN DERIVATIVE 5 UNIT(S): 5 INJECTION, SOLUTION INTRADERMAL at 11:04

## 2020-06-26 RX ADMIN — MIRTAZAPINE 45 MILLIGRAM(S): 45 TABLET, ORALLY DISINTEGRATING ORAL at 20:29

## 2020-06-26 RX ADMIN — Medication 4 DROP(S): at 08:28

## 2020-06-26 RX ADMIN — OLANZAPINE 15 MILLIGRAM(S): 15 TABLET, FILM COATED ORAL at 20:29

## 2020-06-26 RX ADMIN — METHADONE HYDROCHLORIDE 30 MILLIGRAM(S): 40 TABLET ORAL at 08:28

## 2020-06-26 RX ADMIN — PANTOPRAZOLE SODIUM 40 MILLIGRAM(S): 20 TABLET, DELAYED RELEASE ORAL at 06:54

## 2020-06-26 RX ADMIN — Medication 4 DROP(S): at 20:29

## 2020-06-26 RX ADMIN — Medication 2 MILLIGRAM(S): at 12:34

## 2020-06-26 RX ADMIN — POLYETHYLENE GLYCOL 3350 17 GRAM(S): 17 POWDER, FOR SOLUTION ORAL at 20:29

## 2020-06-26 RX ADMIN — Medication 3 MILLIGRAM(S): at 20:29

## 2020-06-26 RX ADMIN — PALIPERIDONE 3 MILLIGRAM(S): 1.5 TABLET, EXTENDED RELEASE ORAL at 20:29

## 2020-06-26 RX ADMIN — Medication 500 MILLIGRAM(S): at 20:28

## 2020-06-26 RX ADMIN — Medication 500 MILLIGRAM(S): at 12:34

## 2020-06-26 RX ADMIN — METHADONE HYDROCHLORIDE 80 MILLIGRAM(S): 40 TABLET ORAL at 08:28

## 2020-06-26 RX ADMIN — Medication 2 MILLIGRAM(S): at 20:29

## 2020-06-26 RX ADMIN — DESVENLAFAXINE 100 MILLIGRAM(S): 50 TABLET, EXTENDED RELEASE ORAL at 08:28

## 2020-06-26 RX ADMIN — Medication 1 APPLICATION(S): at 20:29

## 2020-06-26 RX ADMIN — Medication 500 MILLIGRAM(S): at 06:53

## 2020-06-26 RX ADMIN — Medication 4 DROP(S): at 12:34

## 2020-06-26 RX ADMIN — Medication 2 MILLIGRAM(S): at 08:28

## 2020-06-27 RX ADMIN — Medication 2 MILLIGRAM(S): at 20:31

## 2020-06-27 RX ADMIN — Medication 4 DROP(S): at 09:18

## 2020-06-27 RX ADMIN — Medication 500 MILLIGRAM(S): at 21:34

## 2020-06-27 RX ADMIN — Medication 500 MILLIGRAM(S): at 05:53

## 2020-06-27 RX ADMIN — Medication 2 MILLIGRAM(S): at 13:07

## 2020-06-27 RX ADMIN — MIRTAZAPINE 45 MILLIGRAM(S): 45 TABLET, ORALLY DISINTEGRATING ORAL at 20:31

## 2020-06-27 RX ADMIN — PANTOPRAZOLE SODIUM 40 MILLIGRAM(S): 20 TABLET, DELAYED RELEASE ORAL at 09:18

## 2020-06-27 RX ADMIN — Medication 2 MILLIGRAM(S): at 09:17

## 2020-06-27 RX ADMIN — Medication 4 DROP(S): at 20:31

## 2020-06-27 RX ADMIN — Medication 25 MILLIGRAM(S): at 14:38

## 2020-06-27 RX ADMIN — Medication 650 MILLIGRAM(S): at 20:32

## 2020-06-27 RX ADMIN — METHADONE HYDROCHLORIDE 80 MILLIGRAM(S): 40 TABLET ORAL at 09:18

## 2020-06-27 RX ADMIN — Medication 3 MILLIGRAM(S): at 20:32

## 2020-06-27 RX ADMIN — OLANZAPINE 10 MILLIGRAM(S): 15 TABLET, FILM COATED ORAL at 20:31

## 2020-06-27 RX ADMIN — Medication 500 MILLIGRAM(S): at 13:07

## 2020-06-27 RX ADMIN — DESVENLAFAXINE 100 MILLIGRAM(S): 50 TABLET, EXTENDED RELEASE ORAL at 09:17

## 2020-06-27 RX ADMIN — METHADONE HYDROCHLORIDE 30 MILLIGRAM(S): 40 TABLET ORAL at 09:18

## 2020-06-27 RX ADMIN — PALIPERIDONE 6 MILLIGRAM(S): 1.5 TABLET, EXTENDED RELEASE ORAL at 20:31

## 2020-06-28 RX ADMIN — Medication 25 MILLIGRAM(S): at 13:04

## 2020-06-28 RX ADMIN — Medication 2 MILLIGRAM(S): at 09:15

## 2020-06-28 RX ADMIN — Medication 1 APPLICATION(S): at 20:40

## 2020-06-28 RX ADMIN — Medication 4 DROP(S): at 20:40

## 2020-06-28 RX ADMIN — OLANZAPINE 10 MILLIGRAM(S): 15 TABLET, FILM COATED ORAL at 20:41

## 2020-06-28 RX ADMIN — OLANZAPINE 5 MILLIGRAM(S): 15 TABLET, FILM COATED ORAL at 13:04

## 2020-06-28 RX ADMIN — PANTOPRAZOLE SODIUM 40 MILLIGRAM(S): 20 TABLET, DELAYED RELEASE ORAL at 06:33

## 2020-06-28 RX ADMIN — Medication 4 DROP(S): at 13:03

## 2020-06-28 RX ADMIN — Medication 4 DROP(S): at 08:56

## 2020-06-28 RX ADMIN — Medication 2 MILLIGRAM(S): at 13:02

## 2020-06-28 RX ADMIN — PALIPERIDONE 6 MILLIGRAM(S): 1.5 TABLET, EXTENDED RELEASE ORAL at 20:41

## 2020-06-28 RX ADMIN — Medication 500 MILLIGRAM(S): at 13:02

## 2020-06-28 RX ADMIN — TUBERCULIN PURIFIED PROTEIN DERIVATIVE 5 UNIT(S): 5 INJECTION, SOLUTION INTRADERMAL at 13:45

## 2020-06-28 RX ADMIN — Medication 1 APPLICATION(S): at 08:56

## 2020-06-28 RX ADMIN — Medication 3 MILLIGRAM(S): at 20:41

## 2020-06-28 RX ADMIN — Medication 2 MILLIGRAM(S): at 20:40

## 2020-06-28 RX ADMIN — METHADONE HYDROCHLORIDE 80 MILLIGRAM(S): 40 TABLET ORAL at 08:57

## 2020-06-28 RX ADMIN — DESVENLAFAXINE 100 MILLIGRAM(S): 50 TABLET, EXTENDED RELEASE ORAL at 08:56

## 2020-06-28 RX ADMIN — METHADONE HYDROCHLORIDE 30 MILLIGRAM(S): 40 TABLET ORAL at 08:57

## 2020-06-28 RX ADMIN — MIRTAZAPINE 45 MILLIGRAM(S): 45 TABLET, ORALLY DISINTEGRATING ORAL at 20:41

## 2020-06-28 RX ADMIN — Medication 2 MILLIGRAM(S): at 08:56

## 2020-06-28 RX ADMIN — Medication 500 MILLIGRAM(S): at 21:00

## 2020-06-28 RX ADMIN — Medication 500 MILLIGRAM(S): at 06:33

## 2020-06-29 PROCEDURE — 99232 SBSQ HOSP IP/OBS MODERATE 35: CPT

## 2020-06-29 PROCEDURE — 90832 PSYTX W PT 30 MINUTES: CPT

## 2020-06-29 RX ORDER — CLONAZEPAM 1 MG
2 TABLET ORAL THREE TIMES A DAY
Refills: 0 | Status: DISCONTINUED | OUTPATIENT
Start: 2020-06-29 | End: 2020-07-06

## 2020-06-29 RX ORDER — METHADONE HYDROCHLORIDE 40 MG/1
30 TABLET ORAL DAILY
Refills: 0 | Status: DISCONTINUED | OUTPATIENT
Start: 2020-06-29 | End: 2020-07-06

## 2020-06-29 RX ORDER — BACITRACIN ZINC 500 UNIT/G
1 OINTMENT IN PACKET (EA) TOPICAL
Refills: 0 | Status: DISCONTINUED | OUTPATIENT
Start: 2020-06-29 | End: 2020-07-10

## 2020-06-29 RX ORDER — AMOXICILLIN 250 MG/5ML
500 SUSPENSION, RECONSTITUTED, ORAL (ML) ORAL EVERY 8 HOURS
Refills: 0 | Status: DISCONTINUED | OUTPATIENT
Start: 2020-06-29 | End: 2020-06-29

## 2020-06-29 RX ORDER — AMOXICILLIN 250 MG/5ML
500 SUSPENSION, RECONSTITUTED, ORAL (ML) ORAL EVERY 8 HOURS
Refills: 0 | Status: COMPLETED | OUTPATIENT
Start: 2020-06-29 | End: 2020-07-05

## 2020-06-29 RX ORDER — METHADONE HYDROCHLORIDE 40 MG/1
80 TABLET ORAL DAILY
Refills: 0 | Status: DISCONTINUED | OUTPATIENT
Start: 2020-06-29 | End: 2020-07-06

## 2020-06-29 RX ADMIN — METHADONE HYDROCHLORIDE 80 MILLIGRAM(S): 40 TABLET ORAL at 08:22

## 2020-06-29 RX ADMIN — Medication 2 MILLIGRAM(S): at 08:21

## 2020-06-29 RX ADMIN — Medication 500 MILLIGRAM(S): at 14:46

## 2020-06-29 RX ADMIN — Medication 3 MILLIGRAM(S): at 20:28

## 2020-06-29 RX ADMIN — Medication 4 DROP(S): at 08:22

## 2020-06-29 RX ADMIN — Medication 25 MILLIGRAM(S): at 16:47

## 2020-06-29 RX ADMIN — Medication 500 MILLIGRAM(S): at 21:45

## 2020-06-29 RX ADMIN — Medication 2 MILLIGRAM(S): at 20:28

## 2020-06-29 RX ADMIN — POLYETHYLENE GLYCOL 3350 17 GRAM(S): 17 POWDER, FOR SOLUTION ORAL at 08:22

## 2020-06-29 RX ADMIN — Medication 500 MILLIGRAM(S): at 06:08

## 2020-06-29 RX ADMIN — OLANZAPINE 10 MILLIGRAM(S): 15 TABLET, FILM COATED ORAL at 20:28

## 2020-06-29 RX ADMIN — Medication 2 MILLIGRAM(S): at 13:01

## 2020-06-29 RX ADMIN — MIRTAZAPINE 45 MILLIGRAM(S): 45 TABLET, ORALLY DISINTEGRATING ORAL at 20:28

## 2020-06-29 RX ADMIN — DESVENLAFAXINE 100 MILLIGRAM(S): 50 TABLET, EXTENDED RELEASE ORAL at 08:22

## 2020-06-29 RX ADMIN — Medication 4 DROP(S): at 13:01

## 2020-06-29 RX ADMIN — PALIPERIDONE 6 MILLIGRAM(S): 1.5 TABLET, EXTENDED RELEASE ORAL at 20:28

## 2020-06-29 RX ADMIN — Medication 1 APPLICATION(S): at 08:21

## 2020-06-29 RX ADMIN — PANTOPRAZOLE SODIUM 40 MILLIGRAM(S): 20 TABLET, DELAYED RELEASE ORAL at 06:08

## 2020-06-29 RX ADMIN — METHADONE HYDROCHLORIDE 30 MILLIGRAM(S): 40 TABLET ORAL at 08:22

## 2020-06-29 RX ADMIN — Medication 4 DROP(S): at 20:28

## 2020-06-29 RX ADMIN — OLANZAPINE 5 MILLIGRAM(S): 15 TABLET, FILM COATED ORAL at 16:47

## 2020-06-30 PROCEDURE — 99232 SBSQ HOSP IP/OBS MODERATE 35: CPT

## 2020-06-30 RX ORDER — PALIPERIDONE 1.5 MG/1
9 TABLET, EXTENDED RELEASE ORAL AT BEDTIME
Refills: 0 | Status: DISCONTINUED | OUTPATIENT
Start: 2020-06-30 | End: 2020-06-30

## 2020-06-30 RX ORDER — HYDROXYZINE HCL 10 MG
50 TABLET ORAL EVERY 8 HOURS
Refills: 0 | Status: DISCONTINUED | OUTPATIENT
Start: 2020-06-30 | End: 2020-07-20

## 2020-06-30 RX ORDER — PALIPERIDONE 1.5 MG/1
9 TABLET, EXTENDED RELEASE ORAL AT BEDTIME
Refills: 0 | Status: DISCONTINUED | OUTPATIENT
Start: 2020-06-30 | End: 2020-08-20

## 2020-06-30 RX ORDER — OLANZAPINE 15 MG/1
5 TABLET, FILM COATED ORAL AT BEDTIME
Refills: 0 | Status: DISCONTINUED | OUTPATIENT
Start: 2020-06-30 | End: 2020-07-02

## 2020-06-30 RX ADMIN — Medication 2 MILLIGRAM(S): at 19:52

## 2020-06-30 RX ADMIN — Medication 500 MILLIGRAM(S): at 06:32

## 2020-06-30 RX ADMIN — Medication 500 MILLIGRAM(S): at 20:09

## 2020-06-30 RX ADMIN — Medication 4 DROP(S): at 20:10

## 2020-06-30 RX ADMIN — OLANZAPINE 5 MILLIGRAM(S): 15 TABLET, FILM COATED ORAL at 20:09

## 2020-06-30 RX ADMIN — Medication 2 MILLIGRAM(S): at 20:09

## 2020-06-30 RX ADMIN — METHADONE HYDROCHLORIDE 80 MILLIGRAM(S): 40 TABLET ORAL at 08:17

## 2020-06-30 RX ADMIN — Medication 4 DROP(S): at 13:17

## 2020-06-30 RX ADMIN — Medication 500 MILLIGRAM(S): at 13:17

## 2020-06-30 RX ADMIN — METHADONE HYDROCHLORIDE 30 MILLIGRAM(S): 40 TABLET ORAL at 08:17

## 2020-06-30 RX ADMIN — OLANZAPINE 5 MILLIGRAM(S): 15 TABLET, FILM COATED ORAL at 16:10

## 2020-06-30 RX ADMIN — Medication 650 MILLIGRAM(S): at 20:09

## 2020-06-30 RX ADMIN — PANTOPRAZOLE SODIUM 40 MILLIGRAM(S): 20 TABLET, DELAYED RELEASE ORAL at 06:32

## 2020-06-30 RX ADMIN — MIRTAZAPINE 45 MILLIGRAM(S): 45 TABLET, ORALLY DISINTEGRATING ORAL at 20:09

## 2020-06-30 RX ADMIN — POLYETHYLENE GLYCOL 3350 17 GRAM(S): 17 POWDER, FOR SOLUTION ORAL at 20:09

## 2020-06-30 RX ADMIN — PALIPERIDONE 9 MILLIGRAM(S): 1.5 TABLET, EXTENDED RELEASE ORAL at 20:09

## 2020-06-30 RX ADMIN — Medication 2 MILLIGRAM(S): at 08:17

## 2020-06-30 RX ADMIN — DESVENLAFAXINE 100 MILLIGRAM(S): 50 TABLET, EXTENDED RELEASE ORAL at 08:17

## 2020-06-30 RX ADMIN — Medication 4 DROP(S): at 08:17

## 2020-06-30 RX ADMIN — Medication 3 MILLIGRAM(S): at 20:09

## 2020-06-30 RX ADMIN — Medication 50 MILLIGRAM(S): at 16:10

## 2020-06-30 RX ADMIN — Medication 2 MILLIGRAM(S): at 13:17

## 2020-07-01 PROCEDURE — 99232 SBSQ HOSP IP/OBS MODERATE 35: CPT

## 2020-07-01 RX ADMIN — Medication 2 MILLIGRAM(S): at 12:29

## 2020-07-01 RX ADMIN — MIRTAZAPINE 45 MILLIGRAM(S): 45 TABLET, ORALLY DISINTEGRATING ORAL at 20:15

## 2020-07-01 RX ADMIN — OLANZAPINE 5 MILLIGRAM(S): 15 TABLET, FILM COATED ORAL at 20:15

## 2020-07-01 RX ADMIN — Medication 2 MILLIGRAM(S): at 16:54

## 2020-07-01 RX ADMIN — PALIPERIDONE 9 MILLIGRAM(S): 1.5 TABLET, EXTENDED RELEASE ORAL at 20:15

## 2020-07-01 RX ADMIN — Medication 2 MILLIGRAM(S): at 20:15

## 2020-07-01 RX ADMIN — Medication 4 DROP(S): at 12:29

## 2020-07-01 RX ADMIN — METHADONE HYDROCHLORIDE 30 MILLIGRAM(S): 40 TABLET ORAL at 07:53

## 2020-07-01 RX ADMIN — Medication 3 MILLIGRAM(S): at 20:15

## 2020-07-01 RX ADMIN — Medication 4 DROP(S): at 07:53

## 2020-07-01 RX ADMIN — Medication 2 MILLIGRAM(S): at 07:53

## 2020-07-01 RX ADMIN — OLANZAPINE 5 MILLIGRAM(S): 15 TABLET, FILM COATED ORAL at 16:54

## 2020-07-01 RX ADMIN — PANTOPRAZOLE SODIUM 40 MILLIGRAM(S): 20 TABLET, DELAYED RELEASE ORAL at 06:41

## 2020-07-01 RX ADMIN — METHADONE HYDROCHLORIDE 80 MILLIGRAM(S): 40 TABLET ORAL at 07:53

## 2020-07-01 RX ADMIN — DESVENLAFAXINE 100 MILLIGRAM(S): 50 TABLET, EXTENDED RELEASE ORAL at 07:53

## 2020-07-01 RX ADMIN — Medication 500 MILLIGRAM(S): at 21:08

## 2020-07-01 RX ADMIN — Medication 500 MILLIGRAM(S): at 06:41

## 2020-07-01 RX ADMIN — Medication 500 MILLIGRAM(S): at 14:18

## 2020-07-01 RX ADMIN — Medication 50 MILLIGRAM(S): at 16:54

## 2020-07-02 PROCEDURE — 99232 SBSQ HOSP IP/OBS MODERATE 35: CPT

## 2020-07-02 RX ORDER — MIRTAZAPINE 45 MG/1
15 TABLET, ORALLY DISINTEGRATING ORAL AT BEDTIME
Refills: 0 | Status: DISCONTINUED | OUTPATIENT
Start: 2020-07-02 | End: 2020-08-20

## 2020-07-02 RX ORDER — MIRTAZAPINE 45 MG/1
30 TABLET, ORALLY DISINTEGRATING ORAL
Refills: 0 | Status: DISCONTINUED | OUTPATIENT
Start: 2020-07-03 | End: 2020-08-20

## 2020-07-02 RX ADMIN — Medication 500 MILLIGRAM(S): at 20:53

## 2020-07-02 RX ADMIN — Medication 2 MILLIGRAM(S): at 13:12

## 2020-07-02 RX ADMIN — POLYETHYLENE GLYCOL 3350 17 GRAM(S): 17 POWDER, FOR SOLUTION ORAL at 20:54

## 2020-07-02 RX ADMIN — MIRTAZAPINE 15 MILLIGRAM(S): 45 TABLET, ORALLY DISINTEGRATING ORAL at 20:54

## 2020-07-02 RX ADMIN — Medication 1 APPLICATION(S): at 20:53

## 2020-07-02 RX ADMIN — OLANZAPINE 5 MILLIGRAM(S): 15 TABLET, FILM COATED ORAL at 16:45

## 2020-07-02 RX ADMIN — PALIPERIDONE 9 MILLIGRAM(S): 1.5 TABLET, EXTENDED RELEASE ORAL at 20:54

## 2020-07-02 RX ADMIN — PANTOPRAZOLE SODIUM 40 MILLIGRAM(S): 20 TABLET, DELAYED RELEASE ORAL at 09:13

## 2020-07-02 RX ADMIN — Medication 2 MILLIGRAM(S): at 08:46

## 2020-07-02 RX ADMIN — METHADONE HYDROCHLORIDE 80 MILLIGRAM(S): 40 TABLET ORAL at 09:13

## 2020-07-02 RX ADMIN — Medication 2 MILLIGRAM(S): at 16:45

## 2020-07-02 RX ADMIN — DESVENLAFAXINE 100 MILLIGRAM(S): 50 TABLET, EXTENDED RELEASE ORAL at 09:13

## 2020-07-02 RX ADMIN — Medication 500 MILLIGRAM(S): at 13:12

## 2020-07-02 RX ADMIN — Medication 2 MILLIGRAM(S): at 20:54

## 2020-07-02 RX ADMIN — Medication 50 MILLIGRAM(S): at 16:45

## 2020-07-02 RX ADMIN — Medication 500 MILLIGRAM(S): at 06:24

## 2020-07-02 RX ADMIN — METHADONE HYDROCHLORIDE 30 MILLIGRAM(S): 40 TABLET ORAL at 09:13

## 2020-07-02 RX ADMIN — Medication 3 MILLIGRAM(S): at 20:54

## 2020-07-02 RX ADMIN — Medication 2 MILLIGRAM(S): at 09:13

## 2020-07-03 RX ADMIN — Medication 500 MILLIGRAM(S): at 12:44

## 2020-07-03 RX ADMIN — METHADONE HYDROCHLORIDE 30 MILLIGRAM(S): 40 TABLET ORAL at 08:15

## 2020-07-03 RX ADMIN — DESVENLAFAXINE 100 MILLIGRAM(S): 50 TABLET, EXTENDED RELEASE ORAL at 08:15

## 2020-07-03 RX ADMIN — Medication 3 MILLIGRAM(S): at 21:03

## 2020-07-03 RX ADMIN — Medication 500 MILLIGRAM(S): at 21:02

## 2020-07-03 RX ADMIN — Medication 500 MILLIGRAM(S): at 06:36

## 2020-07-03 RX ADMIN — METHADONE HYDROCHLORIDE 80 MILLIGRAM(S): 40 TABLET ORAL at 08:15

## 2020-07-03 RX ADMIN — MIRTAZAPINE 30 MILLIGRAM(S): 45 TABLET, ORALLY DISINTEGRATING ORAL at 12:44

## 2020-07-03 RX ADMIN — Medication 50 MILLIGRAM(S): at 17:13

## 2020-07-03 RX ADMIN — PANTOPRAZOLE SODIUM 40 MILLIGRAM(S): 20 TABLET, DELAYED RELEASE ORAL at 08:14

## 2020-07-03 RX ADMIN — Medication 2 MILLIGRAM(S): at 08:15

## 2020-07-03 RX ADMIN — MIRTAZAPINE 15 MILLIGRAM(S): 45 TABLET, ORALLY DISINTEGRATING ORAL at 21:03

## 2020-07-03 RX ADMIN — Medication 2 MILLIGRAM(S): at 21:03

## 2020-07-03 RX ADMIN — Medication 2 MILLIGRAM(S): at 12:36

## 2020-07-03 RX ADMIN — POLYETHYLENE GLYCOL 3350 17 GRAM(S): 17 POWDER, FOR SOLUTION ORAL at 21:03

## 2020-07-03 RX ADMIN — PALIPERIDONE 9 MILLIGRAM(S): 1.5 TABLET, EXTENDED RELEASE ORAL at 21:03

## 2020-07-04 RX ADMIN — Medication 2 MILLIGRAM(S): at 21:00

## 2020-07-04 RX ADMIN — PANTOPRAZOLE SODIUM 40 MILLIGRAM(S): 20 TABLET, DELAYED RELEASE ORAL at 08:27

## 2020-07-04 RX ADMIN — Medication 500 MILLIGRAM(S): at 06:35

## 2020-07-04 RX ADMIN — MIRTAZAPINE 30 MILLIGRAM(S): 45 TABLET, ORALLY DISINTEGRATING ORAL at 13:24

## 2020-07-04 RX ADMIN — Medication 50 MILLIGRAM(S): at 17:18

## 2020-07-04 RX ADMIN — Medication 2 MILLIGRAM(S): at 13:01

## 2020-07-04 RX ADMIN — MIRTAZAPINE 15 MILLIGRAM(S): 45 TABLET, ORALLY DISINTEGRATING ORAL at 21:00

## 2020-07-04 RX ADMIN — Medication 1 APPLICATION(S): at 08:26

## 2020-07-04 RX ADMIN — PALIPERIDONE 9 MILLIGRAM(S): 1.5 TABLET, EXTENDED RELEASE ORAL at 21:00

## 2020-07-04 RX ADMIN — Medication 500 MILLIGRAM(S): at 21:00

## 2020-07-04 RX ADMIN — Medication 2 MILLIGRAM(S): at 08:26

## 2020-07-04 RX ADMIN — Medication 500 MILLIGRAM(S): at 14:02

## 2020-07-04 RX ADMIN — Medication 3 MILLIGRAM(S): at 21:00

## 2020-07-04 RX ADMIN — Medication 2 MILLIGRAM(S): at 09:04

## 2020-07-04 RX ADMIN — DESVENLAFAXINE 100 MILLIGRAM(S): 50 TABLET, EXTENDED RELEASE ORAL at 08:26

## 2020-07-04 RX ADMIN — METHADONE HYDROCHLORIDE 30 MILLIGRAM(S): 40 TABLET ORAL at 08:27

## 2020-07-04 RX ADMIN — Medication 2 MILLIGRAM(S): at 13:24

## 2020-07-04 RX ADMIN — METHADONE HYDROCHLORIDE 80 MILLIGRAM(S): 40 TABLET ORAL at 08:27

## 2020-07-04 RX ADMIN — OLANZAPINE 5 MILLIGRAM(S): 15 TABLET, FILM COATED ORAL at 21:00

## 2020-07-05 RX ADMIN — Medication 2 MILLIGRAM(S): at 20:28

## 2020-07-05 RX ADMIN — METHADONE HYDROCHLORIDE 80 MILLIGRAM(S): 40 TABLET ORAL at 08:27

## 2020-07-05 RX ADMIN — METHADONE HYDROCHLORIDE 30 MILLIGRAM(S): 40 TABLET ORAL at 08:28

## 2020-07-05 RX ADMIN — OLANZAPINE 5 MILLIGRAM(S): 15 TABLET, FILM COATED ORAL at 18:39

## 2020-07-05 RX ADMIN — MIRTAZAPINE 30 MILLIGRAM(S): 45 TABLET, ORALLY DISINTEGRATING ORAL at 13:01

## 2020-07-05 RX ADMIN — PALIPERIDONE 9 MILLIGRAM(S): 1.5 TABLET, EXTENDED RELEASE ORAL at 20:29

## 2020-07-05 RX ADMIN — Medication 1 APPLICATION(S): at 08:25

## 2020-07-05 RX ADMIN — Medication 50 MILLIGRAM(S): at 16:49

## 2020-07-05 RX ADMIN — Medication 500 MILLIGRAM(S): at 06:29

## 2020-07-05 RX ADMIN — DESVENLAFAXINE 100 MILLIGRAM(S): 50 TABLET, EXTENDED RELEASE ORAL at 08:27

## 2020-07-05 RX ADMIN — Medication 2 MILLIGRAM(S): at 08:48

## 2020-07-05 RX ADMIN — MIRTAZAPINE 15 MILLIGRAM(S): 45 TABLET, ORALLY DISINTEGRATING ORAL at 20:29

## 2020-07-05 RX ADMIN — Medication 2 MILLIGRAM(S): at 13:01

## 2020-07-05 RX ADMIN — PANTOPRAZOLE SODIUM 40 MILLIGRAM(S): 20 TABLET, DELAYED RELEASE ORAL at 08:28

## 2020-07-05 RX ADMIN — Medication 3 MILLIGRAM(S): at 20:29

## 2020-07-05 RX ADMIN — Medication 2 MILLIGRAM(S): at 08:26

## 2020-07-06 PROCEDURE — 99232 SBSQ HOSP IP/OBS MODERATE 35: CPT

## 2020-07-06 RX ORDER — CLONAZEPAM 1 MG
2 TABLET ORAL THREE TIMES A DAY
Refills: 0 | Status: DISCONTINUED | OUTPATIENT
Start: 2020-07-06 | End: 2020-07-08

## 2020-07-06 RX ORDER — PALIPERIDONE 1.5 MG/1
1 TABLET, EXTENDED RELEASE ORAL
Qty: 30 | Refills: 0
Start: 2020-07-06 | End: 2020-08-04

## 2020-07-06 RX ORDER — METHADONE HYDROCHLORIDE 40 MG/1
80 TABLET ORAL DAILY
Refills: 0 | Status: DISCONTINUED | OUTPATIENT
Start: 2020-07-07 | End: 2020-07-08

## 2020-07-06 RX ORDER — METHADONE HYDROCHLORIDE 40 MG/1
30 TABLET ORAL DAILY
Refills: 0 | Status: DISCONTINUED | OUTPATIENT
Start: 2020-07-07 | End: 2020-07-08

## 2020-07-06 RX ADMIN — Medication 3 MILLIGRAM(S): at 20:37

## 2020-07-06 RX ADMIN — Medication 2 MILLIGRAM(S): at 20:37

## 2020-07-06 RX ADMIN — MIRTAZAPINE 30 MILLIGRAM(S): 45 TABLET, ORALLY DISINTEGRATING ORAL at 12:51

## 2020-07-06 RX ADMIN — Medication 2 MILLIGRAM(S): at 12:51

## 2020-07-06 RX ADMIN — PALIPERIDONE 9 MILLIGRAM(S): 1.5 TABLET, EXTENDED RELEASE ORAL at 20:37

## 2020-07-06 RX ADMIN — OLANZAPINE 5 MILLIGRAM(S): 15 TABLET, FILM COATED ORAL at 16:49

## 2020-07-06 RX ADMIN — PANTOPRAZOLE SODIUM 40 MILLIGRAM(S): 20 TABLET, DELAYED RELEASE ORAL at 08:12

## 2020-07-06 RX ADMIN — POLYETHYLENE GLYCOL 3350 17 GRAM(S): 17 POWDER, FOR SOLUTION ORAL at 08:12

## 2020-07-06 RX ADMIN — Medication 50 MILLIGRAM(S): at 16:48

## 2020-07-06 RX ADMIN — METHADONE HYDROCHLORIDE 30 MILLIGRAM(S): 40 TABLET ORAL at 08:12

## 2020-07-06 RX ADMIN — METHADONE HYDROCHLORIDE 80 MILLIGRAM(S): 40 TABLET ORAL at 08:12

## 2020-07-06 RX ADMIN — Medication 2 MILLIGRAM(S): at 08:55

## 2020-07-06 RX ADMIN — DESVENLAFAXINE 100 MILLIGRAM(S): 50 TABLET, EXTENDED RELEASE ORAL at 08:12

## 2020-07-06 RX ADMIN — Medication 1 APPLICATION(S): at 08:11

## 2020-07-06 RX ADMIN — MIRTAZAPINE 15 MILLIGRAM(S): 45 TABLET, ORALLY DISINTEGRATING ORAL at 20:37

## 2020-07-06 RX ADMIN — Medication 2 MILLIGRAM(S): at 08:12

## 2020-07-06 NOTE — PHARMACOTHERAPY INTERVENTION NOTE - COMMENTS
Left message for Dr. Noel recommeding to renew Methadone 80 mG daily & Methadone 30 mG daily for patient.

## 2020-07-07 PROCEDURE — 99232 SBSQ HOSP IP/OBS MODERATE 35: CPT

## 2020-07-07 RX ORDER — PANTOPRAZOLE SODIUM 20 MG/1
40 TABLET, DELAYED RELEASE ORAL
Refills: 0 | Status: DISCONTINUED | OUTPATIENT
Start: 2020-07-07 | End: 2020-08-20

## 2020-07-07 RX ORDER — GABAPENTIN 400 MG/1
400 CAPSULE ORAL AT BEDTIME
Refills: 0 | Status: DISCONTINUED | OUTPATIENT
Start: 2020-07-07 | End: 2020-07-20

## 2020-07-07 RX ORDER — GABAPENTIN 400 MG/1
400 CAPSULE ORAL
Refills: 0 | Status: DISCONTINUED | OUTPATIENT
Start: 2020-07-07 | End: 2020-07-20

## 2020-07-07 RX ADMIN — DESVENLAFAXINE 100 MILLIGRAM(S): 50 TABLET, EXTENDED RELEASE ORAL at 08:47

## 2020-07-07 RX ADMIN — Medication 2 MILLIGRAM(S): at 20:51

## 2020-07-07 RX ADMIN — Medication 50 MILLIGRAM(S): at 16:58

## 2020-07-07 RX ADMIN — MIRTAZAPINE 30 MILLIGRAM(S): 45 TABLET, ORALLY DISINTEGRATING ORAL at 13:05

## 2020-07-07 RX ADMIN — METHADONE HYDROCHLORIDE 30 MILLIGRAM(S): 40 TABLET ORAL at 08:47

## 2020-07-07 RX ADMIN — PALIPERIDONE 9 MILLIGRAM(S): 1.5 TABLET, EXTENDED RELEASE ORAL at 20:52

## 2020-07-07 RX ADMIN — Medication 3 MILLIGRAM(S): at 20:52

## 2020-07-07 RX ADMIN — Medication 1 APPLICATION(S): at 08:46

## 2020-07-07 RX ADMIN — POLYETHYLENE GLYCOL 3350 17 GRAM(S): 17 POWDER, FOR SOLUTION ORAL at 08:47

## 2020-07-07 RX ADMIN — PANTOPRAZOLE SODIUM 40 MILLIGRAM(S): 20 TABLET, DELAYED RELEASE ORAL at 08:47

## 2020-07-07 RX ADMIN — Medication 2 MILLIGRAM(S): at 08:47

## 2020-07-07 RX ADMIN — MIRTAZAPINE 15 MILLIGRAM(S): 45 TABLET, ORALLY DISINTEGRATING ORAL at 20:51

## 2020-07-07 RX ADMIN — Medication 2 MILLIGRAM(S): at 13:05

## 2020-07-07 RX ADMIN — METHADONE HYDROCHLORIDE 80 MILLIGRAM(S): 40 TABLET ORAL at 08:47

## 2020-07-07 RX ADMIN — OLANZAPINE 5 MILLIGRAM(S): 15 TABLET, FILM COATED ORAL at 16:58

## 2020-07-08 PROCEDURE — 99232 SBSQ HOSP IP/OBS MODERATE 35: CPT

## 2020-07-08 RX ORDER — METHADONE HYDROCHLORIDE 40 MG/1
80 TABLET ORAL DAILY
Refills: 0 | Status: DISCONTINUED | OUTPATIENT
Start: 2020-07-09 | End: 2020-07-15

## 2020-07-08 RX ORDER — CLONAZEPAM 1 MG
2 TABLET ORAL THREE TIMES A DAY
Refills: 0 | Status: DISCONTINUED | OUTPATIENT
Start: 2020-07-08 | End: 2020-07-13

## 2020-07-08 RX ORDER — METHADONE HYDROCHLORIDE 40 MG/1
30 TABLET ORAL DAILY
Refills: 0 | Status: DISCONTINUED | OUTPATIENT
Start: 2020-07-08 | End: 2020-07-15

## 2020-07-08 RX ADMIN — Medication 1 APPLICATION(S): at 08:12

## 2020-07-08 RX ADMIN — PALIPERIDONE 9 MILLIGRAM(S): 1.5 TABLET, EXTENDED RELEASE ORAL at 20:22

## 2020-07-08 RX ADMIN — OLANZAPINE 5 MILLIGRAM(S): 15 TABLET, FILM COATED ORAL at 16:50

## 2020-07-08 RX ADMIN — Medication 2 MILLIGRAM(S): at 20:22

## 2020-07-08 RX ADMIN — Medication 2 MILLIGRAM(S): at 16:50

## 2020-07-08 RX ADMIN — Medication 3 MILLIGRAM(S): at 20:22

## 2020-07-08 RX ADMIN — MIRTAZAPINE 30 MILLIGRAM(S): 45 TABLET, ORALLY DISINTEGRATING ORAL at 13:03

## 2020-07-08 RX ADMIN — MIRTAZAPINE 15 MILLIGRAM(S): 45 TABLET, ORALLY DISINTEGRATING ORAL at 20:22

## 2020-07-08 RX ADMIN — POLYETHYLENE GLYCOL 3350 17 GRAM(S): 17 POWDER, FOR SOLUTION ORAL at 08:12

## 2020-07-08 RX ADMIN — DESVENLAFAXINE 100 MILLIGRAM(S): 50 TABLET, EXTENDED RELEASE ORAL at 08:12

## 2020-07-09 PROCEDURE — 90834 PSYTX W PT 45 MINUTES: CPT

## 2020-07-09 PROCEDURE — 99232 SBSQ HOSP IP/OBS MODERATE 35: CPT

## 2020-07-09 RX ORDER — PRAZOSIN HCL 2 MG
1 CAPSULE ORAL AT BEDTIME
Refills: 0 | Status: DISCONTINUED | OUTPATIENT
Start: 2020-07-09 | End: 2020-08-12

## 2020-07-09 RX ADMIN — Medication 2 MILLIGRAM(S): at 16:41

## 2020-07-09 RX ADMIN — METHADONE HYDROCHLORIDE 80 MILLIGRAM(S): 40 TABLET ORAL at 08:14

## 2020-07-09 RX ADMIN — Medication 1 APPLICATION(S): at 20:51

## 2020-07-09 RX ADMIN — OLANZAPINE 5 MILLIGRAM(S): 15 TABLET, FILM COATED ORAL at 16:41

## 2020-07-09 RX ADMIN — DESVENLAFAXINE 100 MILLIGRAM(S): 50 TABLET, EXTENDED RELEASE ORAL at 08:14

## 2020-07-09 RX ADMIN — MIRTAZAPINE 30 MILLIGRAM(S): 45 TABLET, ORALLY DISINTEGRATING ORAL at 12:55

## 2020-07-09 RX ADMIN — Medication 2 MILLIGRAM(S): at 20:52

## 2020-07-09 RX ADMIN — Medication 2 MILLIGRAM(S): at 12:54

## 2020-07-09 RX ADMIN — METHADONE HYDROCHLORIDE 30 MILLIGRAM(S): 40 TABLET ORAL at 08:14

## 2020-07-09 RX ADMIN — MIRTAZAPINE 15 MILLIGRAM(S): 45 TABLET, ORALLY DISINTEGRATING ORAL at 20:52

## 2020-07-09 RX ADMIN — Medication 2 MILLIGRAM(S): at 08:14

## 2020-07-09 RX ADMIN — PALIPERIDONE 9 MILLIGRAM(S): 1.5 TABLET, EXTENDED RELEASE ORAL at 20:52

## 2020-07-09 RX ADMIN — Medication 3 MILLIGRAM(S): at 20:52

## 2020-07-10 PROCEDURE — 99232 SBSQ HOSP IP/OBS MODERATE 35: CPT

## 2020-07-10 RX ADMIN — Medication 3 MILLIGRAM(S): at 21:23

## 2020-07-10 RX ADMIN — Medication 2 MILLIGRAM(S): at 21:22

## 2020-07-10 RX ADMIN — METHADONE HYDROCHLORIDE 80 MILLIGRAM(S): 40 TABLET ORAL at 08:19

## 2020-07-10 RX ADMIN — PALIPERIDONE 9 MILLIGRAM(S): 1.5 TABLET, EXTENDED RELEASE ORAL at 21:22

## 2020-07-10 RX ADMIN — POLYETHYLENE GLYCOL 3350 17 GRAM(S): 17 POWDER, FOR SOLUTION ORAL at 21:22

## 2020-07-10 RX ADMIN — Medication 2 MILLIGRAM(S): at 08:18

## 2020-07-10 RX ADMIN — DESVENLAFAXINE 100 MILLIGRAM(S): 50 TABLET, EXTENDED RELEASE ORAL at 08:18

## 2020-07-10 RX ADMIN — Medication 2 MILLIGRAM(S): at 10:28

## 2020-07-10 RX ADMIN — OLANZAPINE 5 MILLIGRAM(S): 15 TABLET, FILM COATED ORAL at 16:51

## 2020-07-10 RX ADMIN — MIRTAZAPINE 15 MILLIGRAM(S): 45 TABLET, ORALLY DISINTEGRATING ORAL at 21:22

## 2020-07-10 RX ADMIN — METHADONE HYDROCHLORIDE 30 MILLIGRAM(S): 40 TABLET ORAL at 08:19

## 2020-07-10 RX ADMIN — MIRTAZAPINE 30 MILLIGRAM(S): 45 TABLET, ORALLY DISINTEGRATING ORAL at 13:15

## 2020-07-10 RX ADMIN — Medication 2 MILLIGRAM(S): at 13:15

## 2020-07-11 RX ADMIN — Medication 2 MILLIGRAM(S): at 08:28

## 2020-07-11 RX ADMIN — OLANZAPINE 5 MILLIGRAM(S): 15 TABLET, FILM COATED ORAL at 16:49

## 2020-07-11 RX ADMIN — PALIPERIDONE 9 MILLIGRAM(S): 1.5 TABLET, EXTENDED RELEASE ORAL at 20:40

## 2020-07-11 RX ADMIN — MIRTAZAPINE 15 MILLIGRAM(S): 45 TABLET, ORALLY DISINTEGRATING ORAL at 20:40

## 2020-07-11 RX ADMIN — Medication 3 MILLIGRAM(S): at 20:40

## 2020-07-11 RX ADMIN — METHADONE HYDROCHLORIDE 30 MILLIGRAM(S): 40 TABLET ORAL at 08:28

## 2020-07-11 RX ADMIN — Medication 2 MILLIGRAM(S): at 12:55

## 2020-07-11 RX ADMIN — Medication 2 MILLIGRAM(S): at 20:40

## 2020-07-11 RX ADMIN — MIRTAZAPINE 30 MILLIGRAM(S): 45 TABLET, ORALLY DISINTEGRATING ORAL at 12:56

## 2020-07-11 RX ADMIN — METHADONE HYDROCHLORIDE 80 MILLIGRAM(S): 40 TABLET ORAL at 08:28

## 2020-07-11 RX ADMIN — DESVENLAFAXINE 100 MILLIGRAM(S): 50 TABLET, EXTENDED RELEASE ORAL at 08:28

## 2020-07-11 RX ADMIN — Medication 2 MILLIGRAM(S): at 10:25

## 2020-07-12 RX ADMIN — MIRTAZAPINE 15 MILLIGRAM(S): 45 TABLET, ORALLY DISINTEGRATING ORAL at 20:41

## 2020-07-12 RX ADMIN — Medication 3 MILLIGRAM(S): at 20:41

## 2020-07-12 RX ADMIN — POLYETHYLENE GLYCOL 3350 17 GRAM(S): 17 POWDER, FOR SOLUTION ORAL at 09:55

## 2020-07-12 RX ADMIN — OLANZAPINE 5 MILLIGRAM(S): 15 TABLET, FILM COATED ORAL at 16:42

## 2020-07-12 RX ADMIN — DESVENLAFAXINE 100 MILLIGRAM(S): 50 TABLET, EXTENDED RELEASE ORAL at 09:55

## 2020-07-12 RX ADMIN — MIRTAZAPINE 30 MILLIGRAM(S): 45 TABLET, ORALLY DISINTEGRATING ORAL at 11:50

## 2020-07-12 RX ADMIN — PALIPERIDONE 9 MILLIGRAM(S): 1.5 TABLET, EXTENDED RELEASE ORAL at 20:41

## 2020-07-13 PROCEDURE — 99233 SBSQ HOSP IP/OBS HIGH 50: CPT

## 2020-07-13 RX ORDER — CLONAZEPAM 1 MG
0.5 TABLET ORAL AT BEDTIME
Refills: 0 | Status: DISCONTINUED | OUTPATIENT
Start: 2020-07-13 | End: 2020-07-15

## 2020-07-13 RX ORDER — CLONAZEPAM 1 MG
2 TABLET ORAL
Refills: 0 | Status: DISCONTINUED | OUTPATIENT
Start: 2020-07-13 | End: 2020-07-15

## 2020-07-13 RX ORDER — CLONAZEPAM 1 MG
2 TABLET ORAL DAILY
Refills: 0 | Status: DISCONTINUED | OUTPATIENT
Start: 2020-07-13 | End: 2020-07-15

## 2020-07-13 RX ORDER — CLONAZEPAM 1 MG
1.5 TABLET ORAL AT BEDTIME
Refills: 0 | Status: DISCONTINUED | OUTPATIENT
Start: 2020-07-13 | End: 2020-07-15

## 2020-07-13 RX ADMIN — DESVENLAFAXINE 100 MILLIGRAM(S): 50 TABLET, EXTENDED RELEASE ORAL at 08:23

## 2020-07-13 RX ADMIN — Medication 1 BOTTLE: at 09:42

## 2020-07-13 RX ADMIN — Medication 3 MILLIGRAM(S): at 21:15

## 2020-07-13 RX ADMIN — Medication 50 MILLIGRAM(S): at 12:50

## 2020-07-13 RX ADMIN — PALIPERIDONE 9 MILLIGRAM(S): 1.5 TABLET, EXTENDED RELEASE ORAL at 21:15

## 2020-07-13 RX ADMIN — MIRTAZAPINE 15 MILLIGRAM(S): 45 TABLET, ORALLY DISINTEGRATING ORAL at 21:15

## 2020-07-13 RX ADMIN — OLANZAPINE 5 MILLIGRAM(S): 15 TABLET, FILM COATED ORAL at 15:55

## 2020-07-13 RX ADMIN — Medication 0.5 MILLIGRAM(S): at 21:15

## 2020-07-13 RX ADMIN — Medication 2 MILLIGRAM(S): at 08:27

## 2020-07-13 RX ADMIN — POLYETHYLENE GLYCOL 3350 17 GRAM(S): 17 POWDER, FOR SOLUTION ORAL at 08:23

## 2020-07-13 RX ADMIN — MIRTAZAPINE 30 MILLIGRAM(S): 45 TABLET, ORALLY DISINTEGRATING ORAL at 12:50

## 2020-07-13 RX ADMIN — Medication 1.5 MILLIGRAM(S): at 21:14

## 2020-07-14 PROCEDURE — 99232 SBSQ HOSP IP/OBS MODERATE 35: CPT

## 2020-07-14 RX ADMIN — DESVENLAFAXINE 100 MILLIGRAM(S): 50 TABLET, EXTENDED RELEASE ORAL at 08:50

## 2020-07-14 RX ADMIN — Medication 2 MILLIGRAM(S): at 08:49

## 2020-07-14 RX ADMIN — Medication 2 MILLIGRAM(S): at 12:29

## 2020-07-14 RX ADMIN — Medication 3 MILLIGRAM(S): at 21:35

## 2020-07-14 RX ADMIN — Medication 0.5 MILLIGRAM(S): at 21:35

## 2020-07-14 RX ADMIN — MIRTAZAPINE 30 MILLIGRAM(S): 45 TABLET, ORALLY DISINTEGRATING ORAL at 12:29

## 2020-07-14 RX ADMIN — Medication 1.5 MILLIGRAM(S): at 21:34

## 2020-07-14 RX ADMIN — Medication 50 MILLIGRAM(S): at 12:29

## 2020-07-14 RX ADMIN — MIRTAZAPINE 15 MILLIGRAM(S): 45 TABLET, ORALLY DISINTEGRATING ORAL at 21:34

## 2020-07-14 RX ADMIN — PALIPERIDONE 9 MILLIGRAM(S): 1.5 TABLET, EXTENDED RELEASE ORAL at 21:34

## 2020-07-14 RX ADMIN — OLANZAPINE 5 MILLIGRAM(S): 15 TABLET, FILM COATED ORAL at 16:15

## 2020-07-14 RX ADMIN — Medication 2 MILLIGRAM(S): at 12:30

## 2020-07-15 LAB
ALBUMIN SERPL ELPH-MCNC: 4.4 G/DL — SIGNIFICANT CHANGE UP (ref 3.3–5)
ALP SERPL-CCNC: 72 U/L — SIGNIFICANT CHANGE UP (ref 40–120)
ALT FLD-CCNC: 26 U/L — SIGNIFICANT CHANGE UP (ref 4–41)
ANION GAP SERPL CALC-SCNC: 13 MMO/L — SIGNIFICANT CHANGE UP (ref 7–14)
AST SERPL-CCNC: 26 U/L — SIGNIFICANT CHANGE UP (ref 4–40)
BASOPHILS # BLD AUTO: 0.04 K/UL — SIGNIFICANT CHANGE UP (ref 0–0.2)
BASOPHILS NFR BLD AUTO: 0.8 % — SIGNIFICANT CHANGE UP (ref 0–2)
BILIRUB SERPL-MCNC: < 0.2 MG/DL — LOW (ref 0.2–1.2)
BUN SERPL-MCNC: 10 MG/DL — SIGNIFICANT CHANGE UP (ref 7–23)
CALCIUM SERPL-MCNC: 9.6 MG/DL — SIGNIFICANT CHANGE UP (ref 8.4–10.5)
CHLORIDE SERPL-SCNC: 99 MMOL/L — SIGNIFICANT CHANGE UP (ref 98–107)
CHOLEST SERPL-MCNC: 162 MG/DL — SIGNIFICANT CHANGE UP (ref 120–199)
CO2 SERPL-SCNC: 26 MMOL/L — SIGNIFICANT CHANGE UP (ref 22–31)
CREAT SERPL-MCNC: 0.79 MG/DL — SIGNIFICANT CHANGE UP (ref 0.5–1.3)
EOSINOPHIL # BLD AUTO: 0.36 K/UL — SIGNIFICANT CHANGE UP (ref 0–0.5)
EOSINOPHIL NFR BLD AUTO: 6.8 % — HIGH (ref 0–6)
GLUCOSE SERPL-MCNC: 126 MG/DL — HIGH (ref 70–99)
HBA1C BLD-MCNC: 5.2 % — SIGNIFICANT CHANGE UP (ref 4–5.6)
HCT VFR BLD CALC: 41.2 % — SIGNIFICANT CHANGE UP (ref 39–50)
HDLC SERPL-MCNC: 50 MG/DL — SIGNIFICANT CHANGE UP (ref 35–55)
HGB BLD-MCNC: 13.2 G/DL — SIGNIFICANT CHANGE UP (ref 13–17)
IMM GRANULOCYTES NFR BLD AUTO: 0.4 % — SIGNIFICANT CHANGE UP (ref 0–1.5)
LIPID PNL WITH DIRECT LDL SERPL: 98 MG/DL — SIGNIFICANT CHANGE UP
LYMPHOCYTES # BLD AUTO: 1.64 K/UL — SIGNIFICANT CHANGE UP (ref 1–3.3)
LYMPHOCYTES # BLD AUTO: 30.9 % — SIGNIFICANT CHANGE UP (ref 13–44)
MAGNESIUM SERPL-MCNC: 1.9 MG/DL — SIGNIFICANT CHANGE UP (ref 1.6–2.6)
MCHC RBC-ENTMCNC: 27.2 PG — SIGNIFICANT CHANGE UP (ref 27–34)
MCHC RBC-ENTMCNC: 32 % — SIGNIFICANT CHANGE UP (ref 32–36)
MCV RBC AUTO: 84.9 FL — SIGNIFICANT CHANGE UP (ref 80–100)
MONOCYTES # BLD AUTO: 0.44 K/UL — SIGNIFICANT CHANGE UP (ref 0–0.9)
MONOCYTES NFR BLD AUTO: 8.3 % — SIGNIFICANT CHANGE UP (ref 2–14)
NEUTROPHILS # BLD AUTO: 2.81 K/UL — SIGNIFICANT CHANGE UP (ref 1.8–7.4)
NEUTROPHILS NFR BLD AUTO: 52.8 % — SIGNIFICANT CHANGE UP (ref 43–77)
NRBC # FLD: 0 K/UL — SIGNIFICANT CHANGE UP (ref 0–0)
PLATELET # BLD AUTO: 270 K/UL — SIGNIFICANT CHANGE UP (ref 150–400)
PMV BLD: 9.4 FL — SIGNIFICANT CHANGE UP (ref 7–13)
POTASSIUM SERPL-MCNC: 4.7 MMOL/L — SIGNIFICANT CHANGE UP (ref 3.5–5.3)
POTASSIUM SERPL-SCNC: 4.7 MMOL/L — SIGNIFICANT CHANGE UP (ref 3.5–5.3)
PROT SERPL-MCNC: 7.8 G/DL — SIGNIFICANT CHANGE UP (ref 6–8.3)
RBC # BLD: 4.85 M/UL — SIGNIFICANT CHANGE UP (ref 4.2–5.8)
RBC # FLD: 11.9 % — SIGNIFICANT CHANGE UP (ref 10.3–14.5)
SODIUM SERPL-SCNC: 138 MMOL/L — SIGNIFICANT CHANGE UP (ref 135–145)
TRIGL SERPL-MCNC: 97 MG/DL — SIGNIFICANT CHANGE UP (ref 10–149)
VIT D25+D1,25 OH+D1,25 PNL SERPL-MCNC: 32.6 PG/ML — SIGNIFICANT CHANGE UP (ref 19.9–79.3)
WBC # BLD: 5.31 K/UL — SIGNIFICANT CHANGE UP (ref 3.8–10.5)
WBC # FLD AUTO: 5.31 K/UL — SIGNIFICANT CHANGE UP (ref 3.8–10.5)

## 2020-07-15 PROCEDURE — 99232 SBSQ HOSP IP/OBS MODERATE 35: CPT

## 2020-07-15 RX ORDER — CLONAZEPAM 1 MG
1.5 TABLET ORAL AT BEDTIME
Refills: 0 | Status: DISCONTINUED | OUTPATIENT
Start: 2020-07-15 | End: 2020-07-22

## 2020-07-15 RX ORDER — CLONAZEPAM 1 MG
2 TABLET ORAL DAILY
Refills: 0 | Status: DISCONTINUED | OUTPATIENT
Start: 2020-07-15 | End: 2020-07-22

## 2020-07-15 RX ORDER — CLONAZEPAM 1 MG
0.5 TABLET ORAL AT BEDTIME
Refills: 0 | Status: DISCONTINUED | OUTPATIENT
Start: 2020-07-15 | End: 2020-07-22

## 2020-07-15 RX ORDER — METHADONE HYDROCHLORIDE 40 MG/1
80 TABLET ORAL DAILY
Refills: 0 | Status: DISCONTINUED | OUTPATIENT
Start: 2020-07-15 | End: 2020-07-22

## 2020-07-15 RX ORDER — METHADONE HYDROCHLORIDE 40 MG/1
30 TABLET ORAL DAILY
Refills: 0 | Status: DISCONTINUED | OUTPATIENT
Start: 2020-07-16 | End: 2020-07-22

## 2020-07-15 RX ORDER — CLONAZEPAM 1 MG
2 TABLET ORAL
Refills: 0 | Status: DISCONTINUED | OUTPATIENT
Start: 2020-07-15 | End: 2020-07-22

## 2020-07-15 RX ADMIN — Medication 0.5 MILLIGRAM(S): at 21:20

## 2020-07-15 RX ADMIN — Medication 2 MILLIGRAM(S): at 08:03

## 2020-07-15 RX ADMIN — Medication 3 MILLIGRAM(S): at 21:20

## 2020-07-15 RX ADMIN — Medication 2 MILLIGRAM(S): at 12:23

## 2020-07-15 RX ADMIN — DESVENLAFAXINE 100 MILLIGRAM(S): 50 TABLET, EXTENDED RELEASE ORAL at 08:03

## 2020-07-15 RX ADMIN — OLANZAPINE 5 MILLIGRAM(S): 15 TABLET, FILM COATED ORAL at 12:46

## 2020-07-15 RX ADMIN — Medication 2 MILLIGRAM(S): at 12:46

## 2020-07-15 RX ADMIN — PALIPERIDONE 9 MILLIGRAM(S): 1.5 TABLET, EXTENDED RELEASE ORAL at 21:20

## 2020-07-15 RX ADMIN — MIRTAZAPINE 15 MILLIGRAM(S): 45 TABLET, ORALLY DISINTEGRATING ORAL at 21:20

## 2020-07-15 RX ADMIN — Medication 1.5 MILLIGRAM(S): at 21:20

## 2020-07-15 RX ADMIN — Medication 50 MILLIGRAM(S): at 17:52

## 2020-07-15 RX ADMIN — MIRTAZAPINE 30 MILLIGRAM(S): 45 TABLET, ORALLY DISINTEGRATING ORAL at 12:23

## 2020-07-16 PROCEDURE — 90832 PSYTX W PT 30 MINUTES: CPT

## 2020-07-16 RX ADMIN — POLYETHYLENE GLYCOL 3350 17 GRAM(S): 17 POWDER, FOR SOLUTION ORAL at 08:07

## 2020-07-16 RX ADMIN — Medication 0.5 MILLIGRAM(S): at 21:05

## 2020-07-16 RX ADMIN — OLANZAPINE 5 MILLIGRAM(S): 15 TABLET, FILM COATED ORAL at 14:09

## 2020-07-16 RX ADMIN — MIRTAZAPINE 15 MILLIGRAM(S): 45 TABLET, ORALLY DISINTEGRATING ORAL at 20:35

## 2020-07-16 RX ADMIN — MIRTAZAPINE 30 MILLIGRAM(S): 45 TABLET, ORALLY DISINTEGRATING ORAL at 12:58

## 2020-07-16 RX ADMIN — METHADONE HYDROCHLORIDE 80 MILLIGRAM(S): 40 TABLET ORAL at 08:06

## 2020-07-16 RX ADMIN — PALIPERIDONE 9 MILLIGRAM(S): 1.5 TABLET, EXTENDED RELEASE ORAL at 20:35

## 2020-07-16 RX ADMIN — Medication 2 MILLIGRAM(S): at 12:57

## 2020-07-16 RX ADMIN — POLYETHYLENE GLYCOL 3350 17 GRAM(S): 17 POWDER, FOR SOLUTION ORAL at 20:35

## 2020-07-16 RX ADMIN — Medication 1.5 MILLIGRAM(S): at 20:36

## 2020-07-16 RX ADMIN — Medication 2 MILLIGRAM(S): at 08:06

## 2020-07-16 RX ADMIN — DESVENLAFAXINE 100 MILLIGRAM(S): 50 TABLET, EXTENDED RELEASE ORAL at 08:06

## 2020-07-16 RX ADMIN — Medication 3 MILLIGRAM(S): at 20:35

## 2020-07-16 RX ADMIN — METHADONE HYDROCHLORIDE 30 MILLIGRAM(S): 40 TABLET ORAL at 08:06

## 2020-07-17 RX ADMIN — Medication 1.5 MILLIGRAM(S): at 20:44

## 2020-07-17 RX ADMIN — MIRTAZAPINE 15 MILLIGRAM(S): 45 TABLET, ORALLY DISINTEGRATING ORAL at 20:44

## 2020-07-17 RX ADMIN — Medication 2 MILLIGRAM(S): at 11:13

## 2020-07-17 RX ADMIN — METHADONE HYDROCHLORIDE 30 MILLIGRAM(S): 40 TABLET ORAL at 09:02

## 2020-07-17 RX ADMIN — OLANZAPINE 5 MILLIGRAM(S): 15 TABLET, FILM COATED ORAL at 14:11

## 2020-07-17 RX ADMIN — PALIPERIDONE 9 MILLIGRAM(S): 1.5 TABLET, EXTENDED RELEASE ORAL at 20:44

## 2020-07-17 RX ADMIN — Medication 0.5 MILLIGRAM(S): at 20:40

## 2020-07-17 RX ADMIN — POLYETHYLENE GLYCOL 3350 17 GRAM(S): 17 POWDER, FOR SOLUTION ORAL at 20:44

## 2020-07-17 RX ADMIN — Medication 3 MILLIGRAM(S): at 20:44

## 2020-07-17 RX ADMIN — METHADONE HYDROCHLORIDE 80 MILLIGRAM(S): 40 TABLET ORAL at 09:02

## 2020-07-17 RX ADMIN — DESVENLAFAXINE 100 MILLIGRAM(S): 50 TABLET, EXTENDED RELEASE ORAL at 09:02

## 2020-07-17 RX ADMIN — Medication 2 MILLIGRAM(S): at 09:02

## 2020-07-17 RX ADMIN — Medication 2 MILLIGRAM(S): at 13:16

## 2020-07-17 RX ADMIN — Medication 50 MILLIGRAM(S): at 16:04

## 2020-07-17 RX ADMIN — MIRTAZAPINE 30 MILLIGRAM(S): 45 TABLET, ORALLY DISINTEGRATING ORAL at 13:16

## 2020-07-17 RX ADMIN — POLYETHYLENE GLYCOL 3350 17 GRAM(S): 17 POWDER, FOR SOLUTION ORAL at 09:02

## 2020-07-18 LAB — VIT B1 SERPL-MCNC: 168.5 NMOL/L — SIGNIFICANT CHANGE UP (ref 66.5–200)

## 2020-07-18 RX ADMIN — MIRTAZAPINE 30 MILLIGRAM(S): 45 TABLET, ORALLY DISINTEGRATING ORAL at 12:57

## 2020-07-18 RX ADMIN — PALIPERIDONE 9 MILLIGRAM(S): 1.5 TABLET, EXTENDED RELEASE ORAL at 20:41

## 2020-07-18 RX ADMIN — OLANZAPINE 5 MILLIGRAM(S): 15 TABLET, FILM COATED ORAL at 14:41

## 2020-07-18 RX ADMIN — DESVENLAFAXINE 100 MILLIGRAM(S): 50 TABLET, EXTENDED RELEASE ORAL at 08:22

## 2020-07-18 RX ADMIN — Medication 2 MILLIGRAM(S): at 08:21

## 2020-07-18 RX ADMIN — Medication 2 MILLIGRAM(S): at 12:57

## 2020-07-18 RX ADMIN — Medication 1.5 MILLIGRAM(S): at 20:41

## 2020-07-18 RX ADMIN — Medication 2 MILLIGRAM(S): at 11:39

## 2020-07-18 RX ADMIN — Medication 0.5 MILLIGRAM(S): at 20:42

## 2020-07-18 RX ADMIN — Medication 3 MILLIGRAM(S): at 20:42

## 2020-07-18 RX ADMIN — METHADONE HYDROCHLORIDE 30 MILLIGRAM(S): 40 TABLET ORAL at 08:22

## 2020-07-18 RX ADMIN — METHADONE HYDROCHLORIDE 80 MILLIGRAM(S): 40 TABLET ORAL at 08:22

## 2020-07-18 RX ADMIN — MIRTAZAPINE 15 MILLIGRAM(S): 45 TABLET, ORALLY DISINTEGRATING ORAL at 20:41

## 2020-07-19 RX ADMIN — MIRTAZAPINE 30 MILLIGRAM(S): 45 TABLET, ORALLY DISINTEGRATING ORAL at 13:00

## 2020-07-19 RX ADMIN — Medication 1.5 MILLIGRAM(S): at 21:13

## 2020-07-19 RX ADMIN — METHADONE HYDROCHLORIDE 80 MILLIGRAM(S): 40 TABLET ORAL at 08:38

## 2020-07-19 RX ADMIN — DESVENLAFAXINE 100 MILLIGRAM(S): 50 TABLET, EXTENDED RELEASE ORAL at 08:39

## 2020-07-19 RX ADMIN — Medication 2 MILLIGRAM(S): at 13:00

## 2020-07-19 RX ADMIN — Medication 50 MILLIGRAM(S): at 17:16

## 2020-07-19 RX ADMIN — Medication 3 MILLIGRAM(S): at 21:13

## 2020-07-19 RX ADMIN — METHADONE HYDROCHLORIDE 30 MILLIGRAM(S): 40 TABLET ORAL at 08:38

## 2020-07-19 RX ADMIN — OLANZAPINE 5 MILLIGRAM(S): 15 TABLET, FILM COATED ORAL at 14:45

## 2020-07-19 RX ADMIN — Medication 2 MILLIGRAM(S): at 08:39

## 2020-07-19 RX ADMIN — Medication 2 MILLIGRAM(S): at 13:46

## 2020-07-19 RX ADMIN — PALIPERIDONE 9 MILLIGRAM(S): 1.5 TABLET, EXTENDED RELEASE ORAL at 21:13

## 2020-07-19 RX ADMIN — Medication 0.5 MILLIGRAM(S): at 21:14

## 2020-07-19 RX ADMIN — MIRTAZAPINE 15 MILLIGRAM(S): 45 TABLET, ORALLY DISINTEGRATING ORAL at 21:13

## 2020-07-20 RX ORDER — GABAPENTIN 400 MG/1
600 CAPSULE ORAL
Refills: 0 | Status: DISCONTINUED | OUTPATIENT
Start: 2020-07-20 | End: 2020-08-20

## 2020-07-20 RX ORDER — GABAPENTIN 400 MG/1
600 CAPSULE ORAL AT BEDTIME
Refills: 0 | Status: DISCONTINUED | OUTPATIENT
Start: 2020-07-20 | End: 2020-08-20

## 2020-07-20 RX ADMIN — Medication 1.5 MILLIGRAM(S): at 20:44

## 2020-07-20 RX ADMIN — MIRTAZAPINE 15 MILLIGRAM(S): 45 TABLET, ORALLY DISINTEGRATING ORAL at 20:44

## 2020-07-20 RX ADMIN — DESVENLAFAXINE 100 MILLIGRAM(S): 50 TABLET, EXTENDED RELEASE ORAL at 08:46

## 2020-07-20 RX ADMIN — Medication 2 MILLIGRAM(S): at 08:46

## 2020-07-20 RX ADMIN — Medication 2 MILLIGRAM(S): at 13:04

## 2020-07-20 RX ADMIN — Medication 0.5 MILLIGRAM(S): at 20:45

## 2020-07-20 RX ADMIN — PALIPERIDONE 9 MILLIGRAM(S): 1.5 TABLET, EXTENDED RELEASE ORAL at 20:45

## 2020-07-20 RX ADMIN — MIRTAZAPINE 30 MILLIGRAM(S): 45 TABLET, ORALLY DISINTEGRATING ORAL at 13:04

## 2020-07-20 RX ADMIN — METHADONE HYDROCHLORIDE 80 MILLIGRAM(S): 40 TABLET ORAL at 08:46

## 2020-07-20 RX ADMIN — OLANZAPINE 5 MILLIGRAM(S): 15 TABLET, FILM COATED ORAL at 15:22

## 2020-07-20 RX ADMIN — POLYETHYLENE GLYCOL 3350 17 GRAM(S): 17 POWDER, FOR SOLUTION ORAL at 20:45

## 2020-07-20 RX ADMIN — METHADONE HYDROCHLORIDE 30 MILLIGRAM(S): 40 TABLET ORAL at 08:46

## 2020-07-20 RX ADMIN — GABAPENTIN 600 MILLIGRAM(S): 400 CAPSULE ORAL at 20:44

## 2020-07-20 RX ADMIN — Medication 3 MILLIGRAM(S): at 20:45

## 2020-07-20 RX ADMIN — Medication 50 MILLIGRAM(S): at 15:19

## 2020-07-21 PROCEDURE — 99232 SBSQ HOSP IP/OBS MODERATE 35: CPT

## 2020-07-21 RX ADMIN — DESVENLAFAXINE 100 MILLIGRAM(S): 50 TABLET, EXTENDED RELEASE ORAL at 08:17

## 2020-07-21 RX ADMIN — Medication 2 MILLIGRAM(S): at 12:58

## 2020-07-21 RX ADMIN — MIRTAZAPINE 15 MILLIGRAM(S): 45 TABLET, ORALLY DISINTEGRATING ORAL at 21:18

## 2020-07-21 RX ADMIN — Medication 3 MILLIGRAM(S): at 21:18

## 2020-07-21 RX ADMIN — PALIPERIDONE 9 MILLIGRAM(S): 1.5 TABLET, EXTENDED RELEASE ORAL at 21:18

## 2020-07-21 RX ADMIN — Medication 2 MILLIGRAM(S): at 08:18

## 2020-07-21 RX ADMIN — Medication 0.5 MILLIGRAM(S): at 21:18

## 2020-07-21 RX ADMIN — Medication 2 MILLIGRAM(S): at 08:17

## 2020-07-21 RX ADMIN — Medication 1.5 MILLIGRAM(S): at 21:17

## 2020-07-21 RX ADMIN — MIRTAZAPINE 30 MILLIGRAM(S): 45 TABLET, ORALLY DISINTEGRATING ORAL at 12:58

## 2020-07-21 RX ADMIN — METHADONE HYDROCHLORIDE 30 MILLIGRAM(S): 40 TABLET ORAL at 08:17

## 2020-07-21 RX ADMIN — GABAPENTIN 600 MILLIGRAM(S): 400 CAPSULE ORAL at 21:18

## 2020-07-21 RX ADMIN — OLANZAPINE 5 MILLIGRAM(S): 15 TABLET, FILM COATED ORAL at 13:50

## 2020-07-21 RX ADMIN — GABAPENTIN 600 MILLIGRAM(S): 400 CAPSULE ORAL at 13:50

## 2020-07-21 RX ADMIN — METHADONE HYDROCHLORIDE 80 MILLIGRAM(S): 40 TABLET ORAL at 08:17

## 2020-07-22 PROCEDURE — 99232 SBSQ HOSP IP/OBS MODERATE 35: CPT

## 2020-07-22 RX ORDER — METHADONE HYDROCHLORIDE 40 MG/1
30 TABLET ORAL DAILY
Refills: 0 | Status: DISCONTINUED | OUTPATIENT
Start: 2020-07-23 | End: 2020-07-29

## 2020-07-22 RX ORDER — METHADONE HYDROCHLORIDE 40 MG/1
30 TABLET ORAL DAILY
Refills: 0 | Status: DISCONTINUED | OUTPATIENT
Start: 2020-07-22 | End: 2020-07-22

## 2020-07-22 RX ORDER — METHADONE HYDROCHLORIDE 40 MG/1
80 TABLET ORAL DAILY
Refills: 0 | Status: DISCONTINUED | OUTPATIENT
Start: 2020-07-23 | End: 2020-07-29

## 2020-07-22 RX ORDER — CLONAZEPAM 1 MG
0.5 TABLET ORAL AT BEDTIME
Refills: 0 | Status: DISCONTINUED | OUTPATIENT
Start: 2020-07-22 | End: 2020-07-29

## 2020-07-22 RX ORDER — CLONAZEPAM 1 MG
2 TABLET ORAL DAILY
Refills: 0 | Status: DISCONTINUED | OUTPATIENT
Start: 2020-07-23 | End: 2020-07-29

## 2020-07-22 RX ORDER — CLONAZEPAM 1 MG
1.5 TABLET ORAL AT BEDTIME
Refills: 0 | Status: DISCONTINUED | OUTPATIENT
Start: 2020-07-22 | End: 2020-07-29

## 2020-07-22 RX ORDER — CLONAZEPAM 1 MG
2 TABLET ORAL
Refills: 0 | Status: DISCONTINUED | OUTPATIENT
Start: 2020-07-22 | End: 2020-07-29

## 2020-07-22 RX ADMIN — METHADONE HYDROCHLORIDE 80 MILLIGRAM(S): 40 TABLET ORAL at 08:09

## 2020-07-22 RX ADMIN — MIRTAZAPINE 30 MILLIGRAM(S): 45 TABLET, ORALLY DISINTEGRATING ORAL at 12:52

## 2020-07-22 RX ADMIN — Medication 3 MILLIGRAM(S): at 20:34

## 2020-07-22 RX ADMIN — DESVENLAFAXINE 100 MILLIGRAM(S): 50 TABLET, EXTENDED RELEASE ORAL at 08:09

## 2020-07-22 RX ADMIN — Medication 1.5 MILLIGRAM(S): at 20:34

## 2020-07-22 RX ADMIN — POLYETHYLENE GLYCOL 3350 17 GRAM(S): 17 POWDER, FOR SOLUTION ORAL at 08:09

## 2020-07-22 RX ADMIN — PALIPERIDONE 9 MILLIGRAM(S): 1.5 TABLET, EXTENDED RELEASE ORAL at 20:34

## 2020-07-22 RX ADMIN — OLANZAPINE 5 MILLIGRAM(S): 15 TABLET, FILM COATED ORAL at 16:24

## 2020-07-22 RX ADMIN — GABAPENTIN 600 MILLIGRAM(S): 400 CAPSULE ORAL at 16:24

## 2020-07-22 RX ADMIN — MIRTAZAPINE 15 MILLIGRAM(S): 45 TABLET, ORALLY DISINTEGRATING ORAL at 20:34

## 2020-07-22 RX ADMIN — Medication 0.5 MILLIGRAM(S): at 20:35

## 2020-07-22 RX ADMIN — METHADONE HYDROCHLORIDE 30 MILLIGRAM(S): 40 TABLET ORAL at 08:09

## 2020-07-22 RX ADMIN — Medication 2 MILLIGRAM(S): at 08:09

## 2020-07-22 RX ADMIN — Medication 2 MILLIGRAM(S): at 12:51

## 2020-07-22 RX ADMIN — GABAPENTIN 600 MILLIGRAM(S): 400 CAPSULE ORAL at 20:34

## 2020-07-23 RX ADMIN — Medication 2 MILLIGRAM(S): at 12:48

## 2020-07-23 RX ADMIN — Medication 1.5 MILLIGRAM(S): at 20:37

## 2020-07-23 RX ADMIN — MIRTAZAPINE 15 MILLIGRAM(S): 45 TABLET, ORALLY DISINTEGRATING ORAL at 20:37

## 2020-07-23 RX ADMIN — POLYETHYLENE GLYCOL 3350 17 GRAM(S): 17 POWDER, FOR SOLUTION ORAL at 20:37

## 2020-07-23 RX ADMIN — GABAPENTIN 600 MILLIGRAM(S): 400 CAPSULE ORAL at 16:15

## 2020-07-23 RX ADMIN — METHADONE HYDROCHLORIDE 30 MILLIGRAM(S): 40 TABLET ORAL at 08:30

## 2020-07-23 RX ADMIN — GABAPENTIN 600 MILLIGRAM(S): 400 CAPSULE ORAL at 20:37

## 2020-07-23 RX ADMIN — METHADONE HYDROCHLORIDE 80 MILLIGRAM(S): 40 TABLET ORAL at 08:30

## 2020-07-23 RX ADMIN — OLANZAPINE 5 MILLIGRAM(S): 15 TABLET, FILM COATED ORAL at 16:15

## 2020-07-23 RX ADMIN — Medication 0.5 MILLIGRAM(S): at 20:37

## 2020-07-23 RX ADMIN — MIRTAZAPINE 30 MILLIGRAM(S): 45 TABLET, ORALLY DISINTEGRATING ORAL at 12:48

## 2020-07-23 RX ADMIN — PALIPERIDONE 9 MILLIGRAM(S): 1.5 TABLET, EXTENDED RELEASE ORAL at 20:37

## 2020-07-23 RX ADMIN — Medication 3 MILLIGRAM(S): at 20:37

## 2020-07-23 RX ADMIN — POLYETHYLENE GLYCOL 3350 17 GRAM(S): 17 POWDER, FOR SOLUTION ORAL at 08:30

## 2020-07-23 RX ADMIN — Medication 2 MILLIGRAM(S): at 08:29

## 2020-07-23 RX ADMIN — DESVENLAFAXINE 100 MILLIGRAM(S): 50 TABLET, EXTENDED RELEASE ORAL at 08:29

## 2020-07-24 PROCEDURE — 99232 SBSQ HOSP IP/OBS MODERATE 35: CPT

## 2020-07-24 RX ORDER — PRAZOSIN HCL 2 MG
2 CAPSULE ORAL AT BEDTIME
Refills: 0 | Status: DISCONTINUED | OUTPATIENT
Start: 2020-07-24 | End: 2020-08-20

## 2020-07-24 RX ADMIN — Medication 2 MILLIGRAM(S): at 10:31

## 2020-07-24 RX ADMIN — PALIPERIDONE 9 MILLIGRAM(S): 1.5 TABLET, EXTENDED RELEASE ORAL at 20:53

## 2020-07-24 RX ADMIN — METHADONE HYDROCHLORIDE 80 MILLIGRAM(S): 40 TABLET ORAL at 08:32

## 2020-07-24 RX ADMIN — MIRTAZAPINE 30 MILLIGRAM(S): 45 TABLET, ORALLY DISINTEGRATING ORAL at 13:17

## 2020-07-24 RX ADMIN — GABAPENTIN 600 MILLIGRAM(S): 400 CAPSULE ORAL at 15:11

## 2020-07-24 RX ADMIN — Medication 2 MILLIGRAM(S): at 08:32

## 2020-07-24 RX ADMIN — Medication 0.5 MILLIGRAM(S): at 21:15

## 2020-07-24 RX ADMIN — Medication 1.5 MILLIGRAM(S): at 20:53

## 2020-07-24 RX ADMIN — OLANZAPINE 5 MILLIGRAM(S): 15 TABLET, FILM COATED ORAL at 15:11

## 2020-07-24 RX ADMIN — METHADONE HYDROCHLORIDE 30 MILLIGRAM(S): 40 TABLET ORAL at 08:32

## 2020-07-24 RX ADMIN — Medication 2 MILLIGRAM(S): at 13:17

## 2020-07-24 RX ADMIN — POLYETHYLENE GLYCOL 3350 17 GRAM(S): 17 POWDER, FOR SOLUTION ORAL at 20:53

## 2020-07-24 RX ADMIN — GABAPENTIN 600 MILLIGRAM(S): 400 CAPSULE ORAL at 20:53

## 2020-07-24 RX ADMIN — Medication 2 MILLIGRAM(S): at 20:53

## 2020-07-24 RX ADMIN — DESVENLAFAXINE 100 MILLIGRAM(S): 50 TABLET, EXTENDED RELEASE ORAL at 08:32

## 2020-07-24 RX ADMIN — MIRTAZAPINE 15 MILLIGRAM(S): 45 TABLET, ORALLY DISINTEGRATING ORAL at 20:53

## 2020-07-25 RX ADMIN — Medication 1.5 MILLIGRAM(S): at 20:48

## 2020-07-25 RX ADMIN — METHADONE HYDROCHLORIDE 80 MILLIGRAM(S): 40 TABLET ORAL at 08:22

## 2020-07-25 RX ADMIN — OLANZAPINE 5 MILLIGRAM(S): 15 TABLET, FILM COATED ORAL at 14:20

## 2020-07-25 RX ADMIN — MIRTAZAPINE 30 MILLIGRAM(S): 45 TABLET, ORALLY DISINTEGRATING ORAL at 12:27

## 2020-07-25 RX ADMIN — Medication 2 MILLIGRAM(S): at 14:20

## 2020-07-25 RX ADMIN — Medication 2 MILLIGRAM(S): at 08:22

## 2020-07-25 RX ADMIN — Medication 2 MILLIGRAM(S): at 12:27

## 2020-07-25 RX ADMIN — Medication 2 MILLIGRAM(S): at 20:48

## 2020-07-25 RX ADMIN — Medication 0.5 MILLIGRAM(S): at 20:48

## 2020-07-25 RX ADMIN — METHADONE HYDROCHLORIDE 30 MILLIGRAM(S): 40 TABLET ORAL at 08:22

## 2020-07-25 RX ADMIN — GABAPENTIN 600 MILLIGRAM(S): 400 CAPSULE ORAL at 20:48

## 2020-07-25 RX ADMIN — POLYETHYLENE GLYCOL 3350 17 GRAM(S): 17 POWDER, FOR SOLUTION ORAL at 08:22

## 2020-07-25 RX ADMIN — MIRTAZAPINE 15 MILLIGRAM(S): 45 TABLET, ORALLY DISINTEGRATING ORAL at 20:48

## 2020-07-25 RX ADMIN — DESVENLAFAXINE 100 MILLIGRAM(S): 50 TABLET, EXTENDED RELEASE ORAL at 08:22

## 2020-07-25 RX ADMIN — GABAPENTIN 600 MILLIGRAM(S): 400 CAPSULE ORAL at 14:20

## 2020-07-25 RX ADMIN — PALIPERIDONE 9 MILLIGRAM(S): 1.5 TABLET, EXTENDED RELEASE ORAL at 20:48

## 2020-07-26 RX ADMIN — OLANZAPINE 5 MILLIGRAM(S): 15 TABLET, FILM COATED ORAL at 15:00

## 2020-07-26 RX ADMIN — GABAPENTIN 600 MILLIGRAM(S): 400 CAPSULE ORAL at 15:00

## 2020-07-26 RX ADMIN — METHADONE HYDROCHLORIDE 80 MILLIGRAM(S): 40 TABLET ORAL at 08:24

## 2020-07-26 RX ADMIN — Medication 0.5 MILLIGRAM(S): at 20:50

## 2020-07-26 RX ADMIN — Medication 2 MILLIGRAM(S): at 08:23

## 2020-07-26 RX ADMIN — Medication 1.5 MILLIGRAM(S): at 20:50

## 2020-07-26 RX ADMIN — Medication 2 MILLIGRAM(S): at 12:58

## 2020-07-26 RX ADMIN — PALIPERIDONE 9 MILLIGRAM(S): 1.5 TABLET, EXTENDED RELEASE ORAL at 20:50

## 2020-07-26 RX ADMIN — MIRTAZAPINE 15 MILLIGRAM(S): 45 TABLET, ORALLY DISINTEGRATING ORAL at 20:50

## 2020-07-26 RX ADMIN — DESVENLAFAXINE 100 MILLIGRAM(S): 50 TABLET, EXTENDED RELEASE ORAL at 08:24

## 2020-07-26 RX ADMIN — MIRTAZAPINE 30 MILLIGRAM(S): 45 TABLET, ORALLY DISINTEGRATING ORAL at 12:58

## 2020-07-26 RX ADMIN — GABAPENTIN 600 MILLIGRAM(S): 400 CAPSULE ORAL at 20:50

## 2020-07-26 RX ADMIN — POLYETHYLENE GLYCOL 3350 17 GRAM(S): 17 POWDER, FOR SOLUTION ORAL at 20:50

## 2020-07-26 RX ADMIN — Medication 2 MILLIGRAM(S): at 20:50

## 2020-07-26 RX ADMIN — METHADONE HYDROCHLORIDE 30 MILLIGRAM(S): 40 TABLET ORAL at 08:24

## 2020-07-27 PROCEDURE — 99232 SBSQ HOSP IP/OBS MODERATE 35: CPT

## 2020-07-27 RX ADMIN — METHADONE HYDROCHLORIDE 80 MILLIGRAM(S): 40 TABLET ORAL at 08:07

## 2020-07-27 RX ADMIN — Medication 0.5 MILLIGRAM(S): at 20:38

## 2020-07-27 RX ADMIN — Medication 2 MILLIGRAM(S): at 20:38

## 2020-07-27 RX ADMIN — Medication 2 MILLIGRAM(S): at 08:07

## 2020-07-27 RX ADMIN — Medication 2 MILLIGRAM(S): at 12:39

## 2020-07-27 RX ADMIN — Medication 2 MILLIGRAM(S): at 10:31

## 2020-07-27 RX ADMIN — METHADONE HYDROCHLORIDE 30 MILLIGRAM(S): 40 TABLET ORAL at 08:07

## 2020-07-27 RX ADMIN — OLANZAPINE 5 MILLIGRAM(S): 15 TABLET, FILM COATED ORAL at 14:57

## 2020-07-27 RX ADMIN — MIRTAZAPINE 30 MILLIGRAM(S): 45 TABLET, ORALLY DISINTEGRATING ORAL at 12:57

## 2020-07-27 RX ADMIN — PALIPERIDONE 9 MILLIGRAM(S): 1.5 TABLET, EXTENDED RELEASE ORAL at 20:37

## 2020-07-27 RX ADMIN — GABAPENTIN 600 MILLIGRAM(S): 400 CAPSULE ORAL at 14:58

## 2020-07-27 RX ADMIN — Medication 2 MILLIGRAM(S): at 14:57

## 2020-07-27 RX ADMIN — DESVENLAFAXINE 100 MILLIGRAM(S): 50 TABLET, EXTENDED RELEASE ORAL at 08:07

## 2020-07-27 RX ADMIN — MIRTAZAPINE 15 MILLIGRAM(S): 45 TABLET, ORALLY DISINTEGRATING ORAL at 20:37

## 2020-07-27 RX ADMIN — Medication 1.5 MILLIGRAM(S): at 20:37

## 2020-07-28 PROCEDURE — 99232 SBSQ HOSP IP/OBS MODERATE 35: CPT

## 2020-07-28 RX ADMIN — GABAPENTIN 600 MILLIGRAM(S): 400 CAPSULE ORAL at 14:52

## 2020-07-28 RX ADMIN — DESVENLAFAXINE 100 MILLIGRAM(S): 50 TABLET, EXTENDED RELEASE ORAL at 08:06

## 2020-07-28 RX ADMIN — OLANZAPINE 5 MILLIGRAM(S): 15 TABLET, FILM COATED ORAL at 14:52

## 2020-07-28 RX ADMIN — Medication 1.5 MILLIGRAM(S): at 20:36

## 2020-07-28 RX ADMIN — Medication 2 MILLIGRAM(S): at 12:37

## 2020-07-28 RX ADMIN — Medication 2 MILLIGRAM(S): at 09:09

## 2020-07-28 RX ADMIN — Medication 0.5 MILLIGRAM(S): at 20:36

## 2020-07-28 RX ADMIN — Medication 2 MILLIGRAM(S): at 20:36

## 2020-07-28 RX ADMIN — PALIPERIDONE 9 MILLIGRAM(S): 1.5 TABLET, EXTENDED RELEASE ORAL at 20:36

## 2020-07-28 RX ADMIN — Medication 2 MILLIGRAM(S): at 08:06

## 2020-07-28 RX ADMIN — MIRTAZAPINE 15 MILLIGRAM(S): 45 TABLET, ORALLY DISINTEGRATING ORAL at 20:36

## 2020-07-28 RX ADMIN — MIRTAZAPINE 30 MILLIGRAM(S): 45 TABLET, ORALLY DISINTEGRATING ORAL at 12:37

## 2020-07-28 RX ADMIN — Medication 2 MILLIGRAM(S): at 13:24

## 2020-07-28 RX ADMIN — METHADONE HYDROCHLORIDE 30 MILLIGRAM(S): 40 TABLET ORAL at 08:06

## 2020-07-28 RX ADMIN — METHADONE HYDROCHLORIDE 80 MILLIGRAM(S): 40 TABLET ORAL at 08:06

## 2020-07-29 PROCEDURE — 99232 SBSQ HOSP IP/OBS MODERATE 35: CPT

## 2020-07-29 PROCEDURE — 90832 PSYTX W PT 30 MINUTES: CPT

## 2020-07-29 RX ORDER — CLONAZEPAM 1 MG
1.5 TABLET ORAL AT BEDTIME
Refills: 0 | Status: DISCONTINUED | OUTPATIENT
Start: 2020-07-29 | End: 2020-08-05

## 2020-07-29 RX ORDER — METHADONE HYDROCHLORIDE 40 MG/1
80 TABLET ORAL DAILY
Refills: 0 | Status: DISCONTINUED | OUTPATIENT
Start: 2020-07-30 | End: 2020-08-05

## 2020-07-29 RX ORDER — CLONAZEPAM 1 MG
2 TABLET ORAL DAILY
Refills: 0 | Status: DISCONTINUED | OUTPATIENT
Start: 2020-07-30 | End: 2020-08-05

## 2020-07-29 RX ORDER — METHADONE HYDROCHLORIDE 40 MG/1
30 TABLET ORAL DAILY
Refills: 0 | Status: DISCONTINUED | OUTPATIENT
Start: 2020-07-30 | End: 2020-08-05

## 2020-07-29 RX ORDER — CLONAZEPAM 1 MG
2 TABLET ORAL
Refills: 0 | Status: DISCONTINUED | OUTPATIENT
Start: 2020-07-30 | End: 2020-08-05

## 2020-07-29 RX ORDER — CLONAZEPAM 1 MG
0.5 TABLET ORAL AT BEDTIME
Refills: 0 | Status: DISCONTINUED | OUTPATIENT
Start: 2020-07-29 | End: 2020-08-05

## 2020-07-29 RX ADMIN — METHADONE HYDROCHLORIDE 30 MILLIGRAM(S): 40 TABLET ORAL at 08:24

## 2020-07-29 RX ADMIN — Medication 2 MILLIGRAM(S): at 08:23

## 2020-07-29 RX ADMIN — DESVENLAFAXINE 100 MILLIGRAM(S): 50 TABLET, EXTENDED RELEASE ORAL at 08:23

## 2020-07-29 RX ADMIN — Medication 2 MILLIGRAM(S): at 20:47

## 2020-07-29 RX ADMIN — Medication 2 MILLIGRAM(S): at 12:50

## 2020-07-29 RX ADMIN — Medication 0.5 MILLIGRAM(S): at 20:47

## 2020-07-29 RX ADMIN — METHADONE HYDROCHLORIDE 80 MILLIGRAM(S): 40 TABLET ORAL at 08:24

## 2020-07-29 RX ADMIN — Medication 1.5 MILLIGRAM(S): at 20:47

## 2020-07-29 RX ADMIN — MIRTAZAPINE 15 MILLIGRAM(S): 45 TABLET, ORALLY DISINTEGRATING ORAL at 20:47

## 2020-07-29 RX ADMIN — GABAPENTIN 600 MILLIGRAM(S): 400 CAPSULE ORAL at 14:51

## 2020-07-29 RX ADMIN — PALIPERIDONE 9 MILLIGRAM(S): 1.5 TABLET, EXTENDED RELEASE ORAL at 20:47

## 2020-07-29 RX ADMIN — MIRTAZAPINE 30 MILLIGRAM(S): 45 TABLET, ORALLY DISINTEGRATING ORAL at 12:51

## 2020-07-29 RX ADMIN — OLANZAPINE 5 MILLIGRAM(S): 15 TABLET, FILM COATED ORAL at 14:52

## 2020-07-30 PROCEDURE — 99232 SBSQ HOSP IP/OBS MODERATE 35: CPT

## 2020-07-30 RX ADMIN — METHADONE HYDROCHLORIDE 80 MILLIGRAM(S): 40 TABLET ORAL at 08:30

## 2020-07-30 RX ADMIN — MIRTAZAPINE 15 MILLIGRAM(S): 45 TABLET, ORALLY DISINTEGRATING ORAL at 20:54

## 2020-07-30 RX ADMIN — GABAPENTIN 600 MILLIGRAM(S): 400 CAPSULE ORAL at 14:48

## 2020-07-30 RX ADMIN — Medication 0.5 MILLIGRAM(S): at 21:12

## 2020-07-30 RX ADMIN — Medication 2 MILLIGRAM(S): at 14:14

## 2020-07-30 RX ADMIN — Medication 2 MILLIGRAM(S): at 12:45

## 2020-07-30 RX ADMIN — Medication 1.5 MILLIGRAM(S): at 20:54

## 2020-07-30 RX ADMIN — GABAPENTIN 600 MILLIGRAM(S): 400 CAPSULE ORAL at 20:54

## 2020-07-30 RX ADMIN — MIRTAZAPINE 30 MILLIGRAM(S): 45 TABLET, ORALLY DISINTEGRATING ORAL at 12:45

## 2020-07-30 RX ADMIN — PALIPERIDONE 9 MILLIGRAM(S): 1.5 TABLET, EXTENDED RELEASE ORAL at 20:54

## 2020-07-30 RX ADMIN — OLANZAPINE 5 MILLIGRAM(S): 15 TABLET, FILM COATED ORAL at 14:48

## 2020-07-30 RX ADMIN — Medication 2 MILLIGRAM(S): at 08:30

## 2020-07-30 RX ADMIN — POLYETHYLENE GLYCOL 3350 17 GRAM(S): 17 POWDER, FOR SOLUTION ORAL at 20:54

## 2020-07-30 RX ADMIN — DESVENLAFAXINE 100 MILLIGRAM(S): 50 TABLET, EXTENDED RELEASE ORAL at 08:30

## 2020-07-30 RX ADMIN — METHADONE HYDROCHLORIDE 30 MILLIGRAM(S): 40 TABLET ORAL at 08:30

## 2020-07-30 RX ADMIN — Medication 2 MILLIGRAM(S): at 20:54

## 2020-07-30 NOTE — ED PROVIDER NOTE - NS_ATTENDINGSCRIBE_ED_ALL_ED
I personally performed the service described in the documentation recorded by the scribe in my presence, and it accurately and completely records my words and actions. Xenograft Text: The defect edges were debeveled with a #15 scalpel blade.  Given the location of the defect, shape of the defect and the proximity to free margins a xenograft was deemed most appropriate.  The graft was then trimmed to fit the size of the defect.  The graft was then placed in the primary defect and oriented appropriately.

## 2020-07-31 PROCEDURE — 99232 SBSQ HOSP IP/OBS MODERATE 35: CPT

## 2020-07-31 RX ADMIN — MIRTAZAPINE 15 MILLIGRAM(S): 45 TABLET, ORALLY DISINTEGRATING ORAL at 21:04

## 2020-07-31 RX ADMIN — METHADONE HYDROCHLORIDE 80 MILLIGRAM(S): 40 TABLET ORAL at 08:24

## 2020-07-31 RX ADMIN — GABAPENTIN 600 MILLIGRAM(S): 400 CAPSULE ORAL at 21:04

## 2020-07-31 RX ADMIN — Medication 100 MILLIGRAM(S): at 14:57

## 2020-07-31 RX ADMIN — MIRTAZAPINE 30 MILLIGRAM(S): 45 TABLET, ORALLY DISINTEGRATING ORAL at 13:01

## 2020-07-31 RX ADMIN — PALIPERIDONE 9 MILLIGRAM(S): 1.5 TABLET, EXTENDED RELEASE ORAL at 21:04

## 2020-07-31 RX ADMIN — OLANZAPINE 5 MILLIGRAM(S): 15 TABLET, FILM COATED ORAL at 14:56

## 2020-07-31 RX ADMIN — METHADONE HYDROCHLORIDE 30 MILLIGRAM(S): 40 TABLET ORAL at 08:24

## 2020-07-31 RX ADMIN — Medication 2 MILLIGRAM(S): at 13:01

## 2020-07-31 RX ADMIN — Medication 0.5 MILLIGRAM(S): at 21:04

## 2020-07-31 RX ADMIN — Medication 1.5 MILLIGRAM(S): at 21:04

## 2020-07-31 RX ADMIN — DESVENLAFAXINE 100 MILLIGRAM(S): 50 TABLET, EXTENDED RELEASE ORAL at 08:24

## 2020-07-31 RX ADMIN — GABAPENTIN 600 MILLIGRAM(S): 400 CAPSULE ORAL at 14:57

## 2020-07-31 RX ADMIN — Medication 2 MILLIGRAM(S): at 21:04

## 2020-07-31 RX ADMIN — POLYETHYLENE GLYCOL 3350 17 GRAM(S): 17 POWDER, FOR SOLUTION ORAL at 21:04

## 2020-07-31 RX ADMIN — Medication 2 MILLIGRAM(S): at 08:24

## 2020-08-01 RX ADMIN — POLYETHYLENE GLYCOL 3350 17 GRAM(S): 17 POWDER, FOR SOLUTION ORAL at 08:44

## 2020-08-01 RX ADMIN — Medication 100 MILLIGRAM(S): at 14:50

## 2020-08-01 RX ADMIN — Medication 2 MILLIGRAM(S): at 20:54

## 2020-08-01 RX ADMIN — OLANZAPINE 5 MILLIGRAM(S): 15 TABLET, FILM COATED ORAL at 14:51

## 2020-08-01 RX ADMIN — MIRTAZAPINE 15 MILLIGRAM(S): 45 TABLET, ORALLY DISINTEGRATING ORAL at 20:54

## 2020-08-01 RX ADMIN — METHADONE HYDROCHLORIDE 80 MILLIGRAM(S): 40 TABLET ORAL at 08:43

## 2020-08-01 RX ADMIN — MIRTAZAPINE 30 MILLIGRAM(S): 45 TABLET, ORALLY DISINTEGRATING ORAL at 13:39

## 2020-08-01 RX ADMIN — Medication 2 MILLIGRAM(S): at 08:43

## 2020-08-01 RX ADMIN — METHADONE HYDROCHLORIDE 30 MILLIGRAM(S): 40 TABLET ORAL at 08:43

## 2020-08-01 RX ADMIN — Medication 2 MILLIGRAM(S): at 08:45

## 2020-08-01 RX ADMIN — Medication 0.5 MILLIGRAM(S): at 20:54

## 2020-08-01 RX ADMIN — PALIPERIDONE 9 MILLIGRAM(S): 1.5 TABLET, EXTENDED RELEASE ORAL at 20:54

## 2020-08-01 RX ADMIN — GABAPENTIN 600 MILLIGRAM(S): 400 CAPSULE ORAL at 14:50

## 2020-08-01 RX ADMIN — Medication 1.5 MILLIGRAM(S): at 20:54

## 2020-08-01 RX ADMIN — DESVENLAFAXINE 100 MILLIGRAM(S): 50 TABLET, EXTENDED RELEASE ORAL at 08:43

## 2020-08-01 RX ADMIN — Medication 2 MILLIGRAM(S): at 13:39

## 2020-08-02 RX ADMIN — Medication 100 MILLIGRAM(S): at 14:50

## 2020-08-02 RX ADMIN — Medication 2 MILLIGRAM(S): at 20:46

## 2020-08-02 RX ADMIN — PALIPERIDONE 9 MILLIGRAM(S): 1.5 TABLET, EXTENDED RELEASE ORAL at 20:46

## 2020-08-02 RX ADMIN — DESVENLAFAXINE 100 MILLIGRAM(S): 50 TABLET, EXTENDED RELEASE ORAL at 08:00

## 2020-08-02 RX ADMIN — POLYETHYLENE GLYCOL 3350 17 GRAM(S): 17 POWDER, FOR SOLUTION ORAL at 08:00

## 2020-08-02 RX ADMIN — MIRTAZAPINE 15 MILLIGRAM(S): 45 TABLET, ORALLY DISINTEGRATING ORAL at 20:46

## 2020-08-02 RX ADMIN — Medication 0.5 MILLIGRAM(S): at 20:46

## 2020-08-02 RX ADMIN — GABAPENTIN 600 MILLIGRAM(S): 400 CAPSULE ORAL at 14:50

## 2020-08-02 RX ADMIN — Medication 2 MILLIGRAM(S): at 08:00

## 2020-08-02 RX ADMIN — METHADONE HYDROCHLORIDE 80 MILLIGRAM(S): 40 TABLET ORAL at 08:00

## 2020-08-02 RX ADMIN — METHADONE HYDROCHLORIDE 30 MILLIGRAM(S): 40 TABLET ORAL at 08:00

## 2020-08-02 RX ADMIN — Medication 1.5 MILLIGRAM(S): at 20:45

## 2020-08-02 RX ADMIN — MIRTAZAPINE 30 MILLIGRAM(S): 45 TABLET, ORALLY DISINTEGRATING ORAL at 12:47

## 2020-08-02 RX ADMIN — OLANZAPINE 5 MILLIGRAM(S): 15 TABLET, FILM COATED ORAL at 14:50

## 2020-08-02 RX ADMIN — Medication 2 MILLIGRAM(S): at 12:48

## 2020-08-03 PROCEDURE — 99232 SBSQ HOSP IP/OBS MODERATE 35: CPT

## 2020-08-03 RX ADMIN — OLANZAPINE 5 MILLIGRAM(S): 15 TABLET, FILM COATED ORAL at 14:55

## 2020-08-03 RX ADMIN — Medication 0.5 MILLIGRAM(S): at 21:05

## 2020-08-03 RX ADMIN — Medication 2 MILLIGRAM(S): at 14:54

## 2020-08-03 RX ADMIN — MIRTAZAPINE 15 MILLIGRAM(S): 45 TABLET, ORALLY DISINTEGRATING ORAL at 21:05

## 2020-08-03 RX ADMIN — Medication 2 MILLIGRAM(S): at 10:42

## 2020-08-03 RX ADMIN — Medication 2 MILLIGRAM(S): at 12:46

## 2020-08-03 RX ADMIN — METHADONE HYDROCHLORIDE 80 MILLIGRAM(S): 40 TABLET ORAL at 08:08

## 2020-08-03 RX ADMIN — Medication 2 MILLIGRAM(S): at 21:05

## 2020-08-03 RX ADMIN — Medication 1.5 MILLIGRAM(S): at 21:05

## 2020-08-03 RX ADMIN — DESVENLAFAXINE 100 MILLIGRAM(S): 50 TABLET, EXTENDED RELEASE ORAL at 08:08

## 2020-08-03 RX ADMIN — MIRTAZAPINE 30 MILLIGRAM(S): 45 TABLET, ORALLY DISINTEGRATING ORAL at 12:50

## 2020-08-03 RX ADMIN — Medication 2 MILLIGRAM(S): at 12:50

## 2020-08-03 RX ADMIN — Medication 100 MILLIGRAM(S): at 14:54

## 2020-08-03 RX ADMIN — METHADONE HYDROCHLORIDE 30 MILLIGRAM(S): 40 TABLET ORAL at 08:08

## 2020-08-03 RX ADMIN — GABAPENTIN 600 MILLIGRAM(S): 400 CAPSULE ORAL at 14:54

## 2020-08-03 RX ADMIN — PALIPERIDONE 9 MILLIGRAM(S): 1.5 TABLET, EXTENDED RELEASE ORAL at 21:05

## 2020-08-03 RX ADMIN — Medication 2 MILLIGRAM(S): at 08:08

## 2020-08-04 PROCEDURE — 99223 1ST HOSP IP/OBS HIGH 75: CPT

## 2020-08-04 PROCEDURE — 99233 SBSQ HOSP IP/OBS HIGH 50: CPT

## 2020-08-04 RX ADMIN — GABAPENTIN 600 MILLIGRAM(S): 400 CAPSULE ORAL at 14:46

## 2020-08-04 RX ADMIN — MIRTAZAPINE 30 MILLIGRAM(S): 45 TABLET, ORALLY DISINTEGRATING ORAL at 12:47

## 2020-08-04 RX ADMIN — Medication 2 MILLIGRAM(S): at 20:15

## 2020-08-04 RX ADMIN — Medication 100 MILLIGRAM(S): at 14:45

## 2020-08-04 RX ADMIN — MIRTAZAPINE 15 MILLIGRAM(S): 45 TABLET, ORALLY DISINTEGRATING ORAL at 20:15

## 2020-08-04 RX ADMIN — Medication 2 MILLIGRAM(S): at 12:48

## 2020-08-04 RX ADMIN — METHADONE HYDROCHLORIDE 30 MILLIGRAM(S): 40 TABLET ORAL at 08:49

## 2020-08-04 RX ADMIN — GABAPENTIN 600 MILLIGRAM(S): 400 CAPSULE ORAL at 20:15

## 2020-08-04 RX ADMIN — Medication 1.5 MILLIGRAM(S): at 20:15

## 2020-08-04 RX ADMIN — METHADONE HYDROCHLORIDE 80 MILLIGRAM(S): 40 TABLET ORAL at 08:49

## 2020-08-04 RX ADMIN — Medication 2 MILLIGRAM(S): at 08:49

## 2020-08-04 RX ADMIN — Medication 100 MILLIGRAM(S): at 20:15

## 2020-08-04 RX ADMIN — OLANZAPINE 5 MILLIGRAM(S): 15 TABLET, FILM COATED ORAL at 14:45

## 2020-08-04 RX ADMIN — PALIPERIDONE 9 MILLIGRAM(S): 1.5 TABLET, EXTENDED RELEASE ORAL at 20:15

## 2020-08-04 RX ADMIN — DESVENLAFAXINE 100 MILLIGRAM(S): 50 TABLET, EXTENDED RELEASE ORAL at 08:49

## 2020-08-04 RX ADMIN — Medication 2 MILLIGRAM(S): at 12:47

## 2020-08-04 NOTE — CONSULT NOTE ADULT - PROVIDER SPECIALTY LIST ADULT
Notified patient that her breast biopsy results results were not available. Patient denies problems from recent biopsy.    
Hospitalist
Internal Medicine

## 2020-08-04 NOTE — CONSULT NOTE ADULT - ASSESSMENT
39 M with PMHX of IVDU on methadone depression, currently at [facility] for psychiatric management asked to see for constipation.    1- Slow transit constipation - likely multifactorial due to psychotropics and methadone. abd is mildly distended hypoactive BS - low suspicion for obstruction but might be early ileus. would check abd x-ray first and if reassuring will give lactulose and enema. can consider Relistor if the above are not effective     2- psych - per primary team

## 2020-08-04 NOTE — CONSULT NOTE ADULT - SUBJECTIVE AND OBJECTIVE BOX
39 M with PMHX of IVDU on methadone depression, currently at [facility] for psychiatric management asked to see for constipation. pt states that he hasn't had a BM for 1 week now. endorses abd discomfort, some bloating and feels "backed up" causing him to avoid food. he endorses burping. no n/v/d. hasn't had hx of constipation despite being on methadone for over 15 years now. not on chronic laxative, no hx of abd surgeries. at [facility], his methadone was continued and he was also placed on Invega and Zyprexa. he has been getting rounds of mag citrate and is on Miralax standing     Allergies: Haldol, Prolixin, Thorazine     PMHX: per HPI  Social: per HPI  FHx: NC      ROS:  No HA/DZ  No Vision changes   No CP, SOB  No N/V/D  No Edema  No Rash  NO weakness, numbness    MEDICATIONS  (STANDING):  clonazePAM  Tablet 1.5 milliGRAM(s) Oral at bedtime  clonazePAM  Tablet 2 milliGRAM(s) Oral daily  clonazePAM  Tablet 2 milliGRAM(s) Oral <User Schedule>  desvenlafaxine  milliGRAM(s) Oral daily  gabapentin 600 milliGRAM(s) Oral at bedtime  methadone    Tablet 30 milliGRAM(s) Oral daily  methadone   Dispersible Tablet 80 milliGRAM(s) Oral daily  mirtazapine 15 milliGRAM(s) Oral at bedtime  mirtazapine 30 milliGRAM(s) Oral <User Schedule>  paliperidone ER 9 milliGRAM(s) Oral at bedtime  polyethylene glycol 3350 17 Gram(s) Oral two times a day  prazosin. 2 milliGRAM(s) Oral at bedtime    MEDICATIONS  (PRN):  acetaminophen   Tablet .. 650 milliGRAM(s) Oral every 6 hours PRN Temp greater or equal to 38C (100.4F), Mild Pain (1 - 3), Moderate Pain (4 - 6)  chlorproMAZINE    Injectable 100 milliGRAM(s) IntraMuscular once PRN psychosis or agitation  chlorproMAZINE    Tablet 100 milliGRAM(s) Oral every 4 hours PRN psychosis or agitation  clonazePAM  Tablet 0.5 milliGRAM(s) Oral at bedtime PRN anxiety  gabapentin 600 milliGRAM(s) Oral four times a day PRN anxiety  nicotine  Polacrilex Gum 2 milliGRAM(s) Oral every 2 hours PRN nicotine dependence  OLANZapine 5 milliGRAM(s) Oral every 6 hours PRN psychosis or agitation  pantoprazole    Tablet 40 milliGRAM(s) Oral before breakfast PRN GERD  prazosin. 1 milliGRAM(s) Oral at bedtime PRN nightmares      T(C): 36.8 (08-04-20 @ 08:17)  HR: --86  BP: --112/70  RR: --12  SpO2: --  CAPILLARY BLOOD GLUCOSE        I&O's Summary      PHYSICAL EXAM:  GENERAL: NAD, well-developed, AOx3  HEAD:  Atraumatic, Normocephalic  EYES: EOMI, PERRL, conjunctiva and sclera clear  NECK: Supple, No JVD  CHEST/LUNG: Clear to auscultation bilaterally  HEART: Regular rate and rhythm; No murmurs, rubs, or gallops, No Edema  ABDOMEN: Soft, mildly tender to deep palpation mildly distened; Bowel sounds present but hypoactive   EXTREMITIES:  2+ Peripheral Pulses, No clubbing, cyanosis  PSYCH: No SI/HI  NEUROLOGY: non-focal  SKIN: No rashes or lesions    LABS:          TSH 0.99               RADIOLOGY & ADDITIONAL TESTS:    Imaging Personally Reviewed:    Consultant(s) Notes Reviewed:      Care Discussed with Consultants/Other Providers:

## 2020-08-05 PROCEDURE — 74018 RADEX ABDOMEN 1 VIEW: CPT | Mod: 26

## 2020-08-05 PROCEDURE — 74019 RADEX ABDOMEN 2 VIEWS: CPT | Mod: 26

## 2020-08-05 PROCEDURE — 99232 SBSQ HOSP IP/OBS MODERATE 35: CPT

## 2020-08-05 RX ORDER — CLONAZEPAM 1 MG
2 TABLET ORAL
Refills: 0 | Status: DISCONTINUED | OUTPATIENT
Start: 2020-08-05 | End: 2020-08-06

## 2020-08-05 RX ORDER — METHADONE HYDROCHLORIDE 40 MG/1
30 TABLET ORAL DAILY
Refills: 0 | Status: DISCONTINUED | OUTPATIENT
Start: 2020-08-06 | End: 2020-08-11

## 2020-08-05 RX ORDER — CLONAZEPAM 1 MG
2 TABLET ORAL THREE TIMES A DAY
Refills: 0 | Status: DISCONTINUED | OUTPATIENT
Start: 2020-08-05 | End: 2020-08-12

## 2020-08-05 RX ORDER — METHADONE HYDROCHLORIDE 40 MG/1
80 TABLET ORAL DAILY
Refills: 0 | Status: DISCONTINUED | OUTPATIENT
Start: 2020-08-06 | End: 2020-08-11

## 2020-08-05 RX ADMIN — METHADONE HYDROCHLORIDE 30 MILLIGRAM(S): 40 TABLET ORAL at 08:28

## 2020-08-05 RX ADMIN — PALIPERIDONE 9 MILLIGRAM(S): 1.5 TABLET, EXTENDED RELEASE ORAL at 20:44

## 2020-08-05 RX ADMIN — Medication 2 MILLIGRAM(S): at 12:49

## 2020-08-05 RX ADMIN — MIRTAZAPINE 30 MILLIGRAM(S): 45 TABLET, ORALLY DISINTEGRATING ORAL at 12:49

## 2020-08-05 RX ADMIN — GABAPENTIN 600 MILLIGRAM(S): 400 CAPSULE ORAL at 15:13

## 2020-08-05 RX ADMIN — METHADONE HYDROCHLORIDE 80 MILLIGRAM(S): 40 TABLET ORAL at 08:28

## 2020-08-05 RX ADMIN — DESVENLAFAXINE 100 MILLIGRAM(S): 50 TABLET, EXTENDED RELEASE ORAL at 08:28

## 2020-08-05 RX ADMIN — Medication 2 MILLIGRAM(S): at 20:44

## 2020-08-05 RX ADMIN — Medication 2 MILLIGRAM(S): at 12:51

## 2020-08-05 RX ADMIN — POLYETHYLENE GLYCOL 3350 17 GRAM(S): 17 POWDER, FOR SOLUTION ORAL at 08:29

## 2020-08-05 RX ADMIN — MIRTAZAPINE 15 MILLIGRAM(S): 45 TABLET, ORALLY DISINTEGRATING ORAL at 20:44

## 2020-08-05 RX ADMIN — GABAPENTIN 600 MILLIGRAM(S): 400 CAPSULE ORAL at 20:44

## 2020-08-05 RX ADMIN — OLANZAPINE 5 MILLIGRAM(S): 15 TABLET, FILM COATED ORAL at 15:13

## 2020-08-05 RX ADMIN — Medication 2 MILLIGRAM(S): at 08:28

## 2020-08-05 RX ADMIN — Medication 100 MILLIGRAM(S): at 15:13

## 2020-08-06 RX ORDER — LACTULOSE 10 G/15ML
200 SOLUTION ORAL EVERY 6 HOURS
Refills: 0 | Status: DISCONTINUED | OUTPATIENT
Start: 2020-08-06 | End: 2020-08-06

## 2020-08-06 RX ORDER — LACTULOSE 10 G/15ML
10 SOLUTION ORAL THREE TIMES A DAY
Refills: 0 | Status: DISCONTINUED | OUTPATIENT
Start: 2020-08-06 | End: 2020-08-18

## 2020-08-06 RX ADMIN — GABAPENTIN 600 MILLIGRAM(S): 400 CAPSULE ORAL at 14:59

## 2020-08-06 RX ADMIN — METHADONE HYDROCHLORIDE 80 MILLIGRAM(S): 40 TABLET ORAL at 08:50

## 2020-08-06 RX ADMIN — Medication 2 MILLIGRAM(S): at 08:49

## 2020-08-06 RX ADMIN — Medication 100 MILLIGRAM(S): at 14:59

## 2020-08-06 RX ADMIN — Medication 2 MILLIGRAM(S): at 21:25

## 2020-08-06 RX ADMIN — Medication 2 MILLIGRAM(S): at 21:24

## 2020-08-06 RX ADMIN — DESVENLAFAXINE 100 MILLIGRAM(S): 50 TABLET, EXTENDED RELEASE ORAL at 08:49

## 2020-08-06 RX ADMIN — PALIPERIDONE 9 MILLIGRAM(S): 1.5 TABLET, EXTENDED RELEASE ORAL at 21:25

## 2020-08-06 RX ADMIN — MIRTAZAPINE 15 MILLIGRAM(S): 45 TABLET, ORALLY DISINTEGRATING ORAL at 21:25

## 2020-08-06 RX ADMIN — MIRTAZAPINE 30 MILLIGRAM(S): 45 TABLET, ORALLY DISINTEGRATING ORAL at 12:33

## 2020-08-06 RX ADMIN — LACTULOSE 10 GRAM(S): 10 SOLUTION ORAL at 12:29

## 2020-08-06 RX ADMIN — OLANZAPINE 5 MILLIGRAM(S): 15 TABLET, FILM COATED ORAL at 14:59

## 2020-08-06 RX ADMIN — Medication 1 ENEMA: at 13:42

## 2020-08-06 RX ADMIN — Medication 2 MILLIGRAM(S): at 12:34

## 2020-08-06 RX ADMIN — METHADONE HYDROCHLORIDE 30 MILLIGRAM(S): 40 TABLET ORAL at 08:50

## 2020-08-06 RX ADMIN — Medication 2 MILLIGRAM(S): at 14:59

## 2020-08-06 RX ADMIN — LACTULOSE 10 GRAM(S): 10 SOLUTION ORAL at 21:25

## 2020-08-06 NOTE — CHART NOTE - NSCHARTNOTEFT_GEN_A_CORE
still no BM - dw nursing  Abd xray noted, no SBO or signs if ileus - alessio po wo n/v  start lactulose and enema and monitor for BM

## 2020-08-07 PROCEDURE — 99232 SBSQ HOSP IP/OBS MODERATE 35: CPT

## 2020-08-07 RX ADMIN — POLYETHYLENE GLYCOL 3350 17 GRAM(S): 17 POWDER, FOR SOLUTION ORAL at 20:34

## 2020-08-07 RX ADMIN — Medication 2 MILLIGRAM(S): at 20:34

## 2020-08-07 RX ADMIN — LACTULOSE 10 GRAM(S): 10 SOLUTION ORAL at 12:33

## 2020-08-07 RX ADMIN — Medication 100 MILLIGRAM(S): at 15:15

## 2020-08-07 RX ADMIN — GABAPENTIN 600 MILLIGRAM(S): 400 CAPSULE ORAL at 20:34

## 2020-08-07 RX ADMIN — PALIPERIDONE 9 MILLIGRAM(S): 1.5 TABLET, EXTENDED RELEASE ORAL at 20:34

## 2020-08-07 RX ADMIN — Medication 2 MILLIGRAM(S): at 08:36

## 2020-08-07 RX ADMIN — METHADONE HYDROCHLORIDE 30 MILLIGRAM(S): 40 TABLET ORAL at 08:37

## 2020-08-07 RX ADMIN — MIRTAZAPINE 15 MILLIGRAM(S): 45 TABLET, ORALLY DISINTEGRATING ORAL at 20:34

## 2020-08-07 RX ADMIN — MIRTAZAPINE 30 MILLIGRAM(S): 45 TABLET, ORALLY DISINTEGRATING ORAL at 12:33

## 2020-08-07 RX ADMIN — OLANZAPINE 5 MILLIGRAM(S): 15 TABLET, FILM COATED ORAL at 15:15

## 2020-08-07 RX ADMIN — GABAPENTIN 600 MILLIGRAM(S): 400 CAPSULE ORAL at 15:15

## 2020-08-07 RX ADMIN — METHADONE HYDROCHLORIDE 80 MILLIGRAM(S): 40 TABLET ORAL at 08:37

## 2020-08-07 RX ADMIN — LACTULOSE 10 GRAM(S): 10 SOLUTION ORAL at 20:34

## 2020-08-07 RX ADMIN — LACTULOSE 10 GRAM(S): 10 SOLUTION ORAL at 08:37

## 2020-08-07 RX ADMIN — DESVENLAFAXINE 100 MILLIGRAM(S): 50 TABLET, EXTENDED RELEASE ORAL at 08:37

## 2020-08-07 RX ADMIN — Medication 2 MILLIGRAM(S): at 12:33

## 2020-08-08 RX ADMIN — Medication 2 MILLIGRAM(S): at 12:44

## 2020-08-08 RX ADMIN — METHADONE HYDROCHLORIDE 80 MILLIGRAM(S): 40 TABLET ORAL at 09:01

## 2020-08-08 RX ADMIN — METHADONE HYDROCHLORIDE 30 MILLIGRAM(S): 40 TABLET ORAL at 09:00

## 2020-08-08 RX ADMIN — LACTULOSE 10 GRAM(S): 10 SOLUTION ORAL at 09:00

## 2020-08-08 RX ADMIN — Medication 2 MILLIGRAM(S): at 09:00

## 2020-08-08 RX ADMIN — MIRTAZAPINE 30 MILLIGRAM(S): 45 TABLET, ORALLY DISINTEGRATING ORAL at 12:43

## 2020-08-08 RX ADMIN — DESVENLAFAXINE 100 MILLIGRAM(S): 50 TABLET, EXTENDED RELEASE ORAL at 09:00

## 2020-08-08 RX ADMIN — Medication 2 MILLIGRAM(S): at 20:55

## 2020-08-08 RX ADMIN — Medication 2 MILLIGRAM(S): at 12:43

## 2020-08-08 RX ADMIN — GABAPENTIN 600 MILLIGRAM(S): 400 CAPSULE ORAL at 20:55

## 2020-08-08 RX ADMIN — GABAPENTIN 600 MILLIGRAM(S): 400 CAPSULE ORAL at 14:31

## 2020-08-08 RX ADMIN — LACTULOSE 10 GRAM(S): 10 SOLUTION ORAL at 20:55

## 2020-08-08 RX ADMIN — POLYETHYLENE GLYCOL 3350 17 GRAM(S): 17 POWDER, FOR SOLUTION ORAL at 09:01

## 2020-08-08 RX ADMIN — Medication 100 MILLIGRAM(S): at 14:31

## 2020-08-08 RX ADMIN — MIRTAZAPINE 15 MILLIGRAM(S): 45 TABLET, ORALLY DISINTEGRATING ORAL at 20:55

## 2020-08-08 RX ADMIN — PALIPERIDONE 9 MILLIGRAM(S): 1.5 TABLET, EXTENDED RELEASE ORAL at 20:55

## 2020-08-08 RX ADMIN — OLANZAPINE 5 MILLIGRAM(S): 15 TABLET, FILM COATED ORAL at 14:31

## 2020-08-08 RX ADMIN — Medication 2 MILLIGRAM(S): at 09:02

## 2020-08-08 RX ADMIN — LACTULOSE 10 GRAM(S): 10 SOLUTION ORAL at 12:43

## 2020-08-09 RX ADMIN — Medication 2 MILLIGRAM(S): at 12:40

## 2020-08-09 RX ADMIN — PALIPERIDONE 9 MILLIGRAM(S): 1.5 TABLET, EXTENDED RELEASE ORAL at 21:06

## 2020-08-09 RX ADMIN — MIRTAZAPINE 15 MILLIGRAM(S): 45 TABLET, ORALLY DISINTEGRATING ORAL at 21:06

## 2020-08-09 RX ADMIN — Medication 2 MILLIGRAM(S): at 21:06

## 2020-08-09 RX ADMIN — GABAPENTIN 600 MILLIGRAM(S): 400 CAPSULE ORAL at 14:45

## 2020-08-09 RX ADMIN — LACTULOSE 10 GRAM(S): 10 SOLUTION ORAL at 08:35

## 2020-08-09 RX ADMIN — POLYETHYLENE GLYCOL 3350 17 GRAM(S): 17 POWDER, FOR SOLUTION ORAL at 08:35

## 2020-08-09 RX ADMIN — Medication 100 MILLIGRAM(S): at 14:45

## 2020-08-09 RX ADMIN — METHADONE HYDROCHLORIDE 80 MILLIGRAM(S): 40 TABLET ORAL at 08:35

## 2020-08-09 RX ADMIN — DESVENLAFAXINE 100 MILLIGRAM(S): 50 TABLET, EXTENDED RELEASE ORAL at 08:35

## 2020-08-09 RX ADMIN — OLANZAPINE 5 MILLIGRAM(S): 15 TABLET, FILM COATED ORAL at 14:46

## 2020-08-09 RX ADMIN — Medication 2 MILLIGRAM(S): at 08:35

## 2020-08-09 RX ADMIN — MIRTAZAPINE 30 MILLIGRAM(S): 45 TABLET, ORALLY DISINTEGRATING ORAL at 12:41

## 2020-08-09 RX ADMIN — METHADONE HYDROCHLORIDE 30 MILLIGRAM(S): 40 TABLET ORAL at 08:35

## 2020-08-10 PROCEDURE — 99232 SBSQ HOSP IP/OBS MODERATE 35: CPT

## 2020-08-10 RX ORDER — DIPHENHYDRAMINE HCL 50 MG
50 CAPSULE ORAL EVERY 6 HOURS
Refills: 0 | Status: DISCONTINUED | OUTPATIENT
Start: 2020-08-10 | End: 2020-08-20

## 2020-08-10 RX ADMIN — PALIPERIDONE 9 MILLIGRAM(S): 1.5 TABLET, EXTENDED RELEASE ORAL at 20:22

## 2020-08-10 RX ADMIN — OLANZAPINE 5 MILLIGRAM(S): 15 TABLET, FILM COATED ORAL at 14:52

## 2020-08-10 RX ADMIN — METHADONE HYDROCHLORIDE 30 MILLIGRAM(S): 40 TABLET ORAL at 08:29

## 2020-08-10 RX ADMIN — DESVENLAFAXINE 100 MILLIGRAM(S): 50 TABLET, EXTENDED RELEASE ORAL at 08:28

## 2020-08-10 RX ADMIN — MIRTAZAPINE 15 MILLIGRAM(S): 45 TABLET, ORALLY DISINTEGRATING ORAL at 20:22

## 2020-08-10 RX ADMIN — Medication 2 MILLIGRAM(S): at 12:33

## 2020-08-10 RX ADMIN — Medication 2 MILLIGRAM(S): at 20:22

## 2020-08-10 RX ADMIN — METHADONE HYDROCHLORIDE 80 MILLIGRAM(S): 40 TABLET ORAL at 08:29

## 2020-08-10 RX ADMIN — Medication 2 MILLIGRAM(S): at 12:35

## 2020-08-10 RX ADMIN — MIRTAZAPINE 30 MILLIGRAM(S): 45 TABLET, ORALLY DISINTEGRATING ORAL at 12:33

## 2020-08-10 RX ADMIN — Medication 50 MILLIGRAM(S): at 16:52

## 2020-08-10 RX ADMIN — Medication 100 MILLIGRAM(S): at 14:52

## 2020-08-10 RX ADMIN — GABAPENTIN 600 MILLIGRAM(S): 400 CAPSULE ORAL at 14:52

## 2020-08-10 RX ADMIN — Medication 2 MILLIGRAM(S): at 08:28

## 2020-08-11 PROCEDURE — 99232 SBSQ HOSP IP/OBS MODERATE 35: CPT

## 2020-08-11 RX ORDER — METHADONE HYDROCHLORIDE 40 MG/1
80 TABLET ORAL DAILY
Refills: 0 | Status: DISCONTINUED | OUTPATIENT
Start: 2020-08-12 | End: 2020-08-12

## 2020-08-11 RX ORDER — METHADONE HYDROCHLORIDE 40 MG/1
30 TABLET ORAL DAILY
Refills: 0 | Status: DISCONTINUED | OUTPATIENT
Start: 2020-08-12 | End: 2020-08-12

## 2020-08-11 RX ADMIN — OLANZAPINE 5 MILLIGRAM(S): 15 TABLET, FILM COATED ORAL at 15:15

## 2020-08-11 RX ADMIN — MIRTAZAPINE 30 MILLIGRAM(S): 45 TABLET, ORALLY DISINTEGRATING ORAL at 12:36

## 2020-08-11 RX ADMIN — MIRTAZAPINE 15 MILLIGRAM(S): 45 TABLET, ORALLY DISINTEGRATING ORAL at 20:49

## 2020-08-11 RX ADMIN — PALIPERIDONE 9 MILLIGRAM(S): 1.5 TABLET, EXTENDED RELEASE ORAL at 20:50

## 2020-08-11 RX ADMIN — Medication 2 MILLIGRAM(S): at 08:09

## 2020-08-11 RX ADMIN — Medication 2 MILLIGRAM(S): at 20:50

## 2020-08-11 RX ADMIN — METHADONE HYDROCHLORIDE 80 MILLIGRAM(S): 40 TABLET ORAL at 08:10

## 2020-08-11 RX ADMIN — GABAPENTIN 600 MILLIGRAM(S): 400 CAPSULE ORAL at 15:15

## 2020-08-11 RX ADMIN — DESVENLAFAXINE 100 MILLIGRAM(S): 50 TABLET, EXTENDED RELEASE ORAL at 08:09

## 2020-08-11 RX ADMIN — METHADONE HYDROCHLORIDE 30 MILLIGRAM(S): 40 TABLET ORAL at 08:10

## 2020-08-11 RX ADMIN — Medication 2 MILLIGRAM(S): at 20:49

## 2020-08-11 RX ADMIN — POLYETHYLENE GLYCOL 3350 17 GRAM(S): 17 POWDER, FOR SOLUTION ORAL at 08:10

## 2020-08-11 RX ADMIN — Medication 2 MILLIGRAM(S): at 08:15

## 2020-08-11 RX ADMIN — GABAPENTIN 600 MILLIGRAM(S): 400 CAPSULE ORAL at 20:49

## 2020-08-11 RX ADMIN — Medication 2 MILLIGRAM(S): at 12:36

## 2020-08-11 RX ADMIN — Medication 2 MILLIGRAM(S): at 11:34

## 2020-08-11 RX ADMIN — Medication 100 MILLIGRAM(S): at 15:15

## 2020-08-12 RX ORDER — METHADONE HYDROCHLORIDE 40 MG/1
80 TABLET ORAL DAILY
Refills: 0 | Status: DISCONTINUED | OUTPATIENT
Start: 2020-08-12 | End: 2020-08-17

## 2020-08-12 RX ORDER — METHADONE HYDROCHLORIDE 40 MG/1
30 TABLET ORAL DAILY
Refills: 0 | Status: DISCONTINUED | OUTPATIENT
Start: 2020-08-12 | End: 2020-08-17

## 2020-08-12 RX ORDER — CLONAZEPAM 1 MG
2 TABLET ORAL THREE TIMES A DAY
Refills: 0 | Status: DISCONTINUED | OUTPATIENT
Start: 2020-08-12 | End: 2020-08-19

## 2020-08-12 RX ADMIN — Medication 100 MILLIGRAM(S): at 14:57

## 2020-08-12 RX ADMIN — POLYETHYLENE GLYCOL 3350 17 GRAM(S): 17 POWDER, FOR SOLUTION ORAL at 08:38

## 2020-08-12 RX ADMIN — DESVENLAFAXINE 100 MILLIGRAM(S): 50 TABLET, EXTENDED RELEASE ORAL at 08:38

## 2020-08-12 RX ADMIN — Medication 2 MILLIGRAM(S): at 08:38

## 2020-08-12 RX ADMIN — Medication 2 MILLIGRAM(S): at 13:00

## 2020-08-12 RX ADMIN — OLANZAPINE 5 MILLIGRAM(S): 15 TABLET, FILM COATED ORAL at 14:58

## 2020-08-12 RX ADMIN — MIRTAZAPINE 15 MILLIGRAM(S): 45 TABLET, ORALLY DISINTEGRATING ORAL at 21:01

## 2020-08-12 RX ADMIN — MIRTAZAPINE 30 MILLIGRAM(S): 45 TABLET, ORALLY DISINTEGRATING ORAL at 13:01

## 2020-08-12 RX ADMIN — METHADONE HYDROCHLORIDE 80 MILLIGRAM(S): 40 TABLET ORAL at 08:38

## 2020-08-12 RX ADMIN — Medication 2 MILLIGRAM(S): at 13:54

## 2020-08-12 RX ADMIN — METHADONE HYDROCHLORIDE 30 MILLIGRAM(S): 40 TABLET ORAL at 08:38

## 2020-08-12 RX ADMIN — Medication 2 MILLIGRAM(S): at 21:00

## 2020-08-12 RX ADMIN — GABAPENTIN 600 MILLIGRAM(S): 400 CAPSULE ORAL at 14:57

## 2020-08-12 RX ADMIN — Medication 2 MILLIGRAM(S): at 21:01

## 2020-08-12 RX ADMIN — Medication 1 ENEMA: at 11:18

## 2020-08-12 RX ADMIN — PALIPERIDONE 9 MILLIGRAM(S): 1.5 TABLET, EXTENDED RELEASE ORAL at 21:01

## 2020-08-13 RX ADMIN — MIRTAZAPINE 30 MILLIGRAM(S): 45 TABLET, ORALLY DISINTEGRATING ORAL at 12:29

## 2020-08-13 RX ADMIN — Medication 2 MILLIGRAM(S): at 16:00

## 2020-08-13 RX ADMIN — Medication 2 MILLIGRAM(S): at 12:29

## 2020-08-13 RX ADMIN — Medication 2 MILLIGRAM(S): at 08:15

## 2020-08-13 RX ADMIN — POLYETHYLENE GLYCOL 3350 17 GRAM(S): 17 POWDER, FOR SOLUTION ORAL at 20:54

## 2020-08-13 RX ADMIN — PALIPERIDONE 9 MILLIGRAM(S): 1.5 TABLET, EXTENDED RELEASE ORAL at 20:54

## 2020-08-13 RX ADMIN — MIRTAZAPINE 15 MILLIGRAM(S): 45 TABLET, ORALLY DISINTEGRATING ORAL at 20:54

## 2020-08-13 RX ADMIN — POLYETHYLENE GLYCOL 3350 17 GRAM(S): 17 POWDER, FOR SOLUTION ORAL at 08:15

## 2020-08-13 RX ADMIN — Medication 2 MILLIGRAM(S): at 20:54

## 2020-08-13 RX ADMIN — LACTULOSE 10 GRAM(S): 10 SOLUTION ORAL at 20:54

## 2020-08-13 RX ADMIN — Medication 100 MILLIGRAM(S): at 16:00

## 2020-08-13 RX ADMIN — METHADONE HYDROCHLORIDE 30 MILLIGRAM(S): 40 TABLET ORAL at 08:15

## 2020-08-13 RX ADMIN — OLANZAPINE 5 MILLIGRAM(S): 15 TABLET, FILM COATED ORAL at 16:01

## 2020-08-13 RX ADMIN — METHADONE HYDROCHLORIDE 80 MILLIGRAM(S): 40 TABLET ORAL at 08:15

## 2020-08-13 RX ADMIN — GABAPENTIN 600 MILLIGRAM(S): 400 CAPSULE ORAL at 16:00

## 2020-08-13 RX ADMIN — GABAPENTIN 600 MILLIGRAM(S): 400 CAPSULE ORAL at 20:54

## 2020-08-13 RX ADMIN — DESVENLAFAXINE 100 MILLIGRAM(S): 50 TABLET, EXTENDED RELEASE ORAL at 08:15

## 2020-08-14 RX ADMIN — Medication 2 MILLIGRAM(S): at 20:42

## 2020-08-14 RX ADMIN — Medication 2 MILLIGRAM(S): at 12:51

## 2020-08-14 RX ADMIN — Medication 100 MILLIGRAM(S): at 15:30

## 2020-08-14 RX ADMIN — Medication 2 MILLIGRAM(S): at 08:34

## 2020-08-14 RX ADMIN — POLYETHYLENE GLYCOL 3350 17 GRAM(S): 17 POWDER, FOR SOLUTION ORAL at 08:34

## 2020-08-14 RX ADMIN — PALIPERIDONE 9 MILLIGRAM(S): 1.5 TABLET, EXTENDED RELEASE ORAL at 20:42

## 2020-08-14 RX ADMIN — METHADONE HYDROCHLORIDE 30 MILLIGRAM(S): 40 TABLET ORAL at 08:34

## 2020-08-14 RX ADMIN — MIRTAZAPINE 30 MILLIGRAM(S): 45 TABLET, ORALLY DISINTEGRATING ORAL at 12:52

## 2020-08-14 RX ADMIN — OLANZAPINE 5 MILLIGRAM(S): 15 TABLET, FILM COATED ORAL at 15:31

## 2020-08-14 RX ADMIN — GABAPENTIN 600 MILLIGRAM(S): 400 CAPSULE ORAL at 15:30

## 2020-08-14 RX ADMIN — MIRTAZAPINE 15 MILLIGRAM(S): 45 TABLET, ORALLY DISINTEGRATING ORAL at 20:42

## 2020-08-14 RX ADMIN — GABAPENTIN 600 MILLIGRAM(S): 400 CAPSULE ORAL at 20:42

## 2020-08-14 RX ADMIN — METHADONE HYDROCHLORIDE 80 MILLIGRAM(S): 40 TABLET ORAL at 08:34

## 2020-08-14 RX ADMIN — DESVENLAFAXINE 100 MILLIGRAM(S): 50 TABLET, EXTENDED RELEASE ORAL at 08:34

## 2020-08-14 RX ADMIN — Medication 2 MILLIGRAM(S): at 20:41

## 2020-08-15 RX ADMIN — Medication 2 MILLIGRAM(S): at 15:39

## 2020-08-15 RX ADMIN — METHADONE HYDROCHLORIDE 80 MILLIGRAM(S): 40 TABLET ORAL at 08:29

## 2020-08-15 RX ADMIN — Medication 100 MILLIGRAM(S): at 15:39

## 2020-08-15 RX ADMIN — Medication 2 MILLIGRAM(S): at 20:24

## 2020-08-15 RX ADMIN — MIRTAZAPINE 15 MILLIGRAM(S): 45 TABLET, ORALLY DISINTEGRATING ORAL at 20:24

## 2020-08-15 RX ADMIN — POLYETHYLENE GLYCOL 3350 17 GRAM(S): 17 POWDER, FOR SOLUTION ORAL at 08:29

## 2020-08-15 RX ADMIN — MIRTAZAPINE 30 MILLIGRAM(S): 45 TABLET, ORALLY DISINTEGRATING ORAL at 12:21

## 2020-08-15 RX ADMIN — PALIPERIDONE 9 MILLIGRAM(S): 1.5 TABLET, EXTENDED RELEASE ORAL at 20:24

## 2020-08-15 RX ADMIN — METHADONE HYDROCHLORIDE 30 MILLIGRAM(S): 40 TABLET ORAL at 08:29

## 2020-08-15 RX ADMIN — Medication 2 MILLIGRAM(S): at 08:29

## 2020-08-15 RX ADMIN — GABAPENTIN 600 MILLIGRAM(S): 400 CAPSULE ORAL at 20:24

## 2020-08-15 RX ADMIN — OLANZAPINE 5 MILLIGRAM(S): 15 TABLET, FILM COATED ORAL at 15:40

## 2020-08-15 RX ADMIN — Medication 2 MILLIGRAM(S): at 12:20

## 2020-08-15 RX ADMIN — GABAPENTIN 600 MILLIGRAM(S): 400 CAPSULE ORAL at 15:39

## 2020-08-15 RX ADMIN — DESVENLAFAXINE 100 MILLIGRAM(S): 50 TABLET, EXTENDED RELEASE ORAL at 08:29

## 2020-08-15 RX ADMIN — Medication 2 MILLIGRAM(S): at 08:49

## 2020-08-16 RX ADMIN — Medication 2 MILLIGRAM(S): at 13:19

## 2020-08-16 RX ADMIN — PALIPERIDONE 9 MILLIGRAM(S): 1.5 TABLET, EXTENDED RELEASE ORAL at 21:37

## 2020-08-16 RX ADMIN — GABAPENTIN 600 MILLIGRAM(S): 400 CAPSULE ORAL at 15:08

## 2020-08-16 RX ADMIN — POLYETHYLENE GLYCOL 3350 17 GRAM(S): 17 POWDER, FOR SOLUTION ORAL at 09:15

## 2020-08-16 RX ADMIN — Medication 100 MILLIGRAM(S): at 15:10

## 2020-08-16 RX ADMIN — METHADONE HYDROCHLORIDE 80 MILLIGRAM(S): 40 TABLET ORAL at 08:20

## 2020-08-16 RX ADMIN — MIRTAZAPINE 30 MILLIGRAM(S): 45 TABLET, ORALLY DISINTEGRATING ORAL at 13:20

## 2020-08-16 RX ADMIN — Medication 2 MILLIGRAM(S): at 21:37

## 2020-08-16 RX ADMIN — MIRTAZAPINE 15 MILLIGRAM(S): 45 TABLET, ORALLY DISINTEGRATING ORAL at 21:37

## 2020-08-16 RX ADMIN — GABAPENTIN 600 MILLIGRAM(S): 400 CAPSULE ORAL at 20:54

## 2020-08-16 RX ADMIN — Medication 2 MILLIGRAM(S): at 08:21

## 2020-08-16 RX ADMIN — METHADONE HYDROCHLORIDE 30 MILLIGRAM(S): 40 TABLET ORAL at 08:21

## 2020-08-16 RX ADMIN — DESVENLAFAXINE 100 MILLIGRAM(S): 50 TABLET, EXTENDED RELEASE ORAL at 08:20

## 2020-08-16 RX ADMIN — Medication 50 MILLIGRAM(S): at 15:09

## 2020-08-16 RX ADMIN — Medication 2 MILLIGRAM(S): at 09:48

## 2020-08-16 RX ADMIN — Medication 2 MILLIGRAM(S): at 20:53

## 2020-08-16 RX ADMIN — OLANZAPINE 5 MILLIGRAM(S): 15 TABLET, FILM COATED ORAL at 15:10

## 2020-08-17 PROCEDURE — 99232 SBSQ HOSP IP/OBS MODERATE 35: CPT

## 2020-08-17 RX ORDER — METHADONE HYDROCHLORIDE 40 MG/1
80 TABLET ORAL DAILY
Refills: 0 | Status: DISCONTINUED | OUTPATIENT
Start: 2020-08-18 | End: 2020-08-19

## 2020-08-17 RX ORDER — METHADONE HYDROCHLORIDE 40 MG/1
30 TABLET ORAL DAILY
Refills: 0 | Status: DISCONTINUED | OUTPATIENT
Start: 2020-08-18 | End: 2020-08-19

## 2020-08-17 RX ADMIN — Medication 2 MILLIGRAM(S): at 12:45

## 2020-08-17 RX ADMIN — PALIPERIDONE 9 MILLIGRAM(S): 1.5 TABLET, EXTENDED RELEASE ORAL at 20:58

## 2020-08-17 RX ADMIN — Medication 50 MILLIGRAM(S): at 12:46

## 2020-08-17 RX ADMIN — METHADONE HYDROCHLORIDE 80 MILLIGRAM(S): 40 TABLET ORAL at 08:12

## 2020-08-17 RX ADMIN — Medication 2 MILLIGRAM(S): at 13:41

## 2020-08-17 RX ADMIN — Medication 2 MILLIGRAM(S): at 20:58

## 2020-08-17 RX ADMIN — POLYETHYLENE GLYCOL 3350 17 GRAM(S): 17 POWDER, FOR SOLUTION ORAL at 08:12

## 2020-08-17 RX ADMIN — DESVENLAFAXINE 100 MILLIGRAM(S): 50 TABLET, EXTENDED RELEASE ORAL at 08:12

## 2020-08-17 RX ADMIN — Medication 2 MILLIGRAM(S): at 08:12

## 2020-08-17 RX ADMIN — POLYETHYLENE GLYCOL 3350 17 GRAM(S): 17 POWDER, FOR SOLUTION ORAL at 20:58

## 2020-08-17 RX ADMIN — Medication 2 MILLIGRAM(S): at 20:57

## 2020-08-17 RX ADMIN — OLANZAPINE 5 MILLIGRAM(S): 15 TABLET, FILM COATED ORAL at 15:36

## 2020-08-17 RX ADMIN — GABAPENTIN 600 MILLIGRAM(S): 400 CAPSULE ORAL at 20:57

## 2020-08-17 RX ADMIN — METHADONE HYDROCHLORIDE 30 MILLIGRAM(S): 40 TABLET ORAL at 08:12

## 2020-08-17 RX ADMIN — Medication 100 MILLIGRAM(S): at 15:36

## 2020-08-17 RX ADMIN — GABAPENTIN 600 MILLIGRAM(S): 400 CAPSULE ORAL at 15:36

## 2020-08-17 RX ADMIN — MIRTAZAPINE 30 MILLIGRAM(S): 45 TABLET, ORALLY DISINTEGRATING ORAL at 12:46

## 2020-08-17 RX ADMIN — MIRTAZAPINE 15 MILLIGRAM(S): 45 TABLET, ORALLY DISINTEGRATING ORAL at 20:57

## 2020-08-18 PROCEDURE — 99232 SBSQ HOSP IP/OBS MODERATE 35: CPT

## 2020-08-18 RX ORDER — LACTULOSE 10 G/15ML
10 SOLUTION ORAL THREE TIMES A DAY
Refills: 0 | Status: DISCONTINUED | OUTPATIENT
Start: 2020-08-18 | End: 2020-08-20

## 2020-08-18 RX ADMIN — MIRTAZAPINE 15 MILLIGRAM(S): 45 TABLET, ORALLY DISINTEGRATING ORAL at 20:33

## 2020-08-18 RX ADMIN — DESVENLAFAXINE 100 MILLIGRAM(S): 50 TABLET, EXTENDED RELEASE ORAL at 08:01

## 2020-08-18 RX ADMIN — GABAPENTIN 600 MILLIGRAM(S): 400 CAPSULE ORAL at 15:32

## 2020-08-18 RX ADMIN — METHADONE HYDROCHLORIDE 30 MILLIGRAM(S): 40 TABLET ORAL at 08:00

## 2020-08-18 RX ADMIN — Medication 50 MILLIGRAM(S): at 13:25

## 2020-08-18 RX ADMIN — POLYETHYLENE GLYCOL 3350 17 GRAM(S): 17 POWDER, FOR SOLUTION ORAL at 20:33

## 2020-08-18 RX ADMIN — PALIPERIDONE 9 MILLIGRAM(S): 1.5 TABLET, EXTENDED RELEASE ORAL at 20:33

## 2020-08-18 RX ADMIN — GABAPENTIN 600 MILLIGRAM(S): 400 CAPSULE ORAL at 20:33

## 2020-08-18 RX ADMIN — MIRTAZAPINE 30 MILLIGRAM(S): 45 TABLET, ORALLY DISINTEGRATING ORAL at 13:02

## 2020-08-18 RX ADMIN — Medication 2 MILLIGRAM(S): at 20:33

## 2020-08-18 RX ADMIN — METHADONE HYDROCHLORIDE 80 MILLIGRAM(S): 40 TABLET ORAL at 08:00

## 2020-08-18 RX ADMIN — Medication 100 MILLIGRAM(S): at 15:32

## 2020-08-18 RX ADMIN — OLANZAPINE 5 MILLIGRAM(S): 15 TABLET, FILM COATED ORAL at 15:32

## 2020-08-18 RX ADMIN — Medication 2 MILLIGRAM(S): at 08:00

## 2020-08-18 RX ADMIN — Medication 2 MILLIGRAM(S): at 13:01

## 2020-08-18 RX ADMIN — POLYETHYLENE GLYCOL 3350 17 GRAM(S): 17 POWDER, FOR SOLUTION ORAL at 08:09

## 2020-08-19 PROCEDURE — 99233 SBSQ HOSP IP/OBS HIGH 50: CPT

## 2020-08-19 RX ORDER — CLONAZEPAM 1 MG
2 TABLET ORAL THREE TIMES A DAY
Refills: 0 | Status: DISCONTINUED | OUTPATIENT
Start: 2020-08-19 | End: 2020-08-20

## 2020-08-19 RX ORDER — METHADONE HYDROCHLORIDE 40 MG/1
80 TABLET ORAL DAILY
Refills: 0 | Status: DISCONTINUED | OUTPATIENT
Start: 2020-08-20 | End: 2020-08-20

## 2020-08-19 RX ORDER — METHADONE HYDROCHLORIDE 40 MG/1
30 TABLET ORAL DAILY
Refills: 0 | Status: DISCONTINUED | OUTPATIENT
Start: 2020-08-19 | End: 2020-08-20

## 2020-08-19 RX ADMIN — Medication 2 MILLIGRAM(S): at 20:27

## 2020-08-19 RX ADMIN — Medication 2 MILLIGRAM(S): at 08:12

## 2020-08-19 RX ADMIN — DESVENLAFAXINE 100 MILLIGRAM(S): 50 TABLET, EXTENDED RELEASE ORAL at 08:12

## 2020-08-19 RX ADMIN — Medication 100 MILLIGRAM(S): at 15:33

## 2020-08-19 RX ADMIN — PALIPERIDONE 9 MILLIGRAM(S): 1.5 TABLET, EXTENDED RELEASE ORAL at 20:27

## 2020-08-19 RX ADMIN — GABAPENTIN 600 MILLIGRAM(S): 400 CAPSULE ORAL at 15:32

## 2020-08-19 RX ADMIN — Medication 50 MILLIGRAM(S): at 12:28

## 2020-08-19 RX ADMIN — METHADONE HYDROCHLORIDE 30 MILLIGRAM(S): 40 TABLET ORAL at 08:12

## 2020-08-19 RX ADMIN — GABAPENTIN 600 MILLIGRAM(S): 400 CAPSULE ORAL at 20:27

## 2020-08-19 RX ADMIN — MIRTAZAPINE 30 MILLIGRAM(S): 45 TABLET, ORALLY DISINTEGRATING ORAL at 12:28

## 2020-08-19 RX ADMIN — Medication 2 MILLIGRAM(S): at 12:28

## 2020-08-19 RX ADMIN — MIRTAZAPINE 15 MILLIGRAM(S): 45 TABLET, ORALLY DISINTEGRATING ORAL at 20:27

## 2020-08-19 RX ADMIN — OLANZAPINE 5 MILLIGRAM(S): 15 TABLET, FILM COATED ORAL at 15:33

## 2020-08-19 RX ADMIN — METHADONE HYDROCHLORIDE 80 MILLIGRAM(S): 40 TABLET ORAL at 08:12

## 2020-08-20 VITALS — SYSTOLIC BLOOD PRESSURE: 129 MMHG | TEMPERATURE: 98 F | DIASTOLIC BLOOD PRESSURE: 76 MMHG | HEART RATE: 72 BPM

## 2020-08-20 LAB — SARS-COV-2 RNA SPEC QL NAA+PROBE: SIGNIFICANT CHANGE UP

## 2020-08-20 RX ORDER — DESVENLAFAXINE 50 MG/1
1 TABLET, EXTENDED RELEASE ORAL
Qty: 30 | Refills: 0
Start: 2020-08-20 | End: 2020-09-18

## 2020-08-20 RX ORDER — CLONAZEPAM 1 MG
1 TABLET ORAL
Qty: 90 | Refills: 0
Start: 2020-08-20 | End: 2020-09-18

## 2020-08-20 RX ORDER — GABAPENTIN 400 MG/1
1 CAPSULE ORAL
Qty: 60 | Refills: 0
Start: 2020-08-20 | End: 2020-09-18

## 2020-08-20 RX ORDER — PALIPERIDONE 1.5 MG/1
1 TABLET, EXTENDED RELEASE ORAL
Qty: 30 | Refills: 0
Start: 2020-08-20 | End: 2020-09-18

## 2020-08-20 RX ORDER — PRAZOSIN HCL 2 MG
1 CAPSULE ORAL
Qty: 30 | Refills: 0
Start: 2020-08-20 | End: 2020-09-18

## 2020-08-20 RX ORDER — MIRTAZAPINE 45 MG/1
1 TABLET, ORALLY DISINTEGRATING ORAL
Qty: 30 | Refills: 0
Start: 2020-08-20 | End: 2020-09-18

## 2020-08-20 RX ADMIN — METHADONE HYDROCHLORIDE 30 MILLIGRAM(S): 40 TABLET ORAL at 08:26

## 2020-08-20 RX ADMIN — DESVENLAFAXINE 100 MILLIGRAM(S): 50 TABLET, EXTENDED RELEASE ORAL at 08:26

## 2020-08-20 RX ADMIN — Medication 2 MILLIGRAM(S): at 12:29

## 2020-08-20 RX ADMIN — POLYETHYLENE GLYCOL 3350 17 GRAM(S): 17 POWDER, FOR SOLUTION ORAL at 08:26

## 2020-08-20 RX ADMIN — Medication 2 MILLIGRAM(S): at 12:00

## 2020-08-20 RX ADMIN — METHADONE HYDROCHLORIDE 80 MILLIGRAM(S): 40 TABLET ORAL at 08:26

## 2020-08-20 RX ADMIN — MIRTAZAPINE 30 MILLIGRAM(S): 45 TABLET, ORALLY DISINTEGRATING ORAL at 12:57

## 2020-08-20 RX ADMIN — Medication 2 MILLIGRAM(S): at 08:26

## 2020-08-31 ENCOUNTER — APPOINTMENT (OUTPATIENT)
Dept: OTOLARYNGOLOGY | Facility: CLINIC | Age: 40
End: 2020-08-31

## 2020-09-19 ENCOUNTER — TRANSCRIPTION ENCOUNTER (OUTPATIENT)
Age: 40
End: 2020-09-19

## 2020-11-12 ENCOUNTER — EMERGENCY (EMERGENCY)
Facility: HOSPITAL | Age: 40
LOS: 1 days | Discharge: HOME | End: 2020-11-12
Attending: EMERGENCY MEDICINE | Admitting: EMERGENCY MEDICINE
Payer: MEDICAID

## 2020-11-12 VITALS
HEIGHT: 67 IN | OXYGEN SATURATION: 98 % | HEART RATE: 78 BPM | TEMPERATURE: 98 F | DIASTOLIC BLOOD PRESSURE: 83 MMHG | SYSTOLIC BLOOD PRESSURE: 119 MMHG | RESPIRATION RATE: 16 BRPM

## 2020-11-12 PROCEDURE — 73630 X-RAY EXAM OF FOOT: CPT | Mod: 26,LT

## 2020-11-12 PROCEDURE — 99284 EMERGENCY DEPT VISIT MOD MDM: CPT

## 2020-11-12 RX ORDER — CEFAZOLIN SODIUM 1 G
2000 VIAL (EA) INJECTION ONCE
Refills: 0 | Status: COMPLETED | OUTPATIENT
Start: 2020-11-12 | End: 2020-11-12

## 2020-11-12 RX ORDER — LIDOCAINE HYDROCHLORIDE AND EPINEPHRINE 10; 10 MG/ML; UG/ML
10 INJECTION, SOLUTION INFILTRATION; PERINEURAL ONCE
Refills: 0 | Status: COMPLETED | OUTPATIENT
Start: 2020-11-12 | End: 2020-11-12

## 2020-11-12 RX ORDER — CEPHALEXIN 500 MG
1 CAPSULE ORAL
Qty: 40 | Refills: 0
Start: 2020-11-12 | End: 2020-11-21

## 2020-11-12 RX ORDER — LIDOCAINE HCL 20 MG/ML
20 VIAL (ML) INJECTION ONCE
Refills: 0 | Status: COMPLETED | OUTPATIENT
Start: 2020-11-12 | End: 2020-11-12

## 2020-11-12 RX ADMIN — Medication 100 MILLIGRAM(S): at 20:23

## 2020-11-12 RX ADMIN — LIDOCAINE HYDROCHLORIDE AND EPINEPHRINE 10 MILLILITER(S): 10; 10 INJECTION, SOLUTION INFILTRATION; PERINEURAL at 18:31

## 2020-11-12 RX ADMIN — Medication 20 MILLILITER(S): at 19:38

## 2020-11-12 NOTE — ED PROVIDER NOTE - PHYSICAL EXAMINATION
Physical Exam:  Gen: NAD, non-toxic appearing, awake alert   Lung: CTAB, no respiratory distress, no wheezes/rhonchi/rales B/L, speaking in full sentences  CV: RRR, no murmurs, rubs or gallops  Abd: soft, NT, ND, no guarding, no rigidity, no rebound tenderness, no CVA tenderness   MSK: no visible deformities, ROM normal in UE/LE, no spinal tenderness   Neuro: Numbness to medial L big toe distal to injury/anteriorly proximal to injury  Skin: partial amputation at IP joint of L big toe, bleeding under control  Psych: normal affect, calm  ~Rajan Johnson MD (PGY-1) Physical Exam:  Gen: NAD, non-toxic appearing, awake alert   Lung: CTAB, no respiratory distress, no wheezes/rhonchi/rales B/L, speaking in full sentences  CV: RRR, no murmurs, rubs or gallops  Abd: soft, NT, ND, no guarding, no rigidity, no rebound tenderness, no CVA tenderness   MSK: no visible deformities, ROM normal in UE/LE, no spinal tenderness   Neuro: Numbness to medial L big toe distal to injury/anteriorly proximal to injury  Skin: partial amputation at DIP joint of L big toe, bleeding under control  Psych: normal affect, calm  ~Rajan Johnson MD (PGY-1)

## 2020-11-12 NOTE — ED PROVIDER NOTE - NSPTACCESSSVCSAPPTDETAILS_ED_ALL_ED_FT
Follow up at [facility] specialty clinic for podiatry within next week

## 2020-11-12 NOTE — ED PROVIDER NOTE - NSFOLLOWUPCLINICS_GEN_ALL_ED_FT
[facility]  Podiatry  [address] [place] [address] - [address] [place] [address]  Phone: [phone]  Fax:   Follow Up Time: 7-10 Days

## 2020-11-12 NOTE — ED PROVIDER NOTE - ATTENDING CONTRIBUTION TO CARE
I have personally performed a face to face bedside history and physical examination of this patient. I have discussed the history, examination, review of systems, assessment and plan of management with the resident. I have reviewed the electronic medical record and amended it to reflect my history, review of systems, physical exam, assessment and plan.    Brief HPI: 39M presenting for L 1st toe injury sustained while running.     Vitals:   Reviewed    Exam:    GEN:  Non-toxic appearing, non-distressed, speaking full sentences, non-diaphoretic, AAOx3  HEENT:  NCAT, neck supple, EOMI, PERRLA, sclera anicteric, no conjunctival pallor or injection, no stridor, normal voice, no tonsillar exudate, uvula midline, tympanic membranes and external auditory canals normal appearing bilaterally   CV:  regular rhythm and rate, s1/s2 audible, no murmurs, rubs or gallops, peripheral pulses 2+ and symmetric  PULM:  non-labored respirations, lungs clear to auscultation bilaterally, no wheezes, crackles or rales  ABD:  non distended, non-tender, no rebound, no guarding, negative Bergeron's sign, bowel sounds normal, no cvat  MSK:  left first toe with circumferential laceration at IP joint, nail plate intact, no active bleeding, sensation intact  NEURO:  AAOx3, CN II-XII intact, motor 5/5 in upper and lower extremities bilaterally, sensation grossly intact in extremities and trunk, finger to nose testing wnl, no nystagmus, negative Romberg, no pronator drift, no gait deficit  SKIN:  warm, dry, no rash or vesicles     A/P:  39M presenting for L 1st toe injury.  VSS.  Exam consistent with extensive laceration vs. partial amputation.  Will obtain x-ray, tdap, supportive care.  Possible podiatry consult.  Dispo pending.

## 2020-11-12 NOTE — ED PROVIDER NOTE - PROGRESS NOTE DETAILS
Alex RODRIGUEZ (PGY-1)   pt's pain improved. podiatry repaired wound. will f/u w/ pt in clinic. will send pt home on abx and crutches. d/c to home w/ return precautions

## 2020-11-12 NOTE — ED ADULT TRIAGE NOTE - CHIEF COMPLAINT QUOTE
Pt. states "I have a compound fracture to my left big toe". Pt. was running barefoot when his toe bent under his foot prior to arrival. Open wound noted to toe with active bleeding.

## 2020-11-12 NOTE — ED PROVIDER NOTE - NSFOLLOWUPINSTRUCTIONS_ED_ALL_ED_FT
Please take Keflex four times a day for 10 days    Please only apply weight to the heel if needed and use crutches. Please keep the area clean and dry to prevent infection    Please follow up with the podiatry clinic within 1 week    Please return to the ER if you have worsening pain, fevers/chills, numbness/tingling, weakness, or any other abnormal symptoms

## 2020-11-12 NOTE — CONSULT NOTE ADULT - ASSESSMENT
40yo M presents with left foot hallux open fracture w/ comminuted distal phalanx base fracture   -pt seen and evaluated in ED   -Vital signs stable   -Left foot hallux open fracture w/ laceration overlying DIPJ, no exposed bone, sanguinous drainage, able to move digits, decreased sensation to medial skin flap   -Left foot xray shows nondisplaced comminuted fracture of base of distal phalanx   -Local anesthesia of L hallux digital block utilizing 1% lidocaine plain   -4-0 nylon sutures used to close hallux laceration after flushing with copious amounts of sterile saline   -Betadine splint applied to L hallux, dressed with DSD   -Surgical shoe and crutches provided to patient, WBAT to left heel in surgical shoe   -Recommend d/c on PO Keflex for 10 days   -Pt to f/u with Dr. Salmon as outpatient within 1 week of d/c, call [phone] to schedule appointment  -Discussed w/ attending

## 2020-11-12 NOTE — ED PROVIDER NOTE - OBJECTIVE STATEMENT
39M presenting for L 1st toe injury. Was running outside when his toe flexed under his foot and he stepped on it. Felt "crack" and pain immediately afterwards. Injury at IP joint with bleeding currently controlled. Endorses numbness to medial toe distal to injury and anteriorly proximal to injury. No fevers/chills, head trauma, or other injuries

## 2020-11-12 NOTE — ED PROVIDER NOTE - NS ED ROS FT
CONST: no fevers, no chills  CV: no chest pain, no leg swelling  RESP: no shortness of breath, no cough  ABD: no abdominal pain, no nausea, no vomiting, no diarrhea  : no dysuria, no flank pain, no hematuria  MSK: no back pain, +extremity pain  NEURO: no headache or additional neurologic complaints  HEME: no easy bleeding  SKIN:  +partial amputation

## 2020-11-12 NOTE — CONSULT NOTE ADULT - SUBJECTIVE AND OBJECTIVE BOX
Podiatry pager #: [phone] ([place])/ [phone] ([facility])    Patient is a 39y old  Male who presents with a chief complaint of left hallux open fracture     HPI:     39M presenting for L 1st toe injury. Was running outside when his toe flexed under his foot and he stepped on it. Felt "crack" and pain immediately afterwards. Injury at IP joint with bleeding currently controlled. Endorses numbness to medial toe distal to injury and anteriorly proximal to injury. No fevers/chills, head trauma, or other injuries    PAST MEDICAL & SURGICAL HISTORY:  Diverticulitis    No significant past surgical history        MEDICATIONS  (STANDING):  ceFAZolin   IVPB 2000 milliGRAM(s) IV Intermittent once    MEDICATIONS  (PRN):      Allergies    Haldol (Swelling)  Prolixin (Swelling)  Thorazine (Swelling)    Intolerances        VITALS:    Vital Signs Last 24 Hrs  T(C): 36.8 (12 Nov 2020 17:42), Max: 36.8 (12 Nov 2020 17:42)  T(F): 98.2 (12 Nov 2020 17:42), Max: 98.2 (12 Nov 2020 17:42)  HR: 78 (12 Nov 2020 17:42) (78 - 78)  BP: 119/83 (12 Nov 2020 17:42) (119/83 - 119/83)  BP(mean): --  RR: 16 (12 Nov 2020 17:42) (16 - 16)  SpO2: 98% (12 Nov 2020 17:42) (98% - 98%)    LABS:                CAPILLARY BLOOD GLUCOSE              LOWER EXTREMITY PHYSICAL EXAM:    Vascular: DP/PT 2/4 B/L, CFT <3 seconds B/L, Temperature gradient warm to cool B/L  Neuro: Epicritic sensation intact to the level of digits B/L, decreased sensation to medial hallux flap L foot  Musculoskeletal/Ortho: able to move toes L foot including hallux, pain on palpation of distal hallux L foot   Skin: open laceration overlying L hallux DIPJ, sanguinous drainage, no exposed bone    RADIOLOGY & ADDITIONAL STUDIES:    < from: Xray Foot AP + Lateral + Oblique, Left (11.12.20 @ 19:09) >    EXAM:  RAD FOOT MIN 3 VIEWS LT        PROCEDURE DATE:  Nov 12 2020         INTERPRETATION:  CLINICAL INFORMATION: First toe injury    TECHNIQUE: 3 views of the left foot    COMPARISON: Foot radiographs 6/20/2019    FINDINGS:    Acute comminuted fracture of the base of the distal hallux with an intra-articular extension. Mild soft tissue edema.. No radiopaque foreign body.    IMPRESSION:    Comminuted fracture at the base of the distal hallux with intra-articular extension.            INGRID CAREY MD; Resident Radiology  This document has been electronically signed.  FIDEL BLAIR MD; Attending Radiologist  This document has been electronically signed. Nov 12 2020  7:38PM    < end of copied text >

## 2020-11-12 NOTE — ED PROVIDER NOTE - PATIENT PORTAL LINK FT
You can access the FollowMyHealth Patient Portal offered by [facility] by registering at the following website: [email or web address]. By joining [facility]’s FollowMyHealth portal, you will also be able to view your health information using other applications (apps) compatible with our system.

## 2020-11-30 ENCOUNTER — APPOINTMENT (OUTPATIENT)
Dept: PODIATRY | Facility: HOSPITAL | Age: 40
End: 2020-11-30

## 2021-04-08 NOTE — ED ADULT NURSE NOTE - NSHOSCREENINGQ1_ED_ALL_ED
Lab orders extended for appt next week with Dr. Reynolds.   
Patient called to reschedule appointment that he had to cancel in January due to insurance. Patient is now scheduled for followup with Dr Reynolds on 4/20/21. Can lab orders please be extended, patient will get them done in [place] next week.  
No

## 2021-04-21 NOTE — ED PROVIDER NOTE - PROGRESS NOTE ADDITIONAL1
assisted patient with the completion of the Advance Directive form . Copied and charted document in like chart. Copies given to patient for distribution to agents later.     Jeff Doll   Spiritual Care   [phone]
Additional Progress Note...

## 2021-05-17 NOTE — PROGRESS NOTE ADULT - PROBLEM SELECTOR PLAN 2
Abscess/bursitis seen on MRI-collection is about a cm and is usually overly enhanced in MRI as per IR-will not be accessible for drainage  c/w IV abx   Pain control with oxycodone Home

## 2021-05-21 ENCOUNTER — EMERGENCY (EMERGENCY)
Facility: HOSPITAL | Age: 41
LOS: 1 days | Discharge: HOME | End: 2021-05-21
Attending: EMERGENCY MEDICINE | Admitting: EMERGENCY MEDICINE
Payer: MEDICAID

## 2021-05-21 VITALS
SYSTOLIC BLOOD PRESSURE: 128 MMHG | OXYGEN SATURATION: 100 % | HEART RATE: 89 BPM | DIASTOLIC BLOOD PRESSURE: 89 MMHG | RESPIRATION RATE: 16 BRPM

## 2021-05-21 VITALS
HEIGHT: 67 IN | RESPIRATION RATE: 16 BRPM | TEMPERATURE: 98 F | SYSTOLIC BLOOD PRESSURE: 142 MMHG | OXYGEN SATURATION: 99 % | HEART RATE: 88 BPM | DIASTOLIC BLOOD PRESSURE: 92 MMHG

## 2021-05-21 LAB
ALBUMIN SERPL ELPH-MCNC: 4.3 G/DL — SIGNIFICANT CHANGE UP (ref 3.3–5)
ALP SERPL-CCNC: 72 U/L — SIGNIFICANT CHANGE UP (ref 40–120)
ALT FLD-CCNC: 10 U/L — SIGNIFICANT CHANGE UP (ref 4–41)
ANION GAP SERPL CALC-SCNC: 13 MMOL/L — SIGNIFICANT CHANGE UP (ref 7–14)
AST SERPL-CCNC: 18 U/L — SIGNIFICANT CHANGE UP (ref 4–40)
B PERT DNA SPEC QL NAA+PROBE: SIGNIFICANT CHANGE UP
BASOPHILS # BLD AUTO: 0.02 K/UL — SIGNIFICANT CHANGE UP (ref 0–0.2)
BASOPHILS NFR BLD AUTO: 0.2 % — SIGNIFICANT CHANGE UP (ref 0–2)
BILIRUB SERPL-MCNC: 0.3 MG/DL — SIGNIFICANT CHANGE UP (ref 0.2–1.2)
BLOOD GAS VENOUS COMPREHENSIVE RESULT: SIGNIFICANT CHANGE UP
BUN SERPL-MCNC: 7 MG/DL — SIGNIFICANT CHANGE UP (ref 7–23)
C PNEUM DNA SPEC QL NAA+PROBE: SIGNIFICANT CHANGE UP
CALCIUM SERPL-MCNC: 9.4 MG/DL — SIGNIFICANT CHANGE UP (ref 8.4–10.5)
CHLORIDE SERPL-SCNC: 98 MMOL/L — SIGNIFICANT CHANGE UP (ref 98–107)
CO2 SERPL-SCNC: 25 MMOL/L — SIGNIFICANT CHANGE UP (ref 22–31)
CREAT SERPL-MCNC: 0.75 MG/DL — SIGNIFICANT CHANGE UP (ref 0.5–1.3)
EOSINOPHIL # BLD AUTO: 0.05 K/UL — SIGNIFICANT CHANGE UP (ref 0–0.5)
EOSINOPHIL NFR BLD AUTO: 0.5 % — SIGNIFICANT CHANGE UP (ref 0–6)
FLUAV SUBTYP SPEC NAA+PROBE: SIGNIFICANT CHANGE UP
FLUBV RNA SPEC QL NAA+PROBE: SIGNIFICANT CHANGE UP
GLUCOSE SERPL-MCNC: 115 MG/DL — HIGH (ref 70–99)
HADV DNA SPEC QL NAA+PROBE: SIGNIFICANT CHANGE UP
HCOV 229E RNA SPEC QL NAA+PROBE: SIGNIFICANT CHANGE UP
HCOV HKU1 RNA SPEC QL NAA+PROBE: SIGNIFICANT CHANGE UP
HCOV NL63 RNA SPEC QL NAA+PROBE: SIGNIFICANT CHANGE UP
HCOV OC43 RNA SPEC QL NAA+PROBE: SIGNIFICANT CHANGE UP
HCT VFR BLD CALC: 39.8 % — SIGNIFICANT CHANGE UP (ref 39–50)
HGB BLD-MCNC: 13 G/DL — SIGNIFICANT CHANGE UP (ref 13–17)
HIV 1+2 AB+HIV1 P24 AG SERPL QL IA: SIGNIFICANT CHANGE UP
HMPV RNA SPEC QL NAA+PROBE: SIGNIFICANT CHANGE UP
HPIV1 RNA SPEC QL NAA+PROBE: SIGNIFICANT CHANGE UP
HPIV2 RNA SPEC QL NAA+PROBE: SIGNIFICANT CHANGE UP
HPIV3 RNA SPEC QL NAA+PROBE: SIGNIFICANT CHANGE UP
HPIV4 RNA SPEC QL NAA+PROBE: SIGNIFICANT CHANGE UP
IANC: 6.91 K/UL — SIGNIFICANT CHANGE UP (ref 1.5–8.5)
IMM GRANULOCYTES NFR BLD AUTO: 0.3 % — SIGNIFICANT CHANGE UP (ref 0–1.5)
LYMPHOCYTES # BLD AUTO: 1.71 K/UL — SIGNIFICANT CHANGE UP (ref 1–3.3)
LYMPHOCYTES # BLD AUTO: 18.6 % — SIGNIFICANT CHANGE UP (ref 13–44)
MAGNESIUM SERPL-MCNC: 1.8 MG/DL — SIGNIFICANT CHANGE UP (ref 1.6–2.6)
MCHC RBC-ENTMCNC: 28.4 PG — SIGNIFICANT CHANGE UP (ref 27–34)
MCHC RBC-ENTMCNC: 32.7 GM/DL — SIGNIFICANT CHANGE UP (ref 32–36)
MCV RBC AUTO: 86.9 FL — SIGNIFICANT CHANGE UP (ref 80–100)
MONOCYTES # BLD AUTO: 0.45 K/UL — SIGNIFICANT CHANGE UP (ref 0–0.9)
MONOCYTES NFR BLD AUTO: 4.9 % — SIGNIFICANT CHANGE UP (ref 2–14)
NEUTROPHILS # BLD AUTO: 6.91 K/UL — SIGNIFICANT CHANGE UP (ref 1.8–7.4)
NEUTROPHILS NFR BLD AUTO: 75.5 % — SIGNIFICANT CHANGE UP (ref 43–77)
NRBC # BLD: 0 /100 WBCS — SIGNIFICANT CHANGE UP
NRBC # FLD: 0 K/UL — SIGNIFICANT CHANGE UP
PHOSPHATE SERPL-MCNC: 3.7 MG/DL — SIGNIFICANT CHANGE UP (ref 2.5–4.5)
PLATELET # BLD AUTO: 289 K/UL — SIGNIFICANT CHANGE UP (ref 150–400)
POTASSIUM SERPL-MCNC: 3.9 MMOL/L — SIGNIFICANT CHANGE UP (ref 3.5–5.3)
POTASSIUM SERPL-SCNC: 3.9 MMOL/L — SIGNIFICANT CHANGE UP (ref 3.5–5.3)
PROCALCITONIN SERPL-MCNC: 0.02 NG/ML — SIGNIFICANT CHANGE UP (ref 0.02–0.1)
PROT SERPL-MCNC: 7.6 G/DL — SIGNIFICANT CHANGE UP (ref 6–8.3)
RAPID RVP RESULT: SIGNIFICANT CHANGE UP
RBC # BLD: 4.58 M/UL — SIGNIFICANT CHANGE UP (ref 4.2–5.8)
RBC # FLD: 12.2 % — SIGNIFICANT CHANGE UP (ref 10.3–14.5)
RSV RNA SPEC QL NAA+PROBE: SIGNIFICANT CHANGE UP
RV+EV RNA SPEC QL NAA+PROBE: SIGNIFICANT CHANGE UP
SARS-COV-2 RNA SPEC QL NAA+PROBE: SIGNIFICANT CHANGE UP
SODIUM SERPL-SCNC: 136 MMOL/L — SIGNIFICANT CHANGE UP (ref 135–145)
WBC # BLD: 9.17 K/UL — SIGNIFICANT CHANGE UP (ref 3.8–10.5)
WBC # FLD AUTO: 9.17 K/UL — SIGNIFICANT CHANGE UP (ref 3.8–10.5)

## 2021-05-21 PROCEDURE — 99285 EMERGENCY DEPT VISIT HI MDM: CPT

## 2021-05-21 PROCEDURE — 71275 CT ANGIOGRAPHY CHEST: CPT | Mod: 26

## 2021-05-21 PROCEDURE — 71045 X-RAY EXAM CHEST 1 VIEW: CPT | Mod: 26

## 2021-05-21 RX ORDER — PANTOPRAZOLE SODIUM 20 MG/1
1 TABLET, DELAYED RELEASE ORAL
Qty: 30 | Refills: 0
Start: 2021-05-21 | End: 2021-06-19

## 2021-05-21 NOTE — ED PROVIDER NOTE - PROGRESS NOTE DETAILS
Patient updated with results of testing including CT results that show esophageal thickening.  Updated with plan to send PPI to [facility] pharmacy.  Agreeable with plan.  Will f/u with PCP and agrees with GI f/u.  Informed of plan to call if cultures positive.

## 2021-05-21 NOTE — ED ADULT TRIAGE NOTE - CHIEF COMPLAINT QUOTE
burning ch x 3 days. nausea. denies vomiting.. diarrhea x 3 days. denies fever. reports chills. productive cough since last pm.

## 2021-05-21 NOTE — ED PROVIDER NOTE - OBJECTIVE STATEMENT
40M pmh MDD (multiple psychiatric admissions at [facility] and history of suicide attempts), hx substance use disorder on methadone, hx umdomiciled, osteomyelitis previously in pelvis presents with SOB, chest burning, productive cough, myalgias, metallic taste in mouth for past 4-5 days.  Also notes diarrhea x3 days.  +subjective fever and chills, nausea for same duration.  He has not been vaccinated for COVID.  Denies known exposure to COVID.    specifically denies headache, sore throat, abdominal pain, vomiting, constipation, back or neck pain, or lower extremity edema.

## 2021-05-21 NOTE — ED PROVIDER NOTE - PHYSICAL EXAMINATION
Vital signs reviewed.  CONSTITUTIONAL: uncomfortable appearing, awake  HEAD: Normocephalic; atraumatic  EYES: EOMI, no nystagmus, no conjunctival injection, no scleral icterus  MOUTH/THROAT:  MMM, trachea midline, +tongue discoloration, +poor dentition  NECK: Trachea midline  CV: Normal S1, S2; no audible murmurs; extremities WWP  RESP: slightly increased work of breathing; +accessory muscle use, +crackles noted in RML, no stridor  ABD: soft, non-distended; non-tender  : Deferred  MSK/EXT: no edema, no limited ROM  SKIN: Color appropriate for race  NEURO: No focal sensory or motor deficits

## 2021-05-21 NOTE — ED ADULT NURSE NOTE - OBJECTIVE STATEMENT
Pt present to ED c/o of chest burning with nausea and sob x 5 days. Pt denies fever, chills abdominal discomfort. Respirations even/unlabored, Pt present to ED c/o of chest burning with nausea and sob x 5 days. Pt also experiences nubmness/tingling sensation to right thigh and gargle in voice noted. Pt denies fever, chills abdominal discomfort. Respirations even/unlabored, abdomen nontender, sinus on cardiac monitor, MD Brown at bedside to eval, 20g iv to left ac, labs drawn and sent. Xray pending

## 2021-05-21 NOTE — ED PROVIDER NOTE - ATTENDING CONTRIBUTION TO CARE
I performed a history and physical exam of the patient and discussed their management with the resident and /or advanced care provider. I reviewed the resident and /or ACP's note and agree with the documented findings and plan of care. My medical decision making and observations are found above.  Lungs clear(no rales or wheezing)  and soft I performed a history and physical exam of the patient and discussed their management with the resident and /or advanced care provider. I reviewed the resident and /or ACP's note and agree with the documented findings and plan of care. My medical decision making and observations are found above.  Lungs clear(no rales or wheezing)  and soft abd

## 2021-05-21 NOTE — ED PROVIDER NOTE - NSFOLLOWUPINSTRUCTIONS_ED_ALL_ED_FT
You were found on todays You were found on todays exam to have esophageal thickening consistent with GERD.  A prescription for protonix was sent to your pharmacy.  You should follow up with your primary care doctor and a referral for GI will be made with our referral coordinator.      If you have any severe increase in pain, fever, uncontrollable nausea or vomiting, or inability to tolerate eating and drinking you need to immediately return to the emergency room.

## 2021-05-21 NOTE — ED PROVIDER NOTE - NS ED ROS FT
In additional the that documented in the HPI, the additional ROS was obtained:    CONSTITUTIONAL: +fever, +chills  EYES: no change in vision, no blurred vision  HEENT: no trouble swallowing, no trouble speaking, no sore throat  NECK: no pain, no stiffness  CV: +chest burning, no palpitations  RESP: +productive cough, +shortness of breath  GI: no abdominal pain, +nausea, no vomiting, +diarrhea, no constipation, no BRBPR or melena  : No dysuria, no frequency  NEURO: no headache, no new numbness, no weakness, no dizziness  SKIN: no new rashes  HEME: No easy bruising or bleeding  MSK: No joint pain, no recent trauma  Luther Brown M.D. -Resident

## 2021-05-21 NOTE — ED PROVIDER NOTE - CLINICAL SUMMARY MEDICAL DECISION MAKING FREE TEXT BOX
40M pmh depression, IVDU on methadone, osteomyelitis presents with 5 days SOB, productive cough, chest burning, fever, myalgias, loss of taste, diarrhea.  DDx COVID, pneumonia, would consider elevated risk of HIV and immunocompromised AIDS-defining illness such as PCP pneumonia although less likely given negative HIV test in 2019 and 2017.  Will get labs, CXR, ekg, procalcitonin to start, anticipate admission given poor social situation. 40M pmh depression, IVDU on methadone, osteomyelitis presents with 5 days SOB, productive cough, chest burning, fever, myalgias, loss of taste, diarrhea.  DDx COVID, pneumonia, would consider elevated risk of HIV and immunocompromised AIDS-defining illness such as PCP pneumonia although less likely given negative HIV test in 2019 and 2017.  Will get labs, CXR, ekg, procalcitonin to start, anticipate admission given poor social situation.  Carlo: 39 yo with previous illness presents with cough, sob, chest pain. Looks viral. ?covid. will get labs and cxr. O2 sat ok. pt awake alert and does not desat. 40M pmh depression, IVDU on methadone, osteomyelitis presents with 5 days SOB, productive cough, chest burning, fever, myalgias, loss of taste, diarrhea.  DDx COVID, pneumonia, would consider elevated risk of HIV and immunocompromised AIDS-defining illness such as PCP pneumonia although less likely given negative HIV test in 2019 and 2017.  Will get labs, CXR, ekg, procalcitonin to start, anticipate admission given poor social situation.  Carlo: 39 yo with previous illness presents with cough, sob, chest pain. Looks viral. ?covid. will get labs and cxr. O2 sat ok. pt awake alert and does not desat. GERD also possible

## 2021-05-21 NOTE — ED PROVIDER NOTE - PATIENT PORTAL LINK FT
You can access the FollowMyHealth Patient Portal offered by [facility] by registering at the following website: [email or web address]. By joining [facility]’s FollowMyHealth portal, you will also be able to view your health information using other applications (apps) compatible with our system.

## 2021-05-26 LAB
CULTURE RESULTS: SIGNIFICANT CHANGE UP
CULTURE RESULTS: SIGNIFICANT CHANGE UP
SPECIMEN SOURCE: SIGNIFICANT CHANGE UP
SPECIMEN SOURCE: SIGNIFICANT CHANGE UP

## 2021-06-09 ENCOUNTER — EMERGENCY (EMERGENCY)
Facility: HOSPITAL | Age: 41
LOS: 0 days | Discharge: PSYCHIATRIC OR SUBSTANCE USE FACILITY | End: 2021-06-10
Attending: EMERGENCY MEDICINE
Payer: MEDICAID

## 2021-06-09 VITALS
OXYGEN SATURATION: 96 % | DIASTOLIC BLOOD PRESSURE: 76 MMHG | HEART RATE: 110 BPM | WEIGHT: 149.91 LBS | TEMPERATURE: 98 F | RESPIRATION RATE: 17 BRPM | SYSTOLIC BLOOD PRESSURE: 114 MMHG

## 2021-06-09 DIAGNOSIS — F39 UNSPECIFIED MOOD [AFFECTIVE] DISORDER: ICD-10-CM

## 2021-06-09 DIAGNOSIS — Z87.891 PERSONAL HISTORY OF NICOTINE DEPENDENCE: ICD-10-CM
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
## 2021-06-09 DIAGNOSIS — F11.29 OPIOID DEPENDENCE WITH UNSPECIFIED OPIOID-INDUCED DISORDER: ICD-10-CM

## 2021-06-09 DIAGNOSIS — E11.9 TYPE 2 DIABETES MELLITUS WITHOUT COMPLICATIONS: ICD-10-CM

## 2021-06-09 DIAGNOSIS — F63.9 IMPULSE DISORDER, UNSPECIFIED: ICD-10-CM

## 2021-06-09 DIAGNOSIS — Z87.19 PERSONAL HISTORY OF OTHER DISEASES OF THE DIGESTIVE SYSTEM: ICD-10-CM

## 2021-06-09 DIAGNOSIS — R45.84 ANHEDONIA: ICD-10-CM

## 2021-06-09 DIAGNOSIS — F60.89 OTHER SPECIFIC PERSONALITY DISORDERS: ICD-10-CM

## 2021-06-09 DIAGNOSIS — F43.10 POST-TRAUMATIC STRESS DISORDER, UNSPECIFIED: ICD-10-CM

## 2021-06-09 DIAGNOSIS — S61.512A LACERATION WITHOUT FOREIGN BODY OF LEFT WRIST, INITIAL ENCOUNTER: ICD-10-CM

## 2021-06-09 DIAGNOSIS — F41.8 OTHER SPECIFIED ANXIETY DISORDERS: ICD-10-CM

## 2021-06-09 DIAGNOSIS — F32.9 MAJOR DEPRESSIVE DISORDER, SINGLE EPISODE, UNSPECIFIED: ICD-10-CM

## 2021-06-09 DIAGNOSIS — F10.239 ALCOHOL DEPENDENCE WITH WITHDRAWAL, UNSPECIFIED: ICD-10-CM

## 2021-06-09 DIAGNOSIS — M54.9 DORSALGIA, UNSPECIFIED: ICD-10-CM
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
## 2021-06-09 DIAGNOSIS — K21.9 GASTRO-ESOPHAGEAL REFLUX DISEASE WITHOUT ESOPHAGITIS: ICD-10-CM
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
## 2021-06-09 DIAGNOSIS — G89.29 OTHER CHRONIC PAIN: ICD-10-CM

## 2021-06-09 DIAGNOSIS — Z20.822 CONTACT WITH AND (SUSPECTED) EXPOSURE TO COVID-19: ICD-10-CM

## 2021-06-09 DIAGNOSIS — Z91.5 PERSONAL HISTORY OF SELF-HARM: ICD-10-CM

## 2021-06-09 DIAGNOSIS — F13.99 SEDATIVE, HYPNOTIC OR ANXIOLYTIC USE, UNSPECIFIED WITH UNSPECIFIED SEDATIVE, HYPNOTIC OR ANXIOLYTIC-INDUCED DISORDER: ICD-10-CM

## 2021-06-09 DIAGNOSIS — F10.229 ALCOHOL DEPENDENCE WITH INTOXICATION, UNSPECIFIED: ICD-10-CM
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
## 2021-06-09 PROBLEM — M86.9 OSTEOMYELITIS, UNSPECIFIED: Chronic | Status: ACTIVE | Noted: 2021-05-21

## 2021-06-09 PROBLEM — F19.90 OTHER PSYCHOACTIVE SUBSTANCE USE, UNSPECIFIED, UNCOMPLICATED: Chronic | Status: ACTIVE | Noted: 2021-05-21

## 2021-06-09 LAB
ALBUMIN SERPL ELPH-MCNC: 3.9 G/DL — SIGNIFICANT CHANGE UP (ref 3.3–5)
ALP SERPL-CCNC: 71 U/L — SIGNIFICANT CHANGE UP (ref 40–120)
ALT FLD-CCNC: 29 U/L — SIGNIFICANT CHANGE UP (ref 12–78)
AMPHET UR-MCNC: NEGATIVE — SIGNIFICANT CHANGE UP
ANION GAP SERPL CALC-SCNC: 8 MMOL/L — SIGNIFICANT CHANGE UP (ref 5–17)
APAP SERPL-MCNC: < 2 UG/ML (ref 10–30)
AST SERPL-CCNC: 53 U/L — HIGH (ref 15–37)
BARBITURATES UR SCN-MCNC: NEGATIVE — SIGNIFICANT CHANGE UP
BASOPHILS # BLD AUTO: 0.06 K/UL — SIGNIFICANT CHANGE UP (ref 0–0.2)
BASOPHILS NFR BLD AUTO: 0.6 % — SIGNIFICANT CHANGE UP (ref 0–2)
BENZODIAZ UR-MCNC: POSITIVE — SIGNIFICANT CHANGE UP
BILIRUB SERPL-MCNC: 0.3 MG/DL — SIGNIFICANT CHANGE UP (ref 0.2–1.2)
BUN SERPL-MCNC: 7 MG/DL — SIGNIFICANT CHANGE UP (ref 7–23)
CALCIUM SERPL-MCNC: 9.1 MG/DL — SIGNIFICANT CHANGE UP (ref 8.5–10.1)
CHLORIDE SERPL-SCNC: 106 MMOL/L — SIGNIFICANT CHANGE UP (ref 96–108)
CO2 SERPL-SCNC: 26 MMOL/L — SIGNIFICANT CHANGE UP (ref 22–31)
COCAINE METAB.OTHER UR-MCNC: NEGATIVE — SIGNIFICANT CHANGE UP
COVID-19 SPIKE DOMAIN AB INTERP: NEGATIVE — SIGNIFICANT CHANGE UP
COVID-19 SPIKE DOMAIN ANTIBODY RESULT: 0.4 U/ML — SIGNIFICANT CHANGE UP
CREAT SERPL-MCNC: 0.86 MG/DL — SIGNIFICANT CHANGE UP (ref 0.5–1.3)
EOSINOPHIL # BLD AUTO: 0.06 K/UL — SIGNIFICANT CHANGE UP (ref 0–0.5)
EOSINOPHIL NFR BLD AUTO: 0.6 % — SIGNIFICANT CHANGE UP (ref 0–6)
ETHANOL SERPL-MCNC: 91 MG/DL — HIGH (ref 0–10)
GLUCOSE SERPL-MCNC: 98 MG/DL — SIGNIFICANT CHANGE UP (ref 70–99)
HCT VFR BLD CALC: 40.7 % — SIGNIFICANT CHANGE UP (ref 39–50)
HGB BLD-MCNC: 13.6 G/DL — SIGNIFICANT CHANGE UP (ref 13–17)
IMM GRANULOCYTES NFR BLD AUTO: 0.3 % — SIGNIFICANT CHANGE UP (ref 0–1.5)
LYMPHOCYTES # BLD AUTO: 2.29 K/UL — SIGNIFICANT CHANGE UP (ref 1–3.3)
LYMPHOCYTES # BLD AUTO: 23.6 % — SIGNIFICANT CHANGE UP (ref 13–44)
MCHC RBC-ENTMCNC: 28.2 PG — SIGNIFICANT CHANGE UP (ref 27–34)
MCHC RBC-ENTMCNC: 33.4 GM/DL — SIGNIFICANT CHANGE UP (ref 32–36)
MCV RBC AUTO: 84.4 FL — SIGNIFICANT CHANGE UP (ref 80–100)
METHADONE UR-MCNC: POSITIVE — SIGNIFICANT CHANGE UP
MONOCYTES # BLD AUTO: 0.42 K/UL — SIGNIFICANT CHANGE UP (ref 0–0.9)
MONOCYTES NFR BLD AUTO: 4.3 % — SIGNIFICANT CHANGE UP (ref 2–14)
NEUTROPHILS # BLD AUTO: 6.84 K/UL — SIGNIFICANT CHANGE UP (ref 1.8–7.4)
NEUTROPHILS NFR BLD AUTO: 70.6 % — SIGNIFICANT CHANGE UP (ref 43–77)
NRBC # BLD: 0 /100 WBCS — SIGNIFICANT CHANGE UP (ref 0–0)
OPIATES UR-MCNC: NEGATIVE — SIGNIFICANT CHANGE UP
PCP SPEC-MCNC: SIGNIFICANT CHANGE UP
PCP UR-MCNC: NEGATIVE — SIGNIFICANT CHANGE UP
PLATELET # BLD AUTO: 315 K/UL — SIGNIFICANT CHANGE UP (ref 150–400)
POTASSIUM SERPL-MCNC: 3.2 MMOL/L — LOW (ref 3.5–5.3)
POTASSIUM SERPL-SCNC: 3.2 MMOL/L — LOW (ref 3.5–5.3)
PROT SERPL-MCNC: 8.3 GM/DL — SIGNIFICANT CHANGE UP (ref 6–8.3)
RAPID RVP RESULT: SIGNIFICANT CHANGE UP
RBC # BLD: 4.82 M/UL — SIGNIFICANT CHANGE UP (ref 4.2–5.8)
RBC # FLD: 12.3 % — SIGNIFICANT CHANGE UP (ref 10.3–14.5)
SALICYLATES SERPL-MCNC: 2.1 MG/DL — LOW (ref 2.8–20)
SARS-COV-2 IGG+IGM SERPL QL IA: 0.4 U/ML — SIGNIFICANT CHANGE UP
SARS-COV-2 IGG+IGM SERPL QL IA: NEGATIVE — SIGNIFICANT CHANGE UP
SARS-COV-2 RNA SPEC QL NAA+PROBE: SIGNIFICANT CHANGE UP
SODIUM SERPL-SCNC: 140 MMOL/L — SIGNIFICANT CHANGE UP (ref 135–145)
THC UR QL: POSITIVE — SIGNIFICANT CHANGE UP
WBC # BLD: 9.7 K/UL — SIGNIFICANT CHANGE UP (ref 3.8–10.5)
WBC # FLD AUTO: 9.7 K/UL — SIGNIFICANT CHANGE UP (ref 3.8–10.5)

## 2021-06-09 PROCEDURE — 12001 RPR S/N/AX/GEN/TRNK 2.5CM/<: CPT

## 2021-06-09 PROCEDURE — 99284 EMERGENCY DEPT VISIT MOD MDM: CPT | Mod: 25

## 2021-06-09 PROCEDURE — 90792 PSYCH DIAG EVAL W/MED SRVCS: CPT | Mod: 95

## 2021-06-09 RX ORDER — DIPHENHYDRAMINE HCL 50 MG
50 CAPSULE ORAL ONCE
Refills: 0 | Status: COMPLETED | OUTPATIENT
Start: 2021-06-09 | End: 2021-06-09

## 2021-06-09 RX ORDER — CLONAZEPAM 1 MG
0 TABLET ORAL
Qty: 0 | Refills: 0 | DISCHARGE

## 2021-06-09 RX ORDER — CLONAZEPAM 1 MG
2 TABLET ORAL ONCE
Refills: 0 | Status: DISCONTINUED | OUTPATIENT
Start: 2021-06-09 | End: 2021-06-09

## 2021-06-09 RX ORDER — PALIPERIDONE 1.5 MG/1
0 TABLET, EXTENDED RELEASE ORAL
Qty: 0 | Refills: 0 | DISCHARGE

## 2021-06-09 RX ORDER — MIDAZOLAM HYDROCHLORIDE 1 MG/ML
4 INJECTION, SOLUTION INTRAMUSCULAR; INTRAVENOUS ONCE
Refills: 0 | Status: DISCONTINUED | OUTPATIENT
Start: 2021-06-09 | End: 2021-06-09

## 2021-06-09 RX ORDER — GABAPENTIN 400 MG/1
0 CAPSULE ORAL
Qty: 0 | Refills: 0 | DISCHARGE

## 2021-06-09 RX ORDER — GABAPENTIN 400 MG/1
800 CAPSULE ORAL ONCE
Refills: 0 | Status: COMPLETED | OUTPATIENT
Start: 2021-06-09 | End: 2021-06-09

## 2021-06-09 RX ORDER — PRAZOSIN HCL 2 MG
0 CAPSULE ORAL
Qty: 0 | Refills: 0 | DISCHARGE

## 2021-06-09 RX ORDER — POTASSIUM CHLORIDE 20 MEQ
40 PACKET (EA) ORAL ONCE
Refills: 0 | Status: COMPLETED | OUTPATIENT
Start: 2021-06-09 | End: 2021-06-09

## 2021-06-09 RX ORDER — DESVENLAFAXINE 50 MG/1
0 TABLET, EXTENDED RELEASE ORAL
Qty: 0 | Refills: 0 | DISCHARGE

## 2021-06-09 RX ADMIN — GABAPENTIN 800 MILLIGRAM(S): 400 CAPSULE ORAL at 14:37

## 2021-06-09 RX ADMIN — Medication 50 MILLIGRAM(S): at 12:14

## 2021-06-09 RX ADMIN — Medication 2 MILLIGRAM(S): at 21:42

## 2021-06-09 RX ADMIN — Medication 2 MILLIGRAM(S): at 14:38

## 2021-06-09 RX ADMIN — MIDAZOLAM HYDROCHLORIDE 4 MILLIGRAM(S): 1 INJECTION, SOLUTION INTRAMUSCULAR; INTRAVENOUS at 12:14

## 2021-06-09 RX ADMIN — GABAPENTIN 800 MILLIGRAM(S): 400 CAPSULE ORAL at 21:42

## 2021-06-09 NOTE — ED PROVIDER NOTE - EYES NEGATIVE STATEMENT, MLM
BMI is 23.6 ( UBW no idea/unsure per pt report)/well nourished no discharge, no irritation, no pain, no redness, and no visual changes.

## 2021-06-09 NOTE — ED BEHAVIORAL HEALTH ASSESSMENT NOTE - DETAILS
discussed allergy listed for haldol, prolixin, and thorazine in the chart, not clear what the reaction is thoughts of ending his own life see below see above cutting self

## 2021-06-09 NOTE — ED ADULT NURSE REASSESSMENT NOTE - NS ED NURSE REASSESS COMMENT FT1
pt awake, alert, calm, 1:1 in place, unrestrained. when asked what was patient intending to do with gun. pt states has an impulse problem and may have shot self in head. pt states knows he has a problem and needs help but does not know how to ask for help.

## 2021-06-09 NOTE — ED BEHAVIORAL HEALTH ASSESSMENT NOTE - NSACTIVEVENT_PSY_ALL_CORE
SUBJECTIVE:   Deni Simental is a 33 year old female who presents to clinic today for the following health issues:    Migraine Follow-Up    Headaches symptoms:  Improved     Frequency: in the past 2 weeks patient has not had any, previous to that it was 1-2 times per week     Duration of headaches: 1-3 days    Able to do normal daily activities/work with migraines: No -     Rescue/Relief medication:Excedrin and Maxalt              Effectiveness: intermittent relief    Preventative medication: None    Neurologic complications: No new stroke-like symptoms, loss of vision or speech, numbness or weakness    In the past 4 weeks, how often have you gone to Urgent Care or the emergency room because of your headaches?  0        Amount of exercise or physical activity: None    Problems taking medications regularly: No    Medication side effects: none    Diet: regular (no restrictions)        Patient would like to see a new endocrinologist but [facility] is not taking new pts.  H/o hashimoto's thyroiditis, pt tried armour thyroid from 25 mg of levothyroxine and noted her hair started falling out more which was a symptom prior to the levothyroxine.  Her former endo left her practice and now the new provider is not motivated to change her dose as her labs are fine.        Problem list and histories reviewed & adjusted, as indicated.  Additional history: as documented    Patient Active Problem List   Diagnosis     CARDIOVASCULAR SCREENING; LDL GOAL LESS THAN 160     Migraine with aura and without status migrainosus, not intractable     Hashimoto's thyroiditis     Past Surgical History:   Procedure Laterality Date     HC TOOTH EXTRACTION W/FORCEP         Social History   Substance Use Topics     Smoking status: Former Smoker     Types: Cigarettes     Quit date: 9/17/2011     Smokeless tobacco: Never Used      Comment: Lives in smoke free household     Alcohol use No     Family History   Problem Relation Age of Onset      Alcohol/Drug Mother       MVA alcohol related     DIABETES Father      hypoglycemia     Hypertension Father      CANCER Paternal Grandfather      lung cancer     Thyroid Disease Paternal Grandmother      Thyroid Disease Sister      Graves, tumor     C.A.D. No family hx of      Hypertension No family hx of      Depression No family hx of      GASTROINTESTINAL DISEASE No family hx of      HEART DISEASE No family hx of      Lipids No family hx of      Neurologic Disorder No family hx of      Thyroid Disease No family hx of      Respiratory No family hx of      CEREBROVASCULAR DISEASE No family hx of      Glaucoma No family hx of      Macular Degeneration No family hx of          Current Outpatient Prescriptions   Medication Sig Dispense Refill     thyroid (ARMOUR THYROID) 30 MG tablet Take 1 tablet (30 mg) by mouth daily 90 tablet 1     thyroid 15 MG TABS tablet Take 15 mg by mouth daily       rizatriptan (MAXALT) 5 MG tablet Take 1-2 tablets (5-10 mg) by mouth at onset of headache for migraine May repeat in 2 hours. Max 6 tablets/24 hours. 18 tablet 1     BP Readings from Last 3 Encounters:   17 106/67   17 111/67   16 104/67    Wt Readings from Last 3 Encounters:   17 182 lb (82.6 kg)   17 184 lb (83.5 kg)   16 165 lb 9.6 oz (75.1 kg)                  Labs reviewed in EPIC        Reviewed and updated as needed this visit by clinical staffTobacco  Allergies  Meds  Problems  Med Hx  Surg Hx  Fam Hx  Soc Hx        Reviewed and updated as needed this visit by Provider  Allergies  Meds  Problems         ROS:  Constitutional, HEENT, cardiovascular, pulmonary, gi and gu systems are negative, except as otherwise noted.      OBJECTIVE:   /67 (Cuff Size: Adult Regular)  Pulse 60  Temp 99.4  F (37.4  C) (Oral)  Wt 182 lb (82.6 kg)  SpO2 100%  BMI 29.83 kg/m2  Body mass index is 29.83 kg/(m^2).  GENERAL: healthy, alert and no distress  EYES: Eyes grossly normal to  inspection, PERRL and conjunctivae and sclerae normal  NECK: no adenopathy, no asymmetry, masses, or scars and thyroid normal to palpation  MS: no gross musculoskeletal defects noted, no edema  SKIN: no suspicious lesions or rashes  NEURO: Normal strength and tone, sensory exam grossly normal, mentation intact and cranial nerves 2-12 intact  PSYCH: mentation appears normal, affect normal/bright    Diagnostic Test Results:  none     ASSESSMENT/PLAN:     (G43.109) Migraine with aura and without status migrainosus, not intractable  (primary encounter diagnosis)  Comment: improved without change to meds  Plan: will seek chiro and consider controller med if frequency increases    (E06.3) Hashimoto's thyroiditis  Comment: increased hairloss  Plan: thyroid (ARMOUR THYROID) 30 MG tablet, TSH with        free T4 reflex        Increase armour dose to 30 mg and recheck labs  Discussed s/sx of too much thyroid and pt will contact me if she has them.      See Patient Instructions    Radha Lay MD  [place] [id number]   Triggering events leading to humiliation, shame, and/or despair (e.g., Loss of relationship, financial or health status) (real or anticipated)/Current or pending social isolation

## 2021-06-09 NOTE — ED PROVIDER NOTE - NSREASONFORTRANSFER_ED_A_ED
Reason For Visit  ART AMES is here today for a nurse visit for TB Placement.      Allergies  No Known Drug Allergies    Assessment   1. Encounter for preventive health examination (Z00.00)    Plan   1. PPD, tuberculin skin test; purified protein derivative solution, intradermal    Signatures   Electronically signed by : SUNITA KHANNA NP; Aug 11 2018 11:44AM CST    
Higher Level of Care or Service Not Available

## 2021-06-09 NOTE — ED ADULT NURSE NOTE - PRIMARY CARE PROVIDER
Physical Therapy Daily Treatment      Visit Count: 3 of 8 (visits) to 4/15/17 (date)      Initial Evaluation Date: 2017  Referred by: Dr. Sonia Cordero MD  Medical Diagnosis (from order): Torticollis, congenital [Q68.0]   Treatment Diagnosis: Neck Symptoms with Impaired Posture, Impaired Range of Motion and Impaired Motor Function/Muscle Performance, cranial asymmetry  Primary Insurance: [employer]  Secondary Insurance: N/A  Visits Authorized: N/A   Effective Date: N/A   Visits Allowed: 20V PCY PT Visits Used: 0 Visits RemaininV PCY PT   Hard or Soft limit: HARD   Iontophoresis (CPT 55488): Yes  Aquatic Therapy (CPT 97494): Yes  Orthotics (): N/A (): N/A   PTA: Yes (General Supervision)      Date of Onset: new onset; noted at 4-6 weeks old   Diagnosis Precautions: none  Relevant co-morbidities and medications: none   Relevant Tests: None      Medical/surgical history, prior to admission medications and relevant tests have been reviewed.       SUBJECTIVE   Prefers to lie with head turned to the L. Getting chiro adjustments with some improvement noted. Turning his head both directions when he's upright.      OBJECTIVE     Observation:   Tone: WNL  Upper Extremity: moves B and equally  Lower Extremity: moves B and equally  Trunk: WNL  Head position in supine: right lateral flexion and left rotation  Head position in supported sitting: right lateral flexion and midline  Behavior: tolerates handling  Respiratory: within normal limits  Skin: some erythema in the R neck fold  Vision: Focuses on objects Yes  Visual Pursuit: left, right to just beyond midline when in supine. Improved right visual pursuit when in supported sitting.  Pain Behaviors: none     Cervical Range of Motion: (degrees)    Norm Passive         Left Right   Rotation 0-80°  85 80   Lateral Flexion  0-45°  40 45   Flexion 0-50°  WNL     Extension 0-60°  WN     Comments: ; * denotes pain     Measurements:  Cranial (mm):  Left  Frontozygomaticus to Right [place] 137   Right Frontozygomaticus to Left [place] 135   Cranial Vault   (>6mm is beyond the parameters of normal asymmetry) 2 mm difference   Cranial Vault Asymmetry Index  (>5% is beyond the parameters of normal asymmetry, greater than 10% is severely beyond the parameters of normal asymmetry) 1.5 %      Head Shape based on measurements and observation: Plagiocephaly; Plagiocephaly Severity Scale: Level 2: Clinical Presentation: minimal asymmetry in one posterior quadrant, no secondary changes; Recommendation: repositioning progream; CVAI: 3.5-6.25  Facial Asymmetry: mild anterior shift of Left ear. Mild shift of left frontal bone.     Functional Tests:  Prone:  active head lift in prone  Pulled to Sit:      Head lag: yes      Head Righting Responses: partial response right, full response left  Supported Sitting: moderate control of head in saggital plane, better at maintaining midline in this position  Supported Standing: equal WB     Motor Milestones:  0-2 months:  While On Stomach: Present Observation Comments   Turns head side to side yes observed in clinic     Lifts head and shoulders up from floor yes Observed in clinic    While on Back:         Bends and straightens legs  yes observed in clinic symmetrical   Bends and straightens arms  yes observed in clinic symmetrical   Follows object with eyes  yes observed in clinic asymmetrical   Looks at hands for 3 seconds yes observed in clinic     Grasps adult's finger  yes observed in clinic symmetrical   Grasps rattle for 30 seconds yes Reported by parent     Comments:      3-5 months:   Present Observation Comments   Brings hands to center of body (midline) no not observed in clinic    Reaches towards a toy yes caregiver reported observing    Rolls from back to right and left sides of body no not observed in clinic    Brings feet to mouth  yes not observed in clinic    Holds head up emerging emerging behaviors in clinic    Bears  weight on extended arms no not observed in clinic    Grasps and releases toys yes caregiver reported observing    Comments:       Treatment   Therapeutic Exercise:   -remeasurements taken for cervcal ROM and anthropometrics as above  -reviewed HEP and discussed modifications to isolate R rotation to cervical spine when upright (positioning in [place], use of manual pressure on scapula,etc)  -discussed ongoing conservative monitoring of cranial anthropometrics, as today's measurements indicated lessening severity.       Current Home Program (not performed this date except as noted above):   · Passive stretches for right rotation and left lateral flexion performed by parent  · cervical AROM in supported uprighted position  · R sidelying position - static stretch, and to encourage active head lift toward the left  · Football hold with R side down  · Mirror activity with parent performing lateral trunk deviations to elicit head righting reactions        ASSESSMENT   Lessened severity of plagiocephaly as compared with last check. Continues with R rotation restrictions, more active versus passive. Prone skills are emerging but very close to age-appropriate.   Result of above outlined education: Verbalizes understanding and Demonstrates understanding    Goals:       To be obtained by end of this plan of care:  1. Patient/caregiver independent with modified and progressed home exercise program.  2. Patient to demonstrate equal cervical rotation bilateral directions for ease with visual tracking and using full visual field.  3. Patient to demonstrate prone skills, with extension to 45° with pushing up on elbows and performing visual tracking right and left without difficulty, and tolerating 10 mins.  4. Patient to demonstrate good midline control, and utilizing full cervical rotation bilateral directions in prone, supine, and sitting activities.  5. Will perform age appropriate gross motor milestones by discharge.  6. Patient  to perform equal head righting bilaterally in sitting.        PLAN   remeasure CVAI to dictate need for orthosis. Mom has been through DOC band therapy with her first child, but seems to prefer the laser scanner option versus the plaster casting option, if orthosis would be needed in the future.  Continue PT for restoring cervical ROM symmetry and motor skill development.    THERAPY DAILY BILLING   Primary Insurance: [employer]  Secondary Insurance: N/A    Evaluation Procedures:  No evaluation codes were used on this date of service    Timed Procedures:  Therapeutic Exercise, 30 minutes    Untimed Procedures:  No untimed codes were used on this date of service    Total Treatment Time: 30 minutes       doesn't remember name

## 2021-06-09 NOTE — ED ADULT NURSE NOTE - OBJECTIVE STATEMENT
pt received with multiple self inflicted lacerations to left wrist. pt awake, flat affect, slightly low and slow speech pattern. pt states took rx dose of methadone. pt states is severely depressed for 2.5 weeks, "felt it coming on". pt states was hospitalized with severe self inflicted lacerations to the wrist 8 months ago. denies AH/ visual hallucinations. pt compliant with psych meds. left wrist wrapped with kerlex and ace wrap. cooperative. pt received with multiple self inflicted lacerations to left wrist. multiple abrasions and redness to entire chest area noted. pt awake, flat affect, slightly low and slow speech pattern. pt states took rx dose of methadone. pt states is severely depressed for 2.5 weeks, "felt it coming on". pt states was hospitalized with severe self inflicted lacerations to the wrist 8 months ago. denies AH/ visual hallucinations. pt compliant with psych meds. left wrist wrapped with kerlex and ace wrap. cooperative.

## 2021-06-09 NOTE — ED ADULT TRIAGE NOTE - CHIEF COMPLAINT QUOTE
pt presents to the ED with c/o of self inflicted laceration to left wrist, MD called to bedside state no active squirting of the wound, RN made aware placed on 1:1 stat, lenghty psychiatric hx with prior hospitalizations
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
## 2021-06-09 NOTE — ED PROVIDER NOTE - CONSTITUTIONAL, MLM
normal... Well appearing, awake, alert, oriented to person, place, time/situation and in no apparent distress. Speaking in clear full sentences no nasal flaring no shoulders retractions no diaphoresis, appears very depressed

## 2021-06-09 NOTE — ED PROVIDER NOTE - CLINICAL SUMMARY MEDICAL DECISION MAKING FREE TEXT BOX
hx, exam, labs, ekg, 12 sutures closed the lac Pt  ekg normal sinus at 89 no acute abnormally Pt currently stable for psych disposition

## 2021-06-09 NOTE — ED PROVIDER NOTE - PHYSICAL EXAMINATION
Skin-+ multiple linear very superficial cut to the ventral aspect of the left wrist no active bleeding no tendon no fat tissue exposed.

## 2021-06-09 NOTE — ED BEHAVIORAL HEALTH NOTE - BEHAVIORAL HEALTH NOTE
UPDATE:    Spoke to ER, and patient is medically cleared. Patient is currently being given benzos for alcohol withdrawal. HR lowered from 110 to 90. 12 sutures were placed on L wrist. Per ER, patient is no longer in bilateral restraints.    Next spoke to Dr. Villavicencio from [facility]. Patient now is being declined bed as is "too acute." Discussed on phone. Bed is no longer available as unit cannot handle another agitated patient at this time.    Will hold for bed.
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
## 2021-06-09 NOTE — ED PROVIDER NOTE - CARE PLAN
Principal Discharge DX:	Suicidal intent  Secondary Diagnosis:	Laceration of wrist, left, initial encounter
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
## 2021-06-09 NOTE — ED PROVIDER NOTE - OBJECTIVE STATEMENT
Standing 40 years old male walked in c/o being very depressed and self inflicted lacs by a razor to the left wrist this morning. Pt sts he has a hx of depression and has multiple admission to hospital with suicidal attempts. last admission one year ago. Pt sts his last tetanus was one year ago. Pt denies headache, dizziness, blurred visions, light sensitivities, focal/distal weakness or numbness, cough, sob, chest pain, nausea, vomiting. fever, chills, abd pain, dysuria or irregular bowel movements.

## 2021-06-09 NOTE — ED BEHAVIORAL HEALTH ASSESSMENT NOTE - RISK ASSESSMENT
rf: hx mental illness, hx of substance use, hx of past psych hospitalizations, hx trauma  pf: currently seeking help High Acute Suicide Risk

## 2021-06-09 NOTE — ED BEHAVIORAL HEALTH ASSESSMENT NOTE - NSPRESENTSXS_PSY_ALL_CORE
Depressed mood/Anhedonia/Impulsivity/Hopelessness or despair/Global insomnia/Severe anxiety, agitation or panic/Refusal or inability to complete safety plan

## 2021-06-09 NOTE — ED ADULT NURSE REASSESSMENT NOTE - NS ED NURSE REASSESS COMMENT FT1
pt attempted to retrieve gun from , pt still remains on a 1:1, orders pending for bilateral wrist restraints, orders pending for versed and benadryl; pt is allergic to haldol pt attempted to retrieve gun from , pt still remains on a 1:1, orders pending for bilateral wrist restraints, orders pending for versed and benadryl; pt is allergic to haldol. BEULAH Montesinos made aware pt attempted to retrieve gun from , pt still remains on a 1:1, orders pending for versed and benadryl; pt is allergic to haldol. BEULAH Montesinos made aware

## 2021-06-09 NOTE — ED PROVIDER NOTE - PROGRESS NOTE DETAILS
Pt is agitated and tried to pull out of the security's gun. patient now calm, requesting AM dose of methadone. dose confirmed with patient's methadone clinic @ [phone]. He receives 150mg daily, last dose received yesterday patient now calm, requesting AM dose of methadone. dose confirmed with patient's methadone clinic @ [phone]. He receives 150mg daily, last dose received yesterday, ciwa 0

## 2021-06-10 VITALS
TEMPERATURE: 98 F | RESPIRATION RATE: 18 BRPM | HEART RATE: 63 BPM | DIASTOLIC BLOOD PRESSURE: 67 MMHG | SYSTOLIC BLOOD PRESSURE: 96 MMHG | OXYGEN SATURATION: 97 %

## 2021-06-10 PROCEDURE — 99212 OFFICE O/P EST SF 10 MIN: CPT | Mod: 95

## 2021-06-10 RX ORDER — CLONAZEPAM 1 MG
2 TABLET ORAL ONCE
Refills: 0 | Status: DISCONTINUED | OUTPATIENT
Start: 2021-06-10 | End: 2021-06-10

## 2021-06-10 RX ORDER — METHADONE HYDROCHLORIDE 40 MG/1
125 TABLET ORAL ONCE
Refills: 0 | Status: DISCONTINUED | OUTPATIENT
Start: 2021-06-10 | End: 2021-06-10

## 2021-06-10 RX ORDER — GABAPENTIN 400 MG/1
800 CAPSULE ORAL ONCE
Refills: 0 | Status: COMPLETED | OUTPATIENT
Start: 2021-06-10 | End: 2021-06-10

## 2021-06-10 RX ADMIN — Medication 2 MILLIGRAM(S): at 06:36

## 2021-06-10 RX ADMIN — GABAPENTIN 800 MILLIGRAM(S): 400 CAPSULE ORAL at 06:36

## 2021-06-10 RX ADMIN — METHADONE HYDROCHLORIDE 125 MILLIGRAM(S): 40 TABLET ORAL at 08:13

## 2021-06-10 NOTE — ED BEHAVIORAL HEALTH NOTE - BEHAVIORAL HEALTH NOTE
TELEPSYCHIATRY REASSESSMENT:    Telepsychiatry consulting on patient s/p suicide attempt who is currently holding in the ED awaiting bed availability for psychiatric admission.   Patient sleeping at this time so not visualized on camera.    Nursing update from LEANDRA Mercer: She conveys that initially he asked for his HS medications (Klonopin and Gabapentin) to help him sleep and was restless prior to receiving those. He accepted and tolerated the medications and then went to sleep shortly thereafter (about an hour or so) went to sleep.   MAR reviewed and notably patient received Klonopin 2 mg PO plus Gabapentin 800 mg PO both at 14:38 then 21:42  Vital signs reviewed and are stable.     Assessment and Plan unchanged as per initial/most recent  note.  Telepsychiatry remains available overnight for any psychiatric issues pertaining to patient.

## 2021-06-10 NOTE — PROGRESS NOTE BEHAVIORAL HEALTH - SUMMARY
Pt w/ hx of affective and anxiety disorders, hx of SA/SIB, hx of cannabis and etoh use who presents after SA w/ wrist cutting requiring suturing and after attempting to grab a 's gun in the ED. Patient continues to pose an acute danger to himself and requires psych admission.

## 2021-06-10 NOTE — ED ADULT NURSE REASSESSMENT NOTE - NS ED NURSE REASSESS COMMENT FT1
Patient received by LEANDRA Grullon at bed P. He is awake and in gown. He is standing at door. He is pacing in room and standing at door way.. He wants to know when he is going to get his methadone. Pending dosage verification from clinic. MD aware. 1:1 observation maintained.

## 2021-06-10 NOTE — PROGRESS NOTE BEHAVIORAL HEALTH - NSBHCONSULTMEDS_PSY_A_CORE FT
-resume verified dose of methadone  -resume home psych meds: klonopin 2mg TID, gabapentin 800mg TID  -resume invega 6mg po HS  -CIWA w/ prn ativan; thiamine 100mg po qdaily, folic acid 1mg po qdaily  -prn: haldol 5mg po/IM q6hr prn agitation, ativan 2mg po/IM q6hr prn agitation  -psych dispo: involuntary psych admission - accepted to [facility]  -ED provider made aware of plans/recs

## 2021-06-10 NOTE — PROGRESS NOTE BEHAVIORAL HEALTH - NSBHFUPINTERVALHXFT_PSY_A_CORE
39yo domiciled, on disability, single white M w/ hx of depression, ptsd w/ hx of SA/SIB, past psych hosp w/ opioid and etoh use who presented after SA w/ cutting wrists (required suturing) and attempting to grab 's gun in the ED. Patient was clinically assessed and determined to require involuntary psych admission for concerns about potential harm to self. 41yo domiciled, on disability, single white M w/ hx of depression, ptsd w/ hx of SA/SIB, past psych hosp w/ opioid and etoh use who presented after SA w/ cutting wrists (required suturing) and attempting to grab 's gun in the ED. Patient was clinically assessed and determined to require involuntary psych admission for concerns about potential harm to self.      Pt speaks of ongoing depressive mood and speaks of intermittent SI w/o specific plan right now. Patient denied AH/VH.

## 2021-10-01 ENCOUNTER — OUTPATIENT (OUTPATIENT)
Dept: OUTPATIENT SERVICES | Facility: HOSPITAL | Age: 41
LOS: 1 days | End: 2021-10-01
Payer: MEDICAID

## 2021-10-01 PROCEDURE — H0002: CPT

## 2021-11-02 DIAGNOSIS — Z71.89 OTHER SPECIFIED COUNSELING: ICD-10-CM

## 2021-11-02 PROBLEM — F32.9 MAJOR DEPRESSIVE DISORDER, SINGLE EPISODE, UNSPECIFIED: Chronic | Status: ACTIVE | Noted: 2021-06-09

## 2021-12-01 PROCEDURE — G9005: CPT

## 2022-01-01 NOTE — ED ADULT TRIAGE NOTE - RESPIRATORY RATE (BREATHS/MIN)
Febrile, hemodynamically stable, saturating well on RA  NAD, well appearing, playing aggressively and actively in bed  No tachypnea/WOB/retractions  Head NCAT  Neck supple, full ROM  EOMI grossly, anicteric  MMM, teething, uvula midline, no oropharyngeal lesions/exudates, TM's clear with sharp reflex bilaterally  RRR, nml S1/S2, no m/r/g  Lungs CTAB, no w/r/r  Abd soft, NT, ND, nml BS, no rebound or guarding, no hepatosplenomegaly  Alert, CN's 3-12 grossly intact, interactive  OHARA spontaneously, <2 sec cap refill  Skin warm, well perfused, no rashes or hives
16

## 2022-02-01 NOTE — PROGRESS NOTE ADULT - ASSESSMENT
Patient is a 36 years old male with PMH of IV drug abuse (last used 20 years ago) on methadone and depression who presents with acute onset of left sided hip and buttock pain of 7 days in duration. Closure 3 Information: This tab is for additional flaps and grafts above and beyond our usual structured repairs.  Please note if you enter information here it will not currently bill and you will need to add the billing information manually.

## 2022-06-18 ENCOUNTER — INPATIENT (INPATIENT)
Facility: HOSPITAL | Age: 42
LOS: 24 days | Discharge: HOME | End: 2022-07-13
Attending: PSYCHIATRY & NEUROLOGY | Admitting: PSYCHIATRY & NEUROLOGY
Payer: MEDICAID

## 2022-06-18 VITALS
SYSTOLIC BLOOD PRESSURE: 105 MMHG | DIASTOLIC BLOOD PRESSURE: 71 MMHG | OXYGEN SATURATION: 99 % | TEMPERATURE: 99 F | RESPIRATION RATE: 16 BRPM | HEIGHT: 67 IN | HEART RATE: 73 BPM

## 2022-06-18 DIAGNOSIS — F43.10 POST-TRAUMATIC STRESS DISORDER, UNSPECIFIED: ICD-10-CM

## 2022-06-18 DIAGNOSIS — F33.2 MAJOR DEPRESSIVE DISORDER, RECURRENT SEVERE WITHOUT PSYCHOTIC FEATURES: ICD-10-CM

## 2022-06-18 DIAGNOSIS — F32.9 MAJOR DEPRESSIVE DISORDER, SINGLE EPISODE, UNSPECIFIED: ICD-10-CM

## 2022-06-18 DIAGNOSIS — F11.21 OPIOID DEPENDENCE, IN REMISSION: ICD-10-CM

## 2022-06-18 LAB
ALBUMIN SERPL ELPH-MCNC: 4.3 G/DL — SIGNIFICANT CHANGE UP (ref 3.3–5)
ALP SERPL-CCNC: 58 U/L — SIGNIFICANT CHANGE UP (ref 40–120)
ALT FLD-CCNC: 10 U/L — SIGNIFICANT CHANGE UP (ref 4–41)
ANION GAP SERPL CALC-SCNC: 10 MMOL/L — SIGNIFICANT CHANGE UP (ref 7–14)
AST SERPL-CCNC: 18 U/L — SIGNIFICANT CHANGE UP (ref 4–40)
BASOPHILS # BLD AUTO: 0.03 K/UL — SIGNIFICANT CHANGE UP (ref 0–0.2)
BASOPHILS NFR BLD AUTO: 0.5 % — SIGNIFICANT CHANGE UP (ref 0–2)
BILIRUB SERPL-MCNC: <0.2 MG/DL — SIGNIFICANT CHANGE UP (ref 0.2–1.2)
BUN SERPL-MCNC: 10 MG/DL — SIGNIFICANT CHANGE UP (ref 7–23)
CALCIUM SERPL-MCNC: 8.8 MG/DL — SIGNIFICANT CHANGE UP (ref 8.4–10.5)
CHLORIDE SERPL-SCNC: 101 MMOL/L — SIGNIFICANT CHANGE UP (ref 98–107)
CO2 SERPL-SCNC: 26 MMOL/L — SIGNIFICANT CHANGE UP (ref 22–31)
COVID-19 SPIKE DOMAIN AB INTERP: NEGATIVE — SIGNIFICANT CHANGE UP
COVID-19 SPIKE DOMAIN ANTIBODY RESULT: 0.4 U/ML — SIGNIFICANT CHANGE UP
CREAT SERPL-MCNC: 0.81 MG/DL — SIGNIFICANT CHANGE UP (ref 0.5–1.3)
EGFR: 112 ML/MIN/1.73M2 — SIGNIFICANT CHANGE UP
EOSINOPHIL # BLD AUTO: 0.06 K/UL — SIGNIFICANT CHANGE UP (ref 0–0.5)
EOSINOPHIL NFR BLD AUTO: 1.1 % — SIGNIFICANT CHANGE UP (ref 0–6)
FLUAV AG NPH QL: SIGNIFICANT CHANGE UP
FLUBV AG NPH QL: SIGNIFICANT CHANGE UP
GLUCOSE SERPL-MCNC: 115 MG/DL — HIGH (ref 70–99)
HCT VFR BLD CALC: 38.9 % — LOW (ref 39–50)
HGB BLD-MCNC: 12.7 G/DL — LOW (ref 13–17)
IANC: 3.87 K/UL — SIGNIFICANT CHANGE UP (ref 1.8–7.4)
IMM GRANULOCYTES NFR BLD AUTO: 0.2 % — SIGNIFICANT CHANGE UP (ref 0–1.5)
LYMPHOCYTES # BLD AUTO: 1.42 K/UL — SIGNIFICANT CHANGE UP (ref 1–3.3)
LYMPHOCYTES # BLD AUTO: 25.2 % — SIGNIFICANT CHANGE UP (ref 13–44)
MCHC RBC-ENTMCNC: 27.8 PG — SIGNIFICANT CHANGE UP (ref 27–34)
MCHC RBC-ENTMCNC: 32.6 GM/DL — SIGNIFICANT CHANGE UP (ref 32–36)
MCV RBC AUTO: 85.1 FL — SIGNIFICANT CHANGE UP (ref 80–100)
MONOCYTES # BLD AUTO: 0.24 K/UL — SIGNIFICANT CHANGE UP (ref 0–0.9)
MONOCYTES NFR BLD AUTO: 4.3 % — SIGNIFICANT CHANGE UP (ref 2–14)
NEUTROPHILS # BLD AUTO: 3.87 K/UL — SIGNIFICANT CHANGE UP (ref 1.8–7.4)
NEUTROPHILS NFR BLD AUTO: 68.7 % — SIGNIFICANT CHANGE UP (ref 43–77)
NRBC # BLD: 0 /100 WBCS — SIGNIFICANT CHANGE UP
NRBC # FLD: 0 K/UL — SIGNIFICANT CHANGE UP
PLATELET # BLD AUTO: 241 K/UL — SIGNIFICANT CHANGE UP (ref 150–400)
POTASSIUM SERPL-MCNC: 4 MMOL/L — SIGNIFICANT CHANGE UP (ref 3.5–5.3)
POTASSIUM SERPL-SCNC: 4 MMOL/L — SIGNIFICANT CHANGE UP (ref 3.5–5.3)
PROT SERPL-MCNC: 7.2 G/DL — SIGNIFICANT CHANGE UP (ref 6–8.3)
RBC # BLD: 4.57 M/UL — SIGNIFICANT CHANGE UP (ref 4.2–5.8)
RBC # FLD: 11.9 % — SIGNIFICANT CHANGE UP (ref 10.3–14.5)
RSV RNA NPH QL NAA+NON-PROBE: SIGNIFICANT CHANGE UP
SARS-COV-2 IGG+IGM SERPL QL IA: 0.4 U/ML — SIGNIFICANT CHANGE UP
SARS-COV-2 IGG+IGM SERPL QL IA: NEGATIVE — SIGNIFICANT CHANGE UP
SARS-COV-2 RNA SPEC QL NAA+PROBE: SIGNIFICANT CHANGE UP
SODIUM SERPL-SCNC: 137 MMOL/L — SIGNIFICANT CHANGE UP (ref 135–145)
TOXICOLOGY SCREEN, DRUGS OF ABUSE, SERUM RESULT: SIGNIFICANT CHANGE UP
TSH SERPL-MCNC: 0.53 UIU/ML — SIGNIFICANT CHANGE UP (ref 0.27–4.2)
WBC # BLD: 5.63 K/UL — SIGNIFICANT CHANGE UP (ref 3.8–10.5)
WBC # FLD AUTO: 5.63 K/UL — SIGNIFICANT CHANGE UP (ref 3.8–10.5)

## 2022-06-18 PROCEDURE — 99285 EMERGENCY DEPT VISIT HI MDM: CPT | Mod: 25

## 2022-06-18 PROCEDURE — 99285 EMERGENCY DEPT VISIT HI MDM: CPT

## 2022-06-18 PROCEDURE — 12032 INTMD RPR S/A/T/EXT 2.6-7.5: CPT

## 2022-06-18 RX ORDER — HYDROXYZINE HCL 10 MG
50 TABLET ORAL EVERY 4 HOURS
Refills: 0 | Status: DISCONTINUED | OUTPATIENT
Start: 2022-06-18 | End: 2022-07-13

## 2022-06-18 RX ORDER — MIRTAZAPINE 45 MG/1
7.5 TABLET, ORALLY DISINTEGRATING ORAL ONCE
Refills: 0 | Status: COMPLETED | OUTPATIENT
Start: 2022-06-18 | End: 2022-06-18

## 2022-06-18 RX ORDER — ACETAMINOPHEN 500 MG
975 TABLET ORAL ONCE
Refills: 0 | Status: COMPLETED | OUTPATIENT
Start: 2022-06-18 | End: 2022-06-18

## 2022-06-18 RX ORDER — PRAZOSIN HCL 2 MG
0 CAPSULE ORAL
Qty: 0 | Refills: 0 | DISCHARGE

## 2022-06-18 RX ORDER — MIRTAZAPINE 45 MG/1
45 TABLET, ORALLY DISINTEGRATING ORAL AT BEDTIME
Refills: 0 | Status: DISCONTINUED | OUTPATIENT
Start: 2022-06-19 | End: 2022-07-13

## 2022-06-18 RX ORDER — OLANZAPINE 15 MG/1
5 TABLET, FILM COATED ORAL ONCE
Refills: 0 | Status: DISCONTINUED | OUTPATIENT
Start: 2022-06-18 | End: 2022-07-13

## 2022-06-18 RX ORDER — PALIPERIDONE 1.5 MG/1
0 TABLET, EXTENDED RELEASE ORAL
Qty: 0 | Refills: 0 | DISCHARGE

## 2022-06-18 RX ORDER — MIRTAZAPINE 45 MG/1
37.5 TABLET, ORALLY DISINTEGRATING ORAL AT BEDTIME
Refills: 0 | Status: DISCONTINUED | OUTPATIENT
Start: 2022-06-18 | End: 2022-06-18

## 2022-06-18 RX ORDER — METHADONE HYDROCHLORIDE 40 MG/1
120 TABLET ORAL DAILY
Refills: 0 | Status: DISCONTINUED | OUTPATIENT
Start: 2022-06-18 | End: 2022-06-22

## 2022-06-18 RX ORDER — CLONAZEPAM 1 MG
2 TABLET ORAL ONCE
Refills: 0 | Status: DISCONTINUED | OUTPATIENT
Start: 2022-06-18 | End: 2022-06-18

## 2022-06-18 RX ORDER — TRAZODONE HCL 50 MG
50 TABLET ORAL AT BEDTIME
Refills: 0 | Status: DISCONTINUED | OUTPATIENT
Start: 2022-06-18 | End: 2022-06-21

## 2022-06-18 RX ORDER — LITHIUM CARBONATE 300 MG/1
300 TABLET, EXTENDED RELEASE ORAL ONCE
Refills: 0 | Status: COMPLETED | OUTPATIENT
Start: 2022-06-18 | End: 2022-06-18

## 2022-06-18 RX ORDER — GABAPENTIN 400 MG/1
800 CAPSULE ORAL
Refills: 0 | Status: DISCONTINUED | OUTPATIENT
Start: 2022-06-18 | End: 2022-07-13

## 2022-06-18 RX ORDER — ARIPIPRAZOLE 15 MG/1
15 TABLET ORAL ONCE
Refills: 0 | Status: COMPLETED | OUTPATIENT
Start: 2022-06-18 | End: 2022-06-18

## 2022-06-18 RX ORDER — PALIPERIDONE 1.5 MG/1
10.5 TABLET, EXTENDED RELEASE ORAL
Qty: 0 | Refills: 0 | DISCHARGE

## 2022-06-18 RX ORDER — METHADONE HYDROCHLORIDE 40 MG/1
135 TABLET ORAL DAILY
Refills: 0 | Status: DISCONTINUED | OUTPATIENT
Start: 2022-06-18 | End: 2022-06-18

## 2022-06-18 RX ORDER — CLONAZEPAM 1 MG
2 TABLET ORAL THREE TIMES A DAY
Refills: 0 | Status: DISCONTINUED | OUTPATIENT
Start: 2022-06-18 | End: 2022-06-22

## 2022-06-18 RX ORDER — METHADONE HYDROCHLORIDE 40 MG/1
15 TABLET ORAL DAILY
Refills: 0 | Status: DISCONTINUED | OUTPATIENT
Start: 2022-06-18 | End: 2022-06-22

## 2022-06-18 RX ORDER — METHADONE HYDROCHLORIDE 40 MG/1
125 TABLET ORAL
Qty: 0 | Refills: 0 | DISCHARGE

## 2022-06-18 RX ORDER — OLANZAPINE 15 MG/1
5 TABLET, FILM COATED ORAL EVERY 6 HOURS
Refills: 0 | Status: DISCONTINUED | OUTPATIENT
Start: 2022-06-18 | End: 2022-07-13

## 2022-06-18 RX ORDER — GABAPENTIN 400 MG/1
800 CAPSULE ORAL ONCE
Refills: 0 | Status: COMPLETED | OUTPATIENT
Start: 2022-06-18 | End: 2022-06-18

## 2022-06-18 RX ORDER — TETANUS TOXOID, REDUCED DIPHTHERIA TOXOID AND ACELLULAR PERTUSSIS VACCINE, ADSORBED 5; 2.5; 8; 8; 2.5 [IU]/.5ML; [IU]/.5ML; UG/.5ML; UG/.5ML; UG/.5ML
0.5 SUSPENSION INTRAMUSCULAR ONCE
Refills: 0 | Status: COMPLETED | OUTPATIENT
Start: 2022-06-18 | End: 2022-06-18

## 2022-06-18 RX ORDER — DESVENLAFAXINE 50 MG/1
1 TABLET, EXTENDED RELEASE ORAL
Qty: 0 | Refills: 0 | DISCHARGE

## 2022-06-18 RX ORDER — ARIPIPRAZOLE 15 MG/1
20 TABLET ORAL DAILY
Refills: 0 | Status: DISCONTINUED | OUTPATIENT
Start: 2022-06-18 | End: 2022-06-21

## 2022-06-18 RX ORDER — LITHIUM CARBONATE 300 MG/1
1200 TABLET, EXTENDED RELEASE ORAL AT BEDTIME
Refills: 0 | Status: DISCONTINUED | OUTPATIENT
Start: 2022-06-18 | End: 2022-06-28

## 2022-06-18 RX ADMIN — GABAPENTIN 800 MILLIGRAM(S): 400 CAPSULE ORAL at 20:51

## 2022-06-18 RX ADMIN — LITHIUM CARBONATE 1200 MILLIGRAM(S): 300 TABLET, EXTENDED RELEASE ORAL at 20:51

## 2022-06-18 RX ADMIN — Medication 975 MILLIGRAM(S): at 09:10

## 2022-06-18 RX ADMIN — Medication 2 MILLIGRAM(S): at 10:22

## 2022-06-18 RX ADMIN — Medication 2 MILLIGRAM(S): at 20:51

## 2022-06-18 RX ADMIN — TETANUS TOXOID, REDUCED DIPHTHERIA TOXOID AND ACELLULAR PERTUSSIS VACCINE, ADSORBED 0.5 MILLILITER(S): 5; 2.5; 8; 8; 2.5 SUSPENSION INTRAMUSCULAR at 09:10

## 2022-06-18 RX ADMIN — Medication 50 MILLIGRAM(S): at 20:51

## 2022-06-18 RX ADMIN — Medication 2 MILLIGRAM(S): at 15:49

## 2022-06-18 RX ADMIN — MIRTAZAPINE 7.5 MILLIGRAM(S): 45 TABLET, ORALLY DISINTEGRATING ORAL at 17:21

## 2022-06-18 RX ADMIN — LITHIUM CARBONATE 300 MILLIGRAM(S): 300 TABLET, EXTENDED RELEASE ORAL at 10:23

## 2022-06-18 RX ADMIN — ARIPIPRAZOLE 15 MILLIGRAM(S): 15 TABLET ORAL at 10:22

## 2022-06-18 RX ADMIN — GABAPENTIN 800 MILLIGRAM(S): 400 CAPSULE ORAL at 10:22

## 2022-06-18 RX ADMIN — MIRTAZAPINE 37.5 MILLIGRAM(S): 45 TABLET, ORALLY DISINTEGRATING ORAL at 21:31

## 2022-06-18 NOTE — ED BEHAVIORAL HEALTH ASSESSMENT NOTE - SUBSTANCE ISSUES AND PLAN (INCLUDE STANDING AND PRN MEDICATION)
CIWA, Ativan PRN as needed for signs of withdrawal low risk of acute withdrawal, continue methadone 135mg qd starting tomorrow

## 2022-06-18 NOTE — ED ADULT NURSE NOTE - CHIEF COMPLAINT QUOTE
Sent from [facility] clinic for self inflicted left FA laceration with a razor 2 days ago and repeated it today.  Hx anxiety bipolar substance abuse PTSD

## 2022-06-18 NOTE — ED ADULT TRIAGE NOTE - CHIEF COMPLAINT QUOTE
Sent from [facility] clinic for self inflicted left FA laceration with a razor 2 days ago and repeated it today.  Hx anxiety bipolar substance abuse PTSD

## 2022-06-18 NOTE — ED BEHAVIORAL HEALTH ASSESSMENT NOTE - CURRENT MEDICATION
See above Current meds per CVM: Methadone 135mg qd ,  Lithium 600mg bid, Abilify 15mg qd, Gabapentin 800 mg PO BID , Remeron 45 mg PO QHS, Klonopin 2 mg PO TID.

## 2022-06-18 NOTE — ED BEHAVIORAL HEALTH NOTE - BEHAVIORAL HEALTH NOTE
COVID Exposure Screen- Patient  1.	*Have you had a COVID-19 test in the last 90 days?  (  ) Yes   (x  ) No   (  ) Unknown- Reason: _____  IF YES PROCEED TO QUESTION #2. IF NO OR UNKNOWN, PLEASE SKIP TO QUESTION #3.  2.	Date of test(s) and result(s): ________  3.	*Have you tested positive for COVID-19 antibodies? (  ) Yes   (  ) No   (  ) Unknown- Reason: _____  IF YES PROCEED TO QUESTION #4. IF NO or UNKNOWN, PLEASE SKIP TO QUESTION #5.  4.	Date of positive antibody test: ________  5.	*Have you received 2 doses of the COVID-19 vaccine? (x  ) Yes   (  ) No   (  ) Unknown- Reason: _____   IF YES PROCEED TO QUESTION #6. IF NO or UNKNOWN, PLEASE SKIP TO QUESTION #7.  6.	Date of second dose: ________  7.	*In the past 10 days, have you been around anyone with a positive COVID-19 test?* (  ) Yes   ( x ) No   (  ) Unknown- Reason: ____  IF YES PROCEED TO QUESTION #8. IF NO or UNKNOWN, PLEASE SKIP TO QUESTION #13.  8.	Were you within 6 feet of them for at least 15 minutes? (  ) Yes   (  ) No   (  ) Unknown- Reason: _____  9.	Have you provided care for them? (  ) Yes   (  ) No   (  ) Unknown- Reason: ______  10.	Have you had direct physical contact with them (touched, hugged, or kissed them)? (  ) Yes   (  ) No    (  ) Unknown- Reason: _____  11.	Have you shared eating or drinking utensils with them? (  ) Yes   (  ) No    (  ) Unknown- Reason: ____  12.	Have they sneezed, coughed, or somehow gotten respiratory droplets on you? (  ) Yes   (  ) No    (  ) Unknown- Reason: ______  13.	*Have you been out of [place] within the past 10 days?* (  ) Yes   ( x ) No   (  ) Unknown- Reason: _____  IF YES PLEASE ANSWER THE FOLLOWING QUESTIONS:  14.	Which state/country have you been to? ______  15.	Were you there over 24 hours? (  ) Yes   (  ) No    (  ) Unknown- Reason: ______  16.	Date of return to [facility]: ______

## 2022-06-18 NOTE — BH PATIENT PROFILE - HOME MEDICATIONS
gabapentin 800 mg oral tablet , 1 tab(s) orally 3 times a day  clonazePAM 2 mg oral tablet , 1 tab(s) orally 3 times a day  clonazePAM 2 mg oral tablet , 1 tab(s) orally 3 times a day MDD:6 mg  gabapentin 600 mg oral tablet , 1 tab(s) orally 2 times a day   mirtazapine 45 mg oral tablet , 1 tab(s) orally once a day (at bedtime)

## 2022-06-18 NOTE — ED PROVIDER NOTE - OBJECTIVE STATEMENT
41M pmh MDD (multiple psychiatric admissions at [facility] and history of suicide attempts), hx substance use disorder on methadone, hx umdomiciled, osteomyelitis presents with L ventral forearm laceration. Pt depressed, cut self with razor 2 days ago, cut himself again in same spot 715am today. Denies active si/hi/avh, states he did not take his medications this AM (klonopin 2, lithium ?600mg, gabapentin 800, abilify 15). States he lives with a friend, from couch to couch.

## 2022-06-18 NOTE — ED BEHAVIORAL HEALTH ASSESSMENT NOTE - MUSCLE TONE / STRENGTH
Detail Level: Detailed Depth Of Biopsy: dermis Was A Bandage Applied: Yes Size Of Lesion In Cm: 0 Biopsy Type: H and E Biopsy Method: Personna blade Anesthesia Type: 1% lidocaine without epinephrine Anesthesia Volume In Cc (Will Not Render If 0): 1 Hemostasis: Drysol Wound Care: Petrolatum Dressing: bandage Destruction After The Procedure: No Type Of Destruction Used: Curettage Curettage Text: The wound bed was treated with curettage after the biopsy was performed. Cryotherapy Text: The wound bed was treated with cryotherapy after the biopsy was performed. Electrodesiccation Text: The wound bed was treated with electrodesiccation after the biopsy was performed. Electrodesiccation And Curettage Text: The wound bed was treated with electrodesiccation and curettage after the biopsy was performed. Silver Nitrate Text: The wound bed was treated with silver nitrate after the biopsy was performed. Lab: 6 Consent: Written consent was obtained and risks were reviewed including but not limited to scarring, infection, bleeding, scabbing, incomplete removal, nerve damage and allergy to anesthesia. Post-Care Instructions: I reviewed with the patient in detail post-care instructions. Patient is to keep the biopsy site dry overnight, and then apply ointment twice daily until healed. Notification Instructions: Patient will be notified of biopsy results. However, patient instructed to call the office if not contacted within 2 weeks. Billing Type: Third-Party Bill Other Information: Selecting Yes will display possible errors in your note based on the variables you have selected. This validation is only offered as a suggestion for you. PLEASE NOTE THAT THE VALIDATION TEXT WILL BE REMOVED WHEN YOU FINALIZE YOUR NOTE. IF YOU WANT TO FAX A PRELIMINARY NOTE YOU WILL NEED TO TOGGLE THIS TO 'NO' IF YOU DO NOT WANT IT IN YOUR FAXED NOTE.

## 2022-06-18 NOTE — ED BEHAVIORAL HEALTH ASSESSMENT NOTE - NS ED BHA MSE GENERAL APPEARANCE
Called patient about procedure. Told to be here at 0630 for procedure at [facility] by jh LIZARRAGA. NPO after midnight, but can take morning medication with sips of water, patient stated they not  blood thinners. To have a responsible adult be with patient take them home and stay with them afterwards, if they do not get admitted to [address] [place] [address]. And if available bring current list of medications. No other questions or concerns. No deformities present
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
## 2022-06-18 NOTE — ED ADULT NURSE NOTE - OBJECTIVE STATEMENT
pt received in rm 2 AAO x 3. pt with hx of depression, substance use disorder. pt reports cutting his wrist with a scissor 2 days ago and then razor today in an attempt to hurt himself. pt denies any physical complaints. pt denies H/I, auditory or visual disturbances. RR even and unlabored. wound noted to left wrist, not actively bleeding. will continue to monitor.
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
## 2022-06-18 NOTE — ED BEHAVIORAL HEALTH ASSESSMENT NOTE - PSYCHIATRIC ISSUES AND PLAN (INCLUDE STANDING AND PRN MEDICATION)
continue current medications MDD-switch lithium to 1200mg hs, increase Abilify to 20mg qd, continue klonopin 2mg tid, gabapentin 800mg bid, for insomnia-start trazodone 50mg hs, prn's Atarax 50mg prn q 4 h , zyprexa 5mg po/IM

## 2022-06-18 NOTE — ED ADULT NURSE REASSESSMENT NOTE - NS ED NURSE REASSESS COMMENT FT1
Received pt in  from main ER pt calm & cooperative denies si/hi/avh presently, pt made aware of admission to  L6 eval on going.

## 2022-06-18 NOTE — ED PROVIDER NOTE - CLINICAL SUMMARY MEDICAL DECISION MAKING FREE TEXT BOX
41M pmh MDD (multiple psychiatric admissions at [facility] and history of suicide attempts), hx substance use disorder on methadone, hx umdomiciled, osteomyelitis presents w/ self inflicted L forearm 7cm linear lac in setting of depression. Obtain psych labs, suture repair, likely psych admission.

## 2022-06-18 NOTE — ED BEHAVIORAL HEALTH ASSESSMENT NOTE - DETAILS
see below allergy listed for haldol, prolixin, and thorazine in the chart, not clear what the reaction is see above discussed thoughts of ending his own life cutting self thoughts of ending his own life, depression, not caring for self

## 2022-06-18 NOTE — ED PROVIDER NOTE - PHYSICAL EXAMINATION
[Const] well-appearing, resting comfortably, no acute distress  [HEENT] PERRL, EOMI, moist mucus membranes  [Neck] Supple, trachea midline  [CV] +S1/S2, no m/r/g appreciated  [Lungs] Clear to auscultations bilaterally, no adventitious lung sounds  [Abd] soft, non-tender, nondistended in all 4 quadrants  [MSK] 5/5 upper extremity and lower extremity str bilaterally  [Skin] L forearm 7cm central linear laceration, neurovascular intact  [Neuro] A&Ox3  [Psych] denies si/hi/avh, restricted affect, looks away during conversation, not responding to internal stimuli, depressed

## 2022-06-18 NOTE — ED PROVIDER NOTE - ATTENDING CONTRIBUTION TO CARE
agree with resident note    "41M pmh MDD (multiple psychiatric admissions at [facility] and history of suicide attempts), hx substance use disorder on methadone, hx umdomiciled, osteomyelitis presents with L ventral forearm laceration. Pt depressed, cut self with razor 2 days ago, cut himself again in same spot 715am today. Denies active si/hi/avh, states he did not take his medications this AM (klonopin 2, lithium ?600mg, gabapentin 800, abilify 15). States he lives with a friend, from couch to couch."    PE: A+Ox3; VSS; CTAB/L; s1 s2 no m/r/g abd soft/NT/ND ext: left forearm 8-10cm linear laceration; muscle exposed; FROm of forearm;    Imp: tetanus, lac repair, BH eval for suicidality

## 2022-06-18 NOTE — BH PATIENT PROFILE - FALL HARM RISK - UNIVERSAL INTERVENTIONS
Bed in lowest position, wheels locked, appropriate side rails in place/Call bell, personal items and telephone in reach/Instruct patient to call for assistance before getting out of bed or chair/Non-slip footwear when patient is out of bed/[place] to call system/Physically safe environment - no spills, clutter or unnecessary equipment/Purposeful Proactive Rounding/Room/bathroom lighting operational, light cord in reach

## 2022-06-19 DIAGNOSIS — F32.9 MAJOR DEPRESSIVE DISORDER, SINGLE EPISODE, UNSPECIFIED: ICD-10-CM

## 2022-06-19 RX ADMIN — Medication 2 MILLIGRAM(S): at 09:00

## 2022-06-19 RX ADMIN — Medication 2 MILLIGRAM(S): at 21:15

## 2022-06-19 RX ADMIN — ARIPIPRAZOLE 20 MILLIGRAM(S): 15 TABLET ORAL at 09:00

## 2022-06-19 RX ADMIN — Medication 2 MILLIGRAM(S): at 12:21

## 2022-06-19 RX ADMIN — GABAPENTIN 800 MILLIGRAM(S): 400 CAPSULE ORAL at 09:00

## 2022-06-19 RX ADMIN — METHADONE HYDROCHLORIDE 15 MILLIGRAM(S): 40 TABLET ORAL at 09:00

## 2022-06-19 RX ADMIN — LITHIUM CARBONATE 1200 MILLIGRAM(S): 300 TABLET, EXTENDED RELEASE ORAL at 21:16

## 2022-06-19 RX ADMIN — GABAPENTIN 800 MILLIGRAM(S): 400 CAPSULE ORAL at 21:15

## 2022-06-19 RX ADMIN — MIRTAZAPINE 45 MILLIGRAM(S): 45 TABLET, ORALLY DISINTEGRATING ORAL at 21:15

## 2022-06-19 RX ADMIN — METHADONE HYDROCHLORIDE 120 MILLIGRAM(S): 40 TABLET ORAL at 09:00

## 2022-06-19 NOTE — BH INPATIENT PSYCHIATRY ASSESSMENT NOTE - NSTXANXINTERMD_PSY_ALL_CORE
Psychopharm with goal of BZD reduction and LALA for safety and compliance and supportive therapy with return to [place] aftercare on MMTP

## 2022-06-19 NOTE — BH INPATIENT PSYCHIATRY ASSESSMENT NOTE - NSBHCONSBHPROVDETAILS_PSY_A_CORE  FT
methadone clinic at [facility], sees Dr Abida Henry for med mx in [facility] and Andreea العراقي as therapist

## 2022-06-19 NOTE — BH INPATIENT PSYCHIATRY ASSESSMENT NOTE - NSTXSUICIDINTERMD_PSY_ALL_CORE
Psychopharm with goal of BZD reduction and LALA for safety and compliance and supportive therapy with return to [place] after[facility] on MMTP

## 2022-06-19 NOTE — BH INPATIENT PSYCHIATRY ASSESSMENT NOTE - NSBHCHARTREVIEWVS_PSY_A_CORE FT
Vital Signs Last 24 Hrs  T(C): 36.6 (06-18-22 @ 19:50), Max: 37.1 (06-18-22 @ 10:50)  T(F): 97.8 (06-18-22 @ 19:50), Max: 98.7 (06-18-22 @ 10:50)  HR: 63 (06-18-22 @ 12:09) (63 - 70)  BP: 106/70 (06-18-22 @ 12:09) (106/70 - 112/70)  BP(mean): --  RR: 16 (06-18-22 @ 12:09) (14 - 16)  SpO2: 100% (06-18-22 @ 12:09) (100% - 100%)    Orthostatic VS  06-18-22 @ 13:30  Lying BP: --/-- HR: --  Sitting BP: 105/60 HR: 55  Standing BP: 112/51 HR: 74  Site: upper right arm  Mode: electronic

## 2022-06-19 NOTE — BH INPATIENT PSYCHIATRY ASSESSMENT NOTE - NSICDXPASTMEDICALHX_GEN_ALL_CORE_FT
PAST MEDICAL HISTORY:  Depression     Depression with suicidal attempt    Diverticulitis     Osteomyelitis     Substance use disorder

## 2022-06-19 NOTE — BH INPATIENT PSYCHIATRY ASSESSMENT NOTE - NSTXDEPRESINTERMD_PSY_ALL_CORE
Psychopharm with goal of BZD reduction and LALA for safety and compliance and supportive therapy with return to [place] [facility] on MMTP

## 2022-06-19 NOTE — BH INPATIENT PSYCHIATRY ASSESSMENT NOTE - NSTXNEGATINTERMD_PSY_ALL_CORE
Psychopharm with goal of BZD reduction and LALA for safety and compliance and supportive therapy with return to [place] after[facility] on MMTP

## 2022-06-19 NOTE — BH INPATIENT PSYCHIATRY ASSESSMENT NOTE - NSTXDCHOUSINTERMD_PSY_ALL_CORE
Psychopharm with goal of BZD reduction and LALA for safety and compliance and supportive therapy with return to [place] aftercare on MMTP

## 2022-06-19 NOTE — BH INPATIENT PSYCHIATRY ASSESSMENT NOTE - NSTXSLFCREINTERMD_PSY_ALL_CORE
Psychopharm with goal of BZD reduction and LALA for safety and compliance and supportive therapy with return to [place] aftercare on MMTP

## 2022-06-19 NOTE — BH INPATIENT PSYCHIATRY ASSESSMENT NOTE - NSTXSUPORTINTERMD_PSY_ALL_CORE
Psychopharm with goal of BZD reduction and LALA for safety and compliance and supportive therapy with return to [place] aftercare on MMTP

## 2022-06-19 NOTE — ED BEHAVIORAL HEALTH NOTE - BEHAVIORAL HEALTH NOTE
Writer contacted [employer]ICA[employer] #[id number] and spoke with Sera DARNELL who provided 9 day (6/18-6/26) AUTH #[id number] with concurrent review due 6/26.

## 2022-06-19 NOTE — BH INPATIENT PSYCHIATRY ASSESSMENT NOTE - CURRENT MEDICATION
MEDICATIONS  (STANDING):  ARIPiprazole 20 milliGRAM(s) Oral daily  clonazePAM  Tablet 2 milliGRAM(s) Oral three times a day  gabapentin 800 milliGRAM(s) Oral two times a day  lithium SR (LITHOBID) 1200 milliGRAM(s) Oral at bedtime  methadone    Tablet 15 milliGRAM(s) Oral daily  methadone   Dispersible Tablet 120 milliGRAM(s) Oral daily  mirtazapine 45 milliGRAM(s) Oral at bedtime  traZODone 50 milliGRAM(s) Oral at bedtime    MEDICATIONS  (PRN):  hydrOXYzine hydrochloride 50 milliGRAM(s) Oral every 4 hours PRN Anxiety  OLANZapine 5 milliGRAM(s) Oral every 6 hours PRN agitation/anxiety  OLANZapine Injectable 5 milliGRAM(s) IntraMuscular once PRN severe agitation

## 2022-06-19 NOTE — BH INPATIENT PSYCHIATRY ASSESSMENT NOTE - DETAILS
see above cutting self see below allergy listed for haldol, prolixin, and thorazine in the chart, not clear what the reaction is
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
## 2022-06-19 NOTE — BH INPATIENT PSYCHIATRY ASSESSMENT NOTE - NSTXCOPEINTERMD_PSY_ALL_CORE
Psychopharm with goal of BZD reduction and LALA for safety and compliance and supportive therapy with return to [facility] on MMTP

## 2022-06-19 NOTE — BH INPATIENT PSYCHIATRY ASSESSMENT NOTE - NSTXSUBMISINTERMD_PSY_ALL_CORE
Psychopharm with goal of BZD reduction and LALA for safety and compliance and supportive therapy with return to [facility] on MMTP

## 2022-06-20 LAB
A1C WITH ESTIMATED AVERAGE GLUCOSE RESULT: 4.9 % — SIGNIFICANT CHANGE UP (ref 4–5.6)
CHOLEST SERPL-MCNC: 126 MG/DL — SIGNIFICANT CHANGE UP
ESTIMATED AVERAGE GLUCOSE: 94 — SIGNIFICANT CHANGE UP
HDLC SERPL-MCNC: 57 MG/DL — SIGNIFICANT CHANGE UP
LIPID PNL WITH DIRECT LDL SERPL: 49 MG/DL — SIGNIFICANT CHANGE UP
NON HDL CHOLESTEROL: 69 MG/DL — SIGNIFICANT CHANGE UP
TRIGL SERPL-MCNC: 102 MG/DL — SIGNIFICANT CHANGE UP

## 2022-06-20 PROCEDURE — 99233 SBSQ HOSP IP/OBS HIGH 50: CPT

## 2022-06-20 RX ADMIN — LITHIUM CARBONATE 1200 MILLIGRAM(S): 300 TABLET, EXTENDED RELEASE ORAL at 20:48

## 2022-06-20 RX ADMIN — ARIPIPRAZOLE 20 MILLIGRAM(S): 15 TABLET ORAL at 08:49

## 2022-06-20 RX ADMIN — METHADONE HYDROCHLORIDE 15 MILLIGRAM(S): 40 TABLET ORAL at 08:50

## 2022-06-20 RX ADMIN — Medication 2 MILLIGRAM(S): at 20:48

## 2022-06-20 RX ADMIN — METHADONE HYDROCHLORIDE 120 MILLIGRAM(S): 40 TABLET ORAL at 08:50

## 2022-06-20 RX ADMIN — GABAPENTIN 800 MILLIGRAM(S): 400 CAPSULE ORAL at 20:49

## 2022-06-20 RX ADMIN — MIRTAZAPINE 45 MILLIGRAM(S): 45 TABLET, ORALLY DISINTEGRATING ORAL at 20:48

## 2022-06-20 RX ADMIN — Medication 2 MILLIGRAM(S): at 12:29

## 2022-06-20 RX ADMIN — GABAPENTIN 800 MILLIGRAM(S): 400 CAPSULE ORAL at 08:49

## 2022-06-20 RX ADMIN — Medication 2 MILLIGRAM(S): at 08:49

## 2022-06-20 NOTE — PSYCHIATRIC REHAB INITIAL EVALUATION - NSBHSTRENGTHHOME_PSY_ALL_CORE
Patient reported that he is satisfied with his living situation, as he has running water, heat, and a shower in his camper.
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
## 2022-06-20 NOTE — PSYCHIATRIC REHAB INITIAL EVALUATION - NSBHLOCATIONHOME_PSY_ALL_CORE_FT
[place]-Patient has been staying with a friend Patient has been residing in a [facility] in [place]

## 2022-06-20 NOTE — BH INPATIENT PSYCHIATRY PROGRESS NOTE - NSBHFUPINTERVALHXFT_PSY_A_CORE
SANTHOSH MOBLEY report received, case discussed at team, pt seen, MSE done.  No acute events O/N.  Compliant with meds on unit but lithium levels indicate non compliance with psych meds before admit, endorses depressed mood and 30 lb weight loss.  Pt know well to writer from admission 2 years ago on this unit.  Appears apathetic with prominent anorexia and anhedonia all consistent with severe depression.  Does agree for writer to start ENSURE with meals given that pt appears cachectic with PMR, says "no desire to eat" and "I am naturally anorexic when I get depressed." Does feel lithium is helping him. Say she has flashback or "daymares"  We can agree on need to make #1 priority "feeling better" and pt is able to respond to questions by MD, says he appreciates humor of MD.  Says he concealed his bad mood from MD when he saw MD 4 or 5 times in parking lot, as "I did not want to let people down."  No new SEs reported or observed.

## 2022-06-21 PROCEDURE — 99232 SBSQ HOSP IP/OBS MODERATE 35: CPT

## 2022-06-21 RX ORDER — ARIPIPRAZOLE 15 MG/1
20 TABLET ORAL AT BEDTIME
Refills: 0 | Status: DISCONTINUED | OUTPATIENT
Start: 2022-06-22 | End: 2022-06-24

## 2022-06-21 RX ORDER — TRAZODONE HCL 50 MG
100 TABLET ORAL AT BEDTIME
Refills: 0 | Status: DISCONTINUED | OUTPATIENT
Start: 2022-06-21 | End: 2022-06-24

## 2022-06-21 RX ADMIN — GABAPENTIN 800 MILLIGRAM(S): 400 CAPSULE ORAL at 08:52

## 2022-06-21 RX ADMIN — Medication 2 MILLIGRAM(S): at 12:24

## 2022-06-21 RX ADMIN — LITHIUM CARBONATE 1200 MILLIGRAM(S): 300 TABLET, EXTENDED RELEASE ORAL at 20:59

## 2022-06-21 RX ADMIN — GABAPENTIN 800 MILLIGRAM(S): 400 CAPSULE ORAL at 20:59

## 2022-06-21 RX ADMIN — METHADONE HYDROCHLORIDE 120 MILLIGRAM(S): 40 TABLET ORAL at 08:51

## 2022-06-21 RX ADMIN — Medication 2 MILLIGRAM(S): at 20:59

## 2022-06-21 RX ADMIN — Medication 2 MILLIGRAM(S): at 08:52

## 2022-06-21 RX ADMIN — METHADONE HYDROCHLORIDE 15 MILLIGRAM(S): 40 TABLET ORAL at 08:51

## 2022-06-21 RX ADMIN — ARIPIPRAZOLE 20 MILLIGRAM(S): 15 TABLET ORAL at 08:51

## 2022-06-21 RX ADMIN — MIRTAZAPINE 45 MILLIGRAM(S): 45 TABLET, ORALLY DISINTEGRATING ORAL at 21:00

## 2022-06-21 NOTE — BH INPATIENT PSYCHIATRY PROGRESS NOTE - OTHER
PMR notably evident, wearing hoodie, poor eye contact, mild apathy- all slightly attenuated irritable and depressed with PMR pt narrative

## 2022-06-21 NOTE — DIETITIAN INITIAL EVALUATION ADULT - PERTINENT MEDS FT
MEDICATIONS  (STANDING):  ARIPiprazole 20 milliGRAM(s) Oral daily  clonazePAM  Tablet 2 milliGRAM(s) Oral three times a day  gabapentin 800 milliGRAM(s) Oral two times a day  lithium SR (LITHOBID) 1200 milliGRAM(s) Oral at bedtime  methadone    Tablet 15 milliGRAM(s) Oral daily  methadone   Dispersible Tablet 120 milliGRAM(s) Oral daily  mirtazapine 45 milliGRAM(s) Oral at bedtime  traZODone 50 milliGRAM(s) Oral at bedtime

## 2022-06-21 NOTE — BH INPATIENT PSYCHIATRY PROGRESS NOTE - NSBHFUPINTERVALHXFT_PSY_A_CORE
JOHANNY MOBLEY report received, case discussed at team, pt seen, MSE done.  No acute events O/N.  Compliant with meds on unit but lithium levels indicate non compliance with psych meds before admit, endorses depressed mood and 30 lb weight loss.  Pt known well to writer from admission 2 years ago on this unit after suicide attempt.  Appears apathetic with prominent anorexia and anhedonia all consistent with severe depression.  Does agree for writer to start ENSURE with meals given that pt appears cachectic with PMR, says "no desire to eat" and "I am naturally anorexic when I get depressed." Does feel lithium is helping him. Says he has flashback or "daymares". Yet for unclear reasons stopped lithium.  We can agree on need to make #1 priority "feeling better" and pt is able to respond to questions by MD, says he appreciates humor of MD.  Says he concealed his bad mood from MD when he saw MD 4 or 5 times in parking lot, as "I did not want to let people down."  We also discuss role of mood stabilizers and review all medications and their roles.  AM abilify will be rescheduled as qhs to aid sleep, for now. Discussion to continue as pt very anxiety prone re med changes by hx.  Shows writer laceration to L forearm with sutures, which he had not mentioned or shown yesterday.  No new SEs reported or observed.

## 2022-06-21 NOTE — DIETITIAN INITIAL EVALUATION ADULT - WEIGHT IN LBS
[FreeTextEntry1] : Multiple bilateral breast nodules- stable \par BI-RADS 2 imaging October 2021\par Normal PE\par Continue yearly breast imaging October 2022 \par RTO 1 year
180

## 2022-06-21 NOTE — DIETITIAN INITIAL EVALUATION ADULT - NS FNS DIET ORDER
Diet, Regular:   Supplement Feeding Modality:  Oral  Ensure Enlive Cans or Servings Per Day:  3       Frequency:  Three Times a day (06-20-22 @ 18:14)

## 2022-06-21 NOTE — DIETITIAN INITIAL EVALUATION ADULT - OTHER INFO
Pt presents to [facility] ED with cutting , SI and depression. Admitted to [facility] for Major Depressive Disorder. Pt reports poor appetite/po intake for 3 months. At present, appetite/po intake is fair. No GI distress noted. Encouraged po intake. Pt verbalized understanding. Food preferences taken and implemented.

## 2022-06-22 PROCEDURE — 99233 SBSQ HOSP IP/OBS HIGH 50: CPT

## 2022-06-22 RX ORDER — CLONAZEPAM 1 MG
2 TABLET ORAL THREE TIMES A DAY
Refills: 0 | Status: DISCONTINUED | OUTPATIENT
Start: 2022-06-22 | End: 2022-06-24

## 2022-06-22 RX ORDER — METHADONE HYDROCHLORIDE 40 MG/1
15 TABLET ORAL DAILY
Refills: 0 | Status: DISCONTINUED | OUTPATIENT
Start: 2022-06-22 | End: 2022-06-29

## 2022-06-22 RX ORDER — METHADONE HYDROCHLORIDE 40 MG/1
120 TABLET ORAL DAILY
Refills: 0 | Status: DISCONTINUED | OUTPATIENT
Start: 2022-06-22 | End: 2022-06-29

## 2022-06-22 RX ADMIN — GABAPENTIN 800 MILLIGRAM(S): 400 CAPSULE ORAL at 20:21

## 2022-06-22 RX ADMIN — LITHIUM CARBONATE 1200 MILLIGRAM(S): 300 TABLET, EXTENDED RELEASE ORAL at 20:21

## 2022-06-22 RX ADMIN — GABAPENTIN 800 MILLIGRAM(S): 400 CAPSULE ORAL at 08:25

## 2022-06-22 RX ADMIN — METHADONE HYDROCHLORIDE 15 MILLIGRAM(S): 40 TABLET ORAL at 08:25

## 2022-06-22 RX ADMIN — Medication 2 MILLIGRAM(S): at 20:20

## 2022-06-22 RX ADMIN — ARIPIPRAZOLE 20 MILLIGRAM(S): 15 TABLET ORAL at 20:21

## 2022-06-22 RX ADMIN — Medication 2 MILLIGRAM(S): at 08:25

## 2022-06-22 RX ADMIN — MIRTAZAPINE 45 MILLIGRAM(S): 45 TABLET, ORALLY DISINTEGRATING ORAL at 20:21

## 2022-06-22 RX ADMIN — Medication 2 MILLIGRAM(S): at 13:40

## 2022-06-22 RX ADMIN — METHADONE HYDROCHLORIDE 120 MILLIGRAM(S): 40 TABLET ORAL at 08:25

## 2022-06-22 NOTE — BH INPATIENT PSYCHIATRY PROGRESS NOTE - OTHER
signif laceratio with sutures to L forearm with other scars evident PMR notably evident, wearing hoodie, poor eye contact, mild apathy- all slightly attenuated pt narrative irritable and depressed with PMR

## 2022-06-22 NOTE — BH INPATIENT PSYCHIATRY PROGRESS NOTE - NSBHATTESTBILLINGAW_PSY_A_CORE
[id number] Inpatient care - moderate complexity - 25 minutes [id number] Inpatient care - high complexity - 35 minutes

## 2022-06-22 NOTE — BH INPATIENT PSYCHIATRY PROGRESS NOTE - NSBHFUPINTERVALHXFT_PSY_A_CORE
WED RN report received, case discussed at team, pt seen, MSE done.  No acute events O/N.  Compliant with meds on unit but lithium levels indicate non compliance with psych meds before admit, endorses depressed mood and 30 lb weight loss.  Pt known well to writer from admission 2 years ago on this unit after suicide attempt.  Appears apathetic with prominent anorexia and anhedonia all consistent with severe depression.  Does agree for writer to start ENSURE with meals given that pt appears cachectic with PMR, says "no desire to eat" and "I am naturally anorexic when I get depressed." Does feel lithium is helping him. Says he has flashback or "daymares". Yet for unclear reasons stopped lithium.  We can agree on need to make #1 priority "feeling better" and pt is able to respond to questions by MD, says he appreciates humor of MD.  Says he concealed his bad mood from MD when he saw MD 4 or 5 times in parking lot, as "I did not want to let people down."  We also discussed role of mood stabilizers and reviewed all medications and their roles again today and pt will consider BZD reduction.  AM abilify will be rescheduled as qhs to aid sleep, for now. Discussion to continue as pt very anxiety prone re med changes by hx.  Showed on TUES to writer laceration to L forearm with sutures, which he had not mentioned or shown MON.  Does not mention self harm further today and able to tolerate supportive therapy session on how depression works in brain.  Thanks his MD for long session with much science today.  No new SEs reported or observed.

## 2022-06-23 PROCEDURE — 99232 SBSQ HOSP IP/OBS MODERATE 35: CPT

## 2022-06-23 PROCEDURE — 99223 1ST HOSP IP/OBS HIGH 75: CPT

## 2022-06-23 RX ORDER — BACITRACIN ZINC 500 UNIT/G
1 OINTMENT IN PACKET (EA) TOPICAL DAILY
Refills: 0 | Status: DISCONTINUED | OUTPATIENT
Start: 2022-06-23 | End: 2022-06-29

## 2022-06-23 RX ADMIN — ARIPIPRAZOLE 20 MILLIGRAM(S): 15 TABLET ORAL at 20:53

## 2022-06-23 RX ADMIN — METHADONE HYDROCHLORIDE 15 MILLIGRAM(S): 40 TABLET ORAL at 08:11

## 2022-06-23 RX ADMIN — GABAPENTIN 800 MILLIGRAM(S): 400 CAPSULE ORAL at 08:11

## 2022-06-23 RX ADMIN — LITHIUM CARBONATE 1200 MILLIGRAM(S): 300 TABLET, EXTENDED RELEASE ORAL at 20:54

## 2022-06-23 RX ADMIN — Medication 2 MILLIGRAM(S): at 12:48

## 2022-06-23 RX ADMIN — Medication 2 MILLIGRAM(S): at 20:53

## 2022-06-23 RX ADMIN — MIRTAZAPINE 45 MILLIGRAM(S): 45 TABLET, ORALLY DISINTEGRATING ORAL at 20:53

## 2022-06-23 RX ADMIN — Medication 2 MILLIGRAM(S): at 08:10

## 2022-06-23 RX ADMIN — METHADONE HYDROCHLORIDE 120 MILLIGRAM(S): 40 TABLET ORAL at 08:11

## 2022-06-23 RX ADMIN — GABAPENTIN 800 MILLIGRAM(S): 400 CAPSULE ORAL at 20:53

## 2022-06-23 NOTE — CONSULT NOTE ADULT - SUBJECTIVE AND OBJECTIVE BOX
HPI: Pt is 41M admitted to [facility] 6/18/22 for depression.  He cut himself with a razor blade and the wound was sutured in the ED on 6/18/22.      PAST MEDICAL & SURGICAL HISTORY:  Diverticulitis      Depression      Substance use disorder      Osteomyelitis      Depression  with suicidal attempt      No significant past surgical history          Review of Systems:   CONSTITUTIONAL: No fever, weight loss, or fatigue  EYES: No eye pain, visual disturbances, or discharge  ENMT:  No difficulty hearing, tinnitus, vertigo; No sinus or throat pain  NECK: No pain or stiffness  RESPIRATORY: No cough, wheezing, chills or hemoptysis; No shortness of breath  CARDIOVASCULAR: No chest pain, palpitations, dizziness, or leg swelling  GASTROINTESTINAL: No abdominal or epigastric pain. No nausea, vomiting, or hematemesis; No diarrhea or constipation. No melena or hematochezia.  GENITOURINARY: No dysuria, frequency, hematuria, or incontinence  NEUROLOGICAL: No headaches, memory loss, loss of strength, numbness, or tremors  SKIN: see HPI  LYMPH NODES: No enlarged glands  ENDOCRINE: No heat or cold intolerance; No hair loss  MUSCULOSKELETAL: No joint pain or swelling; No muscle, back, or extremity pain  HEME/LYMPH: No easy bruising, or bleeding gums  ALLERY AND IMMUNOLOGIC: No hives or eczema    Allergies    Haldol (Rash)  Haldol (Swelling)  Prolixin (Anaphylaxis)  Prolixin (Swelling)  Thorazine (Swelling)  Thorazine (Urticaria)    Intolerances        Social History: opioid dependence on MMTP     FAMILY HISTORY:  No pertinent family history in first degree relatives        MEDICATIONS  (STANDING):  ARIPiprazole 20 milliGRAM(s) Oral at bedtime  BACItracin   Ointment 1 Application(s) Topical daily  clonazePAM  Tablet 2 milliGRAM(s) Oral three times a day  gabapentin 800 milliGRAM(s) Oral two times a day  lithium SR (LITHOBID) 1200 milliGRAM(s) Oral at bedtime  methadone    Tablet 15 milliGRAM(s) Oral daily  methadone   Dispersible Tablet 120 milliGRAM(s) Oral daily  mirtazapine 45 milliGRAM(s) Oral at bedtime  traZODone 100 milliGRAM(s) Oral at bedtime    MEDICATIONS  (PRN):  hydrOXYzine hydrochloride 50 milliGRAM(s) Oral every 4 hours PRN Anxiety  OLANZapine 5 milliGRAM(s) Oral every 6 hours PRN agitation/anxiety  OLANZapine Injectable 5 milliGRAM(s) IntraMuscular once PRN severe agitation      Vital Signs Last 24 Hrs  T(C): 36.1 (23 Jun 2022 06:19), Max: 36.8 (22 Jun 2022 18:19)  T(F): 97 (23 Jun 2022 06:19), Max: 98.3 (22 Jun 2022 18:19)  HR: 66 (23 Jun 2022 06:19) (66 - 66)  BP: 99/58 (23 Jun 2022 06:19) (99/58 - 99/58)  BP(mean): --  RR: 15            PHYSICAL EXAM:  GENERAL: NAD, well-developed  HEAD:  Atraumatic, Normocephalic  EYES: EOMI, conjunctiva and sclera clear  NECK: Supple, No JVD  CHEST/LUNG: Clear to auscultation bilaterally; No wheeze  HEART: Regular rate and rhythm; No murmurs, rubs, or gallops  ABDOMEN: Soft, Nontender, Nondistended; Bowel sounds present  EXTREMITIES:  2+ Peripheral Pulses, No clubbing, cyanosis, or edema  NEUROLOGY: non-focal  SKIN: left forearm sutured laceration with dried blood, healing, erythematous, tender, no discharge    LABS:    reviewed in sunrise            Care Discussed with Consultants/Other Providers: Dr Noel

## 2022-06-23 NOTE — CONSULT NOTE ADULT - ASSESSMENT
41m admitted for depression with self-inflicted laceration on left forearm    Plan:  Forearm wound: Healing, to prevent infection recommend bacitracin daily and cover with gauze.  The wound is deep and would assess for healing and suture removal after 10 days (6/27/22)    Opioid dependence on agonist therapy: Continue methadone    Depression: Management per primary team

## 2022-06-23 NOTE — BH INPATIENT PSYCHIATRY PROGRESS NOTE - OTHER
irritable and depressed with PMR signif laceration with sutures to L forearm with other scars evident and sutured area now inflamed;  slightly more verbal but prominent apathy, head down, PMR, very poor eye contact PMR notably evident, wearing hoodie, poor eye contact, mild apathy- all slightly attenuated pt narrative

## 2022-06-23 NOTE — BH INPATIENT PSYCHIATRY PROGRESS NOTE - NSBHFUPINTERVALHXFT_PSY_A_CORE
WED RN report received, case discussed at team, pt seen, MSE done.  No acute events O/N.  Compliant with meds on unit but lithium levels indicate non compliance with psych meds before admit, endorses depressed mood and 30 lb weight loss.  Pt known well to writer from admission 2 years ago on this unit after suicide attempt.  Appears apathetic with prominent anorexia and anhedonia all consistent with severe depression.  Does agree for writer to start ENSURE with meals given that pt appears cachectic with PMR, says "no desire to eat" and "I am naturally anorexic when I get depressed." Does feel lithium is helping him. Says he has flashback or "daymares". Yet for unclear reasons stopped lithium.  We can agree on need to make #1 priority "feeling better" and pt is able to respond to questions by MD, says he appreciates humor of MD.  Says he concealed his bad mood from MD when he saw MD 4 or 5 times in parking lot, as "I did not want to let people down."  We also discussed role of mood stabilizers and reviewed all medications and their roles again today and pt will consider BZD reduction.  AM abilify will be rescheduled as qhs to aid sleep, for now. Discussion to continue as pt very anxiety prone re med changes by hx.  Showed on TUES to writer laceration to L forearm with sutures, which he had not mentioned or shown MON.  Does not mention self harm further today and able to tolerate supportive therapy session on how depression works in brain.  Thanks his MD for long session with much science today.  No new SEs reported or observed.    6/23 Thursday RN report received, case discussed at team, patient seen and MSE done. No acute events overnight. Patient means very depressed with very prominent PMR and weight loss and anhedonia and even some deficits in concentration and memory. Yet he does have a very good rapport with his MD from last hospitalization to the years ago and does make an effort to explain his thoughts and feelings. We discuss Pristiq further and writer again stressed the need to begin an effort to reduce Klonopin dependence. Patient is very anxious and reluctant about this topic but says he trusts his MD and will think about it more seriously. Abilify now at bedtime to aid sleep. Engaging groups. No new side effects reported or observed.  MED team called for woundcare as sutures appear inflamed, r/o cellulitis vs marion infection.

## 2022-06-24 PROCEDURE — 99232 SBSQ HOSP IP/OBS MODERATE 35: CPT

## 2022-06-24 RX ORDER — CLONAZEPAM 1 MG
0.5 TABLET ORAL
Refills: 0 | Status: DISCONTINUED | OUTPATIENT
Start: 2022-06-24 | End: 2022-06-29

## 2022-06-24 RX ORDER — CLONAZEPAM 1 MG
2 TABLET ORAL AT BEDTIME
Refills: 0 | Status: DISCONTINUED | OUTPATIENT
Start: 2022-06-24 | End: 2022-06-29

## 2022-06-24 RX ORDER — ARIPIPRAZOLE 15 MG/1
25 TABLET ORAL AT BEDTIME
Refills: 0 | Status: DISCONTINUED | OUTPATIENT
Start: 2022-06-24 | End: 2022-06-30

## 2022-06-24 RX ORDER — CLONAZEPAM 1 MG
1.5 TABLET ORAL
Refills: 0 | Status: DISCONTINUED | OUTPATIENT
Start: 2022-06-24 | End: 2022-06-29

## 2022-06-24 RX ORDER — TRAZODONE HCL 50 MG
100 TABLET ORAL AT BEDTIME
Refills: 0 | Status: DISCONTINUED | OUTPATIENT
Start: 2022-06-24 | End: 2022-07-01

## 2022-06-24 RX ADMIN — METHADONE HYDROCHLORIDE 15 MILLIGRAM(S): 40 TABLET ORAL at 08:19

## 2022-06-24 RX ADMIN — METHADONE HYDROCHLORIDE 120 MILLIGRAM(S): 40 TABLET ORAL at 08:18

## 2022-06-24 RX ADMIN — Medication 1 APPLICATION(S): at 08:18

## 2022-06-24 RX ADMIN — GABAPENTIN 800 MILLIGRAM(S): 400 CAPSULE ORAL at 20:25

## 2022-06-24 RX ADMIN — Medication 2 MILLIGRAM(S): at 08:18

## 2022-06-24 RX ADMIN — Medication 2 MILLIGRAM(S): at 20:25

## 2022-06-24 RX ADMIN — MIRTAZAPINE 45 MILLIGRAM(S): 45 TABLET, ORALLY DISINTEGRATING ORAL at 20:25

## 2022-06-24 RX ADMIN — Medication 2 MILLIGRAM(S): at 12:32

## 2022-06-24 RX ADMIN — ARIPIPRAZOLE 25 MILLIGRAM(S): 15 TABLET ORAL at 20:25

## 2022-06-24 RX ADMIN — LITHIUM CARBONATE 1200 MILLIGRAM(S): 300 TABLET, EXTENDED RELEASE ORAL at 20:25

## 2022-06-24 RX ADMIN — GABAPENTIN 800 MILLIGRAM(S): 400 CAPSULE ORAL at 08:19

## 2022-06-24 NOTE — BH INPATIENT PSYCHIATRY PROGRESS NOTE - OTHER
PMR notably evident, wearing hoodie, poor eye contact, mild apathy- all slightly attenuated pt narrative irritable and depressed with PMR signif laceration with sutures to L forearm with other scars evident and sutured area now inflamed;  slightly more verbal but prominent apathy, head down, PMR, very poor eye contact, compliant with breathing exercises today

## 2022-06-24 NOTE — BH INPATIENT PSYCHIATRY PROGRESS NOTE - NSBHFUPINTERVALHXFT_PSY_A_CORE
WED RN report received, case discussed at team, pt seen, MSE done.  No acute events O/N.  Compliant with meds on unit but lithium levels indicate non compliance with psych meds before admit, endorses depressed mood and 30 lb weight loss.  Pt known well to writer from admission 2 years ago on this unit after suicide attempt.  Appears apathetic with prominent anorexia and anhedonia all consistent with severe depression.  Does agree for writer to start ENSURE with meals given that pt appears cachectic with PMR, says "no desire to eat" and "I am naturally anorexic when I get depressed." Does feel lithium is helping him. Says he has flashback or "daymares". Yet for unclear reasons stopped lithium.  We can agree on need to make #1 priority "feeling better" and pt is able to respond to questions by MD, says he appreciates humor of MD.  Says he concealed his bad mood from MD when he saw MD 4 or 5 times in parking lot, as "I did not want to let people down."  We also discussed role of mood stabilizers and reviewed all medications and their roles again today and pt will consider BZD reduction.  AM abilify will be rescheduled as qhs to aid sleep, for now. Discussion to continue as pt very anxiety prone re med changes by hx.  Showed on TUES to writer laceration to L forearm with sutures, which he had not mentioned or shown MON.  Does not mention self harm further today and able to tolerate supportive therapy session on how depression works in brain.  Thanks his MD for long session with much science today.  No new SEs reported or observed.    6/24 FRI RN report received, case discussed at team, patient seen and MSE done. No acute events overnight. Patient means very depressed with very prominent PMR and weight loss and anhedonia and even some deficits in concentration and memory. Yet he does have a very good rapport with his MD from last hospitalization to the years ago and does make an effort to explain his thoughts and feelings. We discuss Pristiq further and writer again stressed the need to begin an effort to reduce Klonopin dependence and we made a plan to begin this work on the weekend using prns. Patient is very anxious and reluctant about this topic but says he trusts his MD and will think about it more seriously but appears convinced that this is the way forwards now. Abilify now at bedtime to aid sleep helping and we will increase further tonight. Engaging groups. No new side effects reported or observed.  MED team called for woundcare as sutures appear inflamed, r/o cellulitis vs marion infection.  And today, L 5th digit injured at nailbed by footbal, MED teams suggests wrap and pt agrees.  THey will return MON to examine wounnds.  Pt says he likes the pain, as now he has some feeling.  No new SEs reported or observed.

## 2022-06-25 RX ADMIN — Medication 1 APPLICATION(S): at 08:42

## 2022-06-25 RX ADMIN — ARIPIPRAZOLE 25 MILLIGRAM(S): 15 TABLET ORAL at 20:53

## 2022-06-25 RX ADMIN — MIRTAZAPINE 45 MILLIGRAM(S): 45 TABLET, ORALLY DISINTEGRATING ORAL at 20:54

## 2022-06-25 RX ADMIN — Medication 1.5 MILLIGRAM(S): at 08:39

## 2022-06-25 RX ADMIN — GABAPENTIN 800 MILLIGRAM(S): 400 CAPSULE ORAL at 20:53

## 2022-06-25 RX ADMIN — GABAPENTIN 800 MILLIGRAM(S): 400 CAPSULE ORAL at 08:39

## 2022-06-25 RX ADMIN — LITHIUM CARBONATE 1200 MILLIGRAM(S): 300 TABLET, EXTENDED RELEASE ORAL at 20:53

## 2022-06-25 RX ADMIN — Medication 2 MILLIGRAM(S): at 20:53

## 2022-06-25 RX ADMIN — METHADONE HYDROCHLORIDE 15 MILLIGRAM(S): 40 TABLET ORAL at 08:40

## 2022-06-25 RX ADMIN — METHADONE HYDROCHLORIDE 120 MILLIGRAM(S): 40 TABLET ORAL at 08:40

## 2022-06-25 RX ADMIN — Medication 0.5 MILLIGRAM(S): at 08:40

## 2022-06-25 RX ADMIN — Medication 0.5 MILLIGRAM(S): at 13:13

## 2022-06-25 RX ADMIN — Medication 1.5 MILLIGRAM(S): at 13:12

## 2022-06-26 RX ADMIN — METHADONE HYDROCHLORIDE 120 MILLIGRAM(S): 40 TABLET ORAL at 08:44

## 2022-06-26 RX ADMIN — Medication 1.5 MILLIGRAM(S): at 08:43

## 2022-06-26 RX ADMIN — Medication 1 APPLICATION(S): at 09:15

## 2022-06-26 RX ADMIN — GABAPENTIN 800 MILLIGRAM(S): 400 CAPSULE ORAL at 20:38

## 2022-06-26 RX ADMIN — Medication 1.5 MILLIGRAM(S): at 13:05

## 2022-06-26 RX ADMIN — METHADONE HYDROCHLORIDE 15 MILLIGRAM(S): 40 TABLET ORAL at 08:44

## 2022-06-26 RX ADMIN — LITHIUM CARBONATE 1200 MILLIGRAM(S): 300 TABLET, EXTENDED RELEASE ORAL at 20:38

## 2022-06-26 RX ADMIN — Medication 0.5 MILLIGRAM(S): at 13:12

## 2022-06-26 RX ADMIN — MIRTAZAPINE 45 MILLIGRAM(S): 45 TABLET, ORALLY DISINTEGRATING ORAL at 20:37

## 2022-06-26 RX ADMIN — ARIPIPRAZOLE 25 MILLIGRAM(S): 15 TABLET ORAL at 20:37

## 2022-06-26 RX ADMIN — Medication 2 MILLIGRAM(S): at 20:37

## 2022-06-26 RX ADMIN — Medication 0.5 MILLIGRAM(S): at 08:43

## 2022-06-26 RX ADMIN — GABAPENTIN 800 MILLIGRAM(S): 400 CAPSULE ORAL at 08:42

## 2022-06-27 LAB
ALBUMIN SERPL ELPH-MCNC: 3.8 G/DL — SIGNIFICANT CHANGE UP (ref 3.3–5)
ALP SERPL-CCNC: 48 U/L — SIGNIFICANT CHANGE UP (ref 40–120)
ALT FLD-CCNC: 10 U/L — SIGNIFICANT CHANGE UP (ref 4–41)
ANION GAP SERPL CALC-SCNC: 9 MMOL/L — SIGNIFICANT CHANGE UP (ref 7–14)
AST SERPL-CCNC: 17 U/L — SIGNIFICANT CHANGE UP (ref 4–40)
BASOPHILS # BLD AUTO: 0.03 K/UL — SIGNIFICANT CHANGE UP (ref 0–0.2)
BASOPHILS NFR BLD AUTO: 0.4 % — SIGNIFICANT CHANGE UP (ref 0–2)
BILIRUB SERPL-MCNC: <0.2 MG/DL — SIGNIFICANT CHANGE UP (ref 0.2–1.2)
BUN SERPL-MCNC: 15 MG/DL — SIGNIFICANT CHANGE UP (ref 7–23)
CALCIUM SERPL-MCNC: 9.5 MG/DL — SIGNIFICANT CHANGE UP (ref 8.4–10.5)
CHLORIDE SERPL-SCNC: 99 MMOL/L — SIGNIFICANT CHANGE UP (ref 98–107)
CO2 SERPL-SCNC: 34 MMOL/L — HIGH (ref 22–31)
CREAT SERPL-MCNC: 0.82 MG/DL — SIGNIFICANT CHANGE UP (ref 0.5–1.3)
EGFR: 112 ML/MIN/1.73M2 — SIGNIFICANT CHANGE UP
EOSINOPHIL # BLD AUTO: 0.42 K/UL — SIGNIFICANT CHANGE UP (ref 0–0.5)
EOSINOPHIL NFR BLD AUTO: 5.7 % — SIGNIFICANT CHANGE UP (ref 0–6)
GLUCOSE SERPL-MCNC: 96 MG/DL — SIGNIFICANT CHANGE UP (ref 70–99)
HCT VFR BLD CALC: 38.3 % — LOW (ref 39–50)
HGB BLD-MCNC: 11.9 G/DL — LOW (ref 13–17)
IANC: 4.18 K/UL — SIGNIFICANT CHANGE UP (ref 1.8–7.4)
IMM GRANULOCYTES NFR BLD AUTO: 0.4 % — SIGNIFICANT CHANGE UP (ref 0–1.5)
IRON SATN MFR SERPL: 18 % — SIGNIFICANT CHANGE UP (ref 14–50)
IRON SATN MFR SERPL: 44 UG/DL — LOW (ref 45–165)
LITHIUM SERPL-MCNC: 0.6 MMOL/L — SIGNIFICANT CHANGE UP (ref 0.6–1.2)
LYMPHOCYTES # BLD AUTO: 2.33 K/UL — SIGNIFICANT CHANGE UP (ref 1–3.3)
LYMPHOCYTES # BLD AUTO: 31.4 % — SIGNIFICANT CHANGE UP (ref 13–44)
MCHC RBC-ENTMCNC: 27.9 PG — SIGNIFICANT CHANGE UP (ref 27–34)
MCHC RBC-ENTMCNC: 31.1 GM/DL — LOW (ref 32–36)
MCV RBC AUTO: 89.9 FL — SIGNIFICANT CHANGE UP (ref 80–100)
MONOCYTES # BLD AUTO: 0.43 K/UL — SIGNIFICANT CHANGE UP (ref 0–0.9)
MONOCYTES NFR BLD AUTO: 5.8 % — SIGNIFICANT CHANGE UP (ref 2–14)
NEUTROPHILS # BLD AUTO: 4.18 K/UL — SIGNIFICANT CHANGE UP (ref 1.8–7.4)
NEUTROPHILS NFR BLD AUTO: 56.3 % — SIGNIFICANT CHANGE UP (ref 43–77)
NRBC # BLD: 0 /100 WBCS — SIGNIFICANT CHANGE UP
NRBC # FLD: 0 K/UL — SIGNIFICANT CHANGE UP
PLATELET # BLD AUTO: 297 K/UL — SIGNIFICANT CHANGE UP (ref 150–400)
POTASSIUM SERPL-MCNC: 4.1 MMOL/L — SIGNIFICANT CHANGE UP (ref 3.5–5.3)
POTASSIUM SERPL-SCNC: 4.1 MMOL/L — SIGNIFICANT CHANGE UP (ref 3.5–5.3)
PROT SERPL-MCNC: 6.7 G/DL — SIGNIFICANT CHANGE UP (ref 6–8.3)
RBC # BLD: 4.26 M/UL — SIGNIFICANT CHANGE UP (ref 4.2–5.8)
RBC # FLD: 12.2 % — SIGNIFICANT CHANGE UP (ref 10.3–14.5)
SODIUM SERPL-SCNC: 142 MMOL/L — SIGNIFICANT CHANGE UP (ref 135–145)
TIBC SERPL-MCNC: 238 UG/DL — SIGNIFICANT CHANGE UP (ref 220–430)
UIBC SERPL-MCNC: 194 UG/DL — SIGNIFICANT CHANGE UP (ref 110–370)
WBC # BLD: 7.42 K/UL — SIGNIFICANT CHANGE UP (ref 3.8–10.5)
WBC # FLD AUTO: 7.42 K/UL — SIGNIFICANT CHANGE UP (ref 3.8–10.5)

## 2022-06-27 PROCEDURE — 99232 SBSQ HOSP IP/OBS MODERATE 35: CPT

## 2022-06-27 PROCEDURE — 99233 SBSQ HOSP IP/OBS HIGH 50: CPT

## 2022-06-27 RX ORDER — DESVENLAFAXINE 50 MG/1
50 TABLET, EXTENDED RELEASE ORAL DAILY
Refills: 0 | Status: DISCONTINUED | OUTPATIENT
Start: 2022-06-29 | End: 2022-07-01

## 2022-06-27 RX ORDER — DESVENLAFAXINE 50 MG/1
25 TABLET, EXTENDED RELEASE ORAL DAILY
Refills: 0 | Status: COMPLETED | OUTPATIENT
Start: 2022-06-28 | End: 2022-06-28

## 2022-06-27 RX ADMIN — Medication 1.5 MILLIGRAM(S): at 08:04

## 2022-06-27 RX ADMIN — Medication 0.5 MILLIGRAM(S): at 08:04

## 2022-06-27 RX ADMIN — LITHIUM CARBONATE 1200 MILLIGRAM(S): 300 TABLET, EXTENDED RELEASE ORAL at 20:54

## 2022-06-27 RX ADMIN — METHADONE HYDROCHLORIDE 15 MILLIGRAM(S): 40 TABLET ORAL at 08:05

## 2022-06-27 RX ADMIN — Medication 1.5 MILLIGRAM(S): at 12:42

## 2022-06-27 RX ADMIN — Medication 2 MILLIGRAM(S): at 20:54

## 2022-06-27 RX ADMIN — MIRTAZAPINE 45 MILLIGRAM(S): 45 TABLET, ORALLY DISINTEGRATING ORAL at 20:53

## 2022-06-27 RX ADMIN — Medication 0.5 MILLIGRAM(S): at 12:42

## 2022-06-27 RX ADMIN — GABAPENTIN 800 MILLIGRAM(S): 400 CAPSULE ORAL at 08:04

## 2022-06-27 RX ADMIN — Medication 1 TABLET(S): at 20:53

## 2022-06-27 RX ADMIN — Medication 1 APPLICATION(S): at 09:08

## 2022-06-27 RX ADMIN — ARIPIPRAZOLE 25 MILLIGRAM(S): 15 TABLET ORAL at 20:54

## 2022-06-27 RX ADMIN — METHADONE HYDROCHLORIDE 120 MILLIGRAM(S): 40 TABLET ORAL at 08:05

## 2022-06-27 RX ADMIN — GABAPENTIN 800 MILLIGRAM(S): 400 CAPSULE ORAL at 20:53

## 2022-06-27 NOTE — BH INPATIENT PSYCHIATRY PROGRESS NOTE - NSBHFUPINTERVALHXFT_PSY_A_CORE
WED RN report received, case discussed at team, pt seen, MSE done.  No acute events O/N.  Compliant with meds on unit but lithium levels indicate non compliance with psych meds before admit, endorses depressed mood and 30 lb weight loss.  Pt known well to writer from admission 2 years ago on this unit after suicide attempt.  Appears apathetic with prominent anorexia and anhedonia all consistent with severe depression.  Does agree for writer to start ENSURE with meals given that pt appears cachectic with PMR, says "no desire to eat" and "I am naturally anorexic when I get depressed." Does feel lithium is helping him. Says he has flashback or "daymares". Yet for unclear reasons stopped lithium.  We can agree on need to make #1 priority "feeling better" and pt is able to respond to questions by MD, says he appreciates humor of MD.  Says he concealed his bad mood from MD when he saw MD 4 or 5 times in parking lot, as "I did not want to let people down."  We also discussed role of mood stabilizers and reviewed all medications and their roles again today and pt will consider BZD reduction.  AM abilify will be rescheduled as qhs to aid sleep, for now. Discussion to continue as pt very anxiety prone re med changes by hx.  Showed on TUES to writer laceration to L forearm with sutures, which he had not mentioned or shown MON.  Does not mention self harm further today and able to tolerate supportive therapy session on how depression works in brain.  Thanks his MD for long session with much science today.  No new SEs reported or observed.    6/24 FRI RN report received, case discussed at team, patient seen and MSE done. No acute events overnight. Patient means very depressed with very prominent PMR and weight loss and anhedonia and even some deficits in concentration and memory. Yet he does have a very good rapport with his MD from last hospitalization to the years ago and does make an effort to explain his thoughts and feelings. We discuss Pristiq further and writer again stressed the need to begin an effort to reduce Klonopin dependence and we made a plan to begin this work on the weekend using prns. Patient is very anxious and reluctant about this topic but says he trusts his MD and will think about it more seriously but appears convinced that this is the way forwards now. Abilify now at bedtime to aid sleep helping and we will increase further tonight. Engaging groups. No new side effects reported or observed.  MED team called for woundcare as sutures appear inflamed, r/o cellulitis vs marion infection.  And today, L 5th digit injured at nailbed by footbal, MED teams suggests wrap and pt agrees.  THey will return MON to examine wounnds.  Pt says he likes the pain, as now he has some feeling.    6/27 MON RN report received, case discussed at team, patient seen and MSE done. No acute events overnight. Patient continues to appear very depressed with very prominent PMR and weight loss and anhedonia and even some deficits in concentration and memory but has gained several pounds as per RN since arrival. Yet he does have a very good rapport with his MD from last hospitalization two years ago and does make an effort to explain his thoughts and feelings. We discussed Pristiq further and writer again stressed the need to begin an effort to reduce Klonopin dependence and we made a plan to begin this work on the weekend using prns. But pt did not follow through and says he feels he let his MD down. Has not reason for not attempting lowering.  Says he is attending groups but distracted by erratic behavior of several peers on unit over weekend.  Feels abilfy increase is helping. We made plan to restart pristiq for anxiety and depressed mood. No new SEs reported or observed.

## 2022-06-27 NOTE — BH INPATIENT PSYCHIATRY PROGRESS NOTE - OTHER
pt narrative signif laceration with sutures to L forearm with other scars evident and sutured area now inflamed- MED team to remove sutures today.  slightly more verbal but prominent apathy, head down less, PMR evident, very poor eye contact but slightly improved PMR notably evident, wearing hoodie, poor eye contact, mild apathy- all slightly attenuated irritable and depressed with PMR

## 2022-06-27 NOTE — PHARMACOTHERAPY INTERVENTION NOTE - COMMENTS
Spoke with Dr. Casper regarding there needing to be a stop date on the order. Dr. Casper placed a stop order.

## 2022-06-28 PROCEDURE — 99232 SBSQ HOSP IP/OBS MODERATE 35: CPT

## 2022-06-28 RX ORDER — LITHIUM CARBONATE 300 MG/1
1350 TABLET, EXTENDED RELEASE ORAL AT BEDTIME
Refills: 0 | Status: DISCONTINUED | OUTPATIENT
Start: 2022-06-28 | End: 2022-07-13

## 2022-06-28 RX ADMIN — METHADONE HYDROCHLORIDE 120 MILLIGRAM(S): 40 TABLET ORAL at 08:44

## 2022-06-28 RX ADMIN — Medication 1.5 MILLIGRAM(S): at 13:01

## 2022-06-28 RX ADMIN — GABAPENTIN 800 MILLIGRAM(S): 400 CAPSULE ORAL at 20:35

## 2022-06-28 RX ADMIN — Medication 1 TABLET(S): at 08:46

## 2022-06-28 RX ADMIN — GABAPENTIN 800 MILLIGRAM(S): 400 CAPSULE ORAL at 08:46

## 2022-06-28 RX ADMIN — LITHIUM CARBONATE 1350 MILLIGRAM(S): 300 TABLET, EXTENDED RELEASE ORAL at 20:32

## 2022-06-28 RX ADMIN — DESVENLAFAXINE 25 MILLIGRAM(S): 50 TABLET, EXTENDED RELEASE ORAL at 08:46

## 2022-06-28 RX ADMIN — Medication 1.5 MILLIGRAM(S): at 08:51

## 2022-06-28 RX ADMIN — ARIPIPRAZOLE 25 MILLIGRAM(S): 15 TABLET ORAL at 20:32

## 2022-06-28 RX ADMIN — Medication 1 APPLICATION(S): at 08:47

## 2022-06-28 RX ADMIN — Medication 1 TABLET(S): at 20:32

## 2022-06-28 RX ADMIN — Medication 2 MILLIGRAM(S): at 20:35

## 2022-06-28 RX ADMIN — METHADONE HYDROCHLORIDE 15 MILLIGRAM(S): 40 TABLET ORAL at 08:45

## 2022-06-28 RX ADMIN — Medication 0.5 MILLIGRAM(S): at 08:51

## 2022-06-28 RX ADMIN — Medication 0.5 MILLIGRAM(S): at 13:04

## 2022-06-28 RX ADMIN — MIRTAZAPINE 45 MILLIGRAM(S): 45 TABLET, ORALLY DISINTEGRATING ORAL at 20:32

## 2022-06-28 NOTE — BH TREATMENT PLAN - NSTXSUBMISINTERMD_PSY_ALL_CORE
Psychopharm with goal of BZD reduction and LALA for safety and compliance and supportive therapy with return to [place] aftercare on MMTP

## 2022-06-28 NOTE — BH TREATMENT PLAN - NSTXCOPEINTERMD_PSY_ALL_CORE
Psychopharm with goal of BZD reduction and LALA for safety and compliance and supportive therapy with return to [facility] on MMTP
Psychopharm with goal of BZD reduction and LALA for safety and compliance and supportive therapy with return to [place] aftercare on MMTP

## 2022-06-28 NOTE — BH INPATIENT PSYCHIATRY PROGRESS NOTE - OTHER
irritable and depressed with PMR PMR notably evident, wearing hoodie, poor eye contact, mild apathy- all slightly attenuated pt narrative signif laceration with sutures to L forearm with other scars evident and sutured area now inflamed- MED team to remove sutures today.  slightly more verbal but prominent apathy, head down less, PMR evident, very poor eye contact but slightly improved;  very anxious about bzd reduction

## 2022-06-28 NOTE — BH TREATMENT PLAN - NSTXDEPRESGOAL_PSY_ALL_CORE
Exhibit improvements in self-grooming, hygiene, sleep and appetite
Exhibit improvements in self-grooming, hygiene, sleep and appetite

## 2022-06-28 NOTE — BH TREATMENT PLAN - NSTXDEPRESINTERPR_PSY_ALL_CORE
During this hospitalization, patient will be encouared to attend daily symptom management groups and meet with staff individually in order to demonstrate improvement in depressive sx wihin seven days.
Over the next 7 days, psychiatric rehabilitation staff will encourage the patient to take all medications as prescribed, tend to ADLs, attend daily groups, and engage with staff individually in efforts to facilitate progress toward treatment goal.

## 2022-06-28 NOTE — BH TREATMENT PLAN - NSTXANXGOAL_PSY_ALL_CORE
Be able to perform ADLs and maintain safety despite anxiety/panic daily
Be able to perform ADLs and maintain safety despite anxiety/panic daily

## 2022-06-28 NOTE — BH TREATMENT PLAN - NSTXANXINTERMD_PSY_ALL_CORE
Psychopharm with goal of BZD reduction and LALA for safety and compliance and supportive therapy with return to [facility] aftercare on MMTP
Psychopharm with goal of BZD reduction and LALA for safety and compliance and supportive therapy with return to [facility] aftercare on MMTP

## 2022-06-28 NOTE — BH TREATMENT PLAN - NSTXNEGATINTERMD_PSY_ALL_CORE
Psychopharm with goal of BZD reduction and LALA for safety and compliance and supportive therapy with return to [facility] after[facility] on MMTP

## 2022-06-28 NOTE — CHART NOTE - NSCHARTNOTEFT_GEN_A_CORE
41M admitted to [facility] for major depressive disorder with single episode.  8 nylon sutures removed form L UE, medial forearm, clean, dry dressing placed.   Pt tolerated the procedure well. Instructed to keep area dry.

## 2022-06-28 NOTE — BH TREATMENT PLAN - NSTXDCHOUSINTERMD_PSY_ALL_CORE
Psychopharm with goal of BZD reduction and LALA for safety and compliance and supportive therapy with return to [place] aftercare on MMTP
Psychopharm with goal of BZD reduction and LALA for safety and compliance and supportive therapy with return to [facility] aftercare on MMTP

## 2022-06-28 NOTE — BH TREATMENT PLAN - NSCMSPTSTRENGTHS_PSY_ALL_CORE
Able to adapt/Intelligence/Physically healthy
Able to adapt/Intelligence/Interpersonal skills/Physically healthy/Tolerant

## 2022-06-28 NOTE — BH TREATMENT PLAN - NSTXSLFCREINTERMD_PSY_ALL_CORE
Psychopharm with goal of BZD reduction and LALA for safety and compliance and supportive therapy with return to [place] aftercare on MMTP
Psychopharm with goal of BZD reduction and LALA for safety and compliance and supportive therapy with return to [facility] aftercare on MMTP

## 2022-06-28 NOTE — BH TREATMENT PLAN - NSTXSLFCREGOAL_PSY_ALL_CORE
Be able to demonstrate the ability to care for self in 2 areas such as feeding oneself and hygiene
Be able to demonstrate the ability to care for self in 2 areas such as feeding oneself and hygiene

## 2022-06-28 NOTE — BH TREATMENT PLAN - NSTXSUPORTINTERMD_PSY_ALL_CORE
Psychopharm with goal of BZD reduction and LALA for safety and compliance and supportive therapy with return to [facility] on MMTP

## 2022-06-28 NOTE — BH TREATMENT PLAN - NSTXPLANTHERAPYSESSIONSFT_PSY_ALL_CORE
06-25-22  Type of therapy: Cognitive behavior therapy,Coping skills,Leisure development,Mindfulness,Music therapy,Psychoeducation,Social skills training,Symptom management  Type of session: Individual  Level of patient participation: Engaged,Participates  Duration of participation: 15 minutes  Therapy conducted by: Psych rehab  Therapy Summary: Writer met with patient to review progress toward treatment goal and assess current functioning. Patient was receptive to meeting with writer and actively participated in the discussion. Patient presents as depressed and lethargic.  Patient reported that he has not noted any improvements in mood since admission. Patient expressed that he is experiencing intrusive thoughts and day-vela or flashbacks regarding past trauma which influences depressed mood. Patient reported that his attention and engagement has slightly improved, and that he is doing his best to engage in groups. Patient expressed that his sleep has been fair, but often needs to force himself to eat meals due to poor appetite. Patient agreed with the writer that his goal should remain focusing on symptom management and exhibiting improvements in self-grooming, hygiene, sleep and appetite. Patient reported that he is complying with medication and did not express complaints or concerns regarding regimen. Patient denied SI/HI/VH/AH.  Patient is visible on the unit and has attended 40% of daily psychiatric rehabilitation groups over the last week. Patient displays fair ADLs, and has been in good behavior control.   Over the next 7 days, patient will be encouraged to tend to his ADLs, engage with staff individually and attend daily groups in efforts to facilitate progress toward treatment goal.

## 2022-06-28 NOTE — BH TREATMENT PLAN - NSPTSTATEDGOAL_PSY_ALL_CORE
Patient is seeking treatment and wants to feel better.
Patient is seeking treatment and wants to feel better.

## 2022-06-28 NOTE — BH TREATMENT PLAN - NSTXDEPRESINTERMD_PSY_ALL_CORE
Psychopharm with goal of BZD reduction and LALA for safety and compliance and supportive therapy with return to [facility] aftercare on MMTP
Psychopharm with goal of BZD reduction and LALA for safety and compliance and supportive therapy with return to [facility] aftercare on MMTP

## 2022-06-28 NOTE — BH TREATMENT PLAN - NSTXDCHOUSINTERSW_PSY_ALL_CORE
Support and psychoed to be provided and d/c plan to be arranged when appropriate.
SW will provide support, education and ongoing discussion of dc plans

## 2022-06-28 NOTE — BH INPATIENT PSYCHIATRY PROGRESS NOTE - NSBHFUPINTERVALHXFT_PSY_A_CORE
6/28 TUES RN report received, case discussed at team, patient seen and MSE done. No acute events overnight. Patient continues to appear very depressed with very prominent PMR and weight loss and anhedonia and even some deficits in concentration and memory but has gained several pounds as per RN since arrival. Yet he does have a very good rapport with his MD from last hospitalization two years ago and does make an effort to explain his thoughts and feelings. We discussed Pristiq further and writer again stressed the need to begin an effort to reduce Klonopin dependence and we made a plan to begin this work on the weekend using prns. But pt did not follow through and says he feels he let his MD down but does not mention this today.  Has no reason for not attempting lowering.  Says he is attending groups but distracted by erratic behavior of several peers on unit over weekend, issues now resolved with discharges.  Feels abilify increase is helping. We made plan to restart pristiq for anxiety and depressed mood and pt says he noticed day one today.  We also discussed last nights labs/lithium levels and will increase lithium tonight.  Will increase pristiq further in AM and retry klonopin lowering.  Possibly slightly less anxious on this topic.  No new SEs reported or observed.

## 2022-06-28 NOTE — BH TREATMENT PLAN - NSTXSUICIDINTERMD_PSY_ALL_CORE
Psychopharm with goal of BZD reduction and LALA for safety and compliance and supportive therapy with return to [facility] aftercare on MMTP

## 2022-06-29 PROCEDURE — 99232 SBSQ HOSP IP/OBS MODERATE 35: CPT

## 2022-06-29 RX ORDER — CLONAZEPAM 1 MG
0.5 TABLET ORAL
Refills: 0 | Status: DISCONTINUED | OUTPATIENT
Start: 2022-06-29 | End: 2022-06-29

## 2022-06-29 RX ORDER — CLONAZEPAM 1 MG
0.5 TABLET ORAL
Refills: 0 | Status: DISCONTINUED | OUTPATIENT
Start: 2022-06-29 | End: 2022-06-30

## 2022-06-29 RX ORDER — CLONAZEPAM 1 MG
1.5 TABLET ORAL
Refills: 0 | Status: DISCONTINUED | OUTPATIENT
Start: 2022-06-29 | End: 2022-06-30

## 2022-06-29 RX ORDER — BACITRACIN ZINC 500 UNIT/G
1 OINTMENT IN PACKET (EA) TOPICAL DAILY
Refills: 0 | Status: COMPLETED | OUTPATIENT
Start: 2022-06-29 | End: 2022-07-09

## 2022-06-29 RX ORDER — CLONAZEPAM 1 MG
2 TABLET ORAL AT BEDTIME
Refills: 0 | Status: DISCONTINUED | OUTPATIENT
Start: 2022-06-29 | End: 2022-07-06

## 2022-06-29 RX ADMIN — Medication 0.5 MILLIGRAM(S): at 08:21

## 2022-06-29 RX ADMIN — Medication 1.5 MILLIGRAM(S): at 12:35

## 2022-06-29 RX ADMIN — METHADONE HYDROCHLORIDE 15 MILLIGRAM(S): 40 TABLET ORAL at 08:18

## 2022-06-29 RX ADMIN — Medication 1 TABLET(S): at 08:18

## 2022-06-29 RX ADMIN — Medication 0.5 MILLIGRAM(S): at 12:35

## 2022-06-29 RX ADMIN — ARIPIPRAZOLE 25 MILLIGRAM(S): 15 TABLET ORAL at 20:33

## 2022-06-29 RX ADMIN — Medication 1 APPLICATION(S): at 08:18

## 2022-06-29 RX ADMIN — MIRTAZAPINE 45 MILLIGRAM(S): 45 TABLET, ORALLY DISINTEGRATING ORAL at 20:33

## 2022-06-29 RX ADMIN — GABAPENTIN 800 MILLIGRAM(S): 400 CAPSULE ORAL at 20:32

## 2022-06-29 RX ADMIN — Medication 2 MILLIGRAM(S): at 20:32

## 2022-06-29 RX ADMIN — METHADONE HYDROCHLORIDE 120 MILLIGRAM(S): 40 TABLET ORAL at 08:18

## 2022-06-29 RX ADMIN — LITHIUM CARBONATE 1350 MILLIGRAM(S): 300 TABLET, EXTENDED RELEASE ORAL at 20:32

## 2022-06-29 RX ADMIN — DESVENLAFAXINE 50 MILLIGRAM(S): 50 TABLET, EXTENDED RELEASE ORAL at 08:18

## 2022-06-29 RX ADMIN — Medication 1 TABLET(S): at 20:33

## 2022-06-29 RX ADMIN — Medication 1.5 MILLIGRAM(S): at 08:18

## 2022-06-29 RX ADMIN — GABAPENTIN 800 MILLIGRAM(S): 400 CAPSULE ORAL at 08:18

## 2022-06-29 NOTE — BH INPATIENT PSYCHIATRY PROGRESS NOTE - OTHER
PMR notably evident, wearing hoodie, poor eye contact, mild apathy- all slightly attenuated irritable and depressed with PMR but slightly attenuated pt narrative signif laceration with sutures to L forearm with other scars evident and sutured area now inflamed- MED team to remove sutures today.  slightly more verbal but prominent apathy, head down less, PMR evident, very poor eye contact but slightly improved;  very anxious about bzd reduction and unable to attempt reductions without severe anxiety

## 2022-06-29 NOTE — PROGRESS NOTE ADULT - SUBJECTIVE AND OBJECTIVE BOX
CC/Reason for Consult: No overnight events   Patient seen- reports the laceration/suture erythema and swelling have improved but seem to go through "cycles" of swelling, erythema and then improvement. Does note some discharge that he reports is pus like but denies any F/C. Pain is overall improving but site is still painful. Has otherwise been eating and drinking well.   Denies picking at lesion but is noted to try to remove some crusting by examiner.     SUBJECTIVE / OVERNIGHT EVENTS:    MEDICATIONS  (STANDING):  ARIPiprazole 25 milliGRAM(s) Oral at bedtime  BACItracin   Ointment 1 Application(s) Topical daily  clonazePAM  Tablet 2 milliGRAM(s) Oral at bedtime  clonazePAM  Tablet 1.5 milliGRAM(s) Oral <User Schedule>  clonazePAM  Tablet 0.5 milliGRAM(s) Oral <User Schedule>  gabapentin 800 milliGRAM(s) Oral two times a day  lithium SR (LITHOBID) 1200 milliGRAM(s) Oral at bedtime  methadone    Tablet 15 milliGRAM(s) Oral daily  methadone   Dispersible Tablet 120 milliGRAM(s) Oral daily  mirtazapine 45 milliGRAM(s) Oral at bedtime    MEDICATIONS  (PRN):  hydrOXYzine hydrochloride 50 milliGRAM(s) Oral every 4 hours PRN Anxiety  OLANZapine 5 milliGRAM(s) Oral every 6 hours PRN agitation/anxiety  OLANZapine Injectable 5 milliGRAM(s) IntraMuscular once PRN severe agitation  traZODone 100 milliGRAM(s) Oral at bedtime PRN insomnia      Vital Signs Last 24 Hrs  T(C): 37.1 (27 Jun 2022 06:41), Max: 37.1 (27 Jun 2022 06:41)  T(F): 98.7 (27 Jun 2022 06:41), Max: 98.7 (27 Jun 2022 06:41)  HR: 74 (27 Jun 2022 06:41) (74 - 74)  BP: 98/60 (27 Jun 2022 06:41) (98/60 - 98/60)  BP(mean): --  RR: --  SpO2: --  CAPILLARY BLOOD GLUCOSE            PHYSICAL EXAM:  GENERAL: NAD, well-developed male   HEAD:  Atraumatic, Normocephalic  EYES: EOMI, conjunctiva and sclera clear  NECK: Supple, No JVD  CHEST/LUNG: Clear to auscultation bilaterally; No wheeze  HEART: Regular rate and rhythm; No murmurs, rubs, or gallops  ABDOMEN: Soft, Nontender, Nondistended; Bowel sounds present  EXTREMITIES:  2+ Peripheral Pulses, No clubbing, cyanosis, or edema; LUE: 5inch laceration with 2 inches of surrounding erythema, tenderness, firm, swelling noted. No involvement of wrist/elbow. No active discharge noted   PSYCH: AAOx3  NEUROLOGY: non-focal  SKIN: No rashes or lesions    LABS:                    RADIOLOGY & ADDITIONAL TESTS:    Imaging Personally Reviewed:    Consultant(s) Notes Reviewed:      Care Discussed with Consultants/Other Providers:  
CC/Reason for Consult: L forearm wound    SUBJECTIVE / OVERNIGHT EVENTS:  Patient had nightmares overnight, kept waking up every hour. Found in day room sleeping. Patient otherwise feels "okay". LUE forearm wound feels better, no more discharge. Denies any F/C.   Tolerating Bactrim.     MEDICATIONS  (STANDING):  ARIPiprazole 25 milliGRAM(s) Oral at bedtime  BACItracin   Ointment 1 Application(s) Topical daily  clonazePAM  Tablet 2 milliGRAM(s) Oral at bedtime  clonazePAM  Tablet 1.5 milliGRAM(s) Oral <User Schedule>  clonazePAM  Tablet 0.5 milliGRAM(s) Oral <User Schedule>  desvenlafaxine ER 50 milliGRAM(s) Oral daily  gabapentin 800 milliGRAM(s) Oral two times a day  lithium CR (ESKALITH-CR) 1350 milliGRAM(s) Oral at bedtime  mirtazapine 45 milliGRAM(s) Oral at bedtime  trimethoprim  160 mG/sulfamethoxazole 800 mG 1 Tablet(s) Oral two times a day    MEDICATIONS  (PRN):  hydrOXYzine hydrochloride 50 milliGRAM(s) Oral every 4 hours PRN Anxiety  OLANZapine 5 milliGRAM(s) Oral every 6 hours PRN agitation/anxiety  OLANZapine Injectable 5 milliGRAM(s) IntraMuscular once PRN severe agitation  traZODone 100 milliGRAM(s) Oral at bedtime PRN insomnia      Vital Signs Last 24 Hrs  T(C): 36.1 (29 Jun 2022 06:40), Max: 36.6 (28 Jun 2022 21:26)  T(F): 97 (29 Jun 2022 06:40), Max: 97.9 (28 Jun 2022 21:26)  HR: 63 (29 Jun 2022 06:40) (63 - 63)  BP: 117/63 (29 Jun 2022 06:40) (117/63 - 117/63)  BP(mean): --  RR: --  SpO2: --  CAPILLARY BLOOD GLUCOSE            PHYSICAL EXAM:  GENERAL: NAD, well-developed male   HEAD:  Atraumatic, Normocephalic  EYES: EOMI, conjunctiva and sclera clear  NECK: Supple   CHEST/LUNG: Symmetrical chest rise, no respiratory distress   ABDOMEN: Non-distended  EXTREMITIES:  LUE wound: Sutures removed, erythema improved   PSYCH: AAOx3  NEUROLOGY: non-focal  SKIN: As above. Old scars noted.   LABS:                        11.9   7.42  )-----------( 297      ( 27 Jun 2022 19:20 )             38.3     06-27    142  |  99  |  15  ----------------------------<  96  4.1   |  34<H>  |  0.82    Ca    9.5      27 Jun 2022 19:20    TPro  6.7  /  Alb  3.8  /  TBili  <0.2  /  DBili  x   /  AST  17  /  ALT  10  /  AlkPhos  48  06-27              RADIOLOGY & ADDITIONAL TESTS:    Imaging Personally Reviewed:    Consultant(s) Notes Reviewed:      Care Discussed with Consultants/Other Providers:

## 2022-06-29 NOTE — ADVANCED PRACTICE NURSE CONSULT - ASSESSMENT
Pt is 41M admitted to [facility] 6/18/22 for depression.  He cut himself with a razor blade and the wound was sutured in the ED on 6/18/22.    patient reported that he is depressed but he knows that he has to eat to gain weight  Left wrist wound post sutures removal on 6/28 site intact measure 4 cm x 0.5 the periwound with redness measuring 3 cm x 4 cm.  No drainage noted.  Wound with healing ridge.  Provided education to increase protein intake and to drink water  Patient teach back

## 2022-06-29 NOTE — BH INPATIENT PSYCHIATRY PROGRESS NOTE - NSBHFUPINTERVALHXFT_PSY_A_CORE
6/28 TUES RN report received, case discussed at team, patient seen and MSE done. No acute events overnight. Patient continues to appear very depressed with very prominent PMR and weight loss and anhedonia and even some deficits in concentration and memory but has gained several pounds as per RN since arrival. Yet he does have a very good rapport with his MD from last hospitalization two years ago and does make an effort to explain his thoughts and feelings. We discussed Pristiq further and writer again stressed the need to begin an effort to reduce Klonopin dependence and we made a plan to begin this work on the weekend using prns. But pt did not follow through and says he feels he let his MD down but does not mention this today.  Has no reason for not attempting lowering.  Says he is attending groups but distracted by erratic behavior of several peers on unit over weekend, issues now resolved with discharges.  Feels abilify increase is helping. We made plan to restart pristiq for anxiety and depressed mood and pt says he noticed day one today.  We also discussed last nights labs/lithium levels and will increase lithium tonight.  Will increase pristiq further in AM and retry klonopin lowering.  Possibly slightly less anxious on this topic.  No new SEs reported or observed.    6/29 WED RN report received, case discussed at team, patient seen and MSE done. No acute events overnight. Patient continues to appear very depressed with very prominent PMR and weight loss and anhedonia and even some deficits in concentration and memory but has gained several pounds as per RN since arrival. Yet he does have a very good rapport with his MD from last hospitalization two years ago and does make an effort to explain his thoughts and feelings. We discussed Pristiq further and writer again stressed the need to begin an effort to reduce Klonopin dependence and we made a plan to begin this work on the weekend using prns. But pt did not follow through and says he feels he let his MD down but does not mention this today.  But he is noticing pristiq and feels mood and energy are better.  We had session outside today and pt has very good rapport with his MD.  Supportive therapy session, and slightly less PMR noted but still appears very depressed.  Pleased sutures removed, denies plan to harm himself on unit.  No new SEs reported or observed.

## 2022-06-29 NOTE — PROGRESS NOTE ADULT - ASSESSMENT
41m admitted for depression with self-inflicted laceration on left forearm    Plan:    L forearm laceration: Overall improved. Can complete course of Bactrim. Wound care assessment today.     Opioid dependence on agonist therapy: Continue methadone    Depression: Management per primary team 41m admitted for depression with self-inflicted laceration on left forearm    Plan:    L forearm laceration: Overall improved. Can complete course of Bactrim. Wound care assessment- cont Bacitracin to wound daily.     Opioid dependence on agonist therapy: Continue methadone    Depression: Management per primary team

## 2022-06-30 RX ORDER — CLONAZEPAM 1 MG
0.5 TABLET ORAL
Refills: 0 | Status: DISCONTINUED | OUTPATIENT
Start: 2022-06-30 | End: 2022-07-01

## 2022-06-30 RX ORDER — ARIPIPRAZOLE 15 MG/1
30 TABLET ORAL AT BEDTIME
Refills: 0 | Status: DISCONTINUED | OUTPATIENT
Start: 2022-06-30 | End: 2022-07-13

## 2022-06-30 RX ORDER — METHADONE HYDROCHLORIDE 40 MG/1
120 TABLET ORAL DAILY
Refills: 0 | Status: DISCONTINUED | OUTPATIENT
Start: 2022-06-30 | End: 2022-07-06

## 2022-06-30 RX ORDER — CLONAZEPAM 1 MG
1.5 TABLET ORAL
Refills: 0 | Status: DISCONTINUED | OUTPATIENT
Start: 2022-06-30 | End: 2022-07-06

## 2022-06-30 RX ORDER — METHADONE HYDROCHLORIDE 40 MG/1
15 TABLET ORAL DAILY
Refills: 0 | Status: DISCONTINUED | OUTPATIENT
Start: 2022-06-30 | End: 2022-07-06

## 2022-06-30 RX ADMIN — METHADONE HYDROCHLORIDE 15 MILLIGRAM(S): 40 TABLET ORAL at 10:26

## 2022-06-30 RX ADMIN — LITHIUM CARBONATE 1350 MILLIGRAM(S): 300 TABLET, EXTENDED RELEASE ORAL at 20:34

## 2022-06-30 RX ADMIN — GABAPENTIN 800 MILLIGRAM(S): 400 CAPSULE ORAL at 08:50

## 2022-06-30 RX ADMIN — Medication 1 TABLET(S): at 20:34

## 2022-06-30 RX ADMIN — Medication 0.5 MILLIGRAM(S): at 14:41

## 2022-06-30 RX ADMIN — METHADONE HYDROCHLORIDE 120 MILLIGRAM(S): 40 TABLET ORAL at 10:34

## 2022-06-30 RX ADMIN — Medication 1 TABLET(S): at 08:51

## 2022-06-30 RX ADMIN — Medication 0.5 MILLIGRAM(S): at 10:32

## 2022-06-30 RX ADMIN — ARIPIPRAZOLE 30 MILLIGRAM(S): 15 TABLET ORAL at 20:34

## 2022-06-30 RX ADMIN — Medication 1.5 MILLIGRAM(S): at 08:50

## 2022-06-30 RX ADMIN — Medication 1.5 MILLIGRAM(S): at 13:08

## 2022-06-30 RX ADMIN — MIRTAZAPINE 45 MILLIGRAM(S): 45 TABLET, ORALLY DISINTEGRATING ORAL at 20:34

## 2022-06-30 RX ADMIN — DESVENLAFAXINE 50 MILLIGRAM(S): 50 TABLET, EXTENDED RELEASE ORAL at 08:51

## 2022-06-30 RX ADMIN — GABAPENTIN 800 MILLIGRAM(S): 400 CAPSULE ORAL at 20:34

## 2022-06-30 RX ADMIN — Medication 2 MILLIGRAM(S): at 20:34

## 2022-06-30 NOTE — BH INPATIENT PSYCHIATRY PROGRESS NOTE - NSBHFUPINTERVALHXFT_PSY_A_CORE
6/30 THURS RN report received, case discussed at team, patient seen and MSE done. No acute events overnight. Patient continues to appear very depressed with very prominent PMR and weight loss and anhedonia and even some deficits in concentration and memory but has gained several pounds as per RN since arrival. Yet he does have a very good rapport with his MD from last hospitalization two years ago and does make an effort to explain his thoughts and feelings. We discussed Pristiq further and writer again stressed the need to begin an effort to reduce Klonopin dependence and we made a plan to begin this work on the weekend using prns. But pt did not follow through and says he feels he let his MD down but does not mention this today.  But he is noticing pristiq and feels mood and energy are better.  We had session outside today and pt has very good rapport with his MD.  Supportive therapy session, and slightly less PMR noted but still appears very depressed.  Pleased sutures removed, denies plan to harm himself on unit.  Feels abilify is helping but asks about pristiq increase. Will continue to monitor, but now less evident PMR.  Eating and sleeping better he says, slightly more hopeful.  No new SEs reported or observed.

## 2022-06-30 NOTE — BH INPATIENT PSYCHIATRY PROGRESS NOTE - OTHER
PMR notably evident, wearing hoodie, poor eye contact, mild apathy- all slightly attenuated irritable and depressed with PMR but slightly attenuated pt narrative signif laceration with sutures to L forearm with other scars evident and sutured area now inflamed- MED team to remove sutures today.  slightly more verbal but prominent apathy, head down less, PMR evident, very poor eye contact but slightly improved;  very anxious about bzd reduction and unable to attempt reductions without severe anxiety he says

## 2022-07-01 PROCEDURE — 90853 GROUP PSYCHOTHERAPY: CPT

## 2022-07-01 PROCEDURE — 99232 SBSQ HOSP IP/OBS MODERATE 35: CPT

## 2022-07-01 RX ORDER — DESVENLAFAXINE 50 MG/1
75 TABLET, EXTENDED RELEASE ORAL DAILY
Refills: 0 | Status: DISCONTINUED | OUTPATIENT
Start: 2022-07-02 | End: 2022-07-11

## 2022-07-01 RX ADMIN — Medication 0.5 MILLIGRAM(S): at 12:50

## 2022-07-01 RX ADMIN — Medication 1 TABLET(S): at 08:35

## 2022-07-01 RX ADMIN — METHADONE HYDROCHLORIDE 120 MILLIGRAM(S): 40 TABLET ORAL at 08:34

## 2022-07-01 RX ADMIN — DESVENLAFAXINE 50 MILLIGRAM(S): 50 TABLET, EXTENDED RELEASE ORAL at 08:35

## 2022-07-01 RX ADMIN — Medication 1 TABLET(S): at 20:21

## 2022-07-01 RX ADMIN — GABAPENTIN 800 MILLIGRAM(S): 400 CAPSULE ORAL at 20:21

## 2022-07-01 RX ADMIN — METHADONE HYDROCHLORIDE 15 MILLIGRAM(S): 40 TABLET ORAL at 08:35

## 2022-07-01 RX ADMIN — Medication 1.5 MILLIGRAM(S): at 12:50

## 2022-07-01 RX ADMIN — MIRTAZAPINE 45 MILLIGRAM(S): 45 TABLET, ORALLY DISINTEGRATING ORAL at 20:21

## 2022-07-01 RX ADMIN — Medication 0.5 MILLIGRAM(S): at 08:36

## 2022-07-01 RX ADMIN — LITHIUM CARBONATE 1350 MILLIGRAM(S): 300 TABLET, EXTENDED RELEASE ORAL at 20:20

## 2022-07-01 RX ADMIN — Medication 1.5 MILLIGRAM(S): at 08:35

## 2022-07-01 RX ADMIN — Medication 2 MILLIGRAM(S): at 20:21

## 2022-07-01 RX ADMIN — ARIPIPRAZOLE 30 MILLIGRAM(S): 15 TABLET ORAL at 20:21

## 2022-07-01 RX ADMIN — GABAPENTIN 800 MILLIGRAM(S): 400 CAPSULE ORAL at 08:35

## 2022-07-01 NOTE — BH INPATIENT PSYCHIATRY PROGRESS NOTE - NSBHMSEBEHAV_PSY_A_CORE
Cooperative/Other

## 2022-07-01 NOTE — BH INPATIENT PSYCHIATRY PROGRESS NOTE - NSBHMSESPABN_PSY_A_CORE
Soft volume/Slowed rate/Decreased productivity/Increased latency

## 2022-07-01 NOTE — BH INPATIENT PSYCHIATRY PROGRESS NOTE - NSBHMSEKNOWHOW_PSY_ALL_CORE
Educational attainment/Vocabulary/Other...

## 2022-07-01 NOTE — BH INPATIENT PSYCHIATRY PROGRESS NOTE - NSBHMSETHTPROC_PSY_A_CORE
Linear/Circumstantial/Impaired reasoning

## 2022-07-01 NOTE — BH INPATIENT PSYCHIATRY PROGRESS NOTE - NSBHMSEAFFQUAL_PSY_A_CORE
Depressed/Irritable/Anxious

## 2022-07-01 NOTE — BH INPATIENT PSYCHIATRY PROGRESS NOTE - NSBHMSEAFFRANGE_PSY_A_CORE
Blunted/Constricted

## 2022-07-01 NOTE — BH INPATIENT PSYCHIATRY PROGRESS NOTE - OTHER
PMR slightly attenuated, no longer wearing hoodie, poor but slighlty improved eye contact, mild apathy- all slightly attenuated irritable and depressed with PMR but slightly attenuated pt narrative signif laceration with sutures to L forearm with other scars evident and sutured area now inflamed- MED team to remove sutures today.  slightly more verbal but prominent apathy, head down less, PMR evident, very poor eye contact but slightly improved;  very anxious about bzd reduction and unable to attempt reductions without severe anxiety he says but willing to try again in AM with slightly more confidence, slightly more hopeful

## 2022-07-01 NOTE — BH INPATIENT PSYCHIATRY PROGRESS NOTE - NSBHMSEMOOD_PSY_A_CORE
Depressed/Anxious/Irritable

## 2022-07-01 NOTE — BH INPATIENT PSYCHIATRY PROGRESS NOTE - NSBHFUPINTERVALHXFT_PSY_A_CORE
6/30 THURS RN report received, case discussed at team, patient seen and MSE done. No acute events overnight. Patient continues to appear very depressed with very prominent PMR and weight loss and anhedonia and even some deficits in concentration and memory but has gained several pounds as per RN since arrival. Yet he does have a very good rapport with his MD from last hospitalization two years ago and does make an effort to explain his thoughts and feelings. We discussed Pristiq further and writer again stressed the need to begin an effort to reduce Klonopin dependence and we made a plan to begin this work on the weekend using prns. But pt did not follow through and says he feels he let his MD down but does not mention this today.  But he is noticing pristiq and feels mood and energy are better.  We had session outside today and pt has very good rapport with his MD.  Supportive therapy session, and slightly less PMR noted but still appears very depressed.  Pleased sutures removed, denies plan to harm himself on unit.    7/1 FRI RN report received, case discussed at team, patient seen and MSE done. No acute events overnight. Patient continues to appear very depressed with very prominent PMR and weight loss and anhedonia and even some deficits in concentration and memory but has gained several pounds as per RN since arrival. Yet he does have a very good rapport with his MD from last hospitalization two years ago and does make an effort to explain his thoughts and feelings. Feels abilify is helping but asks about pristiq increase and we made plan for SAT increase. Will continue to monitor, but now less evident PMR.  Eating and sleeping better he says, slightly more hopeful and very slighlty brighter affect noted.  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  We also make plan to lower klonopin, a first, with no extra AM and 1 PM PRNS.  Says he trusts his MD but remains guarded and fearful of BZD reductions.  No new SEs reported or observed.

## 2022-07-01 NOTE — BH INPATIENT PSYCHIATRY PROGRESS NOTE - ADDITIONAL DETAILS / COMMENTS
Hx of serious high lethality self harm/suicide attempts

## 2022-07-01 NOTE — BH INPATIENT PSYCHIATRY PROGRESS NOTE - NSBHMSETHTCONTENT_PSY_A_CORE
Unremarkable/Ruminations/Hopelessness/Suicidality

## 2022-07-02 RX ADMIN — Medication 1 TABLET(S): at 08:27

## 2022-07-02 RX ADMIN — Medication 2 MILLIGRAM(S): at 20:42

## 2022-07-02 RX ADMIN — Medication 1.5 MILLIGRAM(S): at 12:45

## 2022-07-02 RX ADMIN — METHADONE HYDROCHLORIDE 15 MILLIGRAM(S): 40 TABLET ORAL at 08:26

## 2022-07-02 RX ADMIN — GABAPENTIN 800 MILLIGRAM(S): 400 CAPSULE ORAL at 20:41

## 2022-07-02 RX ADMIN — GABAPENTIN 800 MILLIGRAM(S): 400 CAPSULE ORAL at 08:26

## 2022-07-02 RX ADMIN — MIRTAZAPINE 45 MILLIGRAM(S): 45 TABLET, ORALLY DISINTEGRATING ORAL at 20:42

## 2022-07-02 RX ADMIN — Medication 1 APPLICATION(S): at 08:29

## 2022-07-02 RX ADMIN — METHADONE HYDROCHLORIDE 120 MILLIGRAM(S): 40 TABLET ORAL at 08:26

## 2022-07-02 RX ADMIN — Medication 1.5 MILLIGRAM(S): at 08:27

## 2022-07-02 RX ADMIN — ARIPIPRAZOLE 30 MILLIGRAM(S): 15 TABLET ORAL at 20:42

## 2022-07-02 RX ADMIN — LITHIUM CARBONATE 1350 MILLIGRAM(S): 300 TABLET, EXTENDED RELEASE ORAL at 20:42

## 2022-07-02 RX ADMIN — DESVENLAFAXINE 75 MILLIGRAM(S): 50 TABLET, EXTENDED RELEASE ORAL at 08:29

## 2022-07-03 RX ADMIN — METHADONE HYDROCHLORIDE 15 MILLIGRAM(S): 40 TABLET ORAL at 08:56

## 2022-07-03 RX ADMIN — Medication 1.5 MILLIGRAM(S): at 13:38

## 2022-07-03 RX ADMIN — MIRTAZAPINE 45 MILLIGRAM(S): 45 TABLET, ORALLY DISINTEGRATING ORAL at 20:41

## 2022-07-03 RX ADMIN — GABAPENTIN 800 MILLIGRAM(S): 400 CAPSULE ORAL at 20:40

## 2022-07-03 RX ADMIN — LITHIUM CARBONATE 1350 MILLIGRAM(S): 300 TABLET, EXTENDED RELEASE ORAL at 20:41

## 2022-07-03 RX ADMIN — Medication 2 MILLIGRAM(S): at 20:41

## 2022-07-03 RX ADMIN — ARIPIPRAZOLE 30 MILLIGRAM(S): 15 TABLET ORAL at 20:41

## 2022-07-03 RX ADMIN — Medication 1.5 MILLIGRAM(S): at 09:00

## 2022-07-03 RX ADMIN — DESVENLAFAXINE 75 MILLIGRAM(S): 50 TABLET, EXTENDED RELEASE ORAL at 08:56

## 2022-07-03 RX ADMIN — GABAPENTIN 800 MILLIGRAM(S): 400 CAPSULE ORAL at 08:56

## 2022-07-03 RX ADMIN — METHADONE HYDROCHLORIDE 120 MILLIGRAM(S): 40 TABLET ORAL at 08:56

## 2022-07-04 RX ADMIN — Medication 1.5 MILLIGRAM(S): at 12:51

## 2022-07-04 RX ADMIN — DESVENLAFAXINE 75 MILLIGRAM(S): 50 TABLET, EXTENDED RELEASE ORAL at 08:47

## 2022-07-04 RX ADMIN — LITHIUM CARBONATE 1350 MILLIGRAM(S): 300 TABLET, EXTENDED RELEASE ORAL at 20:22

## 2022-07-04 RX ADMIN — GABAPENTIN 800 MILLIGRAM(S): 400 CAPSULE ORAL at 20:21

## 2022-07-04 RX ADMIN — Medication 1.5 MILLIGRAM(S): at 08:49

## 2022-07-04 RX ADMIN — MIRTAZAPINE 45 MILLIGRAM(S): 45 TABLET, ORALLY DISINTEGRATING ORAL at 20:22

## 2022-07-04 RX ADMIN — Medication 2 MILLIGRAM(S): at 20:22

## 2022-07-04 RX ADMIN — METHADONE HYDROCHLORIDE 15 MILLIGRAM(S): 40 TABLET ORAL at 08:46

## 2022-07-04 RX ADMIN — METHADONE HYDROCHLORIDE 120 MILLIGRAM(S): 40 TABLET ORAL at 08:47

## 2022-07-04 RX ADMIN — ARIPIPRAZOLE 30 MILLIGRAM(S): 15 TABLET ORAL at 20:22

## 2022-07-04 RX ADMIN — GABAPENTIN 800 MILLIGRAM(S): 400 CAPSULE ORAL at 08:48

## 2022-07-05 PROCEDURE — 99231 SBSQ HOSP IP/OBS SF/LOW 25: CPT

## 2022-07-05 PROCEDURE — 90853 GROUP PSYCHOTHERAPY: CPT

## 2022-07-05 RX ORDER — SENNA PLUS 8.6 MG/1
2 TABLET ORAL AT BEDTIME
Refills: 0 | Status: DISCONTINUED | OUTPATIENT
Start: 2022-07-05 | End: 2022-07-13

## 2022-07-05 RX ADMIN — Medication 1 APPLICATION(S): at 08:37

## 2022-07-05 RX ADMIN — DESVENLAFAXINE 75 MILLIGRAM(S): 50 TABLET, EXTENDED RELEASE ORAL at 08:33

## 2022-07-05 RX ADMIN — Medication 1.5 MILLIGRAM(S): at 08:33

## 2022-07-05 RX ADMIN — METHADONE HYDROCHLORIDE 120 MILLIGRAM(S): 40 TABLET ORAL at 08:33

## 2022-07-05 RX ADMIN — GABAPENTIN 800 MILLIGRAM(S): 400 CAPSULE ORAL at 20:45

## 2022-07-05 RX ADMIN — SENNA PLUS 2 TABLET(S): 8.6 TABLET ORAL at 20:45

## 2022-07-05 RX ADMIN — Medication 2 MILLIGRAM(S): at 20:44

## 2022-07-05 RX ADMIN — ARIPIPRAZOLE 30 MILLIGRAM(S): 15 TABLET ORAL at 20:44

## 2022-07-05 RX ADMIN — MIRTAZAPINE 45 MILLIGRAM(S): 45 TABLET, ORALLY DISINTEGRATING ORAL at 20:44

## 2022-07-05 RX ADMIN — METHADONE HYDROCHLORIDE 15 MILLIGRAM(S): 40 TABLET ORAL at 08:32

## 2022-07-05 RX ADMIN — GABAPENTIN 800 MILLIGRAM(S): 400 CAPSULE ORAL at 08:33

## 2022-07-05 RX ADMIN — Medication 1.5 MILLIGRAM(S): at 13:12

## 2022-07-05 RX ADMIN — LITHIUM CARBONATE 1350 MILLIGRAM(S): 300 TABLET, EXTENDED RELEASE ORAL at 20:45

## 2022-07-05 NOTE — BH INPATIENT PSYCHIATRY PROGRESS NOTE - MSE UNSTRUCTURED FT
The patient appears stated age, with fair hygiene, and is dressed appropriately.  He was calm and cooperative with the interview.  He maintained appropriate eye contact.  No restlessness or slowing of movements observed.  Gait is steady.  The patient’s speech was fluent, normal in tone, rate, and volume.  The patient’s mood is “depressed.”  His affect is constricted, stable, appropriate.  The patient’s thoughts are goal directed.  He does not have any delusions or hallucinations.  He does not have any suicidal or homicidal ideation, intent, or plan.  Insight is good.  Judgment is good.  Impulse control has been good on the unit.

## 2022-07-05 NOTE — BH INPATIENT PSYCHIATRY PROGRESS NOTE - NSBHFUPINTERVALHXFT_PSY_A_CORE
Detail Level: Simple Detail Level: Zone Patient seen for follow up for depression, SI, chart reviewed, and case discussed with treatment team.  No events reported overnight.  The patient states he is having a somewhat difficult time with the decrease in Klonopin.  The patient shows no objective symptoms of withdrawal, but he states he feels uncomfortable inside.  He continues to have some depression, but denies current SI on the unit.  The patient reports eating well, sleeping fair, but with some nightmares.  He has been compliant with medications, tolerating them well.

## 2022-07-06 PROCEDURE — 99231 SBSQ HOSP IP/OBS SF/LOW 25: CPT

## 2022-07-06 RX ORDER — METHADONE HYDROCHLORIDE 40 MG/1
120 TABLET ORAL DAILY
Refills: 0 | Status: DISCONTINUED | OUTPATIENT
Start: 2022-07-06 | End: 2022-07-13

## 2022-07-06 RX ORDER — CLONAZEPAM 1 MG
1.5 TABLET ORAL
Refills: 0 | Status: DISCONTINUED | OUTPATIENT
Start: 2022-07-06 | End: 2022-07-13

## 2022-07-06 RX ORDER — METHADONE HYDROCHLORIDE 40 MG/1
15 TABLET ORAL DAILY
Refills: 0 | Status: DISCONTINUED | OUTPATIENT
Start: 2022-07-06 | End: 2022-07-13

## 2022-07-06 RX ORDER — CLONAZEPAM 1 MG
2 TABLET ORAL AT BEDTIME
Refills: 0 | Status: COMPLETED | OUTPATIENT
Start: 2022-07-06 | End: 2022-07-13

## 2022-07-06 RX ADMIN — Medication 1.5 MILLIGRAM(S): at 08:21

## 2022-07-06 RX ADMIN — GABAPENTIN 800 MILLIGRAM(S): 400 CAPSULE ORAL at 08:21

## 2022-07-06 RX ADMIN — SENNA PLUS 2 TABLET(S): 8.6 TABLET ORAL at 20:48

## 2022-07-06 RX ADMIN — Medication 2 MILLIGRAM(S): at 20:48

## 2022-07-06 RX ADMIN — METHADONE HYDROCHLORIDE 120 MILLIGRAM(S): 40 TABLET ORAL at 08:21

## 2022-07-06 RX ADMIN — ARIPIPRAZOLE 30 MILLIGRAM(S): 15 TABLET ORAL at 20:48

## 2022-07-06 RX ADMIN — GABAPENTIN 800 MILLIGRAM(S): 400 CAPSULE ORAL at 20:48

## 2022-07-06 RX ADMIN — Medication 1 ENEMA: at 13:56

## 2022-07-06 RX ADMIN — LITHIUM CARBONATE 1350 MILLIGRAM(S): 300 TABLET, EXTENDED RELEASE ORAL at 20:48

## 2022-07-06 RX ADMIN — DESVENLAFAXINE 75 MILLIGRAM(S): 50 TABLET, EXTENDED RELEASE ORAL at 08:21

## 2022-07-06 RX ADMIN — Medication 1.5 MILLIGRAM(S): at 13:18

## 2022-07-06 RX ADMIN — METHADONE HYDROCHLORIDE 15 MILLIGRAM(S): 40 TABLET ORAL at 08:21

## 2022-07-06 RX ADMIN — MIRTAZAPINE 45 MILLIGRAM(S): 45 TABLET, ORALLY DISINTEGRATING ORAL at 20:48

## 2022-07-06 NOTE — BH INPATIENT PSYCHIATRY PROGRESS NOTE - MSE UNSTRUCTURED FT
The patient appears stated age, with fair hygiene, and is dressed appropriately.  He was calm and cooperative with the interview.  He maintained appropriate eye contact.  No restlessness or slowing of movements observed.  Gait is steady.  The patient’s speech was fluent, normal in tone, rate, and volume.  The patient’s mood is “anxious.”  His affect is constricted, stable, appropriate.  The patient’s thoughts are goal directed.  He does not have any delusions or hallucinations.  He does not have any suicidal or homicidal ideation, intent, or plan.  Insight is good.  Judgment is good.  Impulse control has been good on the unit.

## 2022-07-06 NOTE — BH INPATIENT PSYCHIATRY PROGRESS NOTE - NSBHFUPINTERVALHXFT_PSY_A_CORE
Patient seen for follow up for depression, SI, chart reviewed, and case discussed with treatment team.  No events reported overnight.  The patient had submitted a 3-day letter over the weekend.  However, when discussing discharge with him today, he stated he did not want to be discharged yet, and then retracted the letter.  The patient states he is still having some anxiety related to decrease in Klonopin, however, it is getting better.  The patient shows no objective symptoms of withdrawal.  He continues to have some depression, but denies current SI on the unit.  The patient reports eating well, sleeping fair, but with some nightmares.  He is visible on the unit, social with peers, and attending some groups.  He has been compliant with medications, tolerating them well.

## 2022-07-07 PROCEDURE — 90853 GROUP PSYCHOTHERAPY: CPT

## 2022-07-07 PROCEDURE — 90834 PSYTX W PT 45 MINUTES: CPT | Mod: 59

## 2022-07-07 RX ADMIN — GABAPENTIN 800 MILLIGRAM(S): 400 CAPSULE ORAL at 08:31

## 2022-07-07 RX ADMIN — MIRTAZAPINE 45 MILLIGRAM(S): 45 TABLET, ORALLY DISINTEGRATING ORAL at 20:38

## 2022-07-07 RX ADMIN — Medication 1.5 MILLIGRAM(S): at 08:31

## 2022-07-07 RX ADMIN — METHADONE HYDROCHLORIDE 120 MILLIGRAM(S): 40 TABLET ORAL at 08:31

## 2022-07-07 RX ADMIN — Medication 2 MILLIGRAM(S): at 20:39

## 2022-07-07 RX ADMIN — LITHIUM CARBONATE 1350 MILLIGRAM(S): 300 TABLET, EXTENDED RELEASE ORAL at 20:38

## 2022-07-07 RX ADMIN — ARIPIPRAZOLE 30 MILLIGRAM(S): 15 TABLET ORAL at 20:39

## 2022-07-07 RX ADMIN — GABAPENTIN 800 MILLIGRAM(S): 400 CAPSULE ORAL at 20:38

## 2022-07-07 RX ADMIN — DESVENLAFAXINE 75 MILLIGRAM(S): 50 TABLET, EXTENDED RELEASE ORAL at 08:31

## 2022-07-07 RX ADMIN — Medication 1.5 MILLIGRAM(S): at 12:30

## 2022-07-07 RX ADMIN — OLANZAPINE 5 MILLIGRAM(S): 15 TABLET, FILM COATED ORAL at 17:16

## 2022-07-07 RX ADMIN — SENNA PLUS 2 TABLET(S): 8.6 TABLET ORAL at 20:39

## 2022-07-07 RX ADMIN — METHADONE HYDROCHLORIDE 15 MILLIGRAM(S): 40 TABLET ORAL at 08:31

## 2022-07-07 NOTE — BH INPATIENT PSYCHIATRY PROGRESS NOTE - MSE UNSTRUCTURED FT
The patient appears stated age, with fair hygiene, and is dressed appropriately.  He was calm and cooperative with the interview.  He maintained appropriate eye contact.  No restlessness but still has PMR observed.  Gait is steady.  The patient’s speech was fluent, normal in tone, rate, and volume.  The patient’s mood is “anxious.”  His affect is very constricted, stable, appropriate.  The patient’s thoughts are goal directed and appropriate with no self-harming themes.  He does not have any delusions or AVTH.  He does not have any suicidal or homicidal ideation, intent, or plan.  Insight is good.  Judgment is good.  Impulse control has been good on the unit.

## 2022-07-07 NOTE — BH INPATIENT PSYCHIATRY PROGRESS NOTE - NSBHFUPINTERVALHXFT_PSY_A_CORE
6/30 THURS RN report received, case discussed at team, patient seen and MSE done. No acute events overnight. Patient continues to appear very depressed with very prominent PMR and weight loss and anhedonia and even some deficits in concentration and memory but has gained several pounds as per RN since arrival. Yet he does have a very good rapport with his MD from last hospitalization two years ago and does make an effort to explain his thoughts and feelings. We discussed Pristiq further and writer again stressed the need to begin an effort to reduce Klonopin dependence and we made a plan to begin this work on the weekend using prns. But pt did not follow through and says he feels he let his MD down but does not mention this today.  But he is noticing pristiq and feels mood and energy are better.  We had session outside today and pt has very good rapport with his MD.  Supportive therapy session, and slightly less PMR noted but still appears very depressed.  Pleased sutures removed, denies plan to harm himself on unit.    THURS 7/7 RN report received, case discussed at team, patient seen and MSE done. No acute events overnight. Patient continues to appear very depressed with very prominent PMR and weight loss and anhedonia and even some deficits in concentration and memory but has gained several pounds as per RN since arrival. Yet he does have a very good rapport with his MD from last hospitalization two years ago and does make an effort to explain his thoughts and feelings. Feels abilify is helping but asks about pristiq increase and we made plan for SAT increase. Will continue to monitor, but now less evident PMR.  Eating and sleeping better he says, slightly more hopeful and very slighlty brighter affect noted.  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  We also make plan to lower klonopin, a first, with no extra AM and 1 PM PRNS and today entire session spent discussing anxiety about 1 mg reduction in klonopin.  Yet pt tolerated this but says he notices difference between pristiq and klonopin and social phobia remains.  Says he trusts his MD but remains guarded and fearful of BZD reductions and not allow further reductions.  Asks about DC next week and we make plan for that.  No new SEs reported or observed.

## 2022-07-08 PROCEDURE — 90853 GROUP PSYCHOTHERAPY: CPT

## 2022-07-08 PROCEDURE — 99232 SBSQ HOSP IP/OBS MODERATE 35: CPT

## 2022-07-08 RX ORDER — NICOTINE POLACRILEX 2 MG
4 GUM BUCCAL
Refills: 0 | Status: DISCONTINUED | OUTPATIENT
Start: 2022-07-08 | End: 2022-07-13

## 2022-07-08 RX ADMIN — ARIPIPRAZOLE 30 MILLIGRAM(S): 15 TABLET ORAL at 20:36

## 2022-07-08 RX ADMIN — OLANZAPINE 5 MILLIGRAM(S): 15 TABLET, FILM COATED ORAL at 15:23

## 2022-07-08 RX ADMIN — GABAPENTIN 800 MILLIGRAM(S): 400 CAPSULE ORAL at 08:56

## 2022-07-08 RX ADMIN — METHADONE HYDROCHLORIDE 15 MILLIGRAM(S): 40 TABLET ORAL at 08:56

## 2022-07-08 RX ADMIN — Medication 1.5 MILLIGRAM(S): at 08:56

## 2022-07-08 RX ADMIN — Medication 1.5 MILLIGRAM(S): at 12:40

## 2022-07-08 RX ADMIN — DESVENLAFAXINE 75 MILLIGRAM(S): 50 TABLET, EXTENDED RELEASE ORAL at 08:56

## 2022-07-08 RX ADMIN — MIRTAZAPINE 45 MILLIGRAM(S): 45 TABLET, ORALLY DISINTEGRATING ORAL at 20:37

## 2022-07-08 RX ADMIN — Medication 2 MILLIGRAM(S): at 20:36

## 2022-07-08 RX ADMIN — METHADONE HYDROCHLORIDE 120 MILLIGRAM(S): 40 TABLET ORAL at 08:56

## 2022-07-08 RX ADMIN — GABAPENTIN 800 MILLIGRAM(S): 400 CAPSULE ORAL at 20:37

## 2022-07-08 RX ADMIN — Medication 50 MILLIGRAM(S): at 17:11

## 2022-07-08 RX ADMIN — LITHIUM CARBONATE 1350 MILLIGRAM(S): 300 TABLET, EXTENDED RELEASE ORAL at 20:37

## 2022-07-08 NOTE — BH INPATIENT PSYCHIATRY PROGRESS NOTE - MSE UNSTRUCTURED FT
The patient appears stated age, with fair hygiene, and is dressed appropriately but today sits outside with his shirt off.  He was calm and cooperative with the interview.  He maintained appropriate eye contact and has very good rapport with his MD.  No restlessness but still has PMR observed.  Gait is steady.  The patient’s speech was fluent, normal in tone, rate, and volume.  The patient’s mood is “anxious.”  His affect is very constricted, stable, appropriate.  The patient’s thoughts are goal directed and appropriate with no self-harming themes.  He does not have any delusions or AVTH.  He does not have any suicidal or homicidal ideation, intent, or plan and is becoming more hopeful.  Insight is good.  Judgment is good.  Impulse control has been good on the unit. Reliability adequate.

## 2022-07-08 NOTE — BH INPATIENT PSYCHIATRY PROGRESS NOTE - NSBHFUPINTERVALHXFT_PSY_A_CORE
FRI 7/8 RN report received, case discussed at team, patient seen and MSE done. No acute events overnight. Patient appears less depressed with less prominent PMR and anhedonia and even deficits in concentration and memory improved and has gained several pounds as per RN since arrival. He does have a very good rapport with his MD from last hospitalization two years ago and does make an effort to explain his thoughts and feelings and feels he his improving. Cannot explain why he was so depressed yesterady and writer last saw pt yesterday and 6 days prior to that. Feels abilify is helping and pristiq increase and we made plan for DC next week. Will continue to monitor, but now less evident PMR and depressed mood, overall.  Eating and sleeping better he says, more hopeful and brighter affect emerging.  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  Reluctant to lower klonopin futher.  Says he trusts his MD but remains guarded and fearful of BZD reductions and not allow further reductions.  Asks again about DC next week and we make plan to achieve that and will get back to CBT on MON.  Thanks his MD as is customary. Excellent behavioral control but this does not always apply to mood improvement skills being implemented.  No new SEs reported or observed.

## 2022-07-09 RX ADMIN — MIRTAZAPINE 45 MILLIGRAM(S): 45 TABLET, ORALLY DISINTEGRATING ORAL at 20:57

## 2022-07-09 RX ADMIN — METHADONE HYDROCHLORIDE 15 MILLIGRAM(S): 40 TABLET ORAL at 08:52

## 2022-07-09 RX ADMIN — Medication 1.5 MILLIGRAM(S): at 13:04

## 2022-07-09 RX ADMIN — Medication 50 MILLIGRAM(S): at 11:08

## 2022-07-09 RX ADMIN — GABAPENTIN 800 MILLIGRAM(S): 400 CAPSULE ORAL at 20:57

## 2022-07-09 RX ADMIN — DESVENLAFAXINE 75 MILLIGRAM(S): 50 TABLET, EXTENDED RELEASE ORAL at 08:55

## 2022-07-09 RX ADMIN — METHADONE HYDROCHLORIDE 120 MILLIGRAM(S): 40 TABLET ORAL at 08:52

## 2022-07-09 RX ADMIN — Medication 2 MILLIGRAM(S): at 20:57

## 2022-07-09 RX ADMIN — ARIPIPRAZOLE 30 MILLIGRAM(S): 15 TABLET ORAL at 20:57

## 2022-07-09 RX ADMIN — LITHIUM CARBONATE 1350 MILLIGRAM(S): 300 TABLET, EXTENDED RELEASE ORAL at 20:57

## 2022-07-09 RX ADMIN — Medication 1.5 MILLIGRAM(S): at 08:52

## 2022-07-09 RX ADMIN — SENNA PLUS 2 TABLET(S): 8.6 TABLET ORAL at 20:58

## 2022-07-09 RX ADMIN — GABAPENTIN 800 MILLIGRAM(S): 400 CAPSULE ORAL at 08:52

## 2022-07-09 RX ADMIN — OLANZAPINE 5 MILLIGRAM(S): 15 TABLET, FILM COATED ORAL at 11:08

## 2022-07-10 RX ADMIN — DESVENLAFAXINE 75 MILLIGRAM(S): 50 TABLET, EXTENDED RELEASE ORAL at 08:31

## 2022-07-10 RX ADMIN — GABAPENTIN 800 MILLIGRAM(S): 400 CAPSULE ORAL at 20:19

## 2022-07-10 RX ADMIN — Medication 50 MILLIGRAM(S): at 15:08

## 2022-07-10 RX ADMIN — Medication 1.5 MILLIGRAM(S): at 13:23

## 2022-07-10 RX ADMIN — OLANZAPINE 5 MILLIGRAM(S): 15 TABLET, FILM COATED ORAL at 15:08

## 2022-07-10 RX ADMIN — GABAPENTIN 800 MILLIGRAM(S): 400 CAPSULE ORAL at 08:32

## 2022-07-10 RX ADMIN — LITHIUM CARBONATE 1350 MILLIGRAM(S): 300 TABLET, EXTENDED RELEASE ORAL at 20:19

## 2022-07-10 RX ADMIN — MIRTAZAPINE 45 MILLIGRAM(S): 45 TABLET, ORALLY DISINTEGRATING ORAL at 20:19

## 2022-07-10 RX ADMIN — METHADONE HYDROCHLORIDE 15 MILLIGRAM(S): 40 TABLET ORAL at 08:32

## 2022-07-10 RX ADMIN — METHADONE HYDROCHLORIDE 120 MILLIGRAM(S): 40 TABLET ORAL at 08:32

## 2022-07-10 RX ADMIN — ARIPIPRAZOLE 30 MILLIGRAM(S): 15 TABLET ORAL at 20:19

## 2022-07-10 RX ADMIN — Medication 1.5 MILLIGRAM(S): at 08:32

## 2022-07-10 RX ADMIN — Medication 2 MILLIGRAM(S): at 20:19

## 2022-07-11 PROCEDURE — 90853 GROUP PSYCHOTHERAPY: CPT

## 2022-07-11 PROCEDURE — 99232 SBSQ HOSP IP/OBS MODERATE 35: CPT

## 2022-07-11 PROCEDURE — 90834 PSYTX W PT 45 MINUTES: CPT | Mod: 59

## 2022-07-11 RX ORDER — MAGNESIUM HYDROXIDE 400 MG/1
30 TABLET, CHEWABLE ORAL DAILY
Refills: 0 | Status: DISCONTINUED | OUTPATIENT
Start: 2022-07-11 | End: 2022-07-11

## 2022-07-11 RX ORDER — DESVENLAFAXINE 50 MG/1
100 TABLET, EXTENDED RELEASE ORAL DAILY
Refills: 0 | Status: DISCONTINUED | OUTPATIENT
Start: 2022-07-12 | End: 2022-07-13

## 2022-07-11 RX ORDER — MULTIVIT WITH MIN/MFOLATE/K2 340-15/3 G
1 POWDER (GRAM) ORAL ONCE
Refills: 0 | Status: COMPLETED | OUTPATIENT
Start: 2022-07-11 | End: 2022-07-11

## 2022-07-11 RX ORDER — GABAPENTIN 400 MG/1
400 CAPSULE ORAL THREE TIMES A DAY
Refills: 0 | Status: DISCONTINUED | OUTPATIENT
Start: 2022-07-11 | End: 2022-07-13

## 2022-07-11 RX ADMIN — SENNA PLUS 2 TABLET(S): 8.6 TABLET ORAL at 20:34

## 2022-07-11 RX ADMIN — Medication 1 BOTTLE: at 18:49

## 2022-07-11 RX ADMIN — MIRTAZAPINE 45 MILLIGRAM(S): 45 TABLET, ORALLY DISINTEGRATING ORAL at 20:36

## 2022-07-11 RX ADMIN — Medication 2 MILLIGRAM(S): at 20:35

## 2022-07-11 RX ADMIN — METHADONE HYDROCHLORIDE 120 MILLIGRAM(S): 40 TABLET ORAL at 08:30

## 2022-07-11 RX ADMIN — Medication 50 MILLIGRAM(S): at 15:19

## 2022-07-11 RX ADMIN — ARIPIPRAZOLE 30 MILLIGRAM(S): 15 TABLET ORAL at 20:35

## 2022-07-11 RX ADMIN — DESVENLAFAXINE 75 MILLIGRAM(S): 50 TABLET, EXTENDED RELEASE ORAL at 08:30

## 2022-07-11 RX ADMIN — OLANZAPINE 5 MILLIGRAM(S): 15 TABLET, FILM COATED ORAL at 15:20

## 2022-07-11 RX ADMIN — Medication 1.5 MILLIGRAM(S): at 12:30

## 2022-07-11 RX ADMIN — GABAPENTIN 800 MILLIGRAM(S): 400 CAPSULE ORAL at 20:36

## 2022-07-11 RX ADMIN — Medication 1.5 MILLIGRAM(S): at 08:31

## 2022-07-11 RX ADMIN — LITHIUM CARBONATE 1350 MILLIGRAM(S): 300 TABLET, EXTENDED RELEASE ORAL at 20:34

## 2022-07-11 RX ADMIN — GABAPENTIN 800 MILLIGRAM(S): 400 CAPSULE ORAL at 08:31

## 2022-07-11 RX ADMIN — METHADONE HYDROCHLORIDE 15 MILLIGRAM(S): 40 TABLET ORAL at 08:31

## 2022-07-11 NOTE — BH PSYCHOLOGY - CLINICIAN PSYCHOTHERAPY NOTE - NSBHPSYCHOLINT_PSY_A_CORE
Supportive therapy/other...
Supportive therapy/other...
Supportive therapy
Supportive therapy/other...
Supportive therapy/other...

## 2022-07-11 NOTE — BH PSYCHOLOGY - CLINICIAN PSYCHOTHERAPY NOTE - NSBHPSYCHOLINT_PSY_A_CORE FT
Acceptance & commitment therapy, Emotion regulation/coping skills taught, Psychoeducation 

## 2022-07-11 NOTE — BH INPATIENT PSYCHIATRY PROGRESS NOTE - NSBHFUPINTERVALHXFT_PSY_A_CORE
MON 7/11 RN report received, case discussed at team, patient seen and MSE done. No acute events overnight. Patient appears less depressed with less prominent PMR and anhedonia and even deficits in concentration and memory improved and has gained several pounds as per RN since arrival. He does have a very good rapport with his MD from last hospitalization two years ago and does make an effort to explain his thoughts and feelings and feels he his improving. Feels abilify is helping and pristiq increase and we made plan for DC by [facility]. Will continue to monitor, but now less evident PMR and depressed mood, overall.  Eating and sleeping better he says, more hopeful and brighter affect emerging.  We made plan for final pristiq increase in AM and DC ideally on [facility].  Possibly trial of neurontin lowering, but pt to decide this.  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  Reluctant to lower klonopin futher he says but we did accomplish 1 mg lowering.  He agrees that too much sedation not helpful to thinking clearly.  Says he trusts his MD but remains guarded and fearful of BZD reductions and not allow further reductions.  Thanks his MD as is customary. Excellent behavioral control but this does not always apply to mood improvement skills being implemented.  No new SEs reported or observed.

## 2022-07-11 NOTE — BH PSYCHOLOGY - CLINICIAN PSYCHOTHERAPY NOTE - NSBHPSYCHOLPROBS_PSY_ALL_CORE
Anxiety/Other...
Anxiety/Depression/Other...
Anxiety/Other...
Anxiety/Depression/Other...
Anxiety/Depression/Other...

## 2022-07-11 NOTE — BH PSYCHOLOGY - CLINICIAN PSYCHOTHERAPY NOTE - NSBHPSYCHOLBILLFAM_PSY_A_CORE
[id number] - 16 to 37 minutes
[id number] - 38 to 52 minutes
[id number] - 16 to 37 minutes
[id number] - 16 to 37 minutes
[id number] - 38 to 52 minutes

## 2022-07-11 NOTE — BH PSYCHOLOGY - CLINICIAN PSYCHOTHERAPY NOTE - NSTXNEGATGOAL_PSY_ALL_CORE
Will be able to identify and utilize affirmations to create positive self-talk
Will be able to demonstrate understanding of self-talk and its relationship to self-image and communication through discussion with staff once a day
Will be able to identify and utilize affirmations to create positive self-talk

## 2022-07-11 NOTE — BH PSYCHOLOGY - CLINICIAN PSYCHOTHERAPY NOTE - NSTXANXGOAL_PSY_ALL_CORE
Be able to perform ADLs and maintain safety despite anxiety/panic daily
Be able to perform ADLs and maintain safety despite anxiety/panic daily
Be able to participate in activities despite lingering anxiety/panic
Be able to participate in activities despite lingering anxiety/panic

## 2022-07-11 NOTE — BH PSYCHOLOGY - CLINICIAN PSYCHOTHERAPY NOTE - NSBHPSYCHOLGOALS_PSY_A_CORE FT
Decrease symptoms, Develop coping/emotion regulation skills, Increase value-based action, Psychoeducation 

## 2022-07-11 NOTE — BH INPATIENT PSYCHIATRY PROGRESS NOTE - MSE UNSTRUCTURED FT
The patient appears stated age, with fair hygiene, and is dressed appropriately and sits outside with MD for interview, as is his custom.  He was calm and cooperative with the interview.  He maintained appropriate eye contact and has very good rapport with his MD.  No restlessness but still has PMR observed but much attenuated.  Gait is steady.  The patient’s speech was fluent, normal in tone, rate, and volume.  The patient’s mood is “anxious.”  His affect is very constricted, stable, appropriate.  The patient’s thoughts are goal directed and appropriate with no self-harming themes.  He does not have any delusions or AVTH.  He does not have any suicidal or homicidal ideation, intent, or plan and is becoming more hopeful.  Insight is good.  Judgment is good.  Impulse control has been good on the unit. Reliability adequate.

## 2022-07-11 NOTE — BH PSYCHOLOGY - CLINICIAN PSYCHOTHERAPY NOTE - TOKEN PULL-DIAGNOSIS
Primary Diagnosis:  MDD (major depressive disorder) [F32.9]        Problem Dx:   MDD (major depressive disorder) [F32.9]      Opioid dependence in remission [F11.21]      PTSD (post-traumatic stress disorder) [F43.10]      Severe episode of recurrent major depressive disorder, without psychotic features [F33.2]      
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
## 2022-07-12 PROCEDURE — 90853 GROUP PSYCHOTHERAPY: CPT

## 2022-07-12 RX ADMIN — METHADONE HYDROCHLORIDE 120 MILLIGRAM(S): 40 TABLET ORAL at 09:09

## 2022-07-12 RX ADMIN — Medication 1.5 MILLIGRAM(S): at 09:12

## 2022-07-12 RX ADMIN — Medication 2 MILLIGRAM(S): at 20:47

## 2022-07-12 RX ADMIN — SENNA PLUS 2 TABLET(S): 8.6 TABLET ORAL at 20:47

## 2022-07-12 RX ADMIN — DESVENLAFAXINE 100 MILLIGRAM(S): 50 TABLET, EXTENDED RELEASE ORAL at 09:10

## 2022-07-12 RX ADMIN — LITHIUM CARBONATE 1350 MILLIGRAM(S): 300 TABLET, EXTENDED RELEASE ORAL at 20:48

## 2022-07-12 RX ADMIN — METHADONE HYDROCHLORIDE 15 MILLIGRAM(S): 40 TABLET ORAL at 09:09

## 2022-07-12 RX ADMIN — OLANZAPINE 5 MILLIGRAM(S): 15 TABLET, FILM COATED ORAL at 15:20

## 2022-07-12 RX ADMIN — Medication 1.5 MILLIGRAM(S): at 14:13

## 2022-07-12 RX ADMIN — Medication 50 MILLIGRAM(S): at 15:19

## 2022-07-12 RX ADMIN — GABAPENTIN 800 MILLIGRAM(S): 400 CAPSULE ORAL at 20:47

## 2022-07-12 RX ADMIN — ARIPIPRAZOLE 30 MILLIGRAM(S): 15 TABLET ORAL at 20:47

## 2022-07-12 RX ADMIN — GABAPENTIN 800 MILLIGRAM(S): 400 CAPSULE ORAL at 09:10

## 2022-07-12 RX ADMIN — MIRTAZAPINE 45 MILLIGRAM(S): 45 TABLET, ORALLY DISINTEGRATING ORAL at 20:47

## 2022-07-12 NOTE — BH DISCHARGE NOTE NURSING/SOCIAL WORK/PSYCH REHAB - NSDCPRGOAL_PSY_ALL_CORE
Writer met with pt to complete safety plan and assess full progress made by pt throughout the stay. Pt was receptive and cooperative to participate in session. Pt engaged meaningfully with the writer while completing the safety plan. Pt presented with good insight to his symptoms and warning signs. Pt identified various coping skills, including a lot of outdoor activities. Pt stated that he is grateful for people who he can reach out for help (i.e. his boss, peer advocate, and counselor). Throughout the session, pt was pleasant and engaged, despite feeling groggy/sleepy due to medication that he received. Pt demonstrated motivation as pt reported that he has passion for “learning.” Pt was determined to reach out for help in the future when he experiences “that point” in his depression and remove all the sharps from his environment. Pt’s ADLs are fair. Pt denied SI, HI, AH, and VH. Pt attended moderate number of psych rehab groups in the unit. Pt was adherent to medication and was in good behavioral control during the stay. Due to Covid-19 pandemic, unit structure and activities are reevaluated on a consistent basis in effort to maintain safety of patients and staff.

## 2022-07-12 NOTE — BH DISCHARGE NOTE NURSING/SOCIAL WORK/PSYCH REHAB - PATIENT PORTAL LINK FT
You can access the FollowMyHealth Patient Portal offered by [facility] by registering at the following website: [email or web address]. By joining [facility]’s FollowMyHealth portal, you will also be able to view your health information using other applications (apps) compatible with our system.

## 2022-07-12 NOTE — BH SAFETY PLAN - THE ONE THING THAT IS MOST IMPORTANT TO ME AND WORTH LIVING FOR IS:
Pursuit of knowledge, Myself The pursuit of knowledge and myself are most important to me and worth living for.

## 2022-07-12 NOTE — BH DISCHARGE NOTE NURSING/SOCIAL WORK/PSYCH REHAB - DISCHARGE INSTRUCTIONS AFTERCARE APPOINTMENTS
In order to check the location, date, or time of your aftercare appointment, please refer to your Discharge Instructions Document given to you upon leaving the hospital.  If you have lost the instructions please call [phone]
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
## 2022-07-12 NOTE — BH DISCHARGE NOTE NURSING/SOCIAL WORK/PSYCH REHAB - NSDCADDINFO1FT_PSY_ALL_CORE
Please arrive after 8 am tomorrow as you will need to meet with the MD/NP for reinstatement and for the nurses to confirm the last dosage of methadone that was administered on the unit. Appointment for in-person meeting with Dr. Henry will be at 9 AM. You will also see Sandy for a counseling session.

## 2022-07-12 NOTE — BH DISCHARGE NOTE NURSING/SOCIAL WORK/PSYCH REHAB - NSDCVIVACCINE_GEN_ALL_CORE_FT
Tdap; 13-Aug-2019 16:37; Sandy Ng (RN); [employer]; [id number] (Exp. Date: 07-Jun-2021); IntraMuscular; Deltoid Left.; 0.5 milliLiter(s); VIS (VIS Published: 09-May-2013, VIS Presented: 13-Aug-2019);   Tdap; 18-Jun-2022 09:10; Mell Babcock); [employer]; [id number] (Exp. Date: 24-Mar-2024); IntraMuscular; Dorsogluteal Right.; 0.5 milliLiter(s); VIS (VIS Published: 09-May-2013, VIS Presented: 18-Jun-2022);

## 2022-07-12 NOTE — BH SAFETY PLAN - WARNING SIGN 6
When I experience heightened depressive symptoms that do not improve via medication/treatment compliance and application of my preferred coping methods.

## 2022-07-12 NOTE — BH DISCHARGE NOTE NURSING/SOCIAL WORK/PSYCH REHAB - NSDCPRRECOMMEND_PSY_ALL_CORE
Psychiatric rehabilitation team recommends pt to consistently follow up with outpatient provider/clinic for continued treatment and medication regimen.

## 2022-07-12 NOTE — BH DISCHARGE NOTE NURSING/SOCIAL WORK/PSYCH REHAB - NSCDUDCCRISIS_PSY_A_CORE
[facility]  1 [phone]) [facility] ([phone])  Text "WELL" to [phone]  Website: www.[employer]/.[employer] [facility] 1 (640) 861-ZKAS (1064) Website: www.safehorizon.org/.National Suicide Prevention Lifeline 1 (418) 560-7338/.  [facility]  1 (186) LIF[employer] (209-4163)/.  [facility]  [address] [place] [address] [phone]   Hours:  Monday through Friday from 9 AM to 3 PM/.  [facility] [facility] Crisis Line  1 (521) 109-8283, Option 1

## 2022-07-13 VITALS — SYSTOLIC BLOOD PRESSURE: 119 MMHG | HEART RATE: 69 BPM | TEMPERATURE: 97 F | DIASTOLIC BLOOD PRESSURE: 58 MMHG

## 2022-07-13 PROCEDURE — 99239 HOSP IP/OBS DSCHRG MGMT >30: CPT | Mod: 25

## 2022-07-13 PROCEDURE — 90853 GROUP PSYCHOTHERAPY: CPT

## 2022-07-13 RX ORDER — MIRTAZAPINE 45 MG/1
1 TABLET, ORALLY DISINTEGRATING ORAL
Qty: 30 | Refills: 0
Start: 2022-07-13 | End: 2022-08-11

## 2022-07-13 RX ORDER — CLONAZEPAM 1 MG
1.5 TABLET ORAL
Qty: 90 | Refills: 0
Start: 2022-07-13 | End: 2022-08-11

## 2022-07-13 RX ORDER — METHADONE HYDROCHLORIDE 40 MG/1
3 TABLET ORAL
Qty: 3 | Refills: 0
Start: 2022-07-13 | End: 2022-07-13

## 2022-07-13 RX ORDER — ARIPIPRAZOLE 15 MG/1
1 TABLET ORAL
Qty: 30 | Refills: 0
Start: 2022-07-13 | End: 2022-08-11

## 2022-07-13 RX ORDER — GABAPENTIN 400 MG/1
1 CAPSULE ORAL
Qty: 0 | Refills: 0 | DISCHARGE

## 2022-07-13 RX ORDER — LITHIUM CARBONATE 300 MG/1
3 TABLET, EXTENDED RELEASE ORAL
Qty: 90 | Refills: 0
Start: 2022-07-13 | End: 2022-08-11

## 2022-07-13 RX ORDER — CLONAZEPAM 1 MG
1 TABLET ORAL
Qty: 30 | Refills: 0
Start: 2022-07-13 | End: 2022-08-11

## 2022-07-13 RX ORDER — DESVENLAFAXINE 50 MG/1
1 TABLET, EXTENDED RELEASE ORAL
Qty: 30 | Refills: 0
Start: 2022-07-13 | End: 2022-08-11

## 2022-07-13 RX ORDER — CLONAZEPAM 1 MG
1 TABLET ORAL
Qty: 0 | Refills: 0 | DISCHARGE

## 2022-07-13 RX ORDER — SENNA PLUS 8.6 MG/1
2 TABLET ORAL
Qty: 60 | Refills: 0
Start: 2022-07-13 | End: 2022-08-11

## 2022-07-13 RX ORDER — GABAPENTIN 400 MG/1
1 CAPSULE ORAL
Qty: 60 | Refills: 0
Start: 2022-07-13 | End: 2022-08-11

## 2022-07-13 RX ADMIN — METHADONE HYDROCHLORIDE 120 MILLIGRAM(S): 40 TABLET ORAL at 09:22

## 2022-07-13 RX ADMIN — GABAPENTIN 800 MILLIGRAM(S): 400 CAPSULE ORAL at 09:22

## 2022-07-13 RX ADMIN — DESVENLAFAXINE 100 MILLIGRAM(S): 50 TABLET, EXTENDED RELEASE ORAL at 09:22

## 2022-07-13 RX ADMIN — Medication 1.5 MILLIGRAM(S): at 09:22

## 2022-07-13 RX ADMIN — Medication 1.5 MILLIGRAM(S): at 12:52

## 2022-07-13 RX ADMIN — METHADONE HYDROCHLORIDE 15 MILLIGRAM(S): 40 TABLET ORAL at 09:22

## 2022-07-13 NOTE — BH INPATIENT PSYCHIATRY PROGRESS NOTE - MSE OPTIONS
Unstructured MSE
Structured MSE
Structured MSE
Unstructured MSE
Structured MSE
Unstructured MSE
Structured MSE
Unstructured MSE
Structured MSE
Unstructured MSE
Structured MSE

## 2022-07-13 NOTE — BH INPATIENT PSYCHIATRY PROGRESS NOTE - NSDCCRITERIA_PSY_ALL_CORE
symptom stabilization  CGI<=2  Ideally an LALA
symptom stabilization  CGI<=3
symptom stabilization  CGI<=2  Ideally an LALA

## 2022-07-13 NOTE — BH INPATIENT PSYCHIATRY PROGRESS NOTE - NSTXSUPORTGOAL_PSY_ALL_CORE
Patient will explore available community resources to build support network
Patient will engage in unit milieu
Patient will explore available community resources to build support network
Patient will explore available community resources to build support network
Patient will engage in unit milieu
Patient will engage in unit milieu
Patient will explore available community resources to build support network
Patient will engage in unit milieu
Patient will explore available community resources to build support network
Patient will explore available community resources to build support network
Patient will engage in unit milieu

## 2022-07-13 NOTE — BH INPATIENT PSYCHIATRY DISCHARGE NOTE - NSDCMRMEDTOKEN_GEN_ALL_CORE_FT
ARIPiprazole 30 mg oral tablet: 1 tab(s) orally once a day (at bedtime)  clonazePAM 2 mg oral tablet: 1 tab(s) orally once a day (at bedtime) MDD:2 mg  desvenlafaxine (as succinate) 100 mg oral tablet, extended release: 1 tab(s) orally once a day  gabapentin 800 mg oral tablet: 1 tab(s) orally 2 times a day   KlonoPIN 1 mg oral tablet: 1.5 tab(s) orally 2 times a day- AM and 1 PM; pt also has 2 mg dose at bedtime MDD:3 mg  lithium 450 mg oral tablet, extended release: 3 tab(s) orally once a day (at bedtime)  methadone 40 mg oral tablet, dispersible: 3 tab(s) orally once a day- continuing MMTP at [facility], does not need filling by [facility] MDD:120 mg  methadone 5 mg oral tablet: 3 tab(s) orally once a day- continuing MMTP at [facility], does not need filling by [facility] MDD:15 mg  mirtazapine 45 mg oral tablet: 1 tab(s) orally once a day (at bedtime)  senna leaf extract oral tablet: 2 tab(s) orally once a day (at bedtime)

## 2022-07-13 NOTE — BH INPATIENT PSYCHIATRY PROGRESS NOTE - NSTXDEPRESDATEEST_PSY_ALL_CORE
20-Jun-2022
07-Jul-2022
20-Jun-2022
07-Jul-2022
20-Jun-2022
07-Jul-2022
20-Jun-2022

## 2022-07-13 NOTE — BH INPATIENT PSYCHIATRY PROGRESS NOTE - NSBHATTESTTYPEVISIT_PSY_A_CORE
Attending Only

## 2022-07-13 NOTE — BH PSYCHOLOGY - GROUP THERAPY NOTE - NSPSYCHOLGRPCOGPROB_PSY_A_CORE FT
Anxiety, Depression, Emotion dysregulation, Lack of coping skills 

## 2022-07-13 NOTE — BH PSYCHOLOGY - GROUP THERAPY NOTE - NSBHPSYCHOLGRPTYPE_PSY_A_CORE
Cognitive Behavioral Coping Skills

## 2022-07-13 NOTE — BH INPATIENT PSYCHIATRY PROGRESS NOTE - NSTXDCOTHRGOAL_PSY_ALL_CORE
Patient will report a decrease in depressive symptoms and future oriented thinking.

## 2022-07-13 NOTE — BH INPATIENT PSYCHIATRY PROGRESS NOTE - NSTXDEPRESDATETRGT_PSY_ALL_CORE
27-Jun-2022
14-Jul-2022
27-Jun-2022
02-Jul-2022
14-Jul-2022
14-Jul-2022
02-Jul-2022
02-Jul-2022
27-Jun-2022
02-Jul-2022
14-Jul-2022
02-Jul-2022
02-Jul-2022
27-Jun-2022
14-Jul-2022
27-Jun-2022
02-Jul-2022

## 2022-07-13 NOTE — BH PSYCHOLOGY - GROUP THERAPY NOTE - NSBHPSYCHOLPARTICIPCOMMENT_PSY_A_CORE FT
Pt engaged in group. His demeanor was downcast and he required some redirection when engaging in side talk with a peer. He reported that trust is a quality he values in relationships. 
Pt appropriately participated in group. On his wellness box, he wrote "f*** you" and with redirection, amended his wellness box (rei a sun).  Pt engaged in side talk with peer but was responsive to redirection. 
Pt attempted to participated but showed difficulty staying engaged (likely d/t medication side effects). He reported that he has no clarity on his direction moving forward and others empathized with the depression he is currently facing. 
Pt appropriately participated in group. He reported that he would've told his younger self to steer clear of people who have a negative influence. He fell asleep during grounding exercises stating that it is a sign he feels comfortable enough around the group members.
Pt engaged well in group. He reported that he was taught to hold in all of his emotions in his upbringing. As a result, pt reported that he now struggles with how to safely share and express his emotions. 
Pt showed improved group engagement. He discussed the importance of physical well-being and noted the inter-relatedness of the various valued domains.
Pt engaged in group. He quietly participated and was observed completing values handout and reported he would like to focus on the value of relationships. 
Pt appropriately engaged in group. Pt stated that he doesn't feel anger in his body. On other emotions, he was able to describe the body sensations associated with happiness, sadness, and fear. 
Pt engaged in group. He reported ruminations during listening activity and choose a song that elicits reflections on his past choices. Pt was able, with encouragement, to focus on sounds/tempo/quality of the songs.

## 2022-07-13 NOTE — BH INPATIENT PSYCHIATRY PROGRESS NOTE - NSBHMETABOLIC_PSY_ALL_CORE_FT
BMI:   HbA1c: A1C with Estimated Average Glucose Result: 4.9 % (06-20-22 @ 08:05)    Glucose:   BP: 107/65 (07-11-22 @ 06:22) (98/64 - 113/60)  Lipid Panel: Date/Time: 06-20-22 @ 08:05  Cholesterol, Serum: 126  Direct LDL: --  HDL Cholesterol, Serum: 57  Total Cholesterol/HDL Ration Measurement: --  Triglycerides, Serum: 102  
BMI:   HbA1c: A1C with Estimated Average Glucose Result: 4.9 % (06-20-22 @ 08:05)    Glucose:   BP: 104/61 (06-22-22 @ 06:19) (98/63 - 104/61)  Lipid Panel: Date/Time: 06-20-22 @ 08:05  Cholesterol, Serum: 126  Direct LDL: --  HDL Cholesterol, Serum: 57  Total Cholesterol/HDL Ration Measurement: --  Triglycerides, Serum: 102  
BMI:   HbA1c: A1C with Estimated Average Glucose Result: 4.9 % (06-20-22 @ 08:05)    Glucose:   BP: 117/67 (07-01-22 @ 06:42) (117/63 - 117/67)  Lipid Panel: Date/Time: 06-20-22 @ 08:05  Cholesterol, Serum: 126  Direct LDL: --  HDL Cholesterol, Serum: 57  Total Cholesterol/HDL Ration Measurement: --  Triglycerides, Serum: 102  
BMI:   HbA1c: A1C with Estimated Average Glucose Result: 4.9 % (06-20-22 @ 08:05)    Glucose:   BP: 117/67 (07-01-22 @ 06:42) (117/63 - 117/67)  Lipid Panel: Date/Time: 06-20-22 @ 08:05  Cholesterol, Serum: 126  Direct LDL: --  HDL Cholesterol, Serum: 57  Total Cholesterol/HDL Ration Measurement: --  Triglycerides, Serum: 102  
BMI:   HbA1c: A1C with Estimated Average Glucose Result: 4.9 % (06-20-22 @ 08:05)    Glucose:   BP: 119/58 (07-13-22 @ 06:32) (107/65 - 119/58)  Lipid Panel: Date/Time: 06-20-22 @ 08:05  Cholesterol, Serum: 126  Direct LDL: --  HDL Cholesterol, Serum: 57  Total Cholesterol/HDL Ration Measurement: --  Triglycerides, Serum: 102  
BMI:   HbA1c: A1C with Estimated Average Glucose Result: 4.9 % (06-20-22 @ 08:05)    Glucose:   BP: 104/61 (06-22-22 @ 06:19) (98/63 - 104/61)  Lipid Panel: Date/Time: 06-20-22 @ 08:05  Cholesterol, Serum: 126  Direct LDL: --  HDL Cholesterol, Serum: 57  Total Cholesterol/HDL Ration Measurement: --  Triglycerides, Serum: 102  
BMI:   HbA1c: A1C with Estimated Average Glucose Result: 4.9 % (06-20-22 @ 08:05)    Glucose:   BP: 115/64 (07-04-22 @ 06:27) (115/64 - 115/64)  Lipid Panel: Date/Time: 06-20-22 @ 08:05  Cholesterol, Serum: 126  Direct LDL: --  HDL Cholesterol, Serum: 57  Total Cholesterol/HDL Ration Measurement: --  Triglycerides, Serum: 102  
BMI:   HbA1c: A1C with Estimated Average Glucose Result: 4.9 % (06-20-22 @ 08:05)    Glucose:   BP: 108/53 (07-08-22 @ 06:18) (108/53 - 122/70)  Lipid Panel: Date/Time: 06-20-22 @ 08:05  Cholesterol, Serum: 126  Direct LDL: --  HDL Cholesterol, Serum: 57  Total Cholesterol/HDL Ration Measurement: --  Triglycerides, Serum: 102  
BMI:   HbA1c: A1C with Estimated Average Glucose Result: 4.9 % (06-20-22 @ 08:05)    Glucose:   BP: 107/71 (06-24-22 @ 06:44) (99/58 - 107/71)  Lipid Panel: Date/Time: 06-20-22 @ 08:05  Cholesterol, Serum: 126  Direct LDL: --  HDL Cholesterol, Serum: 57  Total Cholesterol/HDL Ration Measurement: --  Triglycerides, Serum: 102  
BMI:   HbA1c: A1C with Estimated Average Glucose Result: 4.9 % (06-20-22 @ 08:05)    Glucose:   BP: 122/73 (06-28-22 @ 06:14) (98/60 - 122/73)  Lipid Panel: Date/Time: 06-20-22 @ 08:05  Cholesterol, Serum: 126  Direct LDL: --  HDL Cholesterol, Serum: 57  Total Cholesterol/HDL Ration Measurement: --  Triglycerides, Serum: 102  
BMI:   HbA1c: A1C with Estimated Average Glucose Result: 4.9 % (06-20-22 @ 08:05)    Glucose:   BP: 115/61 (07-06-22 @ 06:23) (115/61 - 115/64)  Lipid Panel: Date/Time: 06-20-22 @ 08:05  Cholesterol, Serum: 126  Direct LDL: --  HDL Cholesterol, Serum: 57  Total Cholesterol/HDL Ration Measurement: --  Triglycerides, Serum: 102  
BMI:   HbA1c: A1C with Estimated Average Glucose Result: 4.9 % (06-20-22 @ 08:05)    Glucose:   BP: 117/63 (06-29-22 @ 06:40) (117/63 - 122/73)  Lipid Panel: Date/Time: 06-20-22 @ 08:05  Cholesterol, Serum: 126  Direct LDL: --  HDL Cholesterol, Serum: 57  Total Cholesterol/HDL Ration Measurement: --  Triglycerides, Serum: 102  
BMI:   HbA1c: A1C with Estimated Average Glucose Result: 4.9 % (06-20-22 @ 08:05)    Glucose:   BP: 98/63 (06-20-22 @ 06:42) (98/63 - 112/70)  Lipid Panel: Date/Time: 06-20-22 @ 08:05  Cholesterol, Serum: 126  Direct LDL: --  HDL Cholesterol, Serum: 57  Total Cholesterol/HDL Ration Measurement: --  Triglycerides, Serum: 102  
BMI:   HbA1c: A1C with Estimated Average Glucose Result: 4.9 % (06-20-22 @ 08:05)    Glucose:   BP: 108/53 (07-08-22 @ 06:18) (108/53 - 122/70)  Lipid Panel: Date/Time: 06-20-22 @ 08:05  Cholesterol, Serum: 126  Direct LDL: --  HDL Cholesterol, Serum: 57  Total Cholesterol/HDL Ration Measurement: --  Triglycerides, Serum: 102  
BMI:   HbA1c: A1C with Estimated Average Glucose Result: 4.9 % (06-20-22 @ 08:05)    Glucose:   BP: 119/58 (07-13-22 @ 06:32) (107/65 - 119/58)  Lipid Panel: Date/Time: 06-20-22 @ 08:05  Cholesterol, Serum: 126  Direct LDL: --  HDL Cholesterol, Serum: 57  Total Cholesterol/HDL Ration Measurement: --  Triglycerides, Serum: 102  
BMI:   HbA1c: A1C with Estimated Average Glucose Result: 4.9 % (06-20-22 @ 08:05)    Glucose:   BP: 107/71 (06-24-22 @ 06:44) (99/58 - 107/71)  Lipid Panel: Date/Time: 06-20-22 @ 08:05  Cholesterol, Serum: 126  Direct LDL: --  HDL Cholesterol, Serum: 57  Total Cholesterol/HDL Ration Measurement: --  Triglycerides, Serum: 102  
BMI:   HbA1c: A1C with Estimated Average Glucose Result: 4.9 % (06-20-22 @ 08:05)    Glucose:   BP: 98/60 (06-27-22 @ 06:41) (95/63 - 105/66)  Lipid Panel: Date/Time: 06-20-22 @ 08:05  Cholesterol, Serum: 126  Direct LDL: --  HDL Cholesterol, Serum: 57  Total Cholesterol/HDL Ration Measurement: --  Triglycerides, Serum: 102

## 2022-07-13 NOTE — BH INPATIENT PSYCHIATRY PROGRESS NOTE - NSTXDCHOUSINTERMD_PSY_ALL_CORE
Psychopharm with goal of BZD reduction and LALA for safety and compliance and supportive therapy with return to [place] aftercare on MMTP
Psychopharm with goal of BZD reduction and LALA for safety and compliance and supportive therapy with return to [place] aftercare on MMTP
Psychopharm with goal of BZD reduction and LALA for safety and compliance and supportive therapy with return to [place] aftercare on MMTP
Psychopharm with goal of BZD reduction and LALA for safety and compliance and supportive therapy with return to [place] aftercare on MMTP
Psychopharm with goal of BZD reduction and LALA for safety and compliance and supportive therapy with return to [place] aftercare on MMTP
Psychopharm with goal of BZD reduction and LALA for safety and compliance and supportive therapy with return to [place] aftercare on MMTP
Psychopharm with goal of BZD reduction and LALA for safety and compliance and supportive therapy with return to [place] aftercare on MMTP
Psychopharm with goal of BZD reduction and LALA for safety and compliance and supportive therapy with return to [place] aftercare on MMTP
Psychopharm with goal of BZD reduction and LALA for safety and compliance and supportive therapy with return to [place] aftercare on MMTP
Psychopharm with goal of BZD reduction and LALA for safety and compliance and supportive therapy with return to [place] aftercare on MMTP
Psychopharm with goal of BZD reduction and LALA for safety and compliance and supportive therapy with return to [place] aftercare on MMTP
Psychopharm with goal of BZD reduction and LALA for safety and compliance and supportive therapy with return to [place] aftercare on MMTP
Psychopharm with goal of BZD reduction and LALA for safety and compliance and supportive therapy with return to [place] aftercare on MMTP
Psychopharm with goal of BZD reduction and LALA for safety and compliance and supportive therapy with return to [place] aftercare on MMTP
Psychopharm with goal of BZD reduction and LALA for safety and compliance and supportive therapy with return to [place] aftercare on MMTP
Psychopharm with goal of BZD reduction and LALA for safety and compliance and supportive therapy with return to [place] aftercare on MMTP
Psychopharm with goal of BZD reduction and LALA for safety and compliance and supportive therapy with return to [place] after[facility] on MMTP

## 2022-07-13 NOTE — BH INPATIENT PSYCHIATRY PROGRESS NOTE - NSTXANXPROGRES_PSY_ALL_CORE
Improving
Improving
No Change
Improving
No Change
Improving
Improving
No Change

## 2022-07-13 NOTE — BH INPATIENT PSYCHIATRY PROGRESS NOTE - NSTXDEPRESINTERMD_PSY_ALL_CORE
Psychopharm with goal of BZD reduction and LALA for safety and compliance and supportive therapy with return to [place] aftercare on MMTP
Psychopharm with goal of BZD reduction and LALA for safety and compliance and supportive therapy with return to [place] aftercare on MMTP
Psychopharm with goal of BZD reduction and LALA for safety and compliance and supportive therapy with return to [place] aftercare on MMTP
Psychopharm with goal of BZD reduction and LALA for safety and compliance and supportive therapy with return to [place] aftercare on MMTP
Psychopharm with goal of BZD reduction and LALA for safety and compliance and supportive therapy with return to [place] aftercare on MMTP
Psychopharm with goal of BZD reduction and LALA for safety and compliance and supportive therapy with return to [place] aftercare on MMTP
Psychopharm with goal of BZD reduction and LALA for safety and compliance and supportive therapy with return to [place] aftercare on MMTP
Psychopharm with goal of BZD reduction and LALA for safety and compliance and supportive therapy with return to [place] aftercare on MMTP
Psychopharm with goal of BZD reduction and LALA for safety and compliance and supportive therapy with return to [place] aftercare on MMTP
Psychopharm with goal of BZD reduction and LALA for safety and compliance and supportive therapy with return to [place] aftercare on MMTP
Psychopharm with goal of BZD reduction and LALA for safety and compliance and supportive therapy with return to [place] aftercare on MMTP
Psychopharm with goal of BZD reduction and LALA for safety and compliance and supportive therapy with return to [place] aftercare on MMTP
Psychopharm with goal of BZD reduction and LALA for safety and compliance and supportive therapy with return to [place] aftercare on MMTP
Psychopharm with goal of BZD reduction and LALA for safety and compliance and supportive therapy with return to [place] aftercare on MMTP
Psychopharm with goal of BZD reduction and LALA for safety and compliance and supportive therapy with return to [place] aftercare on MMTP
Psychopharm with goal of BZD reduction and LALA for safety and compliance and supportive therapy with return to [place] aftercare on MMTP
Psychopharm with goal of BZD reduction and LALA for safety and compliance and supportive therapy with return to [place] aftercare on MMTP

## 2022-07-13 NOTE — BH INPATIENT PSYCHIATRY PROGRESS NOTE - NSICDXBHSECONDARYDX_PSY_ALL_CORE
MDD (major depressive disorder)   F32.9  

## 2022-07-13 NOTE — BH INPATIENT PSYCHIATRY PROGRESS NOTE - NSTXSUPORTINTERMD_PSY_ALL_CORE
Psychopharm with goal of BZD reduction and LALA for safety and compliance and supportive therapy with return to [place] aftercare on MMTP
Psychopharm with goal of BZD reduction and LALA for safety and compliance and supportive therapy with return to [place] aftercare on MMTP
Psychopharm with goal of BZD reduction and LALA for safety and compliance and supportive therapy with return to [place] aftercare on MMTP
Psychopharm with goal of BZD reduction and LALA for safety and compliance and supportive therapy with return to [place] aftercare on MMTP
Psychopharm with goal of BZD reduction and LALA for safety and compliance and supportive therapy with return to [place] aftercare on MMTP
Psychopharm with goal of BZD reduction and LALA for safety and compliance and supportive therapy with return to [place] aftercare on MMTP
Psychopharm with goal of BZD reduction and LALA for safety and compliance and supportive therapy with return to [place] aftercare on MMTP
Psychopharm with goal of BZD reduction and LALA for safety and compliance and supportive therapy with return to [place] aftercare on MMTP
Psychopharm with goal of BZD reduction and LALA for safety and compliance and supportive therapy with return to [place] aftercare on MMTP
Psychopharm with goal of BZD reduction and LALA for safety and compliance and supportive therapy with return to [place] aftercare on MMTP
Psychopharm with goal of BZD reduction and LALA for safety and compliance and supportive therapy with return to [place] aftercare on MMTP

## 2022-07-13 NOTE — BH INPATIENT PSYCHIATRY PROGRESS NOTE - NSTXPROBSUBMIS_PSY_ALL_CORE
SUBSTANCE MISUSE

## 2022-07-13 NOTE — BH PSYCHOLOGY - GROUP THERAPY NOTE - NSPSYCHOLGRPCOGPT_PSY_A_CORE FT
Patient attended recovery oriented/acceptance & commitment therapy group. Group started with mindful grounding exercise - dropping anchor. Members participated in grounding exercise where they noticed their inner experiences and observed their surroundings. Group then shifted to discussion of personal values. Group responded to the following prompt - quality they value in themselves. Members shared qualities like loyalty, patience, respect, communication, and friendship. Members reflected on the meaning of values and identified how close/far they believed they were behaving in line with their personally identified values (on a bullseye). Members shared their responses and considered the skills/actions they will need to take in order to behave in a way consistent with their values structure.  facilitated discussion of concepts, encouraged active participation, and supported members providing feedback to peers.  The group concluded with a checkout.
Patient attended recovery oriented/acceptance & commitment therapy group. Group started the check in prompt (advice you would give your younger self). Members shared advice like "enjoy your life," surround yourself with positive people, engage in activities that feel natural, and keep exploring. Group then focused on contact with the present moment through a skill called "drop anchor." Explored the purpose of dropping anchor (to steady one's boat during the storm) and guided pts through two mindful grounding activities. Processed the experience of the exercise and created space for all reactions (unfocused, focused, sleepy, etc.). Encouraged pts to consider when in their lives they can practice this skill.  facilitated discussion of concepts, encouraged active participation, and supported members providing feedback to peers.  The group concluded with a checkout.
Patient attended recovery oriented/acceptance & commitment therapy group. Group started with mindful stretching. Session focused on relationship between emotions and body responses. Discussed six core emotions and members shared the bodily reactions experienced with each emotion. Members explored importance of using emotions to provide them with important information. Members talked about difficulties experiencing emotions and what happens when they fight their emotions (they come back stronger). Began discussion of emotional acceptance.  facilitated discussion of concepts, encouraged active participation, and supported members providing feedback to peers.  The group concluded with a checkout.
Patient attended recovery oriented/acceptance & commitment therapy group. Group started with check in prompt to encourage socialization - sharing a moment/memory connected to music (song/band). Members introduced their partners and shared their partner's musical interests and memories tied to music. Group then shifted to mindful listening -  discussed engagement with present moment and processes that can get in the way. Members mindfully listened to selected songs and processed the exercise (challenges in staying present, memories elicited). Explored impact mindful listening had on members - some reported feeling clearer in thought, others reported feeling sad. Discussed importance of being able to notice their attention as a form of engaging in the present moment.  facilitated discussion of concepts, encouraged active participation, and supported members providing feedback to peers.  The group concluded with a checkout.
Patient attended recovery oriented/acceptance & commitment therapy group. Group started with mindful grounding exercise - dropping anchor. Memberes participated in grounding exercise where they noticed their inner experiences and observed their surroundings. Group then shifted to discussion of personal values. Group responded to the following prompt - quality you value in others. Members shared qualities like trust, honesty, kindness, and loyalty. Members reflected on the meaning of values and completed a worksheet meant to elicit their values in the domains of work/education, leisure, personal growth/healh, and relationships. Members shared their responses within each domain.  facilitated discussion of concepts, encouraged active participation, and supported members providing feedback to peers.  The group concluded with a checkout.
Patient attended recovery oriented/acceptance & commitment therapy group. Group opened with dialogue about failures and successes through discussion of Jose Lawrence quote (I've missed more than 9,000 shots in my career...I have failed again and again, that is why I succeed"). Group discussed what it means to be human and to be willing to make mistakes in order to find "success." Group then focused on values and acceptance through the use of image of person carrying balloons (representation of unpleasant inner experiences) walking toward a valued direction. Members discussed what might happen if they let go of the balloons. Members reflected on their personal "balloons" and their valued directions. Members identified inner experiences including ruminations, dwelling, anticipatory anxiety, past trauma, and fear of failure. Valued directions included peace, happiness, knowledge, and growth. Members reflected on the challenge in of walking toward a valued direction with negative inner experiences.  facilitated discussion of concepts, encouraged active participation, and supported members providing feedback to peers.  The group concluded with a checkout.
Patient attended recovery oriented/acceptance & commitment therapy group. Group began with discussion on living with your fears. Group spoke about presented image of someone sitting next to her figurative “fear” monster. Explored what it means to allow one’s fears to exist without resisting or suppressing them. Rest of session focused on valued domains including family, social relationships, personal growth, physical well-being, employment/education. Members ranked these domains in terms of current importance in their lives (1-10 scale). Highlighted that having a valued direction can serve to guide and direct where one places their focus and attention. Discussed 2 valued domains to focus on within the next week and identified 2 specific behaviors to take that are in line with those values.  facilitated discussion of concepts, encouraged active participation, and supported members providing feedback to peers.  The group concluded with a checkout.
Patient attended recovery oriented/acceptance & commitment therapy group. Group focused on coping with stress. Session first explored members' personal indicators of stress - heart palpitations, restlessness, back pain, fear, lack of mindfulness, and tremors. Members completed stress worksheet and checked off triggers that bring on stress and their personal responses. Members engaged in origami activity and created two origami boxes - a "stress" box and "wellness" box. Members mindfully engaged in following the steps to assemble their boxes. Members briefly reflected on their surprise in assembling an origami box and were encouraged to fill their boxes with slips of papers (to record their stress signals and coping responses).  facilitated discussion of concepts, encouraged active participation, and supported members providing feedback to peers.  The group concluded with a checkout.
Patient attended recovery oriented/acceptance & commitment therapy group. Group started with discussion of a comic showcasing mindfulness. This facilitated discussion about purpose of staying present and the ease with which we get pulled into the past or future. Members insightfully reflected on this comic and engaged in exercise to appreciate one thing in the moment - connecting with friends, birds chirping, mother nature, having a full stomach. Group then transitioned to reflections on a metaphor touching on ideas of barriers and goals. Explored how we all find ourselves in a figurative "swamp" (representation of barriers) when working toward what matters. Members maintained dialogue about getting stuck in the swamp, their personal goals (top of the mountain), and peer support.  facilitated discussion of concepts, encouraged active participation, and supported members providing feedback to peers.  The group concluded with a checkout.
Patient attended recovery oriented/acceptance & commitment therapy group. Group focused on discussion of core emotions, function of emotions, and ideas members received about experiencing and displaying emotion. Members shared the messages they received about emotions growing up - to not show anxiety to others and to shut down fear. Group explored and reflected on the function/purpose of emotions (to provide important information about survival). Group discussed benefits of accepting emotional experience as opposed to trying to get rid or avoid their emotions that are ultimately inevitable. Members ended groupw by reviewing an emotions worksheet (emotions don't last forever, difference between having an emotion and acting on the emotion).  facilitated discussion of concepts, encouraged active participation, and supported members providing feedback to peers.  The group concluded with a checkout.

## 2022-07-13 NOTE — BH INPATIENT PSYCHIATRY PROGRESS NOTE - NSTXDEPRESPROGRES_PSY_ALL_CORE
Met - goal discontinued
No Change
No Change
Improving
Improving
No Change
Improving
Improving
No Change
No Change
Improving
No Change
No Change
Met - goal discontinued
No Change

## 2022-07-13 NOTE — BH INPATIENT PSYCHIATRY PROGRESS NOTE - MSE UNSTRUCTURED FT
The patient appears stated age, with fair hygiene, and is dressed appropriately and sits outside with MD for interview, as is his custom, even exercising outdoors.  He was calm and cooperative with the interview.  He maintained appropriate eye contact and has very good rapport with his MD.  No restlessness but still has PMR observed but much and further attenuated.  Gait is steady.  The patient’s speech was fluent, normal in tone, rate, and volume.  The patient’s mood is “less anxious... and more hopeful.”  His affect is less constricted, stable, appropriate with brighter affect emergine.  The patient’s thoughts are goal directed and appropriate with no self-harming themes.  He does not have any delusions or AVTH.  He does not have any suicidal or homicidal ideation, intent, or plan and is becoming more hopeful.  Insight is good.  Judgment is good.  Impulse control has been good on the unit. Reliability adequate and improved. The patient appears stated age, with fair hygiene, and is dressed appropriately and sits outside with MD for interview, as is his custom, even exercising outdoors and stable for DC to home.  He was calm and cooperative with the interview.  He maintained appropriate eye contact and has very good rapport with his MD.  No restlessness but still has PMR observed but much and further attenuated.  Gait is steady.  The patient’s speech was fluent, normal in tone, rate, and volume.  The patient’s mood is “less anxious... and more hopeful.”  His affect is less constricted, stable, appropriate with brighter affect emergine.  The patient’s thoughts are goal directed and appropriate with no self-harming themes.  He does not have any delusions or AVTH.  He does not have any suicidal or homicidal ideation, intent, or plan and is becoming more hopeful.  Insight is good.  Judgment is good.  Impulse control has been good on the unit. Reliability adequate and improved.

## 2022-07-13 NOTE — BH INPATIENT PSYCHIATRY PROGRESS NOTE - NSTXCOPEPROGRES_PSY_ALL_CORE
No Change
Improving
Improving
Met - goal discontinued
Improving
No Change
Improving
Met - goal discontinued

## 2022-07-13 NOTE — BH INPATIENT PSYCHIATRY PROGRESS NOTE - NSTXANXINTERMD_PSY_ALL_CORE
Psychopharm with goal of BZD reduction and LALA for safety and compliance and supportive therapy with return to [place] aftercare on MMTP
Psychopharm with goal of BZD reduction and LALA for safety and compliance and supportive therapy with return to [place] aftercare on MMTP
Psychopharm with goal of BZD reduction and LALA for safety and compliance and supportive therapy with return to [place] aftercare on MMTP
Psychopharm with goal of BZD reduction and LALA for safety and compliance and supportive therapy with return to [place] aftercare on MMTP
Psychopharm with goal of BZD reduction and LALA for safety and compliance and supportive therapy with return to [place] aftercare on MMTP
Psychopharm with goal of BZD reduction and LALA for safety and compliance and supportive therapy with return to [place] aftercare on MMTP
Psychopharm with goal of BZD reduction and LALA for safety and compliance and supportive therapy with return to [place] aftercare on MMTP
Psychopharm with goal of BZD reduction and LALA for safety and compliance and supportive therapy with return to [place] aftercare on MMTP
Psychopharm with goal of BZD reduction and LALA for safety and compliance and supportive therapy with return to [place] aftercare on MMTP
Psychopharm with goal of BZD reduction and LALA for safety and compliance and supportive therapy with return to [place] aftercare on MMTP
Psychopharm with goal of BZD reduction and LALA for safety and compliance and supportive therapy with return to [place] aftercare on MMTP
Psychopharm with goal of BZD reduction and LALA for safety and compliance and supportive therapy with return to [place] aftercare on MMTP
Psychopharm with goal of BZD reduction and LALA for safety and compliance and supportive therapy with return to [place] aftercare on MMTP
Psychopharm with goal of BZD reduction and LALA for safety and compliance and supportive therapy with return to [place] aftercare on MMTP
Psychopharm with goal of BZD reduction and LALA for safety and compliance and supportive therapy with return to [place] aftercare on MMTP
Psychopharm with goal of BZD reduction and LALA for safety and compliance and supportive therapy with return to [place] aftercare on MMTP
Psychopharm with goal of BZD reduction and LALA for safety and compliance and supportive therapy with return to [place] aftercare on MMTP

## 2022-07-13 NOTE — BH INPATIENT PSYCHIATRY PROGRESS NOTE - NSTXNEGATGOAL_PSY_ALL_CORE
Will be able to identify and utilize affirmations to create positive self-talk
Will be able to demonstrate understanding of self-talk and its relationship to self-image and communication through discussion with staff once a day
Will be able to demonstrate understanding of self-talk and its relationship to self-image and communication through discussion with staff once a day
Will be able to identify and utilize affirmations to create positive self-talk
Will be able to demonstrate understanding of self-talk and its relationship to self-image and communication through discussion with staff once a day
Will be able to identify and utilize affirmations to create positive self-talk
Will be able to demonstrate understanding of self-talk and its relationship to self-image and communication through discussion with staff once a day

## 2022-07-13 NOTE — BH PSYCHOLOGY - GROUP THERAPY NOTE - NSBHPTASSESSDT_PSY_A_CORE
05-Jul-2022 10:15
01-Jul-2022 10:15
12-Jul-2022 10:15
13-Jul-2022 10:15
28-Jun-2022 10:15
07-Jul-2022 10:15
22-Jun-2022 10:15
30-Jun-2022 10:15
08-Jul-2022 10:15
11-Jul-2022 10:15

## 2022-07-13 NOTE — BH INPATIENT PSYCHIATRY PROGRESS NOTE - NSTXSUBMISGOAL_PSY_ALL_CORE
Be able to verbalize an understanding of the association between substance abuse and mental health
Be able to acknowledge that substance abuse is a problem
Be able to verbalize an understanding of the association between substance abuse and mental health
Be able to acknowledge that substance abuse is a problem
Be able to acknowledge that substance abuse is a problem

## 2022-07-13 NOTE — BH INPATIENT PSYCHIATRY PROGRESS NOTE - NSTXDEPRESGOAL_PSY_ALL_CORE
Exhibit improvements in self-grooming, hygiene, sleep and appetite
Report using a coping skill to overcome sadness and worry in order to socialize with peers daily
Exhibit improvements in self-grooming, hygiene, sleep and appetite
Report using a coping skill to overcome sadness and worry in order to socialize with peers daily
Exhibit improvements in self-grooming, hygiene, sleep and appetite
Report using a coping skill to overcome sadness and worry in order to socialize with peers daily
Exhibit improvements in self-grooming, hygiene, sleep and appetite

## 2022-07-13 NOTE — BH INPATIENT PSYCHIATRY DISCHARGE NOTE - NSBHDCRISKMITIGATE_PSY_ALL_CORE
Safety planning/Reduction in access to lethal methods (pills, firearms, etc)/Referral to vocational program/Referral to case management/Medications targeting suicidality/non-suicidal self injurious behavior/Other

## 2022-07-13 NOTE — BH INPATIENT PSYCHIATRY PROGRESS NOTE - NSTXPROBDEPRES_PSY_ALL_CORE
DEPRESSIVE SYMPTOMS

## 2022-07-13 NOTE — BH PSYCHOLOGY - GROUP THERAPY NOTE - NSPSYCHOLGRPBILLING_PSY_A_CORE
[id number] - Group Psychotherapy
[id number] - Group Psychotherapy
[id number] - Group Psychotherapy
[id number] - Group Psychotherapy
[id number] - Group Psychotherapy
[id number] - Group Psychotherapy
[id number] - Group Psychotherapy
[id number] - Group Psychotherapy
[id number] - Group Psychotherapy
[id number] - Group Psychotherapy

## 2022-07-13 NOTE — BH INPATIENT PSYCHIATRY PROGRESS NOTE - NSTXSLFCREPROGRES_PSY_ALL_CORE
Improving
Met - goal discontinued
Met - goal discontinued
Improving
Met - goal discontinued
Improving
Met - goal discontinued
Met - goal discontinued

## 2022-07-13 NOTE — BH INPATIENT PSYCHIATRY PROGRESS NOTE - NSTXSUICIDINTERMD_PSY_ALL_CORE
Psychopharm with goal of BZD reduction and LALA for safety and compliance and supportive therapy with return to [place] aftercare on MMTP
Psychopharm with goal of BZD reduction and LALA for safety and compliance and supportive therapy with return to [place] aftercare on MMTP
Psychopharm with goal of BZD reduction and LALA for safety and compliance and supportive therapy with return to [place] aftercare on MMTP
Psychopharm with goal of BZD reduction and LALA for safety and compliance and supportive therapy with return to [place] aftercare on MMTP
Psychopharm with goal of BZD reduction and LALA for safety and compliance and supportive therapy with return to [place] aftercare on MMTP
Psychopharm with goal of BZD reduction and LALA for safety and compliance and supportive therapy with return to [place] aftercare on MMTP
Psychopharm with goal of BZD reduction and LALA for safety and compliance and supportive therapy with return to [place] aftercare on MMTP
Psychopharm with goal of BZD reduction and LALA for safety and compliance and supportive therapy with return to [place] aftercare on MMTP
Psychopharm with goal of BZD reduction and LALA for safety and compliance and supportive therapy with return to [place] aftercare on MMTP
Psychopharm with goal of BZD reduction and LALA for safety and compliance and supportive therapy with return to [place] aftercare on MMTP
Psychopharm with goal of BZD reduction and LALA for safety and compliance and supportive therapy with return to [place] aftercare on MMTP

## 2022-07-13 NOTE — BH INPATIENT PSYCHIATRY PROGRESS NOTE - NSTXANXDATETRGT_PSY_ALL_CORE
14-Jul-2022
14-Jul-2022
25-Jun-2022
14-Jul-2022
25-Jun-2022
25-Jun-2022
14-Jul-2022
25-Jun-2022
14-Jul-2022
25-Jun-2022
25-Jun-2022

## 2022-07-13 NOTE — BH INPATIENT PSYCHIATRY PROGRESS NOTE - NSTXSLFCREGOAL_PSY_ALL_CORE
Be able to demonstrate the ability to care for self in 2 areas such as feeding oneself and hygiene
Will perform ADLs without assistance/prompts daily
Be able to demonstrate the ability to care for self in 2 areas such as feeding oneself and hygiene
Will perform ADLs without assistance/prompts daily
Be able to demonstrate the ability to care for self in 2 areas such as feeding oneself and hygiene
Will perform ADLs without assistance/prompts daily
Be able to demonstrate the ability to care for self in 2 areas such as feeding oneself and hygiene

## 2022-07-13 NOTE — BH INPATIENT PSYCHIATRY PROGRESS NOTE - NSTXDCHOUSDATETRGT_PSY_ALL_CORE
13-Jul-2022
13-Jul-2022
27-Jun-2022
06-Jul-2022
27-Jun-2022
13-Jul-2022
13-Jul-2022
06-Jul-2022
27-Jun-2022
06-Jul-2022
04-Jul-2022
13-Jul-2022
06-Jul-2022
27-Jun-2022
13-Jul-2022
27-Jun-2022
04-Jul-2022

## 2022-07-13 NOTE — BH INPATIENT PSYCHIATRY PROGRESS NOTE - NSTXDCHOUSPROGRES_PSY_ALL_CORE
Improving
Met - goal discontinued
Met - goal discontinued
Improving
Met - goal discontinued
Met - goal discontinued
No Change
Met - goal discontinued
Improving
No Change
Improving
Met - goal discontinued
No Change

## 2022-07-13 NOTE — BH PSYCHOLOGY - GROUP THERAPY NOTE - NSBHPSYCHOLPARTICIP_PSY_A_CORE
Fully engaged
Fully engaged
Partially engaged
Fully engaged
Partially engaged

## 2022-07-13 NOTE — BH INPATIENT PSYCHIATRY PROGRESS NOTE - NSTXANXGOAL_PSY_ALL_CORE
Be able to participate in activities despite lingering anxiety/panic
Be able to perform ADLs and maintain safety despite anxiety/panic daily
Be able to participate in activities despite lingering anxiety/panic
Be able to perform ADLs and maintain safety despite anxiety/panic daily
Be able to participate in activities despite lingering anxiety/panic
Be able to perform ADLs and maintain safety despite anxiety/panic daily
Be able to participate in activities despite lingering anxiety/panic
Be able to participate in activities despite lingering anxiety/panic
Be able to perform ADLs and maintain safety despite anxiety/panic daily
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
## 2022-07-13 NOTE — BH INPATIENT PSYCHIATRY PROGRESS NOTE - NSTXNEGATINTERMD_PSY_ALL_CORE
Psychopharm with goal of BZD reduction and LALA for safety and compliance and supportive therapy with return to [place] aftercare on MMTP
Psychopharm with goal of BZD reduction and LALA for safety and compliance and supportive therapy with return to [place] aftercare on MMTP
Psychopharm with goal of BZD reduction and LALA for safety and compliance and supportive therapy with return to [place] aftercare on MMTP
Psychopharm with goal of BZD reduction and LALA for safety and compliance and supportive therapy with return to [place] aftercare on MMTP
Psychopharm with goal of BZD reduction and LALA for safety and compliance and supportive therapy with return to [place] aftercare on MMTP
Psychopharm with goal of BZD reduction and LALA for safety and compliance and supportive therapy with return to [place] aftercare on MMTP
Psychopharm with goal of BZD reduction and LALA for safety and compliance and supportive therapy with return to [place] aftercare on MMTP
Psychopharm with goal of BZD reduction and LALA for safety and compliance and supportive therapy with return to [place] aftercare on MMTP
Psychopharm with goal of BZD reduction and LALA for safety and compliance and supportive therapy with return to [place] aftercare on MMTP
Psychopharm with goal of BZD reduction and LALA for safety and compliance and supportive therapy with return to [place] aftercare on MMTP
Psychopharm with goal of BZD reduction and LALA for safety and compliance and supportive therapy with return to [place] aftercare on MMTP

## 2022-07-13 NOTE — BH INPATIENT PSYCHIATRY PROGRESS NOTE - NSBHCONSBHPROVDETAILS_PSY_A_CORE  FT
methadone clinic at [facility], sees Dr Abida Henry for med mx in [facility] and Andreea العراقي as therapist
methadone clinic at [facility], sees Dr Abida Henry for med mx in [facility] and Andreea العراقي as therapist
methadone clinic at [facility], sees Dr Abida Henry for med mx in [facility] and Andreea العراقي as therapist
methadone clinic at [facility], sees Dr Abida Henry for med mx in [facility] and Andreea العراقي as therapist
methadone clinic at [facility], sees Dr Abida Henry for med mx in [facility] and Andreea العراقي as therapist
methadone clinic at [facility], sees Dr Abida Henry for med mx in [facility] and Andreea العراقي as therapist
methadone clinic at [facility], sees Dr Abida Henry for med mx in [facility] and Andreea العراقي as therapist
methadone clinic at [facility], sees Dr Abida Henry for med mx in [facility] and Andreea العراقي as therapist
methadone clinic at [facility], sees Dr Abida Henry for med mx in [facility] and Andreea العراقي as therapist
methadone clinic at [facility], sees Dr Abida Henry for med mx in [facility] and Andreea العراقي as therapist
methadone clinic at [facility], sees Dr Abida Henry for med mx in [facility] and Andreea العراقي as therapist
methadone clinic at [facility], sees Dr Abida Henry for med mx in [facility] and Andreea العراقي as therapist
methadone clinic at [facility], sees Dr Abida Henry for med mx in [facility] and Andreea العراقي as therapist
methadone clinic at [facility], sees Dr Abida Henry for med mx in [facility] and Andreea العراقي as therapist
methadone clinic at [facility], sees Dr Abida Henry for med mx in [facility] and Andreea العراقي as therapist
methadone clinic at [facility], sees Dr Abida Henry for med mx in [facility] and Andreea العراقي as therapist
methadone clinic at [facility], sees Dr Abida Henry for med mx in [facility] and Andreea العراقي as therapist

## 2022-07-13 NOTE — BH INPATIENT PSYCHIATRY PROGRESS NOTE - NSTXSLFCREINTERMD_PSY_ALL_CORE
Psychopharm with goal of BZD reduction and LALA for safety and compliance and supportive therapy with return to [place] aftercare on MMTP
Psychopharm with goal of BZD reduction and LALA for safety and compliance and supportive therapy with return to [place] aftercare on MMTP
Psychopharm with goal of BZD reduction and LALA for safety and compliance and supportive therapy with return to [place] aftercare on MMTP
Psychopharm with goal of BZD reduction and LALA for safety and compliance and supportive therapy with return to [place] aftercare on MMTP
Psychopharm with goal of BZD reduction and LALA for safety and compliance and supportive therapy with return to [place] aftercare on MMTP
Psychopharm with goal of BZD reduction and LALA for safety and compliance and supportive therapy with return to [place] aftercare on MMTP
Psychopharm with goal of BZD reduction and LALA for safety and compliance and supportive therapy with return to [place] aftercare on MMTP
Psychopharm with goal of BZD reduction and LALA for safety and compliance and supportive therapy with return to [place] aftercare on MMTP
Psychopharm with goal of BZD reduction and LALA for safety and compliance and supportive therapy with return to [place] aftercare on MMTP
Psychopharm with goal of BZD reduction and LALA for safety and compliance and supportive therapy with return to [place] aftercare on MMTP
Psychopharm with goal of BZD reduction and LALA for safety and compliance and supportive therapy with return to [place] aftercare on MMTP
Psychopharm with goal of BZD reduction and LALA for safety and compliance and supportive therapy with return to [place] aftercare on MMTP
Psychopharm with goal of BZD reduction and LALA for safety and compliance and supportive therapy with return to [place] aftercare on MMTP
Psychopharm with goal of BZD reduction and LALA for safety and compliance and supportive therapy with return to [place] aftercare on MMTP
Psychopharm with goal of BZD reduction and LALA for safety and compliance and supportive therapy with return to [place] aftercare on MMTP
Psychopharm with goal of BZD reduction and LALA for safety and compliance and supportive therapy with return to [place] aftercare on MMTP
Psychopharm with goal of BZD reduction and LALA for safety and compliance and supportive therapy with return to [place] aftercare on MMTP

## 2022-07-13 NOTE — BH INPATIENT PSYCHIATRY PROGRESS NOTE - NSTXDCHOUSGOAL_PSY_ALL_CORE
Will meet with care coordinator and accept services

## 2022-07-13 NOTE — BH INPATIENT PSYCHIATRY DISCHARGE NOTE - NSCURPASTPSYDX_PSY_ALL_CORE
Mood disorder/PTSD/Eating disorder/Alcohol/Substance Use disorders/Cluster B Personality disorder/traits

## 2022-07-13 NOTE — BH INPATIENT PSYCHIATRY PROGRESS NOTE - NSBHCHARTREVIEWLAB_PSY_A_CORE FT
reviewed no acute findings  Lithium 0.1 on 6/18

## 2022-07-13 NOTE — BH INPATIENT PSYCHIATRY PROGRESS NOTE - NSTXDCHOUSDATENEW_PSY_ALL_CORE
27-Jun-2022

## 2022-07-13 NOTE — BH INPATIENT PSYCHIATRY PROGRESS NOTE - NSTXSUBMISINTERMD_PSY_ALL_CORE
Psychopharm with goal of BZD reduction and LALA for safety and compliance and supportive therapy with return to [place] aftercare on MMTP
Psychopharm with goal of BZD reduction and LALA for safety and compliance and supportive therapy with return to [place] aftercare on MMTP
Psychopharm with goal of BZD reduction and LALA for safety and compliance and supportive therapy with return to [place] aftercare on MMTP
Psychopharm with goal of BZD reduction and LALA for safety and compliance and supportive therapy with return to [place] aftercare on MMTP
Psychopharm with goal of BZD reduction and LALA for safety and compliance and supportive therapy with return to [place] aftercare on MMTP
Psychopharm with goal of BZD reduction and LALA for safety and compliance and supportive therapy with return to [place] aftercare on MMTP
Psychopharm with goal of BZD reduction and LALA for safety and compliance and supportive therapy with return to [place] aftercare on MMTP
Psychopharm with goal of BZD reduction and LALA for safety and compliance and supportive therapy with return to [place] aftercare on MMTP
Psychopharm with goal of BZD reduction and LALA for safety and compliance and supportive therapy with return to [place] aftercare on MMTP
Psychopharm with goal of BZD reduction and LALA for safety and compliance and supportive therapy with return to [place] aftercare on MMTP
Psychopharm with goal of BZD reduction and LALA for safety and compliance and supportive therapy with return to [place] aftercare on MMTP

## 2022-07-13 NOTE — BH INPATIENT PSYCHIATRY PROGRESS NOTE - NSTXANXDATEEST_PSY_ALL_CORE
07-Jul-2022
18-Jun-2022
07-Jul-2022
18-Jun-2022
18-Jun-2022
07-Jul-2022
07-Jul-2022
18-Jun-2022
07-Jul-2022
18-Jun-2022
18-Jun-2022

## 2022-07-13 NOTE — BH INPATIENT PSYCHIATRY PROGRESS NOTE - NSBHCONSDANGERSELF_PSY_A_CORE
suicidal ideation with plan and means

## 2022-07-13 NOTE — BH PSYCHOLOGY - GROUP THERAPY NOTE - NSBHPSYCHOLASSESSPROV_PSY_A_CORE
Licensed Psychologist
Licensed Psychologist and Psychology Trainee
Licensed Psychologist
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
## 2022-07-13 NOTE — BH INPATIENT PSYCHIATRY PROGRESS NOTE - NSTXDCHOUSDATEEST_PSY_ALL_CORE
06-Jul-2022
06-Jul-2022
27-Jun-2022
06-Jul-2022
06-Jul-2022
27-Jun-2022
20-Jun-2022
06-Jul-2022
20-Jun-2022
27-Jun-2022
20-Jun-2022
27-Jun-2022
27-Jun-2022
06-Jul-2022
20-Jun-2022
27-Jun-2022
20-Jun-2022

## 2022-07-13 NOTE — BH INPATIENT PSYCHIATRY PROGRESS NOTE - NSBHCHARTREVIEWVS_PSY_A_CORE FT
Vital Signs Last 24 Hrs  T(C): 36.3 (07-06-22 @ 06:23), Max: 36.6 (07-05-22 @ 20:11)  T(F): 97.4 (07-06-22 @ 06:23), Max: 97.8 (07-05-22 @ 20:11)  HR: 67 (07-06-22 @ 06:23) (67 - 67)  BP: 115/61 (07-06-22 @ 06:23) (115/61 - 115/61)  BP(mean): --  RR: --  SpO2: --    Orthostatic VS  07-05-22 @ 06:15  Lying BP: --/-- HR: --  Sitting BP: 124/67 HR: 67  Standing BP: --/-- HR: --  Site: --  Mode: --  
Vital Signs Last 24 Hrs  T(C): 36.7 (06-20-22 @ 18:54), Max: 36.7 (06-20-22 @ 06:42)  T(F): 98 (06-20-22 @ 18:54), Max: 98 (06-20-22 @ 06:42)  HR: 61 (06-20-22 @ 06:42) (61 - 61)  BP: 98/63 (06-20-22 @ 06:42) (98/63 - 98/63)  BP(mean): --  RR: --  SpO2: --    
Vital Signs Last 24 Hrs  T(C): 35.6 (06-24-22 @ 06:44), Max: 36.8 (06-23-22 @ 20:20)  T(F): 96 (06-24-22 @ 06:44), Max: 98.3 (06-23-22 @ 20:20)  HR: 67 (06-24-22 @ 06:44) (67 - 67)  BP: 107/71 (06-24-22 @ 06:44) (107/71 - 107/71)  BP(mean): --  RR: --  SpO2: --    
Vital Signs Last 24 Hrs  T(C): 36.1 (07-13-22 @ 06:32), Max: 36.1 (07-13-22 @ 06:32)  T(F): 97 (07-13-22 @ 06:32), Max: 97 (07-13-22 @ 06:32)  HR: 69 (07-13-22 @ 06:32) (69 - 69)  BP: 119/58 (07-13-22 @ 06:32) (119/58 - 119/58)  BP(mean): --  RR: --  SpO2: --    
Vital Signs Last 24 Hrs  T(C): 36.3 (07-08-22 @ 19:24), Max: 36.3 (07-08-22 @ 19:24)  T(F): 97.4 (07-08-22 @ 19:24), Max: 97.4 (07-08-22 @ 19:24)  HR: 67 (07-08-22 @ 06:18) (67 - 67)  BP: 108/53 (07-08-22 @ 06:18) (108/53 - 108/53)  BP(mean): --  RR: --  SpO2: --    
Vital Signs Last 24 Hrs  T(C): 37.1 (06-27-22 @ 06:41), Max: 37.1 (06-27-22 @ 06:41)  T(F): 98.7 (06-27-22 @ 06:41), Max: 98.7 (06-27-22 @ 06:41)  HR: 74 (06-27-22 @ 06:41) (74 - 74)  BP: 98/60 (06-27-22 @ 06:41) (98/60 - 98/60)  BP(mean): --  RR: --  SpO2: --    
Vital Signs Last 24 Hrs  T(C): 36.2 (07-05-22 @ 06:15), Max: 36.8 (07-04-22 @ 20:34)  T(F): 97.1 (07-05-22 @ 06:15), Max: 98.2 (07-04-22 @ 20:34)  HR: --  BP: --  BP(mean): --  RR: --  SpO2: --    Orthostatic VS  07-05-22 @ 06:15  Lying BP: --/-- HR: --  Sitting BP: 124/67 HR: 67  Standing BP: --/-- HR: --  Site: --  Mode: --  
Vital Signs Last 24 Hrs  T(C): 36.3 (07-08-22 @ 19:24), Max: 36.3 (07-08-22 @ 19:24)  T(F): 97.4 (07-08-22 @ 19:24), Max: 97.4 (07-08-22 @ 19:24)  HR: 67 (07-08-22 @ 06:18) (67 - 67)  BP: 108/53 (07-08-22 @ 06:18) (108/53 - 108/53)  BP(mean): --  RR: --  SpO2: --    
Vital Signs Last 24 Hrs  T(C): 36.8 (06-22-22 @ 18:19), Max: 36.8 (06-22-22 @ 18:19)  T(F): 98.3 (06-22-22 @ 18:19), Max: 98.3 (06-22-22 @ 18:19)  HR: 68 (06-22-22 @ 06:19) (68 - 68)  BP: 104/61 (06-22-22 @ 06:19) (104/61 - 104/61)  BP(mean): --  RR: --  SpO2: --    Orthostatic VS  06-21-22 @ 06:54  Lying BP: --/-- HR: --  Sitting BP: 99/56 HR: 59  Standing BP: 94/52 HR: 68  Site: --  Mode: --  
Vital Signs Last 24 Hrs  T(C): 36.3 (07-01-22 @ 06:42), Max: 36.3 (07-01-22 @ 06:42)  T(F): 97.4 (07-01-22 @ 06:42), Max: 97.4 (07-01-22 @ 06:42)  HR: 64 (07-01-22 @ 06:42) (64 - 64)  BP: 117/67 (07-01-22 @ 06:42) (117/67 - 117/67)  BP(mean): --  RR: --  SpO2: --    Orthostatic VS  06-30-22 @ 07:56  Lying BP: --/-- HR: --  Sitting BP: 117/76 HR: 70  Standing BP: --/-- HR: --  Site: --  Mode: --  
Vital Signs Last 24 Hrs  T(C): 35.6 (06-24-22 @ 06:44), Max: 36.8 (06-23-22 @ 20:20)  T(F): 96 (06-24-22 @ 06:44), Max: 98.3 (06-23-22 @ 20:20)  HR: 67 (06-24-22 @ 06:44) (67 - 67)  BP: 107/71 (06-24-22 @ 06:44) (107/71 - 107/71)  BP(mean): --  RR: --  SpO2: --    
Vital Signs Last 24 Hrs  T(C): 36.1 (07-13-22 @ 06:32), Max: 36.1 (07-13-22 @ 06:32)  T(F): 97 (07-13-22 @ 06:32), Max: 97 (07-13-22 @ 06:32)  HR: 69 (07-13-22 @ 06:32) (69 - 69)  BP: 119/58 (07-13-22 @ 06:32) (119/58 - 119/58)  BP(mean): --  RR: --  SpO2: --    
Vital Signs Last 24 Hrs  T(C): 36.7 (06-22-22 @ 06:19), Max: 36.7 (06-22-22 @ 06:19)  T(F): 98 (06-22-22 @ 06:19), Max: 98 (06-22-22 @ 06:19)  HR: 68 (06-22-22 @ 06:19) (68 - 68)  BP: 104/61 (06-22-22 @ 06:19) (104/61 - 104/61)  BP(mean): --  RR: --  SpO2: --    Orthostatic VS  06-21-22 @ 06:54  Lying BP: --/-- HR: --  Sitting BP: 99/56 HR: 59  Standing BP: 94/52 HR: 68  Site: --  Mode: --  
Vital Signs Last 24 Hrs  T(C): 36.3 (07-01-22 @ 06:42), Max: 36.3 (07-01-22 @ 06:42)  T(F): 97.4 (07-01-22 @ 06:42), Max: 97.4 (07-01-22 @ 06:42)  HR: 64 (07-01-22 @ 06:42) (64 - 64)  BP: 117/67 (07-01-22 @ 06:42) (117/67 - 117/67)  BP(mean): --  RR: --  SpO2: --    Orthostatic VS  06-30-22 @ 07:56  Lying BP: --/-- HR: --  Sitting BP: 117/76 HR: 70  Standing BP: --/-- HR: --  Site: --  Mode: --  
Vital Signs Last 24 Hrs  T(C): 36.9 (07-11-22 @ 18:46), Max: 36.9 (07-11-22 @ 18:46)  T(F): 98.5 (07-11-22 @ 18:46), Max: 98.5 (07-11-22 @ 18:46)  HR: 71 (07-11-22 @ 06:22) (71 - 71)  BP: 107/65 (07-11-22 @ 06:22) (107/65 - 107/65)  BP(mean): --  RR: --  SpO2: --    
Vital Signs Last 24 Hrs  T(C): 36.6 (06-28-22 @ 06:14), Max: 36.6 (06-28-22 @ 06:14)  T(F): 97.8 (06-28-22 @ 06:14), Max: 97.8 (06-28-22 @ 06:14)  HR: 66 (06-28-22 @ 06:14) (66 - 66)  BP: 122/73 (06-28-22 @ 06:14) (122/73 - 122/73)  BP(mean): --  RR: --  SpO2: --    
Vital Signs Last 24 Hrs  T(C): 36.6 (06-30-22 @ 07:56), Max: 36.7 (06-29-22 @ 19:09)  T(F): 97.9 (06-30-22 @ 07:56), Max: 98.1 (06-29-22 @ 19:09)  HR: --  BP: --  BP(mean): --  RR: --  SpO2: --    Orthostatic VS  06-30-22 @ 07:56  Lying BP: --/-- HR: --  Sitting BP: 117/76 HR: 70  Standing BP: --/-- HR: --  Site: --  Mode: --

## 2022-07-13 NOTE — BH INPATIENT PSYCHIATRY DISCHARGE NOTE - NSDCCPCAREPLAN_GEN_ALL_CORE_FT
PRINCIPAL DISCHARGE DIAGNOSIS  Diagnosis: Recurrent major depression-severe  Assessment and Plan of Treatment:

## 2022-07-13 NOTE — BH INPATIENT PSYCHIATRY PROGRESS NOTE - NSTXSUICIDGOAL_PSY_ALL_CORE
Will identify and utilize 2 coping skills

## 2022-07-13 NOTE — BH INPATIENT PSYCHIATRY PROGRESS NOTE - NSICDXBHTERTIARYDX_PSY_ALL_CORE
R/O PTSD (post-traumatic stress disorder)   F43.10  R/O Opioid dependence in remission   F11.21  

## 2022-07-13 NOTE — BH INPATIENT PSYCHIATRY PROGRESS NOTE - NSTXPROBSLFCRE_PSY_ALL_CORE
SELF-CARE DEFICIT

## 2022-07-13 NOTE — BH PSYCHOLOGY - GROUP THERAPY NOTE - NSPSYCHOLGRPCOGINT_PSY_A_CORE
other..
mindfulness skills taught/other..
other..
mindfulness skills taught/other..
mindfulness skills taught/other..
explore reducing vulnerability to stress/mindfulness skills taught
mindfulness skills taught/other..
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
## 2022-07-13 NOTE — BH INPATIENT PSYCHIATRY PROGRESS NOTE - NSBHFUPINTERVALCCFT_PSY_A_CORE
Depression+SI+anorexia and insomnia with mood instability

## 2022-07-13 NOTE — BH INPATIENT PSYCHIATRY PROGRESS NOTE - NSBHASSESSSUMMFT_PSY_ALL_CORE
40 yo single,  male, homeless, with past psychiatric history of PTSD, depression, anxiety, opioid use disorder currently on MMTP, history of etoh use and hx of withdrawal seizures from alcohol in the past, former cannabis use.  Patient is hospitalized with a primary problem of worsening mood with SI and decompensation.  BIB EMS called by [facility] methadone [facility], previous hx of attempting to grab a security guards gun in June 2021 while in [facility] ED, presenting with cutting, SI and recent depression.  Has multiple past psychiatric admissions, last at [facility] for cutting wrist 2/16/22-3/10/22 and [facility] HLW6 6/3-8/20/2020.  Patient admitted to [facility] on a 9.13 legal status.     Plan:  >Legal: 9.13  >Obs: Routine, no current SI. no need for CO, patient not expected to pose risk to self or others in controlled inpatient setting  >Psychiatric Meds: Restart outpatient medication regimen:  abilify 20 mg qd increase to 25 mg as qhs, lithium 1200 mg qhs, remeron 45 mg qd, neurontin 800 mg BID, klonopin 2 mg TID with goal of downtaper  Consider restart of pristiq to replace klonopin and neurontin  Continue MMT dose of 135 mg qd as per ARS  PRN medications:  Ativan 2mg oral Q6HR PRN for agitation and anxiety.  Haldol 5mg oral Q6HR PRN for agitation.   Benadryl 50mg oral Q6HR PRN for agitation.   Vistaril 50mg oral Q6HR PRN for anxiety.  Desyrel 50mg oral QHS PRN for insomnia.   >Labs: Admission labs reviewed, no acute findings. Labs pending for tomorrow: A1c and Lipid panel.  Hold antipsychotics if QTc >500  >Medical:   No acute concerns. No consultations needed at this time. No indication for CIWA. Patient with consistently stable VS, no visible physical symptoms of withdrawal. During the course of treatment, will collaborate with medical team to manage medical issues.  >Diet: Regular  >Social: milieu/structured therapy  >Treatment Interventions: Groups and Individual Therapy/CBT, Motivational counseling for substance abuse related issues.   >Dispo: Collateral and dispo planning pending further symptom and medication optimization  
40 yo single,  male, homeless, with past psychiatric history of PTSD, depression, anxiety, opioid use disorder currently on MMTP, history of etoh use and hx of withdrawal seizures from alcohol in the past, former cannabis use.  Patient is hospitalized with a primary problem of worsening mood with SI and decompensation.  BIB EMS called by [facility] methadone [facility], previous hx of attempting to grab a security guards gun in June 2021 while in [facility] ED, presenting with cutting, SI and recent depression.  Has multiple past psychiatric admissions, last at [facility] for cutting wrist 2/16/22-3/10/22 and [facility] HLW6 6/3-8/20/2020.  Patient admitted to [facility] on a 9.13 legal status.     Plan:  >Legal: 9.13  >Obs: Routine, no current SI. no need for CO, patient not expected to pose risk to self or others in controlled inpatient setting  >Psychiatric Meds: Restart outpatient medication regimen:  abilify 20 mg qd, lithium 1200 mg qhs, remeron 45 mg qd, neurontin 800 mg BID, klonopin 2 mg TID with goal of downtaper  Consider restart of pristiq to replace klonopin and neurontin  Continue MMT dose of 135 mg qd as per ARS  PRN medications:  Ativan 2mg oral Q6HR PRN for agitation and anxiety.  Haldol 5mg oral Q6HR PRN for agitation.   Benadryl 50mg oral Q6HR PRN for agitation.   Vistaril 50mg oral Q6HR PRN for anxiety.  Desyrel 50mg oral QHS PRN for insomnia.   >Labs: Admission labs reviewed, no acute findings. Labs pending for tomorrow: A1c and Lipid panel.  Hold antipsychotics if QTc >500  >Medical:   No acute concerns. No consultations needed at this time. No indication for CIWA. Patient with consistently stable VS, no visible physical symptoms of withdrawal. During the course of treatment, will collaborate with medical team to manage medical issues.  >Diet: Regular  >Social: milieu/structured therapy  >Treatment Interventions: Groups and Individual Therapy/CBT, Motivational counseling for substance abuse related issues.   >Dispo: Collateral and dispo planning pending further symptom and medication optimization  
42 yo single,  male, homeless, with past psychiatric history of PTSD, depression, anxiety, opioid use disorder currently on MMTP, history of etoh use and hx of withdrawal seizures from alcohol in the past, former cannabis use.  Patient is hospitalized with a primary problem of worsening mood with SI and decompensation.  BIB EMS called by [facility] methadone [facility], previous hx of attempting to grab a security guards gun in June 2021 while in [facility] ED, presenting with cutting, SI and recent depression.  Has multiple past psychiatric admissions, last at [facility] for cutting wrist 2/16/22-3/10/22 and [facility] HLW6 6/3-8/20/2020.  Patient admitted to [facility] on a 9.13 legal status.     Plan:  >Legal: 9.13  >Obs: Routine, no current SI. no need for CO, patient not expected to pose risk to self or others in controlled inpatient setting  >Psychiatric Meds: Restart outpatient medication regimen:  abilify 20 mg qd increase to 25 mg as qhs, lithium 1200 mg qhs, remeron 45 mg qd, neurontin 800 mg BID, klonopin 2 mg TID with goal of downtaper  Consider restart of pristiq to replace klonopin and neurontin  Continue MMT dose of 135 mg qd as per ARS  PRN medications:  Ativan 2mg oral Q6HR PRN for agitation and anxiety.  Haldol 5mg oral Q6HR PRN for agitation.   Benadryl 50mg oral Q6HR PRN for agitation.   Vistaril 50mg oral Q6HR PRN for anxiety.  Desyrel 50mg oral QHS PRN for insomnia.   >Labs: Admission labs reviewed, no acute findings. Labs pending for tomorrow: A1c and Lipid panel.  Hold antipsychotics if QTc >500  >Medical:   No acute concerns. No consultations needed at this time. No indication for CIWA. Patient with consistently stable VS, no visible physical symptoms of withdrawal. During the course of treatment, will collaborate with medical team to manage medical issues.  >Diet: Regular  >Social: milieu/structured therapy  >Treatment Interventions: Groups and Individual Therapy/CBT, Motivational counseling for substance abuse related issues.   >Dispo: Collateral and dispo planning pending further symptom and medication optimization  
42 yo single,  male, homeless, with past psychiatric history of PTSD, depression, anxiety, opioid use disorder currently on MMTP, history of etoh use and hx of withdrawal seizures from alcohol in the past, former cannabis use.  Patient is hospitalized with a primary problem of worsening mood with SI and decompensation.  BIB EMS called by [facility] methadone [facility], previous hx of attempting to grab a security guards gun in June 2021 while in [facility] ED, presenting with cutting, SI and recent depression.  Has multiple past psychiatric admissions, last at [facility] for cutting wrist 2/16/22-3/10/22 and [facility] HLW6 6/3-8/20/2020.  Patient admitted to [facility] on a 9.13 legal status.     Plan  >Legal: 9.13  >Obs: Routine, no current SI. no need for CO, patient not expected to pose risk to self or others in controlled inpatient setting  >Psychiatric Meds: Restart outpatient medication regimen:  abilify 20 mg qd now continued at 30 mg as qhs, lithium 1200 mg qhs- levels tonight, remeron 45 mg qd, neurontin 800 mg BID, klonopin 2 mg TID with goal of downtaper- attempted 1.5 mg + 0.5 mg PRN at 9 AM and 1 PM both stopped for SAT and 2 mg qhs;  Goal of LALA Aristada but pt refusing, fearful of needles he says  Now as 1.5/1.5/2 mg =5 mg total daily  Restart of pristiq 25 mg qd TUES and 50 mg qd WED to replace klonopin and neurontin- increased to 75 mg qd SAT 7/2 and 100 mg qd 7/12  Continue MMT dose of 135 mg qd as per ARS  PRN medications:  Ativan 2 mg oral Q6HR PRN for agitation and anxiety.  Haldol 5 mg oral Q6HR PRN for agitation.   Benadryl 50 mg oral Q6HR PRN for agitation.   Vistaril 50 mg oral Q6HR PRN for anxiety.  Desyrel 100 mg oral QHS PRN for insomnia.   >Labs: Admission labs reviewed, no acute findings. Labs pending for tomorrow: A1c and Lipid panel.  Hold antipsychotics if QTc >500  >Medical:   No acute concerns. No consultations needed at this time. No indication for CIWA. Patient with consistently stable VS, no visible physical symptoms of withdrawal. During the course of treatment, will collaborate with medical team to manage medical issues.  SUTURES: removed 6/29 with no issues, would to L wrist healing with no evident cellulitis  >Diet: Regular  >Social: milieu/structured therapy  >Treatment Interventions: Groups and Individual Therapy/CBT, Motivational counseling for substance abuse related issues.   >Dispo: Collateral and dispo planning pending further symptom and medication optimization  
40 yo single,  male, homeless, with past psychiatric history of PTSD, depression, anxiety, opioid use disorder currently on MMTP, history of etoh use and hx of withdrawal seizures from alcohol in the past, former cannabis use.  Patient is hospitalized with a primary problem of worsening mood with SI and decompensation.  BIB EMS called by [facility] methadone [facility], previous hx of attempting to grab a security guards gun in June 2021 while in [facility] ED, presenting with cutting, SI and recent depression.  Has multiple past psychiatric admissions, last at [facility] for cutting wrist 2/16/22-3/10/22 and [facility] HLW6 6/3-8/20/2020.  Patient admitted to [facility] on a 9.13 legal status.     The patient continues to be depressed, anxious, but denies SI on the unit.  Behavior well controlled.  Some subjective worsening of anxiety from taper of klonopin.  Tolerating medications well, will continue.  Patient withdrew 3-day letter.    Plan  >Legal: 9.13  >Obs: Routine, no current SI. no need for CO, patient not expected to pose risk to self or others in controlled inpatient setting  >Psychiatric Meds: Restart outpatient medication regimen:  abilify 20 mg qd now continued at 25 mg as qhs, lithium 1200 mg qhs- levels tonight, remeron 45 mg qd, neurontin 800 mg BID, klonopin 2 mg TID with goal of downtaper- attempt 1.5 mg + 0.5 mg PRN at 9 AM and 1 PM both stopped for SAT and 2 mg qhs (o.5 mg dose now written as QID but MDD 1 mg to avoid pixis problems)  Restart of pristiq 25 mg qd TUES and 50 mg qd WED to replace klonopin and neurontin- increase to 75 mg qd SAT  Continue MMT dose of 135 mg qd as per ARS  PRN medications:  Ativan 2 mg oral Q6HR PRN for agitation and anxiety.  Haldol 5 mg oral Q6HR PRN for agitation.   Benadryl 50 mg oral Q6HR PRN for agitation.   Vistaril 50 mg oral Q6HR PRN for anxiety.  Desyrel 100 mg oral QHS PRN for insomnia.   >Labs: Admission labs reviewed, no acute findings. Labs pending for tomorrow: A1c and Lipid panel.  Hold antipsychotics if QTc >500  >Medical:   No acute concerns. No consultations needed at this time. No indication for CIWA. Patient with consistently stable VS, no visible physical symptoms of withdrawal. During the course of treatment, will collaborate with medical team to manage medical issues.  SUTURES: removed 6/29 with no issues, would to L wrist healing with no evident cellulitis  >Diet: Regular  >Social: milieu/structured therapy  >Treatment Interventions: Groups and Individual Therapy/CBT, Motivational counseling for substance abuse related issues.   >Dispo: Collateral and dispo planning pending further symptom and medication optimization  
42 yo single,  male, homeless, with past psychiatric history of PTSD, depression, anxiety, opioid use disorder currently on MMTP, history of etoh use and hx of withdrawal seizures from alcohol in the past, former cannabis use.  Patient is hospitalized with a primary problem of worsening mood with SI and decompensation.  BIB EMS called by [facility] methadone [facility], previous hx of attempting to grab a security guards gun in June 2021 while in [facility] ED, presenting with cutting, SI and recent depression.  Has multiple past psychiatric admissions, last at [facility] for cutting wrist 2/16/22-3/10/22 and [facility] HLW6 6/3-8/20/2020.  Patient admitted to [facility] on a 9.13 legal status.     The patient continues to be depressed, denies SI on the unit.  Some subjective anxiety from taper of klonopin.  Tolerating medications well, will continue.    Plan  >Legal: 9.13  >Obs: Routine, no current SI. no need for CO, patient not expected to pose risk to self or others in controlled inpatient setting  >Psychiatric Meds: Restart outpatient medication regimen:  abilify 20 mg qd now continued at 25 mg as qhs, lithium 1200 mg qhs- levels tonight, remeron 45 mg qd, neurontin 800 mg BID, klonopin 2 mg TID with goal of downtaper- attempt 1.5 mg + 0.5 mg PRN at 9 AM and 1 PM both stopped for SAT and 2 mg qhs (o.5 mg dose now written as QID but MDD 1 mg to avoid pixis problems)  Restart of pristiq 25 mg qd TUES and 50 mg qd WED to replace klonopin and neurontin- increase to 75 mg qd SAT  Continue MMT dose of 135 mg qd as per ARS  PRN medications:  Ativan 2 mg oral Q6HR PRN for agitation and anxiety.  Haldol 5 mg oral Q6HR PRN for agitation.   Benadryl 50 mg oral Q6HR PRN for agitation.   Vistaril 50 mg oral Q6HR PRN for anxiety.  Desyrel 100 mg oral QHS PRN for insomnia.   >Labs: Admission labs reviewed, no acute findings. Labs pending for tomorrow: A1c and Lipid panel.  Hold antipsychotics if QTc >500  >Medical:   No acute concerns. No consultations needed at this time. No indication for CIWA. Patient with consistently stable VS, no visible physical symptoms of withdrawal. During the course of treatment, will collaborate with medical team to manage medical issues.  SUTURES: removed 6/29 with no issues, would to L wrist healing with no evident cellulitis  >Diet: Regular  >Social: milieu/structured therapy  >Treatment Interventions: Groups and Individual Therapy/CBT, Motivational counseling for substance abuse related issues.   >Dispo: Collateral and dispo planning pending further symptom and medication optimization  
40 yo single,  male, homeless, with past psychiatric history of PTSD, depression, anxiety, opioid use disorder currently on MMTP, history of etoh use and hx of withdrawal seizures from alcohol in the past, former cannabis use.  Patient is hospitalized with a primary problem of worsening mood with SI and decompensation.  BIB EMS called by [facility] methadone [facility], previous hx of attempting to grab a security guards gun in June 2021 while in [facility] ED, presenting with cutting, SI and recent depression.  Has multiple past psychiatric admissions, last at [facility] for cutting wrist 2/16/22-3/10/22 and [facility] HLW6 6/3-8/20/2020.  Patient admitted to [facility] on a 9.13 legal status.     Plan:  >Legal: 9.13  >Obs: Routine, no current SI. no need for CO, patient not expected to pose risk to self or others in controlled inpatient setting  >Psychiatric Meds: Restart outpatient medication regimen:  abilify 20 mg qd, lithium 1200 mg qhs, remeron 45 mg qd, neurontin 800 mg BID, klonopin 2 mg TID with goal of downtaper  Consider restart of pristiq to replace klonopin and neurontin  Continue MMT dose of 135 mg qd as per ARS  PRN medications:  Ativan 2mg oral Q6HR PRN for agitation and anxiety.  Haldol 5mg oral Q6HR PRN for agitation.   Benadryl 50mg oral Q6HR PRN for agitation.   Vistaril 50mg oral Q6HR PRN for anxiety.  Desyrel 50mg oral QHS PRN for insomnia.   >Labs: Admission labs reviewed, no acute findings. Labs pending for tomorrow: A1c and Lipid panel.  Hold antipsychotics if QTc >500  >Medical:   No acute concerns. No consultations needed at this time. No indication for CIWA. Patient with consistently stable VS, no visible physical symptoms of withdrawal. During the course of treatment, will collaborate with medical team to manage medical issues.  >Diet: Regular  >Social: milieu/structured therapy  >Treatment Interventions: Groups and Individual Therapy/CBT, Motivational counseling for substance abuse related issues.   >Dispo: Collateral and dispo planning pending further symptom and medication optimization  
40 yo single,  male, homeless, with past psychiatric history of PTSD, depression, anxiety, opioid use disorder currently on MMTP, history of etoh use and hx of withdrawal seizures from alcohol in the past, former cannabis use.  Patient is hospitalized with a primary problem of worsening mood with SI and decompensation.  BIB EMS called by [facility] methadone [facility], previous hx of attempting to grab a security guards gun in June 2021 while in [facility] ED, presenting with cutting, SI and recent depression.  Has multiple past psychiatric admissions, last at [facility] for cutting wrist 2/16/22-3/10/22 and [facility] HLW6 6/3-8/20/2020.  Patient admitted to [facility] on a 9.13 legal status.     Plan  >Legal: 9.13  >Obs: Routine, no current SI. no need for CO, patient not expected to pose risk to self or others in controlled inpatient setting  >Psychiatric Meds: Restart outpatient medication regimen:  abilify 20 mg qd now continued at 25 mg as qhs, lithium 1200 mg qhs- levels tonight, remeron 45 mg qd, neurontin 800 mg BID, klonopin 2 mg TID with goal of downtaper- attempt 1.5 mg + 0.5 mg PRN at 9 AM and 1 PM and 2 mg qhs (o.5 mg dose now writted as QID but MDD 1 mg to avoid pixis problems)  Restart of pristiq 25 mg qd TUES and 50 mg qd WED to replace klonopin and neurontin  Continue MMT dose of 135 mg qd as per ARS  PRN medications:  Ativan 2 mg oral Q6HR PRN for agitation and anxiety.  Haldol 5 mg oral Q6HR PRN for agitation.   Benadryl 50 mg oral Q6HR PRN for agitation.   Vistaril 50 mg oral Q6HR PRN for anxiety.  Desyrel 100 mg oral QHS PRN for insomnia.   >Labs: Admission labs reviewed, no acute findings. Labs pending for tomorrow: A1c and Lipid panel.  Hold antipsychotics if QTc >500  >Medical:   No acute concerns. No consultations needed at this time. No indication for CIWA. Patient with consistently stable VS, no visible physical symptoms of withdrawal. During the course of treatment, will collaborate with medical team to manage medical issues.  >Diet: Regular  >Social: milieu/structured therapy  >Treatment Interventions: Groups and Individual Therapy/CBT, Motivational counseling for substance abuse related issues.   >Dispo: Collateral and dispo planning pending further symptom and medication optimization  
42 yo single,  male, homeless, with past psychiatric history of PTSD, depression, anxiety, opioid use disorder currently on MMTP, history of etoh use and hx of withdrawal seizures from alcohol in the past, former cannabis use.  Patient is hospitalized with a primary problem of worsening mood with SI and decompensation.  BIB EMS called by [facility] methadone [facility], previous hx of attempting to grab a security guards gun in June 2021 while in [facility] ED, presenting with cutting, SI and recent depression.  Has multiple past psychiatric admissions, last at [facility] for cutting wrist 2/16/22-3/10/22 and [facility] HLW6 6/3-8/20/2020.  Patient admitted to [facility] on a 9.13 legal status.     Plan  >Legal: 9.13  >Obs: Routine, no current SI. no need for CO, patient not expected to pose risk to self or others in controlled inpatient setting  >Psychiatric Meds: Restart outpatient medication regimen:  abilify 20 mg qd now continued at 25 mg as qhs, lithium 1200 mg qhs- levels tonight, remeron 45 mg qd, neurontin 800 mg BID, klonopin 2 mg TID with goal of downtaper- attempt 1.5 mg + 0.5 mg PRN at 9 AM and 1 PM and 2 mg qhs  Restart of pristiq 25 mg qd TUES and 50 mg qd WED to replace klonopin and neurontin  Continue MMT dose of 135 mg qd as per ARS  PRN medications:  Ativan 2 mg oral Q6HR PRN for agitation and anxiety.  Haldol 5 mg oral Q6HR PRN for agitation.   Benadryl 50 mg oral Q6HR PRN for agitation.   Vistaril 50 mg oral Q6HR PRN for anxiety.  Desyrel 100 mg oral QHS PRN for insomnia.   >Labs: Admission labs reviewed, no acute findings. Labs pending for tomorrow: A1c and Lipid panel.  Hold antipsychotics if QTc >500  >Medical:   No acute concerns. No consultations needed at this time. No indication for CIWA. Patient with consistently stable VS, no visible physical symptoms of withdrawal. During the course of treatment, will collaborate with medical team to manage medical issues.  >Diet: Regular  >Social: milieu/structured therapy  >Treatment Interventions: Groups and Individual Therapy/CBT, Motivational counseling for substance abuse related issues.   >Dispo: Collateral and dispo planning pending further symptom and medication optimization  
42 yo single,  male, homeless, with past psychiatric history of PTSD, depression, anxiety, opioid use disorder currently on MMTP, history of etoh use and hx of withdrawal seizures from alcohol in the past, former cannabis use.  Patient is hospitalized with a primary problem of worsening mood with SI and decompensation.  BIB EMS called by [facility] methadone [facility], previous hx of attempting to grab a security guards gun in June 2021 while in [facility] ED, presenting with cutting, SI and recent depression.  Has multiple past psychiatric admissions, last at [facility] for cutting wrist 2/16/22-3/10/22 and [facility] HLW6 6/3-8/20/2020.  Patient admitted to [facility] on a 9.13 legal status.     Plan  >Legal: 9.13  >Obs: Routine, no current SI. no need for CO, patient not expected to pose risk to self or others in controlled inpatient setting  >Psychiatric Meds: Restart outpatient medication regimen:  abilify 20 mg qd now continued at 30 mg as qhs, lithium 1200 mg qhs- levels tonight, remeron 45 mg qd, neurontin 800 mg BID, klonopin 2 mg TID with goal of downtaper- attempted 1.5 mg + 0.5 mg PRN at 9 AM and 1 PM both stopped for SAT and 2 mg qhs;  Goal of LALA Aristada but pt refusing, fearful of needles he says  Now as 1.5/1.5/2 mg =5 mg total daily  Restart of pristiq 25 mg qd TUES and 50 mg qd WED to replace klonopin and neurontin- increased to 75 mg qd SAT 7/2 and 100 mg qd 7/12  Continue MMT dose of 135 mg qd as per ARS  PRN medications:  Ativan 2 mg oral Q6HR PRN for agitation and anxiety.  Haldol 5 mg oral Q6HR PRN for agitation.   Benadryl 50 mg oral Q6HR PRN for agitation.   Vistaril 50 mg oral Q6HR PRN for anxiety.  Desyrel 100 mg oral QHS PRN for insomnia.   >Labs: Admission labs reviewed, no acute findings. Labs pending for tomorrow: A1c and Lipid panel.  Hold antipsychotics if QTc >500  >Medical:   No acute concerns. No consultations needed at this time. No indication for CIWA. Patient with consistently stable VS, no visible physical symptoms of withdrawal. During the course of treatment, will collaborate with medical team to manage medical issues.  SUTURES: removed 6/29 with no issues, would to L wrist healing with no evident cellulitis  >Diet: Regular  >Social: milieu/structured therapy  >Treatment Interventions: Groups and Individual Therapy/CBT, Motivational counseling for substance abuse related issues.   >Dispo: Collateral and dispo planning pending further symptom and medication optimization  
40 yo single,  male, homeless, with past psychiatric history of PTSD, depression, anxiety, opioid use disorder currently on MMTP, history of etoh use and hx of withdrawal seizures from alcohol in the past, former cannabis use.  Patient is hospitalized with a primary problem of worsening mood with SI and decompensation.  BIB EMS called by [facility] methadone [facility], previous hx of attempting to grab a security guards gun in June 2021 while in [facility] ED, presenting with cutting, SI and recent depression.  Has multiple past psychiatric admissions, last at [facility] for cutting wrist 2/16/22-3/10/22 and [facility] HLW6 6/3-8/20/2020.  Patient admitted to [facility] on a 9.13 legal status.     Plan  >Legal: 9.13  >Obs: Routine, no current SI. no need for CO, patient not expected to pose risk to self or others in controlled inpatient setting  >Psychiatric Meds: Restart outpatient medication regimen:  abilify 20 mg qd now continued at 25 mg as qhs, lithium 1200 mg qhs- levels tonight, remeron 45 mg qd, neurontin 800 mg BID, klonopin 2 mg TID with goal of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

## 2022-07-13 NOTE — [facility] INPATIENT PSYCHIATRY DISCHARGE NOTE - ATTENDING DISCHARGE PHYSICAL EXAMINATION:
Stable for DC to home  Rx meds  No LALA  MMTP 135 mg qd Stable for DC to home  Rx meds  No LALA  MMTP 135 mg qd- CONTINUED
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
## 2022-07-13 NOTE — BH INPATIENT PSYCHIATRY DISCHARGE NOTE - NSBHDCTIMESPENT_PSY_ALL_CORE
Signout to Dr. Henry and colleagues at [facility]/communications week of 7/11/12 and also by SW; pt not on an LALA but MMTP continued at 135 mg qd

## 2022-07-13 NOTE — BH PSYCHOLOGY - GROUP THERAPY NOTE - NSPSYCHOLGRPCOGPT_PSY_A_CORE
participated in exercise/other...
participated in exercise/shared positive coping experience/other...
participated in exercise/other...
participated in exercise/shared positive coping experience/other...

## 2022-07-13 NOTE — BH INPATIENT PSYCHIATRY PROGRESS NOTE - NSBHATTESTBILLINGAW_PSY_A_CORE
[id number] Inpatient care - moderate complexity - 25 minutes [id number] Inpatient care - high complexity - 35 minutes

## 2022-07-13 NOTE — BH INPATIENT PSYCHIATRY PROGRESS NOTE - PRN MEDS
MEDICATIONS  (PRN):  hydrOXYzine hydrochloride 50 milliGRAM(s) Oral every 4 hours PRN Anxiety  OLANZapine 5 milliGRAM(s) Oral every 6 hours PRN agitation/anxiety  OLANZapine Injectable 5 milliGRAM(s) IntraMuscular once PRN severe agitation  
MEDICATIONS  (PRN):  hydrOXYzine hydrochloride 50 milliGRAM(s) Oral every 4 hours PRN Anxiety  OLANZapine 5 milliGRAM(s) Oral every 6 hours PRN agitation/anxiety  OLANZapine Injectable 5 milliGRAM(s) IntraMuscular once PRN severe agitation  
MEDICATIONS  (PRN):  gabapentin 400 milliGRAM(s) Oral three times a day PRN anxiety  hydrOXYzine hydrochloride 50 milliGRAM(s) Oral every 4 hours PRN Anxiety  nicotine  Polacrilex Gum 4 milliGRAM(s) Oral every 3 hours PRN NRT  OLANZapine 5 milliGRAM(s) Oral every 6 hours PRN agitation/anxiety  OLANZapine Injectable 5 milliGRAM(s) IntraMuscular once PRN severe agitation  
MEDICATIONS  (PRN):  hydrOXYzine hydrochloride 50 milliGRAM(s) Oral every 4 hours PRN Anxiety  OLANZapine 5 milliGRAM(s) Oral every 6 hours PRN agitation/anxiety  OLANZapine Injectable 5 milliGRAM(s) IntraMuscular once PRN severe agitation  
MEDICATIONS  (PRN):  hydrOXYzine hydrochloride 50 milliGRAM(s) Oral every 4 hours PRN Anxiety  nicotine  Polacrilex Gum 4 milliGRAM(s) Oral every 3 hours PRN NRT  OLANZapine 5 milliGRAM(s) Oral every 6 hours PRN agitation/anxiety  OLANZapine Injectable 5 milliGRAM(s) IntraMuscular once PRN severe agitation  
MEDICATIONS  (PRN):  hydrOXYzine hydrochloride 50 milliGRAM(s) Oral every 4 hours PRN Anxiety  OLANZapine 5 milliGRAM(s) Oral every 6 hours PRN agitation/anxiety  OLANZapine Injectable 5 milliGRAM(s) IntraMuscular once PRN severe agitation  
MEDICATIONS  (PRN):  hydrOXYzine hydrochloride 50 milliGRAM(s) Oral every 4 hours PRN Anxiety  OLANZapine 5 milliGRAM(s) Oral every 6 hours PRN agitation/anxiety  OLANZapine Injectable 5 milliGRAM(s) IntraMuscular once PRN severe agitation  
MEDICATIONS  (PRN):  hydrOXYzine hydrochloride 50 milliGRAM(s) Oral every 4 hours PRN Anxiety  OLANZapine 5 milliGRAM(s) Oral every 6 hours PRN agitation/anxiety  OLANZapine Injectable 5 milliGRAM(s) IntraMuscular once PRN severe agitation  traZODone 100 milliGRAM(s) Oral at bedtime PRN insomnia  
MEDICATIONS  (PRN):  gabapentin 400 milliGRAM(s) Oral three times a day PRN anxiety  hydrOXYzine hydrochloride 50 milliGRAM(s) Oral every 4 hours PRN Anxiety  nicotine  Polacrilex Gum 4 milliGRAM(s) Oral every 3 hours PRN NRT  OLANZapine 5 milliGRAM(s) Oral every 6 hours PRN agitation/anxiety  OLANZapine Injectable 5 milliGRAM(s) IntraMuscular once PRN severe agitation  
MEDICATIONS  (PRN):  gabapentin 400 milliGRAM(s) Oral three times a day PRN anxiety  hydrOXYzine hydrochloride 50 milliGRAM(s) Oral every 4 hours PRN Anxiety  nicotine  Polacrilex Gum 4 milliGRAM(s) Oral every 3 hours PRN NRT  OLANZapine 5 milliGRAM(s) Oral every 6 hours PRN agitation/anxiety  OLANZapine Injectable 5 milliGRAM(s) IntraMuscular once PRN severe agitation  
MEDICATIONS  (PRN):  clonazePAM  Tablet 0.5 milliGRAM(s) Oral four times a day PRN anxiety  hydrOXYzine hydrochloride 50 milliGRAM(s) Oral every 4 hours PRN Anxiety  OLANZapine 5 milliGRAM(s) Oral every 6 hours PRN agitation/anxiety  OLANZapine Injectable 5 milliGRAM(s) IntraMuscular once PRN severe agitation  traZODone 100 milliGRAM(s) Oral at bedtime PRN insomnia  
MEDICATIONS  (PRN):  hydrOXYzine hydrochloride 50 milliGRAM(s) Oral every 4 hours PRN Anxiety  OLANZapine 5 milliGRAM(s) Oral every 6 hours PRN agitation/anxiety  OLANZapine Injectable 5 milliGRAM(s) IntraMuscular once PRN severe agitation  
MEDICATIONS  (PRN):  hydrOXYzine hydrochloride 50 milliGRAM(s) Oral every 4 hours PRN Anxiety  OLANZapine 5 milliGRAM(s) Oral every 6 hours PRN agitation/anxiety  OLANZapine Injectable 5 milliGRAM(s) IntraMuscular once PRN severe agitation  traZODone 100 milliGRAM(s) Oral at bedtime PRN insomnia  
MEDICATIONS  (PRN):  hydrOXYzine hydrochloride 50 milliGRAM(s) Oral every 4 hours PRN Anxiety  OLANZapine 5 milliGRAM(s) Oral every 6 hours PRN agitation/anxiety  OLANZapine Injectable 5 milliGRAM(s) IntraMuscular once PRN severe agitation  
MEDICATIONS  (PRN):  hydrOXYzine hydrochloride 50 milliGRAM(s) Oral every 4 hours PRN Anxiety  OLANZapine 5 milliGRAM(s) Oral every 6 hours PRN agitation/anxiety  OLANZapine Injectable 5 milliGRAM(s) IntraMuscular once PRN severe agitation  traZODone 100 milliGRAM(s) Oral at bedtime PRN insomnia  
MEDICATIONS  (PRN):  hydrOXYzine hydrochloride 50 milliGRAM(s) Oral every 4 hours PRN Anxiety  OLANZapine 5 milliGRAM(s) Oral every 6 hours PRN agitation/anxiety  OLANZapine Injectable 5 milliGRAM(s) IntraMuscular once PRN severe agitation  
MEDICATIONS  (PRN):  hydrOXYzine hydrochloride 50 milliGRAM(s) Oral every 4 hours PRN Anxiety  nicotine  Polacrilex Gum 4 milliGRAM(s) Oral every 3 hours PRN NRT  OLANZapine 5 milliGRAM(s) Oral every 6 hours PRN agitation/anxiety  OLANZapine Injectable 5 milliGRAM(s) IntraMuscular once PRN severe agitation

## 2022-07-13 NOTE — BH PSYCHOLOGY - GROUP THERAPY NOTE - NSBHPSYCHOLGRPNAME_PSY_A_CORE
CBT (Cognitive Behavioral Therapy) Group

## 2022-07-13 NOTE — BH INPATIENT PSYCHIATRY PROGRESS NOTE - CURRENT MEDICATION
MEDICATIONS  (STANDING):  ARIPiprazole 20 milliGRAM(s) Oral at bedtime  BACItracin   Ointment 1 Application(s) Topical daily  clonazePAM  Tablet 2 milliGRAM(s) Oral three times a day  gabapentin 800 milliGRAM(s) Oral two times a day  lithium SR (LITHOBID) 1200 milliGRAM(s) Oral at bedtime  methadone    Tablet 15 milliGRAM(s) Oral daily  methadone   Dispersible Tablet 120 milliGRAM(s) Oral daily  mirtazapine 45 milliGRAM(s) Oral at bedtime  traZODone 100 milliGRAM(s) Oral at bedtime    MEDICATIONS  (PRN):  hydrOXYzine hydrochloride 50 milliGRAM(s) Oral every 4 hours PRN Anxiety  OLANZapine 5 milliGRAM(s) Oral every 6 hours PRN agitation/anxiety  OLANZapine Injectable 5 milliGRAM(s) IntraMuscular once PRN severe agitation  
MEDICATIONS  (STANDING):  ARIPiprazole 30 milliGRAM(s) Oral at bedtime  clonazePAM  Tablet 2 milliGRAM(s) Oral at bedtime  clonazePAM  Tablet 1.5 milliGRAM(s) Oral <User Schedule>  desvenlafaxine  milliGRAM(s) Oral daily  gabapentin 800 milliGRAM(s) Oral two times a day  lithium CR (ESKALITH-CR) 1350 milliGRAM(s) Oral at bedtime  mirtazapine 45 milliGRAM(s) Oral at bedtime  senna 2 Tablet(s) Oral at bedtime    MEDICATIONS  (PRN):  gabapentin 400 milliGRAM(s) Oral three times a day PRN anxiety  hydrOXYzine hydrochloride 50 milliGRAM(s) Oral every 4 hours PRN Anxiety  nicotine  Polacrilex Gum 4 milliGRAM(s) Oral every 3 hours PRN NRT  OLANZapine 5 milliGRAM(s) Oral every 6 hours PRN agitation/anxiety  OLANZapine Injectable 5 milliGRAM(s) IntraMuscular once PRN severe agitation  
MEDICATIONS  (STANDING):  ARIPiprazole 20 milliGRAM(s) Oral daily  clonazePAM  Tablet 2 milliGRAM(s) Oral three times a day  gabapentin 800 milliGRAM(s) Oral two times a day  lithium SR (LITHOBID) 1200 milliGRAM(s) Oral at bedtime  methadone    Tablet 15 milliGRAM(s) Oral daily  methadone   Dispersible Tablet 120 milliGRAM(s) Oral daily  mirtazapine 45 milliGRAM(s) Oral at bedtime  traZODone 50 milliGRAM(s) Oral at bedtime    MEDICATIONS  (PRN):  hydrOXYzine hydrochloride 50 milliGRAM(s) Oral every 4 hours PRN Anxiety  OLANZapine 5 milliGRAM(s) Oral every 6 hours PRN agitation/anxiety  OLANZapine Injectable 5 milliGRAM(s) IntraMuscular once PRN severe agitation  
MEDICATIONS  (STANDING):  ARIPiprazole 30 milliGRAM(s) Oral at bedtime  BACItracin   Ointment 1 Application(s) Topical daily  clonazePAM  Tablet 2 milliGRAM(s) Oral at bedtime  clonazePAM  Tablet 1.5 milliGRAM(s) Oral <User Schedule>  desvenlafaxine ER 75 milliGRAM(s) Oral daily  gabapentin 800 milliGRAM(s) Oral two times a day  lithium CR (ESKALITH-CR) 1350 milliGRAM(s) Oral at bedtime  methadone    Tablet 15 milliGRAM(s) Oral daily  methadone   Dispersible Tablet 120 milliGRAM(s) Oral daily  mirtazapine 45 milliGRAM(s) Oral at bedtime  senna 2 Tablet(s) Oral at bedtime    MEDICATIONS  (PRN):  hydrOXYzine hydrochloride 50 milliGRAM(s) Oral every 4 hours PRN Anxiety  nicotine  Polacrilex Gum 4 milliGRAM(s) Oral every 3 hours PRN NRT  OLANZapine 5 milliGRAM(s) Oral every 6 hours PRN agitation/anxiety  OLANZapine Injectable 5 milliGRAM(s) IntraMuscular once PRN severe agitation  
MEDICATIONS  (STANDING):  ARIPiprazole 20 milliGRAM(s) Oral at bedtime  clonazePAM  Tablet 2 milliGRAM(s) Oral three times a day  gabapentin 800 milliGRAM(s) Oral two times a day  lithium SR (LITHOBID) 1200 milliGRAM(s) Oral at bedtime  methadone    Tablet 15 milliGRAM(s) Oral daily  methadone   Dispersible Tablet 120 milliGRAM(s) Oral daily  mirtazapine 45 milliGRAM(s) Oral at bedtime  traZODone 100 milliGRAM(s) Oral at bedtime    MEDICATIONS  (PRN):  hydrOXYzine hydrochloride 50 milliGRAM(s) Oral every 4 hours PRN Anxiety  OLANZapine 5 milliGRAM(s) Oral every 6 hours PRN agitation/anxiety  OLANZapine Injectable 5 milliGRAM(s) IntraMuscular once PRN severe agitation  
MEDICATIONS  (STANDING):  ARIPiprazole 25 milliGRAM(s) Oral at bedtime  BACItracin   Ointment 1 Application(s) Topical daily  clonazePAM  Tablet 2 milliGRAM(s) Oral at bedtime  clonazePAM  Tablet 1.5 milliGRAM(s) Oral <User Schedule>  clonazePAM  Tablet 0.5 milliGRAM(s) Oral <User Schedule>  gabapentin 800 milliGRAM(s) Oral two times a day  lithium SR (LITHOBID) 1200 milliGRAM(s) Oral at bedtime  methadone    Tablet 15 milliGRAM(s) Oral daily  methadone   Dispersible Tablet 120 milliGRAM(s) Oral daily  mirtazapine 45 milliGRAM(s) Oral at bedtime  trimethoprim  160 mG/sulfamethoxazole 800 mG 1 Tablet(s) Oral two times a day    MEDICATIONS  (PRN):  hydrOXYzine hydrochloride 50 milliGRAM(s) Oral every 4 hours PRN Anxiety  OLANZapine 5 milliGRAM(s) Oral every 6 hours PRN agitation/anxiety  OLANZapine Injectable 5 milliGRAM(s) IntraMuscular once PRN severe agitation  traZODone 100 milliGRAM(s) Oral at bedtime PRN insomnia  
MEDICATIONS  (STANDING):  ARIPiprazole 30 milliGRAM(s) Oral at bedtime  BACItracin   Ointment 1 Application(s) Topical daily  clonazePAM  Tablet 2 milliGRAM(s) Oral at bedtime  clonazePAM  Tablet 1.5 milliGRAM(s) Oral <User Schedule>  desvenlafaxine ER 75 milliGRAM(s) Oral daily  gabapentin 800 milliGRAM(s) Oral two times a day  lithium CR (ESKALITH-CR) 1350 milliGRAM(s) Oral at bedtime  methadone    Tablet 15 milliGRAM(s) Oral daily  methadone   Dispersible Tablet 120 milliGRAM(s) Oral daily  mirtazapine 45 milliGRAM(s) Oral at bedtime  saline laxative (FLEET) Rectal Enema 1 Enema Rectal once  senna 2 Tablet(s) Oral at bedtime    MEDICATIONS  (PRN):  hydrOXYzine hydrochloride 50 milliGRAM(s) Oral every 4 hours PRN Anxiety  OLANZapine 5 milliGRAM(s) Oral every 6 hours PRN agitation/anxiety  OLANZapine Injectable 5 milliGRAM(s) IntraMuscular once PRN severe agitation  
MEDICATIONS  (STANDING):  ARIPiprazole 25 milliGRAM(s) Oral at bedtime  BACItracin   Ointment 1 Application(s) Topical daily  clonazePAM  Tablet 2 milliGRAM(s) Oral at bedtime  clonazePAM  Tablet 1.5 milliGRAM(s) Oral <User Schedule>  clonazePAM  Tablet 0.5 milliGRAM(s) Oral <User Schedule>  gabapentin 800 milliGRAM(s) Oral two times a day  lithium SR (LITHOBID) 1200 milliGRAM(s) Oral at bedtime  methadone    Tablet 15 milliGRAM(s) Oral daily  methadone   Dispersible Tablet 120 milliGRAM(s) Oral daily  mirtazapine 45 milliGRAM(s) Oral at bedtime    MEDICATIONS  (PRN):  hydrOXYzine hydrochloride 50 milliGRAM(s) Oral every 4 hours PRN Anxiety  OLANZapine 5 milliGRAM(s) Oral every 6 hours PRN agitation/anxiety  OLANZapine Injectable 5 milliGRAM(s) IntraMuscular once PRN severe agitation  traZODone 100 milliGRAM(s) Oral at bedtime PRN insomnia  
MEDICATIONS  (STANDING):  ARIPiprazole 20 milliGRAM(s) Oral at bedtime  clonazePAM  Tablet 2 milliGRAM(s) Oral three times a day  gabapentin 800 milliGRAM(s) Oral two times a day  lithium SR (LITHOBID) 1200 milliGRAM(s) Oral at bedtime  methadone    Tablet 15 milliGRAM(s) Oral daily  methadone   Dispersible Tablet 120 milliGRAM(s) Oral daily  mirtazapine 45 milliGRAM(s) Oral at bedtime  traZODone 100 milliGRAM(s) Oral at bedtime    MEDICATIONS  (PRN):  hydrOXYzine hydrochloride 50 milliGRAM(s) Oral every 4 hours PRN Anxiety  OLANZapine 5 milliGRAM(s) Oral every 6 hours PRN agitation/anxiety  OLANZapine Injectable 5 milliGRAM(s) IntraMuscular once PRN severe agitation  
MEDICATIONS  (STANDING):  ARIPiprazole 30 milliGRAM(s) Oral at bedtime  clonazePAM  Tablet 2 milliGRAM(s) Oral at bedtime  clonazePAM  Tablet 1.5 milliGRAM(s) Oral <User Schedule>  desvenlafaxine  milliGRAM(s) Oral daily  gabapentin 800 milliGRAM(s) Oral two times a day  lithium CR (ESKALITH-CR) 1350 milliGRAM(s) Oral at bedtime  mirtazapine 45 milliGRAM(s) Oral at bedtime  senna 2 Tablet(s) Oral at bedtime    MEDICATIONS  (PRN):  gabapentin 400 milliGRAM(s) Oral three times a day PRN anxiety  hydrOXYzine hydrochloride 50 milliGRAM(s) Oral every 4 hours PRN Anxiety  nicotine  Polacrilex Gum 4 milliGRAM(s) Oral every 3 hours PRN NRT  OLANZapine 5 milliGRAM(s) Oral every 6 hours PRN agitation/anxiety  OLANZapine Injectable 5 milliGRAM(s) IntraMuscular once PRN severe agitation  
MEDICATIONS  (STANDING):  ARIPiprazole 30 milliGRAM(s) Oral at bedtime  BACItracin   Ointment 1 Application(s) Topical daily  clonazePAM  Tablet 2 milliGRAM(s) Oral at bedtime  clonazePAM  Tablet 1.5 milliGRAM(s) Oral <User Schedule>  gabapentin 800 milliGRAM(s) Oral two times a day  lithium CR (ESKALITH-CR) 1350 milliGRAM(s) Oral at bedtime  methadone    Tablet 15 milliGRAM(s) Oral daily  methadone   Dispersible Tablet 120 milliGRAM(s) Oral daily  mirtazapine 45 milliGRAM(s) Oral at bedtime  trimethoprim  160 mG/sulfamethoxazole 800 mG 1 Tablet(s) Oral two times a day    MEDICATIONS  (PRN):  hydrOXYzine hydrochloride 50 milliGRAM(s) Oral every 4 hours PRN Anxiety  OLANZapine 5 milliGRAM(s) Oral every 6 hours PRN agitation/anxiety  OLANZapine Injectable 5 milliGRAM(s) IntraMuscular once PRN severe agitation  
MEDICATIONS  (STANDING):  ARIPiprazole 30 milliGRAM(s) Oral at bedtime  BACItracin   Ointment 1 Application(s) Topical daily  clonazePAM  Tablet 2 milliGRAM(s) Oral at bedtime  clonazePAM  Tablet 1.5 milliGRAM(s) Oral <User Schedule>  desvenlafaxine ER 75 milliGRAM(s) Oral daily  gabapentin 800 milliGRAM(s) Oral two times a day  lithium CR (ESKALITH-CR) 1350 milliGRAM(s) Oral at bedtime  methadone    Tablet 15 milliGRAM(s) Oral daily  methadone   Dispersible Tablet 120 milliGRAM(s) Oral daily  mirtazapine 45 milliGRAM(s) Oral at bedtime    MEDICATIONS  (PRN):  hydrOXYzine hydrochloride 50 milliGRAM(s) Oral every 4 hours PRN Anxiety  OLANZapine 5 milliGRAM(s) Oral every 6 hours PRN agitation/anxiety  OLANZapine Injectable 5 milliGRAM(s) IntraMuscular once PRN severe agitation  
MEDICATIONS  (STANDING):  ARIPiprazole 30 milliGRAM(s) Oral at bedtime  BACItracin   Ointment 1 Application(s) Topical daily  clonazePAM  Tablet 2 milliGRAM(s) Oral at bedtime  clonazePAM  Tablet 1.5 milliGRAM(s) Oral <User Schedule>  gabapentin 800 milliGRAM(s) Oral two times a day  lithium CR (ESKALITH-CR) 1350 milliGRAM(s) Oral at bedtime  methadone    Tablet 15 milliGRAM(s) Oral daily  methadone   Dispersible Tablet 120 milliGRAM(s) Oral daily  mirtazapine 45 milliGRAM(s) Oral at bedtime  trimethoprim  160 mG/sulfamethoxazole 800 mG 1 Tablet(s) Oral two times a day    MEDICATIONS  (PRN):  hydrOXYzine hydrochloride 50 milliGRAM(s) Oral every 4 hours PRN Anxiety  OLANZapine 5 milliGRAM(s) Oral every 6 hours PRN agitation/anxiety  OLANZapine Injectable 5 milliGRAM(s) IntraMuscular once PRN severe agitation  
MEDICATIONS  (STANDING):  ARIPiprazole 30 milliGRAM(s) Oral at bedtime  BACItracin   Ointment 1 Application(s) Topical daily  clonazePAM  Tablet 2 milliGRAM(s) Oral at bedtime  clonazePAM  Tablet 1.5 milliGRAM(s) Oral <User Schedule>  desvenlafaxine ER 75 milliGRAM(s) Oral daily  gabapentin 800 milliGRAM(s) Oral two times a day  lithium CR (ESKALITH-CR) 1350 milliGRAM(s) Oral at bedtime  methadone    Tablet 15 milliGRAM(s) Oral daily  methadone   Dispersible Tablet 120 milliGRAM(s) Oral daily  mirtazapine 45 milliGRAM(s) Oral at bedtime  senna 2 Tablet(s) Oral at bedtime    MEDICATIONS  (PRN):  hydrOXYzine hydrochloride 50 milliGRAM(s) Oral every 4 hours PRN Anxiety  nicotine  Polacrilex Gum 4 milliGRAM(s) Oral every 3 hours PRN NRT  OLANZapine 5 milliGRAM(s) Oral every 6 hours PRN agitation/anxiety  OLANZapine Injectable 5 milliGRAM(s) IntraMuscular once PRN severe agitation  
MEDICATIONS  (STANDING):  ARIPiprazole 30 milliGRAM(s) Oral at bedtime  clonazePAM  Tablet 2 milliGRAM(s) Oral at bedtime  clonazePAM  Tablet 1.5 milliGRAM(s) Oral <User Schedule>  gabapentin 800 milliGRAM(s) Oral two times a day  lithium CR (ESKALITH-CR) 1350 milliGRAM(s) Oral at bedtime  methadone    Tablet 15 milliGRAM(s) Oral daily  methadone   Dispersible Tablet 120 milliGRAM(s) Oral daily  mirtazapine 45 milliGRAM(s) Oral at bedtime  senna 2 Tablet(s) Oral at bedtime    MEDICATIONS  (PRN):  gabapentin 400 milliGRAM(s) Oral three times a day PRN anxiety  hydrOXYzine hydrochloride 50 milliGRAM(s) Oral every 4 hours PRN Anxiety  nicotine  Polacrilex Gum 4 milliGRAM(s) Oral every 3 hours PRN NRT  OLANZapine 5 milliGRAM(s) Oral every 6 hours PRN agitation/anxiety  OLANZapine Injectable 5 milliGRAM(s) IntraMuscular once PRN severe agitation  
MEDICATIONS  (STANDING):  ARIPiprazole 25 milliGRAM(s) Oral at bedtime  BACItracin   Ointment 1 Application(s) Topical daily  clonazePAM  Tablet 2 milliGRAM(s) Oral at bedtime  clonazePAM  Tablet 1.5 milliGRAM(s) Oral <User Schedule>  desvenlafaxine ER 50 milliGRAM(s) Oral daily  gabapentin 800 milliGRAM(s) Oral two times a day  lithium CR (ESKALITH-CR) 1350 milliGRAM(s) Oral at bedtime  methadone    Tablet 15 milliGRAM(s) Oral daily  methadone   Dispersible Tablet 120 milliGRAM(s) Oral daily  mirtazapine 45 milliGRAM(s) Oral at bedtime  trimethoprim  160 mG/sulfamethoxazole 800 mG 1 Tablet(s) Oral two times a day    MEDICATIONS  (PRN):  clonazePAM  Tablet 0.5 milliGRAM(s) Oral four times a day PRN anxiety  hydrOXYzine hydrochloride 50 milliGRAM(s) Oral every 4 hours PRN Anxiety  OLANZapine 5 milliGRAM(s) Oral every 6 hours PRN agitation/anxiety  OLANZapine Injectable 5 milliGRAM(s) IntraMuscular once PRN severe agitation  traZODone 100 milliGRAM(s) Oral at bedtime PRN insomnia  
MEDICATIONS  (STANDING):  ARIPiprazole 25 milliGRAM(s) Oral at bedtime  BACItracin   Ointment 1 Application(s) Topical daily  clonazePAM  Tablet 2 milliGRAM(s) Oral at bedtime  clonazePAM  Tablet 1.5 milliGRAM(s) Oral <User Schedule>  clonazePAM  Tablet 0.5 milliGRAM(s) Oral <User Schedule>  gabapentin 800 milliGRAM(s) Oral two times a day  lithium CR (ESKALITH-CR) 1350 milliGRAM(s) Oral at bedtime  methadone    Tablet 15 milliGRAM(s) Oral daily  methadone   Dispersible Tablet 120 milliGRAM(s) Oral daily  mirtazapine 45 milliGRAM(s) Oral at bedtime  trimethoprim  160 mG/sulfamethoxazole 800 mG 1 Tablet(s) Oral two times a day    MEDICATIONS  (PRN):  hydrOXYzine hydrochloride 50 milliGRAM(s) Oral every 4 hours PRN Anxiety  OLANZapine 5 milliGRAM(s) Oral every 6 hours PRN agitation/anxiety  OLANZapine Injectable 5 milliGRAM(s) IntraMuscular once PRN severe agitation  traZODone 100 milliGRAM(s) Oral at bedtime PRN insomnia

## 2022-07-13 NOTE — BH INPATIENT PSYCHIATRY PROGRESS NOTE - NSTXCOPEINTERMD_PSY_ALL_CORE
Psychopharm with goal of BZD reduction and LALA for safety and compliance and supportive therapy with return to [place] aftercare on MMTP
Psychopharm with goal of BZD reduction and LALA for safety and compliance and supportive therapy with return to [place] aftercare on MMTP
Psychopharm with goal of BZD reduction and LALA for safety and compliance and supportive therapy with return to [place] aftercare on MMTP
Psychopharm with goal of BZD reduction and LALA for safety and compliance and supportive therapy with return to [place] aftercare on MMTP
Psychopharm with goal of BZD reduction and LALA for safety and compliance and supportive therapy with return to [place] aftercare on MMTP
Psychopharm with goal of BZD reduction and LALA for safety and compliance and supportive therapy with return to [place] aftercare on MMTP
Psychopharm with goal of BZD reduction and LALA for safety and compliance and supportive therapy with return to [place] aftercare on MMTP
Psychopharm with goal of BZD reduction and LALA for safety and compliance and supportive therapy with return to [place] aftercare on MMTP
Psychopharm with goal of BZD reduction and LALA for safety and compliance and supportive therapy with return to [place] aftercare on MMTP
Psychopharm with goal of BZD reduction and LALA for safety and compliance and supportive therapy with return to [place] aftercare on MMTP
Psychopharm with goal of BZD reduction and LALA for safety and compliance and supportive therapy with return to [place] aftercare on MMTP
Psychopharm with goal of BZD reduction and LALA for safety and compliance and supportive therapy with return to [place] aftercare on MMTP
Psychopharm with goal of BZD reduction and LALA for safety and compliance and supportive therapy with return to [place] aftercare on MMTP
Psychopharm with goal of BZD reduction and LALA for safety and compliance and supportive therapy with return to [place] aftercare on MMTP
Psychopharm with goal of BZD reduction and LALA for safety and compliance and supportive therapy with return to [place] aftercare on MMTP
Psychopharm with goal of BZD reduction and LALA for safety and compliance and supportive therapy with return to [place] aftercare on MMTP
Psychopharm with goal of BZD reduction and LALA for safety and compliance and supportive therapy with return to [place] aftercare on MMTP

## 2022-07-31 ENCOUNTER — EMERGENCY (EMERGENCY)
Facility: HOSPITAL | Age: 42
LOS: 1 days | Discharge: HOME | End: 2022-07-31
Attending: STUDENT IN AN ORGANIZED HEALTH CARE EDUCATION/TRAINING PROGRAM | Admitting: STUDENT IN AN ORGANIZED HEALTH CARE EDUCATION/TRAINING PROGRAM

## 2022-07-31 VITALS
DIASTOLIC BLOOD PRESSURE: 67 MMHG | RESPIRATION RATE: 18 BRPM | SYSTOLIC BLOOD PRESSURE: 118 MMHG | TEMPERATURE: 98 F | OXYGEN SATURATION: 100 % | HEART RATE: 78 BPM | HEIGHT: 67 IN

## 2022-07-31 PROCEDURE — 99284 EMERGENCY DEPT VISIT MOD MDM: CPT

## 2022-07-31 RX ORDER — METHADONE HYDROCHLORIDE 40 MG/1
15 TABLET ORAL ONCE
Refills: 0 | Status: DISCONTINUED | OUTPATIENT
Start: 2022-07-31 | End: 2022-07-31

## 2022-07-31 RX ORDER — METHADONE HYDROCHLORIDE 40 MG/1
120 TABLET ORAL ONCE
Refills: 0 | Status: DISCONTINUED | OUTPATIENT
Start: 2022-07-31 | End: 2022-07-31

## 2022-07-31 RX ADMIN — METHADONE HYDROCHLORIDE 120 MILLIGRAM(S): 40 TABLET ORAL at 08:43

## 2022-07-31 RX ADMIN — METHADONE HYDROCHLORIDE 15 MILLIGRAM(S): 40 TABLET ORAL at 08:47

## 2022-07-31 NOTE — ED PROVIDER NOTE - PHYSICAL EXAMINATION
GEN: no acute respiratory distress. nontoxic, speaking comfortably in full sentences, ambulating with steady gait.  HEENT: NCAT. face symmetrical. PERRL 4mm, EOMI, MMM, oropharynx wnl.  Neck: no JVD, trachea midline, no LAD  CV: RRR. +S1S2, no murmur.   Chest: CTA B/l. no wheezing, rales, rhonchi.   ABD: +BS, soft, non distended, non tender.   MSK: No clubbing, cyanosis, edema. FROM of all extremities.   Neuro: AAOX3.  no focal deficit  SKIN: No rash or piloerrection.

## 2022-07-31 NOTE — ED PROVIDER NOTE - CLINICAL SUMMARY MEDICAL DECISION MAKING FREE TEXT BOX
opioid dependence of MMTP, reports dose 135mg, reports lost his dose. Was given 135mg during recent [facility] admission. attempted to confirm dose in HIE, calling program on call numbers but unable to reach direct contact. no evidence of acute withdrawal. will give dose.

## 2022-07-31 NOTE — ED PROVIDER NOTE - PROGRESS NOTE DETAILS
Manny George DO: never had response from calls. pt dosed methadone and advised ED can not replace MMTP.

## 2022-07-31 NOTE — ED ADULT TRIAGE NOTE - CHIEF COMPLAINT QUOTE
here for methadone refill. prescribed 135mg of methadone daily, last dose was yesterday. Hx depression (on lithium)

## 2022-07-31 NOTE — ED PROVIDER NOTE - NS ED ROS FT
Constitutional: No fever. no chills.   Eyes: No visual changes, eye pain or redness  HEENT: No throat pain,   CV: No chest pain, no palpitations  Resp: No shortness of breath, no cough  GI: No abdominal pain. No nausea. no  vomiting. No diarrhea. No constipation.   MSK: No musculoskeletal pain  Skin: No rash, lesions, no bruises  Neuro: No headache. No numbness or tingling. No weakness. No dizziness.  Allergy/Immunology: allergy as in EMR

## 2022-07-31 NOTE — ED PROVIDER NOTE - PATIENT PORTAL LINK FT
You can access the FollowMyHealth Patient Portal offered by [facility] by registering at the following website: [email or web address]. By joining [employer]’s FollowMyHealth portal, you will also be able to view your health information using other applications (apps) compatible with our system.

## 2022-07-31 NOTE — ED PROVIDER NOTE - OBJECTIVE STATEMENT
42 yo m past medical history MDD, opioid dependence presence to ED requesting methadone dose. patient reports receiving his dose from [facility] program ([facility]) yesterday. Reports he lost the bottle and unsure what happened to it. EMR reviewed during recent [facility] admission was on 135mg methadone, dose he requesting today. patient  reports mild nausea, baseline, but denies diarrhea, piloerection, agitation, fever chills cp sob.   -called program # from patient left message with call back service ([phone])  -called [phone], left voicemail  -In EMR no recent ED visit for missed/lost methadone

## 2022-08-02 NOTE — BH PSYCHOLOGY - GROUP THERAPY NOTE - NSBHPSYCHOLDISCUSS_PSY_A_CORE
Discussion with collaborating staff took place since patient's last visit
Initiate Treatment: Winlevi bid\\nEpiduo forte qhs
Detail Level: Zone
Render In Strict Bullet Format?: No

## 2022-08-13 ENCOUNTER — EMERGENCY (EMERGENCY)
Facility: HOSPITAL | Age: 42
LOS: 0 days | Discharge: HOME | End: 2022-08-14
Attending: EMERGENCY MEDICINE

## 2022-08-13 VITALS
WEIGHT: 169.98 LBS | SYSTOLIC BLOOD PRESSURE: 118 MMHG | DIASTOLIC BLOOD PRESSURE: 81 MMHG | RESPIRATION RATE: 18 BRPM | HEART RATE: 70 BPM | TEMPERATURE: 97 F | OXYGEN SATURATION: 97 % | HEIGHT: 67 IN

## 2022-08-13 DIAGNOSIS — T50.901A POISONING BY UNSPECIFIED DRUGS, MEDICAMENTS AND BIOLOGICAL SUBSTANCES, ACCIDENTAL (UNINTENTIONAL), INITIAL ENCOUNTER: ICD-10-CM

## 2022-08-13 DIAGNOSIS — R55 SYNCOPE AND COLLAPSE: ICD-10-CM

## 2022-08-13 DIAGNOSIS — Z87.19 PERSONAL HISTORY OF OTHER DISEASES OF THE DIGESTIVE SYSTEM: ICD-10-CM

## 2022-08-13 DIAGNOSIS — M86.9 OSTEOMYELITIS, UNSPECIFIED: ICD-10-CM

## 2022-08-13 DIAGNOSIS — F32.A DEPRESSION, UNSPECIFIED: ICD-10-CM

## 2022-08-13 DIAGNOSIS — Z88.8 ALLERGY STATUS TO OTHER DRUGS, MEDICAMENTS AND BIOLOGICAL SUBSTANCES STATUS: ICD-10-CM
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
## 2022-08-13 DIAGNOSIS — Z88.7 ALLERGY STATUS TO SERUM AND VACCINE: ICD-10-CM

## 2022-08-13 DIAGNOSIS — R00.1 BRADYCARDIA, UNSPECIFIED: ICD-10-CM

## 2022-08-13 LAB
BASOPHILS # BLD AUTO: 0.04 K/UL — SIGNIFICANT CHANGE UP (ref 0–0.2)
BASOPHILS NFR BLD AUTO: 0.6 % — SIGNIFICANT CHANGE UP (ref 0–2)
EOSINOPHIL # BLD AUTO: 0.44 K/UL — SIGNIFICANT CHANGE UP (ref 0–0.5)
EOSINOPHIL NFR BLD AUTO: 6.9 % — HIGH (ref 0–6)
HCT VFR BLD CALC: 37.7 % — LOW (ref 39–50)
HGB BLD-MCNC: 12.4 G/DL — LOW (ref 13–17)
IMM GRANULOCYTES NFR BLD AUTO: 0.3 % — SIGNIFICANT CHANGE UP (ref 0–1.5)
LYMPHOCYTES # BLD AUTO: 1.72 K/UL — SIGNIFICANT CHANGE UP (ref 1–3.3)
LYMPHOCYTES # BLD AUTO: 26.8 % — SIGNIFICANT CHANGE UP (ref 13–44)
MCHC RBC-ENTMCNC: 29.5 PG — SIGNIFICANT CHANGE UP (ref 27–34)
MCHC RBC-ENTMCNC: 32.9 G/DL — SIGNIFICANT CHANGE UP (ref 32–36)
MCV RBC AUTO: 89.8 FL — SIGNIFICANT CHANGE UP (ref 80–100)
MONOCYTES # BLD AUTO: 0.52 K/UL — SIGNIFICANT CHANGE UP (ref 0–0.9)
MONOCYTES NFR BLD AUTO: 8.1 % — SIGNIFICANT CHANGE UP (ref 2–14)
NEUTROPHILS # BLD AUTO: 3.68 K/UL — SIGNIFICANT CHANGE UP (ref 1.8–7.4)
NEUTROPHILS NFR BLD AUTO: 57.3 % — SIGNIFICANT CHANGE UP (ref 43–77)
NRBC # BLD: 0 /100 WBCS — SIGNIFICANT CHANGE UP (ref 0–0)
PLATELET # BLD AUTO: 309 K/UL — SIGNIFICANT CHANGE UP (ref 150–400)
RBC # BLD: 4.2 M/UL — SIGNIFICANT CHANGE UP (ref 4.2–5.8)
RBC # FLD: 12.6 % — SIGNIFICANT CHANGE UP (ref 10.3–14.5)
WBC # BLD: 6.42 K/UL — SIGNIFICANT CHANGE UP (ref 3.8–10.5)
WBC # FLD AUTO: 6.42 K/UL — SIGNIFICANT CHANGE UP (ref 3.8–10.5)

## 2022-08-13 PROCEDURE — 71045 X-RAY EXAM CHEST 1 VIEW: CPT | Mod: 26

## 2022-08-13 PROCEDURE — 70450 CT HEAD/BRAIN W/O DYE: CPT | Mod: 26,MA

## 2022-08-13 PROCEDURE — 93010 ELECTROCARDIOGRAM REPORT: CPT

## 2022-08-13 PROCEDURE — 99285 EMERGENCY DEPT VISIT HI MDM: CPT

## 2022-08-13 RX ORDER — SODIUM CHLORIDE 9 MG/ML
1000 INJECTION INTRAMUSCULAR; INTRAVENOUS; SUBCUTANEOUS ONCE
Refills: 0 | Status: COMPLETED | OUTPATIENT
Start: 2022-08-13 | End: 2022-08-13

## 2022-08-13 RX ADMIN — SODIUM CHLORIDE 1000 MILLILITER(S): 9 INJECTION INTRAMUSCULAR; INTRAVENOUS; SUBCUTANEOUS at 23:17

## 2022-08-13 NOTE — ED PROVIDER NOTE - PATIENT PORTAL LINK FT
You can access the FollowMyHealth Patient Portal offered by [facility] by registering at the following website: [email or web address]. By joining [employer]’s FollowMyHealth portal, you will also be able to view your health information using other applications (apps) compatible with our system.

## 2022-08-13 NOTE — ED PROVIDER NOTE - PHYSICAL EXAMINATION
Gen: Drowsy, NAD, well appearing  Head: NC, AT, EOMI, normal lids/conjunctiva  ENT: normal hearing, patent oropharynx without erythema/exudate, uvula midline  Neck: +supple, no tenderness/meningismus/JVD, +Trachea midline  Pulm: Bilateral BS, normal resp effort, no wheeze/stridor/retractions  CV: RRR, no M/R/G, +dist pulses  Abd: soft, NT/ND, Negative [place] signs, +BS, no palpable masses  Mskel: no edema/erythema/cyanosis  Skin: no rash, warm/dry  Neuro: AAOx3, no apparent sensory/motor deficits, coordination intact

## 2022-08-13 NOTE — ED ADULT NURSE NOTE - NSIMPLEMENTINTERV_GEN_ALL_ED
Implemented All Fall Risk Interventions:  [place] to call system. Call bell, personal items and telephone within reach. Instruct patient to call for assistance. Room bathroom lighting operational. Non-slip footwear when patient is off stretcher. Physically safe environment: no spills, clutter or unnecessary equipment. Stretcher in lowest position, wheels locked, appropriate side rails in place. Provide visual cue, wrist band, yellow gown, etc. Monitor gait and stability. Monitor for mental status changes and reorient to person, place, and time. Review medications for side effects contributing to fall risk. Reinforce activity limits and safety measures with patient and family.

## 2022-08-13 NOTE — ED PROVIDER NOTE - CLINICAL SUMMARY MEDICAL DECISION MAKING FREE TEXT BOX
Patient with known substance abuse, passed out in bank. VSS.  Neuro intact. CT imaging wnl.  Lab values reviewed, there are no values which require acute intervention.  Refused urine tox.  Patient awake and alert with stable gait, only states that he had taken his home medication but did not OD, denies chest pain, acute symptoms, seems stable for dc, denies SI.  Patient given results on discharge and advised to follow up with PMD.  Patient cautioned to return to the Emergency Department for any worsening symptoms.  Patient advised that their doctor may call  to follow up on the specific results of the tests performed today in the emergency department.   Patient appears well on discharge. Patient with known substance abuse, passed out in bank. VSS.  Neuro intact. CT imaging wnl.  Lab values reviewed, there are no values which require acute intervention.  Refused urine tox.  Lithium level pending, send out lab.  Patient awake and alert with stable gait, only states that he had taken his home medication but did not OD, denies chest pain, acute symptoms, seems stable for dc, denies SI.  Patient given results on discharge and advised to follow up with PMD.  Patient cautioned to return to the Emergency Department for any worsening symptoms.  Patient advised that their doctor may call  to follow up on the specific results of the tests performed today in the emergency department.   Patient appears well on discharge.

## 2022-08-13 NOTE — ED ADULT NURSE NOTE - ED STAT RN HANDOFF DETAILS
Pt cleared to dc by Dr Dominguez. Dc instructions explained pt left ambulatory with steady gait, VSS

## 2022-08-13 NOTE — ED ADULT NURSE NOTE - OBJECTIVE STATEMENT
Patient alert, ambulatory and verbally responsive. Pt arrived at ed via ambulance. Pt found at [employer] drive thru [place] lying on the floor. Patient confused unable to recall what happened. Patient Drowsy abrasion on both knees noted. Denies pain or any discomfort. Pt has medication with him but no ID.

## 2022-08-13 NOTE — ED ADULT NURSE NOTE - CHIEF COMPLAINT
Contagion risk and importance of hand washing discussed. The patient is a 41y Male complaining of passed out.

## 2022-08-13 NOTE — ED ADULT TRIAGE NOTE - CHIEF COMPLAINT QUOTE
AS per EMS pt passed out in the drive thru of [employer] pt states he takes Remeron for sleep disorder , also takes clonazepam and lithium , denies S/I H/I .

## 2022-08-13 NOTE — ED PROVIDER NOTE - OBJECTIVE STATEMENT
Pertinent PMH/PSH/FHx/SHx and Review of Systems contained within:  Patient presents to the ED for syncope. Patient with suspected intox, appears drowsy and sleepy in ER, but responsive.  Passed out while at bank.  Minimally interviewable, however admits he tooks meds for sleep, cogentin, and denies pain or SI.  VSS.

## 2022-08-14 VITALS
SYSTOLIC BLOOD PRESSURE: 120 MMHG | HEART RATE: 79 BPM | TEMPERATURE: 98 F | OXYGEN SATURATION: 100 % | RESPIRATION RATE: 17 BRPM | DIASTOLIC BLOOD PRESSURE: 68 MMHG

## 2022-08-14 LAB
ALBUMIN SERPL ELPH-MCNC: 3.4 G/DL — SIGNIFICANT CHANGE UP (ref 3.3–5)
ALP SERPL-CCNC: 55 U/L — SIGNIFICANT CHANGE UP (ref 40–120)
ALT FLD-CCNC: 21 U/L — SIGNIFICANT CHANGE UP (ref 12–78)
ANION GAP SERPL CALC-SCNC: 4 MMOL/L — LOW (ref 5–17)
APAP SERPL-MCNC: < 2 UG/ML (ref 10–30)
AST SERPL-CCNC: 42 U/L — HIGH (ref 15–37)
BILIRUB SERPL-MCNC: 0.2 MG/DL — SIGNIFICANT CHANGE UP (ref 0.2–1.2)
BUN SERPL-MCNC: 10 MG/DL — SIGNIFICANT CHANGE UP (ref 7–23)
CALCIUM SERPL-MCNC: 9.1 MG/DL — SIGNIFICANT CHANGE UP (ref 8.5–10.1)
CHLORIDE SERPL-SCNC: 101 MMOL/L — SIGNIFICANT CHANGE UP (ref 96–108)
CO2 SERPL-SCNC: 32 MMOL/L — HIGH (ref 22–31)
CREAT SERPL-MCNC: 0.86 MG/DL — SIGNIFICANT CHANGE UP (ref 0.5–1.3)
EGFR: 110 ML/MIN/1.73M2 — SIGNIFICANT CHANGE UP
ETHANOL SERPL-MCNC: <10 MG/DL — SIGNIFICANT CHANGE UP (ref 0–10)
GLUCOSE SERPL-MCNC: 85 MG/DL — SIGNIFICANT CHANGE UP (ref 70–99)
POTASSIUM SERPL-MCNC: 3.9 MMOL/L — SIGNIFICANT CHANGE UP (ref 3.5–5.3)
POTASSIUM SERPL-SCNC: 3.9 MMOL/L — SIGNIFICANT CHANGE UP (ref 3.5–5.3)
PROT SERPL-MCNC: 7.6 GM/DL — SIGNIFICANT CHANGE UP (ref 6–8.3)
SALICYLATES SERPL-MCNC: <1.7 MG/DL — LOW (ref 2.8–20)
SODIUM SERPL-SCNC: 137 MMOL/L — SIGNIFICANT CHANGE UP (ref 135–145)
TROPONIN I, HIGH SENSITIVITY RESULT: 4.4 NG/L — SIGNIFICANT CHANGE UP

## 2022-08-14 RX ORDER — SODIUM CHLORIDE 9 MG/ML
1000 INJECTION INTRAMUSCULAR; INTRAVENOUS; SUBCUTANEOUS ONCE
Refills: 0 | Status: COMPLETED | OUTPATIENT
Start: 2022-08-14 | End: 2022-08-14

## 2022-08-14 RX ADMIN — SODIUM CHLORIDE 1000 MILLILITER(S): 9 INJECTION INTRAMUSCULAR; INTRAVENOUS; SUBCUTANEOUS at 01:25

## 2022-08-15 LAB — LITHIUM SERPL-MCNC: 0.55 MMOL/L — LOW (ref 0.6–1.2)

## 2022-10-26 NOTE — ED BEHAVIORAL HEALTH ASSESSMENT NOTE - PSYCHIATRIC ISSUES AND PLAN (INCLUDE STANDING AND PRN MEDICATION)
Detail Level: Detailed Comment: Pt reports a yeast infection from doxycycline, treated by PCP. Acne is getting better on tret 3-4x/week and spironlactone. Will increase to 100 mg qAM, 50 mg qPM for 3-4 months, then taper down. Discussed spironolactone is slow to work. No picking! Render Risk Assessment In Note?: no Depression: Paxil 50 mg daily; Remeron 45 mg HS; post-traumatic stress disorder: 50 mg HS; anxiety: Klonopin 1 mg TID

## 2022-12-01 NOTE — BH INPATIENT PSYCHIATRY DISCHARGE NOTE - NSBHMETABOLIC_PSY_ALL_CORE_FT
BMI:   HbA1c: A1C with Estimated Average Glucose Result: 4.9 % (06-20-22 @ 08:05)    Glucose:   BP: 119/58 (07-13-22 @ 06:32) (107/65 - 119/58)  Lipid Panel: Date/Time: 06-20-22 @ 08:05  Cholesterol, Serum: 126  Direct LDL: --  HDL Cholesterol, Serum: 57  Total Cholesterol/HDL Ration Measurement: --  Triglycerides, Serum: 102  
4

## 2023-03-15 NOTE — ED ADULT NURSE NOTE - NSFALLRSKASSESSDT_ED_ALL_ED
chest wall non-tender, breathing is unlabored without accessory muscle use, normal breath sounds 12-Apr-2020 11:44

## 2023-07-05 NOTE — ED ADULT NURSE NOTE - NS ED BHA MSE SPEECH VOLUME
normal appearance , without tenderness upon palpation , no deformities , trachea midline , Thyroid normal size , no masses , thyroid nontender Soft

## 2024-03-04 NOTE — ED BEHAVIORAL HEALTH ASSESSMENT NOTE - SUICIDE RISK FACTORS
Patient being discharged to HOME per orders.     Discharge instructions and AVS reviewed with patient. Risk factors discussed with patient/family member. Patient verbalized understanding and teach-back method used to demonstrate understanding. All questions addressed. LDAs removed.     New medications are sent to pharmacy of choice.    Patient transported via wheelchair to ride home with belongings, stroke booklet, and prescriptions.    Agitation/severe anxiety/Access to means (pills, firearms, etc.)/Unable to engage in safety planning/History of abuse/trauma/Hopelessness/Chronic pain or acute medical issue Unable to engage in safety planning/Global insomnia/Hopelessness/Agitation/severe anxiety/Chronic pain or acute medical issue/History of abuse/trauma/Access to means (pills, firearms, etc.)

## 2024-03-06 NOTE — ED PROVIDER NOTE - CADM POA CENTRAL LINE
Phone call received from patient; therapist explained reason she called early.  Patient did not recall having a therapy appointment or who referred him to services.  He also is unsure if he needs therapy.  He does not remember working with previous therapist.  Therapist informed patient that she will discuss with his PCP about appropriateness of therapy and call back.  Phone call ended mutually.  
No

## 2024-05-14 NOTE — H&P ADULT - PROBLEM SELECTOR PROBLEM 2
I reviewed the resident's documentation and discussed the patient with the resident. I agree with the resident's medical decision making as documented in the note.     Juan De La Cruz DDS       Hip pain, acute, left

## 2024-08-23 NOTE — PROGRESS NOTE ADULT - PROBLEM SELECTOR PLAN 1
Problem: Safety - Adult  Goal: Free from fall injury  Outcome: Adequate for Discharge  Flowsheets (Taken 8/23/2024 0925)  Free From Fall Injury:   Instruct family/caregiver on patient safety   Based on caregiver fall risk screen, instruct family/caregiver to ask for assistance with transferring infant if caregiver noted to have fall risk factors  Note: Free from falls while in [facility] Oncology.     Problem: Discharge Planning  Goal: Discharge to home or other facility with appropriate resources  Flowsheets (Taken 8/23/2024 0925)  Discharge to home or other facility with appropriate resources:   Identify barriers to discharge with patient and caregiver   Arrange for needed discharge resources and transportation as appropriate  Note: Verbalize understanding of discharge instructions, follow up appointments, and when to call Physician.     Problem: Infection - Adult  Goal: Absence of infection at discharge  Flowsheets (Taken 8/23/2024 0925)  Absence of infection at discharge:   Assess and monitor for signs and symptoms of infection   Monitor lab/diagnostic results  Note: Mediport site with no redness or warmth. Skin over port site intact with no signs of breakdown noted. Patient verbalizes signs/symptoms of port infection and when to notify the physician.      Ancef thru 7/25/17  weekly - cbc, cmp, esr, crp  PICC  d/c to rehab

## 2025-01-22 NOTE — CONSULT NOTE ADULT - ASSESSMENT
"Chief Complaint   Patient presents with    Ent Problem     Ears checkup       Temp 97.3  F (36.3  C) (Temporal)   Ht 2' 8.68\" (83 cm)   Wt 28 lb (12.7 kg)   BMI 18.44 kg/m      Kasandra Arreola    " 35 y/o M, with L3-4 osteomyelitis, MRSA bacteremia Is This A New Presentation, Or A Follow-Up?: Growth How Severe Is Your Skin Lesion?: moderate Has Your Skin Lesion Been Treated?: not been treated

## 2025-02-09 NOTE — BH INPATIENT PSYCHIATRY ASSESSMENT NOTE - NSBHMSEMOOD_PSY_A_CORE
Carolina Weathers RN To  Jillian Yip Sent and Delivered  2/7/2025  5:35 PM   Last Read in MyChart  2/7/2025  5:56 PM by Jillian Yip      Depressed/Anxious/Irritable